# Patient Record
Sex: FEMALE | Race: WHITE | NOT HISPANIC OR LATINO
[De-identification: names, ages, dates, MRNs, and addresses within clinical notes are randomized per-mention and may not be internally consistent; named-entity substitution may affect disease eponyms.]

---

## 2017-02-14 ENCOUNTER — OTHER (OUTPATIENT)
Age: 57
End: 2017-02-14

## 2017-02-27 ENCOUNTER — APPOINTMENT (OUTPATIENT)
Dept: SPINE | Facility: CLINIC | Age: 57
End: 2017-02-27

## 2017-02-27 VITALS
WEIGHT: 195 LBS | DIASTOLIC BLOOD PRESSURE: 90 MMHG | HEIGHT: 66 IN | BODY MASS INDEX: 31.34 KG/M2 | HEART RATE: 71 BPM | SYSTOLIC BLOOD PRESSURE: 147 MMHG

## 2017-02-27 DIAGNOSIS — M48.02 SPINAL STENOSIS, CERVICAL REGION: ICD-10-CM

## 2017-06-05 ENCOUNTER — APPOINTMENT (OUTPATIENT)
Dept: SPINE | Facility: CLINIC | Age: 57
End: 2017-06-05

## 2017-06-05 VITALS
HEART RATE: 72 BPM | DIASTOLIC BLOOD PRESSURE: 79 MMHG | HEIGHT: 66 IN | SYSTOLIC BLOOD PRESSURE: 118 MMHG | WEIGHT: 195 LBS | BODY MASS INDEX: 31.34 KG/M2

## 2017-06-05 RX ORDER — FENOFIBRATE 160 MG/1
160 TABLET ORAL
Qty: 90 | Refills: 0 | Status: ACTIVE | COMMUNITY
Start: 2017-05-15

## 2017-06-05 RX ORDER — NEBIVOLOL HYDROCHLORIDE 10 MG/1
10 TABLET ORAL
Refills: 0 | Status: ACTIVE | COMMUNITY

## 2017-06-05 RX ORDER — AMLODIPINE BESYLATE 5 MG/1
5 TABLET ORAL
Qty: 90 | Refills: 0 | Status: ACTIVE | COMMUNITY
Start: 2017-04-18

## 2017-06-05 RX ORDER — CYCLOBENZAPRINE HYDROCHLORIDE 10 MG/1
10 TABLET, FILM COATED ORAL
Qty: 90 | Refills: 0 | Status: ACTIVE | COMMUNITY
Start: 2016-05-09

## 2017-06-05 RX ORDER — PANTOPRAZOLE 40 MG/1
40 TABLET, DELAYED RELEASE ORAL
Qty: 90 | Refills: 0 | Status: ACTIVE | COMMUNITY
Start: 2017-03-03

## 2017-06-05 RX ORDER — AZILSARTAN KAMEDOXOMIL 80 MG/1
80 TABLET ORAL
Refills: 0 | Status: ACTIVE | COMMUNITY

## 2017-06-05 RX ORDER — EZETIMIBE 10 MG/1
10 TABLET ORAL
Refills: 0 | Status: ACTIVE | COMMUNITY

## 2017-12-04 ENCOUNTER — APPOINTMENT (OUTPATIENT)
Dept: SPINE | Facility: CLINIC | Age: 57
End: 2017-12-04

## 2020-07-14 ENCOUNTER — APPOINTMENT (OUTPATIENT)
Dept: SPINE | Facility: CLINIC | Age: 60
End: 2020-07-14
Payer: COMMERCIAL

## 2020-07-14 VITALS
BODY MASS INDEX: 31.34 KG/M2 | HEIGHT: 66 IN | DIASTOLIC BLOOD PRESSURE: 76 MMHG | TEMPERATURE: 36.4 F | WEIGHT: 195 LBS | OXYGEN SATURATION: 98 % | HEART RATE: 94 BPM | RESPIRATION RATE: 16 BRPM | SYSTOLIC BLOOD PRESSURE: 126 MMHG

## 2020-07-14 PROCEDURE — 99203 OFFICE O/P NEW LOW 30 MIN: CPT

## 2020-07-14 RX ORDER — MULTIVIT-MIN/IRON/FOLIC ACID/K 18-600-40
400 CAPSULE ORAL
Refills: 0 | Status: ACTIVE | COMMUNITY

## 2020-07-14 RX ORDER — ATORVASTATIN CALCIUM 80 MG/1
TABLET, FILM COATED ORAL
Refills: 0 | Status: ACTIVE | COMMUNITY

## 2020-07-14 RX ORDER — ELECTROLYTES/DEXTROSE
SOLUTION, ORAL ORAL
Refills: 0 | Status: ACTIVE | COMMUNITY

## 2020-07-14 RX ORDER — ALPRAZOLAM 0.25 MG/1
0.25 TABLET ORAL
Refills: 0 | Status: ACTIVE | COMMUNITY

## 2020-07-14 RX ORDER — CALCIUM CARBONATE/VITAMIN D3 600 MG-10
TABLET ORAL
Refills: 0 | Status: ACTIVE | COMMUNITY

## 2020-07-14 RX ORDER — DULOXETINE HYDROCHLORIDE 60 MG/1
60 CAPSULE, DELAYED RELEASE PELLETS ORAL
Qty: 180 | Refills: 0 | Status: DISCONTINUED | COMMUNITY
Start: 2017-04-05 | End: 2020-07-14

## 2020-07-14 RX ORDER — DIPHENHYDRAMINE HCL 25 MG
25 CAPSULE ORAL
Refills: 0 | Status: ACTIVE | COMMUNITY

## 2020-07-14 RX ORDER — ASPIRIN 81 MG
81 TABLET, DELAYED RELEASE (ENTERIC COATED) ORAL
Refills: 0 | Status: ACTIVE | COMMUNITY

## 2020-07-14 RX ORDER — DULOXETINE HYDROCHLORIDE 60 MG/1
60 CAPSULE, DELAYED RELEASE ORAL
Refills: 0 | Status: ACTIVE | COMMUNITY

## 2020-07-14 RX ORDER — UBIDECARENONE 200 MG
CAPSULE ORAL
Refills: 0 | Status: ACTIVE | COMMUNITY

## 2020-07-30 ENCOUNTER — OUTPATIENT (OUTPATIENT)
Dept: OUTPATIENT SERVICES | Facility: HOSPITAL | Age: 60
LOS: 1 days | End: 2020-07-30
Payer: COMMERCIAL

## 2020-07-30 ENCOUNTER — APPOINTMENT (OUTPATIENT)
Dept: RADIOLOGY | Facility: CLINIC | Age: 60
End: 2020-07-30
Payer: COMMERCIAL

## 2020-07-30 ENCOUNTER — APPOINTMENT (OUTPATIENT)
Dept: MRI IMAGING | Facility: CLINIC | Age: 60
End: 2020-07-30
Payer: COMMERCIAL

## 2020-07-30 ENCOUNTER — APPOINTMENT (OUTPATIENT)
Dept: CT IMAGING | Facility: CLINIC | Age: 60
End: 2020-07-30
Payer: COMMERCIAL

## 2020-07-30 DIAGNOSIS — Z98.89 OTHER SPECIFIED POSTPROCEDURAL STATES: Chronic | ICD-10-CM

## 2020-07-30 DIAGNOSIS — M48.00 SPINAL STENOSIS, SITE UNSPECIFIED: ICD-10-CM

## 2020-07-30 DIAGNOSIS — M54.5 LOW BACK PAIN: ICD-10-CM

## 2020-07-30 DIAGNOSIS — M41.9 SCOLIOSIS, UNSPECIFIED: ICD-10-CM

## 2020-07-30 PROCEDURE — 72110 X-RAY EXAM L-2 SPINE 4/>VWS: CPT

## 2020-07-30 PROCEDURE — 72148 MRI LUMBAR SPINE W/O DYE: CPT | Mod: 26

## 2020-07-30 PROCEDURE — 72084 X-RAY EXAM ENTIRE SPI 6/> VW: CPT | Mod: 26

## 2020-07-30 PROCEDURE — 72084 X-RAY EXAM ENTIRE SPI 6/> VW: CPT

## 2020-07-30 PROCEDURE — 72131 CT LUMBAR SPINE W/O DYE: CPT | Mod: 26

## 2020-07-30 PROCEDURE — 72148 MRI LUMBAR SPINE W/O DYE: CPT

## 2020-07-30 PROCEDURE — 72131 CT LUMBAR SPINE W/O DYE: CPT

## 2020-07-30 PROCEDURE — 72082 X-RAY EXAM ENTIRE SPI 2/3 VW: CPT

## 2020-07-31 NOTE — REVIEW OF SYSTEMS
[Tingling] : tingling [Numbness] : numbness [Difficulty Walking] : difficulty walking [Negative] : Endocrine [de-identified] : lower back pain and bilateral leg pain

## 2020-07-31 NOTE — PHYSICAL EXAM
[General Appearance - Alert] : alert [General Appearance - In No Acute Distress] : in no acute distress [General Appearance - Well Nourished] : well nourished [Impaired Insight] : insight and judgment were intact [Oriented To Time, Place, And Person] : oriented to person, place, and time [General Appearance - Well Developed] : well developed [Affect] : the affect was normal [Mood] : the mood was normal [Person] : oriented to person [Place] : oriented to place [Time] : oriented to time [Remote Intact] : remote memory intact [Short Term Intact] : short term memory intact [Registration Intact] : recent registration memory intact [Span Intact] : the attention span was normal [Concentration Intact] : normal concentrating ability [Visual Intact] : visual attention was ~T not ~L decreased [Fluency] : fluency intact [Comprehension] : comprehension intact [Reading] : reading intact [Current Events] : adequate knowledge of current events [Past History] : adequate knowledge of personal past history [Cranial Nerves Accessory (XI - Cranial And Spinal)] : head turning and shoulder shrug symmetric [Cranial Nerves Oculomotor (III)] : extraocular motion intact [Vocabulary] : adequate range of vocabulary [Cranial Nerves Hypoglossal (XII)] : there was no tongue deviation with protrusion [Motor Tone] : muscle tone was normal in all four extremities [Involuntary Movements] : no involuntary movements were seen [Motor Strength] : muscle strength was normal in all four extremities [No Muscle Atrophy] : normal bulk in all four extremities [Sensation Tactile Decrease] : light touch was intact [Dysesthesia] : dysesthesia was present [Sensation Pain / Temperature Decrease] : pain and temperature was intact [Balance] : balance was intact [2+] : Ankle jerk left 2+ [No Visual Abnormalities] : no visible abnormailities [Able to toe walk] : the patient was able to toe walk [Normal] : normal [Able to heel walk] : the patient was able to heel walk [Outer Ear] : the ears and nose were normal in appearance [Sclera] : the sclera and conjunctiva were normal [Neck Appearance] : the appearance of the neck was normal [] : no respiratory distress [Skin Color & Pigmentation] : normal skin color and pigmentation [Abnormal Walk] : normal gait [Plantar Reflex Right Only] : normal on the right [Coordination - Dysmetria Impaired Finger-to-Nose Bilateral] : not present [Limited Balance] : balance was intact [Plantar Reflex Left Only] : normal on the left [___] : absent on the right [___] : absent on the left [Straight-Leg Raise Test - Left] : straight leg raise of the left leg was negative [Straight-Leg Raise Test - Right] : straight leg raise  of the right leg was negative

## 2020-07-31 NOTE — ASSESSMENT
[FreeTextEntry1] : 59 year old with lumbar stenosis and progressive lower back pain with leg radicular pain.  Neurological exam is normal.  Lumbar MRI from 2015 shows multiple levels of degenerative disease and severe scoliosis and impingement of cauda equina at L4 L5.  Severe canal stenosis at L2 L3 and L3 L4.  An updated MRI  of te lumbar spine was ordered with additional testing including a scoliosis image, flexion/extension lumbar xrays, CT scan of the lower spine.   She will return in two weeks for review.  She will avoid heavy lifting and continue light stretching.

## 2020-07-31 NOTE — HISTORY OF PRESENT ILLNESS
[> 3 months] : more  than 3 months [FreeTextEntry1] : Lower back pain and  bilateral leg pain  [de-identified] : Today I had the pleasure of  seeing Ms Pang in consultation who was seen five years ago following a cervical fusion of C 4 to T1.  She has no neck or arm radicular pain, but does  carry a chronic complaint of lower back pain with leg radiculopathy bilaterally.  Her back pain began eight years ago  which was isolated to the right buttock area and she was diagnosed with lumbar stenosis and scoliosis. The pain in the last month has progressed and increased in severity. The pain ranges from a 2/10 to a 7/10.  She was given an epidural which was not effective and after a few weeks after the injection, she had a shingles event of her right hip.  This has resolved and she has not had any post herpetic pain.\par \par Today she reports lower back pain across the back and bilaterally leg pain for close to 20 years.  The pain is radiating from the back and into the legs, there is tingling and numbness of the left leg and an achy pain described on the right leg.  The right leg also has a sharp shooting pain that starts in the buttock area and radiates into the entire the leg.   The pain does radiate into each foot with burning dysesthesia of the foot.   She has trouble walking and can only navigate a block at at time.  Sitting and standing is difficult.  If she squats and curls in a fetal position the pain subsides.  No leg weakness is reported and no stool or urine incontinence is noted.  Two months ago she went to a chiropractor which helped alleviate the pain, but was self limiting.  Cyclobenzaprine and cymbalta does improve her pain as well.   She has not gone to PT.  \par \par

## 2020-08-03 ENCOUNTER — APPOINTMENT (OUTPATIENT)
Dept: SPINE | Facility: CLINIC | Age: 60
End: 2020-08-03
Payer: COMMERCIAL

## 2020-08-03 VITALS
OXYGEN SATURATION: 97 % | BODY MASS INDEX: 31.34 KG/M2 | TEMPERATURE: 98.5 F | DIASTOLIC BLOOD PRESSURE: 81 MMHG | HEART RATE: 78 BPM | HEIGHT: 66 IN | WEIGHT: 195 LBS | SYSTOLIC BLOOD PRESSURE: 130 MMHG | RESPIRATION RATE: 16 BRPM

## 2020-08-03 PROCEDURE — 99213 OFFICE O/P EST LOW 20 MIN: CPT

## 2020-08-03 NOTE — REASON FOR VISIT
[Follow-Up: _____] : a [unfilled] follow-up visit [Other: _____] : [unfilled] [FreeTextEntry1] : 60 year old female with a history of chronic lumbar back pain that has progressed in nature and severity over a 5 year period. She has difficulty walking and  performing activities.   Her pain is rated today at  1 4/10.    On MRI of the lumbar region severe Spondylolisthesis L4 L5 and nerve compression is present.   There is a lack of curvature and vacuum disc disease at multiple levels.  In the past she had undergone a C 4 to T 1 fusion which she has recovered well from and has no upper extremity or neck complaints.  T1 pelvic angle is 30°, SVA =8 CMS and she needs about 20° of lumbar lordosis.  Also in addition to multilevel lumbar stenosis at L2-L5 levels will be significant stenosis at T10-11.

## 2020-08-03 NOTE — REVIEW OF SYSTEMS
[Numbness] : numbness [Difficulty Walking] : difficulty walking [Tingling] : tingling [Negative] : Endocrine [de-identified] : lower back pain and leg pain

## 2020-08-03 NOTE — ASSESSMENT
[FreeTextEntry1] : An extensive discussion for a two staged thoracic - lumbar fusion surgical procedure was discussed.  First be a lateral interbody fusion from L1 to L4-5 though there is a teardrop psoas at the L4-5 level so that may not be able to be done. Stage IIB decompressive laminectomy the thoracic and lumbar area along with T9 the pelvis posterior fusion .A Thoracic MRI and CT was ordered to evaluate additional stenosis and possible compression.  Ms. Pang has significant limitations with her daily activity and is willing to  undergo surgery .  She will begin the process of scheduling.

## 2020-08-03 NOTE — DATA REVIEWED
[de-identified] : 7/30/20202 CT scan of lumbar region from [facility]  [de-identified] : 7/30/2020 MRI of lumbar region from [facility]  [de-identified] : 7/30/2020  Scoliosis image from [facility]

## 2020-08-20 ENCOUNTER — OUTPATIENT (OUTPATIENT)
Dept: OUTPATIENT SERVICES | Facility: HOSPITAL | Age: 60
LOS: 1 days | End: 2020-08-20
Payer: COMMERCIAL

## 2020-08-20 ENCOUNTER — APPOINTMENT (OUTPATIENT)
Dept: MRI IMAGING | Facility: CLINIC | Age: 60
End: 2020-08-20
Payer: COMMERCIAL

## 2020-08-20 ENCOUNTER — APPOINTMENT (OUTPATIENT)
Dept: CT IMAGING | Facility: CLINIC | Age: 60
End: 2020-08-20
Payer: COMMERCIAL

## 2020-08-20 DIAGNOSIS — Z98.89 OTHER SPECIFIED POSTPROCEDURAL STATES: Chronic | ICD-10-CM

## 2020-08-20 DIAGNOSIS — M48.00 SPINAL STENOSIS, SITE UNSPECIFIED: ICD-10-CM

## 2020-08-20 PROCEDURE — 72146 MRI CHEST SPINE W/O DYE: CPT | Mod: 26

## 2020-08-20 PROCEDURE — 72128 CT CHEST SPINE W/O DYE: CPT

## 2020-08-20 PROCEDURE — 72128 CT CHEST SPINE W/O DYE: CPT | Mod: 26

## 2020-08-20 PROCEDURE — 72146 MRI CHEST SPINE W/O DYE: CPT

## 2020-08-31 ENCOUNTER — APPOINTMENT (OUTPATIENT)
Dept: SPINE | Facility: CLINIC | Age: 60
End: 2020-08-31
Payer: MEDICARE

## 2020-08-31 DIAGNOSIS — Z98.890 OTHER SPECIFIED POSTPROCEDURAL STATES: ICD-10-CM

## 2020-08-31 PROCEDURE — 99212 OFFICE O/P EST SF 10 MIN: CPT | Mod: 95

## 2020-08-31 NOTE — ASSESSMENT
[FreeTextEntry1] : Long standing lower back pain and leg radicular pain.   Multiple levels of stenosis and a  T 8 to pelvic fusion was discussed and all alternatives and risks with benefits was reviewed and understood.  She is scheduled in October for surgical repair.

## 2020-08-31 NOTE — HISTORY OF PRESENT ILLNESS
[Home] : at home, [unfilled] , at the time of the visit. [Medical Office: ([facility])___] : at the medical office located in  [Other:____] : [unfilled] [Verbal consent obtained from patient] : the patient, [unfilled] [FreeTextEntry4] : Kim Lombardo [FreeTextEntry1] : 60 year old female with a history of lower back pain with bilateral leg pain.  The pain is progressing in nature and sitting and sanding is difficult.  She was evaluated and a two staged procedure was recommended for an extensive spinal  fusion of the thoraco-lumbar region.  Images of the thoracic spine was discussed.

## 2020-08-31 NOTE — REVIEW OF SYSTEMS
[Numbness] : numbness [Tingling] : tingling [Difficulty Walking] : difficulty walking [Negative] : Endocrine [de-identified] : lower back pain

## 2020-08-31 NOTE — REASON FOR VISIT
[Follow-Up: _____] : a [unfilled] follow-up visit [FreeTextEntry1] : A recent MRI of the thoracic spine shows multilevel thoracic stenosis which will need to be addressed during the second stage of her surgery.

## 2020-09-22 ENCOUNTER — OUTPATIENT (OUTPATIENT)
Dept: OUTPATIENT SERVICES | Facility: HOSPITAL | Age: 60
LOS: 1 days | End: 2020-09-22
Payer: COMMERCIAL

## 2020-09-22 VITALS
HEART RATE: 76 BPM | WEIGHT: 199.96 LBS | TEMPERATURE: 98 F | SYSTOLIC BLOOD PRESSURE: 158 MMHG | OXYGEN SATURATION: 97 % | DIASTOLIC BLOOD PRESSURE: 88 MMHG | HEIGHT: 65 IN | RESPIRATION RATE: 20 BRPM

## 2020-09-22 DIAGNOSIS — Z29.9 ENCOUNTER FOR PROPHYLACTIC MEASURES, UNSPECIFIED: ICD-10-CM

## 2020-09-22 DIAGNOSIS — M48.061 SPINAL STENOSIS, LUMBAR REGION WITHOUT NEUROGENIC CLAUDICATION: ICD-10-CM

## 2020-09-22 DIAGNOSIS — I10 ESSENTIAL (PRIMARY) HYPERTENSION: ICD-10-CM

## 2020-09-22 DIAGNOSIS — M41.9 SCOLIOSIS, UNSPECIFIED: ICD-10-CM

## 2020-09-22 DIAGNOSIS — Z01.818 ENCOUNTER FOR OTHER PREPROCEDURAL EXAMINATION: ICD-10-CM

## 2020-09-22 DIAGNOSIS — Z98.890 OTHER SPECIFIED POSTPROCEDURAL STATES: Chronic | ICD-10-CM

## 2020-09-22 DIAGNOSIS — Z98.89 OTHER SPECIFIED POSTPROCEDURAL STATES: Chronic | ICD-10-CM

## 2020-09-22 LAB
A1C WITH ESTIMATED AVERAGE GLUCOSE RESULT: 6.2 % — HIGH (ref 4–5.6)
ANION GAP SERPL CALC-SCNC: 12 MMOL/L — SIGNIFICANT CHANGE UP (ref 5–17)
BLD GP AB SCN SERPL QL: NEGATIVE — SIGNIFICANT CHANGE UP
BUN SERPL-MCNC: 26 MG/DL — HIGH (ref 7–23)
CALCIUM SERPL-MCNC: 10.4 MG/DL — SIGNIFICANT CHANGE UP (ref 8.4–10.5)
CHLORIDE SERPL-SCNC: 106 MMOL/L — SIGNIFICANT CHANGE UP (ref 96–108)
CO2 SERPL-SCNC: 24 MMOL/L — SIGNIFICANT CHANGE UP (ref 22–31)
CREAT SERPL-MCNC: 0.86 MG/DL — SIGNIFICANT CHANGE UP (ref 0.5–1.3)
ESTIMATED AVERAGE GLUCOSE: 131 MG/DL — HIGH (ref 68–114)
GLUCOSE SERPL-MCNC: 139 MG/DL — HIGH (ref 70–99)
HCT VFR BLD CALC: 48.8 % — HIGH (ref 34.5–45)
HGB BLD-MCNC: 15.7 G/DL — HIGH (ref 11.5–15.5)
MCHC RBC-ENTMCNC: 29.6 PG — SIGNIFICANT CHANGE UP (ref 27–34)
MCHC RBC-ENTMCNC: 32.2 GM/DL — SIGNIFICANT CHANGE UP (ref 32–36)
MCV RBC AUTO: 92.1 FL — SIGNIFICANT CHANGE UP (ref 80–100)
MRSA PCR RESULT.: SIGNIFICANT CHANGE UP
NRBC # BLD: 0 /100 WBCS — SIGNIFICANT CHANGE UP (ref 0–0)
PLATELET # BLD AUTO: 292 K/UL — SIGNIFICANT CHANGE UP (ref 150–400)
POTASSIUM SERPL-MCNC: 4.1 MMOL/L — SIGNIFICANT CHANGE UP (ref 3.5–5.3)
POTASSIUM SERPL-SCNC: 4.1 MMOL/L — SIGNIFICANT CHANGE UP (ref 3.5–5.3)
RBC # BLD: 5.3 M/UL — HIGH (ref 3.8–5.2)
RBC # FLD: 13.2 % — SIGNIFICANT CHANGE UP (ref 10.3–14.5)
RH IG SCN BLD-IMP: POSITIVE — SIGNIFICANT CHANGE UP
S AUREUS DNA NOSE QL NAA+PROBE: SIGNIFICANT CHANGE UP
SODIUM SERPL-SCNC: 142 MMOL/L — SIGNIFICANT CHANGE UP (ref 135–145)
WBC # BLD: 5.12 K/UL — SIGNIFICANT CHANGE UP (ref 3.8–10.5)
WBC # FLD AUTO: 5.12 K/UL — SIGNIFICANT CHANGE UP (ref 3.8–10.5)

## 2020-09-22 PROCEDURE — 87640 STAPH A DNA AMP PROBE: CPT

## 2020-09-22 PROCEDURE — 86901 BLOOD TYPING SEROLOGIC RH(D): CPT

## 2020-09-22 PROCEDURE — 80048 BASIC METABOLIC PNL TOTAL CA: CPT

## 2020-09-22 PROCEDURE — 85027 COMPLETE CBC AUTOMATED: CPT

## 2020-09-22 PROCEDURE — 86850 RBC ANTIBODY SCREEN: CPT

## 2020-09-22 PROCEDURE — G0463: CPT

## 2020-09-22 PROCEDURE — 87641 MR-STAPH DNA AMP PROBE: CPT

## 2020-09-22 PROCEDURE — 83036 HEMOGLOBIN GLYCOSYLATED A1C: CPT

## 2020-09-22 PROCEDURE — 86900 BLOOD TYPING SEROLOGIC ABO: CPT

## 2020-09-22 RX ORDER — VANCOMYCIN HCL 1 G
1500 VIAL (EA) INTRAVENOUS ONCE
Refills: 0 | Status: DISCONTINUED | OUTPATIENT
Start: 2020-09-29 | End: 2020-10-06

## 2020-09-22 NOTE — H&P PST ADULT - NSICDXPASTMEDICALHX_GEN_ALL_CORE_FT
PAST MEDICAL HISTORY:  Anxiety and depression     Eczema extremities    GERD (gastroesophageal reflux disease)     H/O sinusitis     History of sciatica mostly right side    HTN (hypertension)     Hyperlipidemia     Meniscus tear left knee    Migraine     Scoliosis     Seasonal allergies     Spinal stenosis in cervical region      PAST MEDICAL HISTORY:  Anxiety and depression     Eczema extremities    GERD (gastroesophageal reflux disease)     H/O sinusitis     History of sciatica mostly right side    HTN (hypertension)     Hyperlipidemia     Lumbar spinal stenosis     Meniscus tear left knee    Migraine     Scoliosis     Seasonal allergies     Spinal stenosis in cervical region

## 2020-09-22 NOTE — H&P PST ADULT - ASSESSMENT
CAPRINI SCORE [CLOT updated 18]    AGE RELATED RISK FACTORS                                                       MOBILITY RELATED FACTORS  [1 ] Age 41-60 years                                            (1 Point)                    [ ] Bed rest                                                        (1 Point)  [ ] Age: 61-74 years                                           (2 Points)                  [ ] Plaster cast                                                   (2 Points)  [ ] Age= 75 years                                              (3 Points)                    [ ] Bed bound for more than 72 hours                 (2 Points)    DISEASE RELATED RISK FACTORS                                               GENDER SPECIFIC FACTORS  [ ] Edema in the lower extremities                       (1 Point)              [ ] Pregnancy                                                     (1 Point)  [ ] Varicose veins                                               (1 Point)                     [ ] Post-partum < 6 weeks                                   (1 Point)             [1] BMI > 25 Kg/m2                                            (1 Point)                     [ ] Hormonal therapy  or oral contraception          (1 Point)                 [ ] Sepsis (in the previous month)                        (1 Point)               [ ] History of pregnancy complications                 (1 point)  [ ] Pneumonia or serious lung disease                                               [ ] Unexplained or recurrent                     (1 Point)           (in the previous month)                               (1 Point)  [ ] Abnormal pulmonary function test                     (1 Point)                 SURGERY RELATED RISK FACTORS  [ ] Acute myocardial infarction                              (1 Point)               [ ]  Section                                             (1 Point)  [ ] Congestive heart failure (in the previous month)  (1 Point)      [ ] Minor surgery                                                  (1 Point)   [ ] Inflammatory bowel disease                             (1 Point)               [ ] Arthroscopic surgery                                        (2 Points)  [ ] Central venous access                                      (2 Points)                [ 2] General surgery lasting more than 45 minutes (2 points)  [ ] Present or previous malignancy                     (2 Points)                [ ] Elective arthroplasty                                         (5 points)    [ ] Stroke (in the previous month)                          (5 Points)                                                                                                                                                           HEMATOLOGY RELATED FACTORS                                                 TRAUMA RELATED RISK FACTORS  [ ] Prior episodes of VTE                                     (3 Points)                [ ] Fracture of the hip, pelvis, or leg                       (5 Points)  [ ] Positive family history for VTE                         (3 Points)             [ ] Acute spinal cord injury (in the previous month)  (5 Points)  [ ] Prothrombin 40121 A                                     (3 Points)               [ ] Paralysis  (less than 1 month)                             (5 Points)  [ ] Factor V Leiden                                             (3 Points)                  [ ] Multiple Trauma within 1 month                        (5 Points)  [ ] Lupus anticoagulants                                     (3 Points)                                                           [ ] Anticardiolipin antibodies                               (3 Points)                                                       [ ] High homocysteine in the blood                      (3 Points)                                             [ ] Other congenital or acquired thrombophilia      (3 Points)                                                [ ] Heparin induced thrombocytopenia                  (3 Points)                                     Total Score [ 4]

## 2020-09-22 NOTE — H&P PST ADULT - NS PRO FEM  PAP SMEARS 3YRS
"                                                    Physical Therapy Daily Note/ Plan of Care     Name: Kamla Espinoza  Clinic Number: [id number]  Diagnosis:   Encounter Diagnoses   Name Primary?    Chronic pain of both knees     Difficulty walking     Decreased range of motion (ROM) of both knees      Physician: Raffaele Saucedo MD  Precautions: diabetes and standard  Visit #: 9 of 20    Time In: 2:55 PM  Time Out: 3:55 PM  1:1 treatment time: 60 minutes    Visit amount: 121.28  Total amount: ~749.4     Initial Evaluation Date: 8/28/19  POC Expiration: 10/17/19, 11/19/19    Subjective     Pt reports that her knees are feeling good right now.  She is not in any pain.    Objective       Range of Motion: Knee    RIGHT LEFT   Flexion: 90 95   Extension -10 -10      Strength: Knee and Ankle    RIGHT  LIEFT   Quadriceps  Knee Extension 4/5 4/5   Hamstrings  Knee Flexion 4/5 4/5   Gastroc/Soleus  Plantarflexion 4/5 4/5   Anterior Tibialis  Dorsiflexion    4+/5 4+/5         Strength: Hip     RIGHT  LEFT   Hip Flexion 4-/5 4-/5   Abduction 4-/5 4-/5   IR 4-/5 4-/5   ER 4/5 4/5   Ext 3+/5 3+/5         Kamla received individual therapeutic exercises and assessment to develop strength, endurance, ROM and flexibility for 60 minutes including:    Quad sets 2 minutes c/ 5" holds  SAQ 2 x 10 c/ 2# ankle weights  SLR 2 x 10 c/ 2# ankle weights  Glute sets 2 x 10 c/ 5" holds  Seated heel slides 20 x 5" hold  Seated marches x 20 B c/ 2# ankle weights  Seated heel/toe raises 3 x 10  Seated add squeezes 5" hold x 2 minutes  Seated clamshells c/ OTB x 20  LAQ 3 x 10 c/ 2# ankle weights  Seated HSS 3 x 30"  Seated knee extension stretch c/ foot on stool and 4# ankle weights above and below joint line x 2 min B  Gastroc wedge stretch 2 x 30"  Standing heel raises 2 x 10 c/ 2# ankle weights  Standing HS curls 2 x 10 c/ 2# ankle weights  Step ups on airex x 20  Steamboats 1 x 10 B c/ 2# ankle weights      Written Home Exercises " Provided: at al  Pt demo good understanding of the education provided. Kamla demonstrated good return demonstration of activities.     Education provided re: importance of improving strength and mobility  Kamla verbalized good understanding of education provided.   No spiritual or educational barriers to learning provided    Assessment     Pt able to tolerate all interventions well without any complaints of pain or discomfort. Pt c/ improved BLE strength and knee ROM, however remains significantly limited c/ both.  Pt will continue to benefit from outpatient PT until surgery date. Continue to progress based on pt's tolerance.    This is a 69 y.o. female referred to outpatient physical therapy and presents with a medical diagnosis of B knee OA and demonstrates limitations as described in the problem list. Pt prognosis is Good. Pt will continue to benefit from skilled outpatient physical therapy to address the deficits listed in the problem list, provide pt/family education and to maximize pt's level of independence in the home and community environment.     Goals:  Short Term Goals:   1. Increase B knee AROM -10-89 degrees , pain-free, within 3 weeks to restore normal functional mobility (Met 10/24/19)  2. Patient able to tolerate at least 5 minutes sustained standing/walking with B knee pain less than 6/10 within 3 weeks  (Met 10/24/19)     Long Term Goals  1. Patient to report B knee pain less than 7/10 at all times within 6 weeks for improved celina to sustained activity (Met 10/24/19)  2. Patient able to tolerate at least 10 minutes sustained standing/walking with R knee pain less than 6/10 within 6 weeks to improve functional mobility (Not Met)  3. Increase B knee AROM to -5 to 100 degrees within 6 weeks to improve mobility prior to upcoming TKA's( Not Met)  4. Increase B LE strength to at least 4-/5 throughout within 8 weeks for improved endurance with sustained activity (Not Met)     Plan     Extend plan of  care for an additional 2x/wk until time of surgery on 11/19/19    Therapist: Drew Dubose PT,DPT  10/24/2019   yes

## 2020-09-22 NOTE — H&P PST ADULT - NSICDXPASTSURGICALHX_GEN_ALL_CORE_FT
PAST SURGICAL HISTORY:  H/O cervical spine surgery C3-6   11/2015    History of appendectomy     History of tonsillectomy

## 2020-09-22 NOTE — H&P PST ADULT - NSICDXFAMILYHX_GEN_ALL_CORE_FT
FAMILY HISTORY:  Family history of atrial fibrillation, mother  age 94  Family hx of hypertension, brother age 65  FHx: type 2 diabetes mellitus, brother age 65 alive

## 2020-09-22 NOTE — H&P PST ADULT - NSICDXPROBLEM_GEN_ALL_CORE_FT
PROBLEM DIAGNOSES  Problem: Lumbar spinal stenosis  Assessment and Plan: scheduled for stage 1 / L1-5 lumbar lateral interbody fusion   both verbal and written preop instruction given, patient verbalized understanding.  chlorhexidine wash instruction given    Problem: Hypertension  Assessment and Plan: instructed to continue antihypertensive medication eduardo-op  scheduled for evaluation with her PCP on 9/23/2020    Problem: Need for prophylactic measure  Assessment and Plan: The Caprini score indicates this patient is at risk for a VTE event (score 3-5).  Most surgical patients in this group would benefit from pharmacologic prophylaxis.  The surgical team will determine the balance between VTE risk and bleeding risk

## 2020-09-22 NOTE — H&P PST ADULT - HISTORY OF PRESENT ILLNESS
60 year old female with h/o scoliosis, presents to preadmission testing for scheduled stage 1, L1-5 lumbar lateral inter 60 year old female with h/o scoliosis/ spinal stenosis- lumbar region, presents to preadmission testing for scheduled stage 1, L1-5 lumbar lateral interbody fusion, scheduled for 9/29/2020. Her medical history includes HTN, hyperlipidemia, osteoarthritis, anxiety/depression disorders, GERD, eczema, sinusitis.    Scheduled for COVID-19 swab on 9/26/2020.

## 2020-09-25 DIAGNOSIS — Z01.818 ENCOUNTER FOR OTHER PREPROCEDURAL EXAMINATION: ICD-10-CM

## 2020-09-25 PROBLEM — Z87.09 PERSONAL HISTORY OF OTHER DISEASES OF THE RESPIRATORY SYSTEM: Chronic | Status: ACTIVE | Noted: 2020-09-22

## 2020-09-25 PROBLEM — M48.061 SPINAL STENOSIS, LUMBAR REGION WITHOUT NEUROGENIC CLAUDICATION: Chronic | Status: ACTIVE | Noted: 2020-09-22

## 2020-09-25 PROBLEM — L30.9 DERMATITIS, UNSPECIFIED: Chronic | Status: ACTIVE | Noted: 2020-09-22

## 2020-09-25 PROBLEM — Z86.69 PERSONAL HISTORY OF OTHER DISEASES OF THE NERVOUS SYSTEM AND SENSE ORGANS: Chronic | Status: ACTIVE | Noted: 2020-09-22

## 2020-09-26 ENCOUNTER — TRANSCRIPTION ENCOUNTER (OUTPATIENT)
Age: 60
End: 2020-09-26

## 2020-09-26 ENCOUNTER — APPOINTMENT (OUTPATIENT)
Dept: DISASTER EMERGENCY | Facility: CLINIC | Age: 60
End: 2020-09-26

## 2020-09-26 LAB — SARS-COV-2 N GENE NPH QL NAA+PROBE: NOT DETECTED

## 2020-09-28 ENCOUNTER — TRANSCRIPTION ENCOUNTER (OUTPATIENT)
Age: 60
End: 2020-09-28

## 2020-09-29 ENCOUNTER — APPOINTMENT (OUTPATIENT)
Dept: SPINE | Facility: HOSPITAL | Age: 60
End: 2020-09-29

## 2020-09-29 ENCOUNTER — INPATIENT (INPATIENT)
Facility: HOSPITAL | Age: 60
LOS: 10 days | Discharge: OTHER INSTITUTION | DRG: 453 | End: 2020-10-10
Attending: NEUROLOGICAL SURGERY | Admitting: NEUROLOGICAL SURGERY
Payer: COMMERCIAL

## 2020-09-29 ENCOUNTER — TRANSCRIPTION ENCOUNTER (OUTPATIENT)
Age: 60
End: 2020-09-29

## 2020-09-29 VITALS
DIASTOLIC BLOOD PRESSURE: 90 MMHG | SYSTOLIC BLOOD PRESSURE: 133 MMHG | OXYGEN SATURATION: 96 % | HEIGHT: 65 IN | RESPIRATION RATE: 18 BRPM | TEMPERATURE: 98 F | HEART RATE: 75 BPM | WEIGHT: 199.96 LBS

## 2020-09-29 DIAGNOSIS — Z98.89 OTHER SPECIFIED POSTPROCEDURAL STATES: Chronic | ICD-10-CM

## 2020-09-29 DIAGNOSIS — Z98.890 OTHER SPECIFIED POSTPROCEDURAL STATES: Chronic | ICD-10-CM

## 2020-09-29 DIAGNOSIS — M41.9 SCOLIOSIS, UNSPECIFIED: ICD-10-CM

## 2020-09-29 LAB
A1C WITH ESTIMATED AVERAGE GLUCOSE RESULT: 6.4 % — HIGH (ref 4–5.6)
ANION GAP SERPL CALC-SCNC: 11 MMOL/L — SIGNIFICANT CHANGE UP (ref 5–17)
APTT BLD: 39.6 SEC — HIGH (ref 27.5–35.5)
BASOPHILS # BLD AUTO: 0.03 K/UL — SIGNIFICANT CHANGE UP (ref 0–0.2)
BASOPHILS NFR BLD AUTO: 0.2 % — SIGNIFICANT CHANGE UP (ref 0–2)
BUN SERPL-MCNC: 29 MG/DL — HIGH (ref 7–23)
CALCIUM SERPL-MCNC: 9.4 MG/DL — SIGNIFICANT CHANGE UP (ref 8.4–10.5)
CHLORIDE SERPL-SCNC: 104 MMOL/L — SIGNIFICANT CHANGE UP (ref 96–108)
CO2 SERPL-SCNC: 24 MMOL/L — SIGNIFICANT CHANGE UP (ref 22–31)
CREAT SERPL-MCNC: 0.87 MG/DL — SIGNIFICANT CHANGE UP (ref 0.5–1.3)
EOSINOPHIL # BLD AUTO: 0.02 K/UL — SIGNIFICANT CHANGE UP (ref 0–0.5)
EOSINOPHIL NFR BLD AUTO: 0.1 % — SIGNIFICANT CHANGE UP (ref 0–6)
ESTIMATED AVERAGE GLUCOSE: 137 MG/DL — HIGH (ref 68–114)
GAS PNL BLDA: SIGNIFICANT CHANGE UP
GLUCOSE BLDC GLUCOMTR-MCNC: 123 MG/DL — HIGH (ref 70–99)
GLUCOSE SERPL-MCNC: 178 MG/DL — HIGH (ref 70–99)
HCT VFR BLD CALC: 44.3 % — SIGNIFICANT CHANGE UP (ref 34.5–45)
HGB BLD-MCNC: 14.3 G/DL — SIGNIFICANT CHANGE UP (ref 11.5–15.5)
IMM GRANULOCYTES NFR BLD AUTO: 1 % — SIGNIFICANT CHANGE UP (ref 0–1.5)
INR BLD: 1.11 RATIO — SIGNIFICANT CHANGE UP (ref 0.88–1.16)
LYMPHOCYTES # BLD AUTO: 1.22 K/UL — SIGNIFICANT CHANGE UP (ref 1–3.3)
LYMPHOCYTES # BLD AUTO: 8.8 % — LOW (ref 13–44)
MCHC RBC-ENTMCNC: 29.6 PG — SIGNIFICANT CHANGE UP (ref 27–34)
MCHC RBC-ENTMCNC: 32.3 GM/DL — SIGNIFICANT CHANGE UP (ref 32–36)
MCV RBC AUTO: 91.7 FL — SIGNIFICANT CHANGE UP (ref 80–100)
MONOCYTES # BLD AUTO: 0.88 K/UL — SIGNIFICANT CHANGE UP (ref 0–0.9)
MONOCYTES NFR BLD AUTO: 6.4 % — SIGNIFICANT CHANGE UP (ref 2–14)
NEUTROPHILS # BLD AUTO: 11.54 K/UL — HIGH (ref 1.8–7.4)
NEUTROPHILS NFR BLD AUTO: 83.5 % — HIGH (ref 43–77)
NRBC # BLD: 0 /100 WBCS — SIGNIFICANT CHANGE UP (ref 0–0)
PLATELET # BLD AUTO: 282 K/UL — SIGNIFICANT CHANGE UP (ref 150–400)
POTASSIUM SERPL-MCNC: 4.5 MMOL/L — SIGNIFICANT CHANGE UP (ref 3.5–5.3)
POTASSIUM SERPL-SCNC: 4.5 MMOL/L — SIGNIFICANT CHANGE UP (ref 3.5–5.3)
PROTHROM AB SERPL-ACNC: 13.2 SEC — SIGNIFICANT CHANGE UP (ref 10.6–13.6)
RBC # BLD: 4.83 M/UL — SIGNIFICANT CHANGE UP (ref 3.8–5.2)
RBC # FLD: 13.4 % — SIGNIFICANT CHANGE UP (ref 10.3–14.5)
RH IG SCN BLD-IMP: POSITIVE — SIGNIFICANT CHANGE UP
SODIUM SERPL-SCNC: 139 MMOL/L — SIGNIFICANT CHANGE UP (ref 135–145)
WBC # BLD: 13.83 K/UL — HIGH (ref 3.8–10.5)
WBC # FLD AUTO: 13.83 K/UL — HIGH (ref 3.8–10.5)

## 2020-09-29 PROCEDURE — 22853 INSJ BIOMECHANICAL DEVICE: CPT

## 2020-09-29 PROCEDURE — 72128 CT CHEST SPINE W/O DYE: CPT | Mod: 26

## 2020-09-29 PROCEDURE — 22585 ARTHRD ANT NTRBD MIN DSC EA: CPT

## 2020-09-29 PROCEDURE — 72131 CT LUMBAR SPINE W/O DYE: CPT | Mod: 26

## 2020-09-29 PROCEDURE — 22558 ARTHRD ANT NTRBD MIN DSC LUM: CPT

## 2020-09-29 PROCEDURE — 99233 SBSQ HOSP IP/OBS HIGH 50: CPT

## 2020-09-29 RX ORDER — ONDANSETRON 8 MG/1
4 TABLET, FILM COATED ORAL
Refills: 0 | Status: DISCONTINUED | OUTPATIENT
Start: 2020-09-29 | End: 2020-09-29

## 2020-09-29 RX ORDER — DULOXETINE HYDROCHLORIDE 30 MG/1
60 CAPSULE, DELAYED RELEASE ORAL DAILY
Refills: 0 | Status: DISCONTINUED | OUTPATIENT
Start: 2020-09-29 | End: 2020-09-29

## 2020-09-29 RX ORDER — ACETAMINOPHEN 500 MG
1000 TABLET ORAL ONCE
Refills: 0 | Status: DISCONTINUED | OUTPATIENT
Start: 2020-09-29 | End: 2020-09-29

## 2020-09-29 RX ORDER — HYDROMORPHONE HYDROCHLORIDE 2 MG/ML
0.25 INJECTION INTRAMUSCULAR; INTRAVENOUS; SUBCUTANEOUS ONCE
Refills: 0 | Status: DISCONTINUED | OUTPATIENT
Start: 2020-09-29 | End: 2020-09-29

## 2020-09-29 RX ORDER — NALOXONE HYDROCHLORIDE 4 MG/.1ML
0.1 SPRAY NASAL
Refills: 0 | Status: DISCONTINUED | OUTPATIENT
Start: 2020-09-29 | End: 2020-09-29

## 2020-09-29 RX ORDER — HYDROMORPHONE HYDROCHLORIDE 2 MG/ML
30 INJECTION INTRAMUSCULAR; INTRAVENOUS; SUBCUTANEOUS
Refills: 0 | Status: DISCONTINUED | OUTPATIENT
Start: 2020-09-29 | End: 2020-09-30

## 2020-09-29 RX ORDER — SODIUM CHLORIDE 9 MG/ML
3 INJECTION INTRAMUSCULAR; INTRAVENOUS; SUBCUTANEOUS EVERY 8 HOURS
Refills: 0 | Status: DISCONTINUED | OUTPATIENT
Start: 2020-09-29 | End: 2020-09-29

## 2020-09-29 RX ORDER — DEXAMETHASONE 0.5 MG/5ML
4 ELIXIR ORAL EVERY 6 HOURS
Refills: 0 | Status: COMPLETED | OUTPATIENT
Start: 2020-09-29 | End: 2020-09-30

## 2020-09-29 RX ORDER — MAGNESIUM OXIDE 400 MG ORAL TABLET 241.3 MG
400 TABLET ORAL DAILY
Refills: 0 | Status: DISCONTINUED | OUTPATIENT
Start: 2020-09-29 | End: 2020-10-10

## 2020-09-29 RX ORDER — ONDANSETRON 8 MG/1
4 TABLET, FILM COATED ORAL EVERY 6 HOURS
Refills: 0 | Status: DISCONTINUED | OUTPATIENT
Start: 2020-09-29 | End: 2020-10-06

## 2020-09-29 RX ORDER — NALOXONE HYDROCHLORIDE 4 MG/.1ML
0.1 SPRAY NASAL
Refills: 0 | Status: DISCONTINUED | OUTPATIENT
Start: 2020-09-29 | End: 2020-10-02

## 2020-09-29 RX ORDER — ALPRAZOLAM 0.25 MG
0.25 TABLET ORAL THREE TIMES A DAY
Refills: 0 | Status: DISCONTINUED | OUTPATIENT
Start: 2020-09-29 | End: 2020-09-29

## 2020-09-29 RX ORDER — CHLORHEXIDINE GLUCONATE 213 G/1000ML
1 SOLUTION TOPICAL ONCE
Refills: 0 | Status: DISCONTINUED | OUTPATIENT
Start: 2020-09-29 | End: 2020-09-29

## 2020-09-29 RX ORDER — NEBIVOLOL HYDROCHLORIDE 5 MG/1
10 TABLET ORAL DAILY
Refills: 0 | Status: DISCONTINUED | OUTPATIENT
Start: 2020-09-29 | End: 2020-09-30

## 2020-09-29 RX ORDER — INFLUENZA VIRUS VACCINE 15; 15; 15; 15 UG/.5ML; UG/.5ML; UG/.5ML; UG/.5ML
0.5 SUSPENSION INTRAMUSCULAR ONCE
Refills: 0 | Status: DISCONTINUED | OUTPATIENT
Start: 2020-09-29 | End: 2020-10-10

## 2020-09-29 RX ORDER — HYDROMORPHONE HYDROCHLORIDE 2 MG/ML
30 INJECTION INTRAMUSCULAR; INTRAVENOUS; SUBCUTANEOUS
Refills: 0 | Status: DISCONTINUED | OUTPATIENT
Start: 2020-09-29 | End: 2020-09-29

## 2020-09-29 RX ORDER — MAGNESIUM HYDROXIDE 400 MG/1
30 TABLET, CHEWABLE ORAL EVERY 12 HOURS
Refills: 0 | Status: DISCONTINUED | OUTPATIENT
Start: 2020-09-29 | End: 2020-10-10

## 2020-09-29 RX ORDER — DIPHENHYDRAMINE HCL 50 MG
25 CAPSULE ORAL EVERY 4 HOURS
Refills: 0 | Status: DISCONTINUED | OUTPATIENT
Start: 2020-09-29 | End: 2020-09-29

## 2020-09-29 RX ORDER — ONDANSETRON 8 MG/1
4 TABLET, FILM COATED ORAL EVERY 6 HOURS
Refills: 0 | Status: DISCONTINUED | OUTPATIENT
Start: 2020-09-29 | End: 2020-09-29

## 2020-09-29 RX ORDER — POLYETHYLENE GLYCOL 3350 17 G/17G
17 POWDER, FOR SOLUTION ORAL
Refills: 0 | Status: DISCONTINUED | OUTPATIENT
Start: 2020-09-29 | End: 2020-10-10

## 2020-09-29 RX ORDER — HYDROMORPHONE HYDROCHLORIDE 2 MG/ML
0.5 INJECTION INTRAMUSCULAR; INTRAVENOUS; SUBCUTANEOUS
Refills: 0 | Status: DISCONTINUED | OUTPATIENT
Start: 2020-09-29 | End: 2020-09-30

## 2020-09-29 RX ORDER — ATORVASTATIN CALCIUM 80 MG/1
10 TABLET, FILM COATED ORAL AT BEDTIME
Refills: 0 | Status: DISCONTINUED | OUTPATIENT
Start: 2020-09-29 | End: 2020-10-10

## 2020-09-29 RX ORDER — HYDROMORPHONE HYDROCHLORIDE 2 MG/ML
0.5 INJECTION INTRAMUSCULAR; INTRAVENOUS; SUBCUTANEOUS
Refills: 0 | Status: DISCONTINUED | OUTPATIENT
Start: 2020-09-29 | End: 2020-09-29

## 2020-09-29 RX ORDER — FENOFIBRATE,MICRONIZED 130 MG
145 CAPSULE ORAL DAILY
Refills: 0 | Status: DISCONTINUED | OUTPATIENT
Start: 2020-09-29 | End: 2020-10-10

## 2020-09-29 RX ORDER — PANTOPRAZOLE SODIUM 20 MG/1
40 TABLET, DELAYED RELEASE ORAL
Refills: 0 | Status: DISCONTINUED | OUTPATIENT
Start: 2020-09-29 | End: 2020-10-10

## 2020-09-29 RX ORDER — SUMATRIPTAN SUCCINATE 4 MG/.5ML
25 INJECTION, SOLUTION SUBCUTANEOUS
Refills: 0 | Status: DISCONTINUED | OUTPATIENT
Start: 2020-09-29 | End: 2020-10-10

## 2020-09-29 RX ORDER — METOPROLOL TARTRATE 50 MG
25 TABLET ORAL
Refills: 0 | Status: DISCONTINUED | OUTPATIENT
Start: 2020-09-29 | End: 2020-09-29

## 2020-09-29 RX ORDER — SENNA PLUS 8.6 MG/1
2 TABLET ORAL AT BEDTIME
Refills: 0 | Status: DISCONTINUED | OUTPATIENT
Start: 2020-09-29 | End: 2020-10-10

## 2020-09-29 RX ORDER — METOPROLOL TARTRATE 50 MG
50 TABLET ORAL DAILY
Refills: 0 | Status: DISCONTINUED | OUTPATIENT
Start: 2020-09-29 | End: 2020-09-29

## 2020-09-29 RX ORDER — CHOLECALCIFEROL (VITAMIN D3) 125 MCG
1000 CAPSULE ORAL DAILY
Refills: 0 | Status: DISCONTINUED | OUTPATIENT
Start: 2020-09-29 | End: 2020-10-10

## 2020-09-29 RX ORDER — LIDOCAINE HCL 20 MG/ML
0.2 VIAL (ML) INJECTION ONCE
Refills: 0 | Status: DISCONTINUED | OUTPATIENT
Start: 2020-09-29 | End: 2020-09-29

## 2020-09-29 RX ORDER — LOSARTAN POTASSIUM 100 MG/1
50 TABLET, FILM COATED ORAL DAILY
Refills: 0 | Status: DISCONTINUED | OUTPATIENT
Start: 2020-09-29 | End: 2020-09-30

## 2020-09-29 RX ORDER — DIPHENHYDRAMINE HCL 50 MG
25 CAPSULE ORAL EVERY 4 HOURS
Refills: 0 | Status: DISCONTINUED | OUTPATIENT
Start: 2020-09-29 | End: 2020-10-10

## 2020-09-29 RX ORDER — CHLORHEXIDINE GLUCONATE 213 G/1000ML
1 SOLUTION TOPICAL
Refills: 0 | Status: DISCONTINUED | OUTPATIENT
Start: 2020-09-29 | End: 2020-10-06

## 2020-09-29 RX ORDER — CYCLOBENZAPRINE HYDROCHLORIDE 10 MG/1
10 TABLET, FILM COATED ORAL THREE TIMES A DAY
Refills: 0 | Status: DISCONTINUED | OUTPATIENT
Start: 2020-09-29 | End: 2020-10-10

## 2020-09-29 RX ORDER — AMLODIPINE BESYLATE 2.5 MG/1
5 TABLET ORAL DAILY
Refills: 0 | Status: DISCONTINUED | OUTPATIENT
Start: 2020-09-29 | End: 2020-09-30

## 2020-09-29 RX ORDER — DULOXETINE HYDROCHLORIDE 30 MG/1
60 CAPSULE, DELAYED RELEASE ORAL
Refills: 0 | Status: DISCONTINUED | OUTPATIENT
Start: 2020-09-29 | End: 2020-10-10

## 2020-09-29 RX ORDER — SODIUM CHLORIDE 9 MG/ML
1000 INJECTION, SOLUTION INTRAVENOUS
Refills: 0 | Status: DISCONTINUED | OUTPATIENT
Start: 2020-09-29 | End: 2020-10-02

## 2020-09-29 RX ADMIN — HYDROMORPHONE HYDROCHLORIDE 30 MILLILITER(S): 2 INJECTION INTRAMUSCULAR; INTRAVENOUS; SUBCUTANEOUS at 14:41

## 2020-09-29 RX ADMIN — CHLORHEXIDINE GLUCONATE 1 APPLICATION(S): 213 SOLUTION TOPICAL at 21:49

## 2020-09-29 RX ADMIN — ATORVASTATIN CALCIUM 10 MILLIGRAM(S): 80 TABLET, FILM COATED ORAL at 21:49

## 2020-09-29 RX ADMIN — SODIUM CHLORIDE 110 MILLILITER(S): 9 INJECTION, SOLUTION INTRAVENOUS at 13:55

## 2020-09-29 RX ADMIN — Medication 4 MILLIGRAM(S): at 18:18

## 2020-09-29 RX ADMIN — Medication 4 MILLIGRAM(S): at 23:56

## 2020-09-29 RX ADMIN — SENNA PLUS 2 TABLET(S): 8.6 TABLET ORAL at 21:49

## 2020-09-29 RX ADMIN — PANTOPRAZOLE SODIUM 40 MILLIGRAM(S): 20 TABLET, DELAYED RELEASE ORAL at 18:17

## 2020-09-29 RX ADMIN — Medication 1 TABLET(S): at 18:17

## 2020-09-29 RX ADMIN — SODIUM CHLORIDE 75 MILLILITER(S): 9 INJECTION, SOLUTION INTRAVENOUS at 21:49

## 2020-09-29 RX ADMIN — HYDROMORPHONE HYDROCHLORIDE 0.25 MILLIGRAM(S): 2 INJECTION INTRAMUSCULAR; INTRAVENOUS; SUBCUTANEOUS at 14:15

## 2020-09-29 RX ADMIN — DULOXETINE HYDROCHLORIDE 60 MILLIGRAM(S): 30 CAPSULE, DELAYED RELEASE ORAL at 18:18

## 2020-09-29 RX ADMIN — AMLODIPINE BESYLATE 5 MILLIGRAM(S): 2.5 TABLET ORAL at 18:17

## 2020-09-29 RX ADMIN — HYDROMORPHONE HYDROCHLORIDE 30 MILLILITER(S): 2 INJECTION INTRAMUSCULAR; INTRAVENOUS; SUBCUTANEOUS at 19:00

## 2020-09-29 RX ADMIN — HYDROMORPHONE HYDROCHLORIDE 0.25 MILLIGRAM(S): 2 INJECTION INTRAMUSCULAR; INTRAVENOUS; SUBCUTANEOUS at 13:47

## 2020-09-29 NOTE — PHYSICAL THERAPY INITIAL EVALUATION ADULT - DISCHARGE DISPOSITION, PT EVAL
Acute rehab, Patient is able to tolerate 3 hours of therapy/day. Patient's plan is to go home after acute rehab. Patient will require at least two disciplines of therapy while participating in acute rehab. The patient also has a medical complexity that requires close physician supervision and 24hr rehabilitation nursing care to address the etiologic cause of the patient's current impairment./rehabilitation facility

## 2020-09-29 NOTE — PHYSICAL THERAPY INITIAL EVALUATION ADULT - PERTINENT HX OF CURRENT PROBLEM, REHAB EVAL
Pt is a  59 y/o female admitted to [facility] on 9/29/20 9/29/2020. pmh includes HTN, hyperlipidemia, osteoarthritis, anxiety/depression disorders, GERD, eczema, sinusitis. Pt with h/o scoliosis/ spinal stenosis- lumbar region. Pt is now s/p stage 1 L1-L5 lateral lumbar interbody fusion.

## 2020-09-29 NOTE — PROGRESS NOTE ADULT - SUBJECTIVE AND OBJECTIVE BOX
Chief complaint:   Patient is a 60y old  Female who presents with a chief complaint of "Back pain" (22 Sep 2020 07:59)    HPI:  60 year old female with h/o scoliosis/ spinal stenosis- lumbar region, presents to preadmission testing for scheduled stage 1, L1-5 lumbar lateral interbody fusion, scheduled for 9/29/2020. Her medical history includes HTN, hyperlipidemia, osteoarthritis, anxiety/depression disorders, GERD, eczema, sinusitis.    Scheduled for COVID-19 swab on 9/26/2020.  (22 Sep 2020 07:59)        24hr EVENTS:      ROS: [ ]  Unable to assess due to mental status   All other systems negative    -----------------------------------------------------------------------------------------------------------------------------------------------------------------------------------  ICU Vital Signs Last 24 Hrs  T(C): 36.8 (29 Sep 2020 15:55), Max: 36.9 (29 Sep 2020 07:22)  T(F): 98.2 (29 Sep 2020 15:55), Max: 98.4 (29 Sep 2020 07:22)  HR: 79 (29 Sep 2020 18:00) (75 - 85)  BP: 142/84 (29 Sep 2020 14:45) (133/90 - 160/80)  BP(mean): 109 (29 Sep 2020 14:45) (101 - 109)  ABP: 152/77 (29 Sep 2020 18:00) (133/76 - 174/88)  ABP(mean): 106 (29 Sep 2020 18:00) (88 - 121)  RR: 12 (29 Sep 2020 18:00) (11 - 18)  SpO2: 96% (29 Sep 2020 18:00) (91% - 100%)      I&O's Summary    29 Sep 2020 07:01  -  29 Sep 2020 19:13  --------------------------------------------------------  IN: 330 mL / OUT: 225 mL / NET: 105 mL        MEDICATIONS  (STANDING):  acetaminophen  IVPB .. 1000 milliGRAM(s) IV Intermittent once  amLODIPine   Tablet 5 milliGRAM(s) Oral daily  atorvastatin 10 milliGRAM(s) Oral at bedtime  chlorhexidine 4% Liquid 1 Application(s) Topical <User Schedule>  cholecalciferol 1000 Unit(s) Oral daily  dexAMETHasone  Injectable 4 milliGRAM(s) IV Push every 6 hours  DULoxetine 60 milliGRAM(s) Oral daily  fenofibrate Tablet 145 milliGRAM(s) Oral daily  HYDROmorphone PCA (1 mG/mL) 30 milliLiter(s) PCA Continuous PCA Continuous  influenza   Vaccine 0.5 milliLiter(s) IntraMuscular once  lactated ringers. 1000 milliLiter(s) (110 mL/Hr) IV Continuous <Continuous>  losartan 50 milliGRAM(s) Oral daily  magnesium oxide 400 milliGRAM(s) Oral daily  multivitamin 1 Tablet(s) Oral daily  nebivolol 10 milliGRAM(s) Oral daily  pantoprazole    Tablet 40 milliGRAM(s) Oral before breakfast  polyethylene glycol 3350 17 Gram(s) Oral two times a day  senna 2 Tablet(s) Oral at bedtime  vancomycin  IVPB 1500 milliGRAM(s) IV Intermittent once      RESPIRATORY:        IMAGING:   Recent imaging studies were reviewed.    LAB RESULTS:                          14.3   13.83 )-----------( 282      ( 29 Sep 2020 14:00 )             44.3           09-29    139  |  104  |  29<H>  ----------------------------<  178<H>  4.5   |  24  |  0.87    Ca    9.4      29 Sep 2020 14:00            ABG - ( 29 Sep 2020 10:14 )  pH, Arterial: 7.43  pH, Blood: x     /  pCO2: 39    /  pO2: 290   / HCO3: 26    / Base Excess: 1.9   /  SaO2: 99        -----------------------------------------------------------------------------------------------------------------------------------------------------------------------------------    PHYSICAL EXAM:  General: Calm, laying in bed  HEENT: MMM  Neuro:  -Mental status- No acute distress, AOx3, conversational, following commands  -CN- PERRL 3mm, EOMI, tongue midline, face symmetric  -Motor- full strength in all ext  -Sensation- intact to LT   -Coordination- no dysmetria noted    CV: RRR  Pulm: Clear to auscultation  Abd: Soft, nontender, nondistended  Ext: No edema  Skin: warm, dry Chief complaint:   Patient is a 60y old  Female who presents with a chief complaint of "Back pain" (22 Sep 2020 07:59)    HPI:  60 year old female with h/o scoliosis/ spinal stenosis- lumbar region, presents to preadmission testing for scheduled stage 1, L1-5 lumbar lateral interbody fusion, scheduled for 9/29/2020. Her medical history includes HTN, hyperlipidemia, osteoarthritis, anxiety/depression disorders, GERD, eczema, sinusitis.    Scheduled for COVID-19 swab on 9/26/2020.  (22 Sep 2020 07:59)    24hr EVENTS:  POD 0  L1-5 lumbar lateral interbody fusion    ROS: 5/10 pain  All other systems negative    -----------------------------------------------------------------------------------------------------------------------------------------------------------------------------------  ICU Vital Signs Last 24 Hrs  T(C): 36.8 (29 Sep 2020 15:55), Max: 36.9 (29 Sep 2020 07:22)  T(F): 98.2 (29 Sep 2020 15:55), Max: 98.4 (29 Sep 2020 07:22)  HR: 79 (29 Sep 2020 18:00) (75 - 85)  BP: 142/84 (29 Sep 2020 14:45) (133/90 - 160/80)  BP(mean): 109 (29 Sep 2020 14:45) (101 - 109)  ABP: 152/77 (29 Sep 2020 18:00) (133/76 - 174/88)  ABP(mean): 106 (29 Sep 2020 18:00) (88 - 121)  RR: 12 (29 Sep 2020 18:00) (11 - 18)  SpO2: 96% (29 Sep 2020 18:00) (91% - 100%)      I&O's Summary    29 Sep 2020 07:01  -  29 Sep 2020 19:13  --------------------------------------------------------  IN: 330 mL / OUT: 225 mL / NET: 105 mL        MEDICATIONS  (STANDING):  acetaminophen  IVPB .. 1000 milliGRAM(s) IV Intermittent once  amLODIPine   Tablet 5 milliGRAM(s) Oral daily  atorvastatin 10 milliGRAM(s) Oral at bedtime  chlorhexidine 4% Liquid 1 Application(s) Topical <User Schedule>  cholecalciferol 1000 Unit(s) Oral daily  dexAMETHasone  Injectable 4 milliGRAM(s) IV Push every 6 hours  DULoxetine 60 milliGRAM(s) Oral daily  fenofibrate Tablet 145 milliGRAM(s) Oral daily  HYDROmorphone PCA (1 mG/mL) 30 milliLiter(s) PCA Continuous PCA Continuous  influenza   Vaccine 0.5 milliLiter(s) IntraMuscular once  lactated ringers. 1000 milliLiter(s) (110 mL/Hr) IV Continuous <Continuous>  losartan 50 milliGRAM(s) Oral daily  magnesium oxide 400 milliGRAM(s) Oral daily  multivitamin 1 Tablet(s) Oral daily  nebivolol 10 milliGRAM(s) Oral daily  pantoprazole    Tablet 40 milliGRAM(s) Oral before breakfast  polyethylene glycol 3350 17 Gram(s) Oral two times a day  senna 2 Tablet(s) Oral at bedtime  vancomycin  IVPB 1500 milliGRAM(s) IV Intermittent once      IMAGING:   Recent imaging studies were reviewed.    LAB RESULTS:                          14.3   13.83 )-----------( 282      ( 29 Sep 2020 14:00 )             44.3           09-29    139  |  104  |  29<H>  ----------------------------<  178<H>  4.5   |  24  |  0.87    Ca    9.4      29 Sep 2020 14:00            ABG - ( 29 Sep 2020 10:14 )  pH, Arterial: 7.43  pH, Blood: x     /  pCO2: 39    /  pO2: 290   / HCO3: 26    / Base Excess: 1.9   /  SaO2: 99        -----------------------------------------------------------------------------------------------------------------------------------------------------------------------------------    PHYSICAL EXAM:  General: Calm, laying in bed  HEENT: MMM  Neuro:  -Mental status- No acute distress, AOx3, conversational, following commands  -CN- PERRL 3mm, EOMI, tongue midline, face symmetric  -Motor- full strength in all ext except 4/5 hip flexion  -Sensation- intact to LT   -Coordination- no dysmetria noted    CV: RRR  Pulm: Clear to auscultation  Abd: Soft, nontender, nondistended  Ext: No edema  Skin: warm, dry

## 2020-09-29 NOTE — PROGRESS NOTE ADULT - ASSESSMENT
Assesment:60 year old female with h/o HTN, hyperlipidemia, ostearthritis, scoliosis/ spinal stenosis, anxiety/depression disorders, GERD, eczema, sinusitis admitted for scheduled stage 1, L1-5 lumbar lateral interbody fusion     NEURO:  L1-L5 lateral lumbar interbody fusion s/p stage 1 on 9/29/20, stage 2 scheduled for 9/30/20  EBL 200cc  -neuro check q1  -dexamethasone 4mg q6   -pain management w/ Dilaudid ggt, cyclobenzaprine 10mg TID PRN for spasm  -Post-Op CT lumbar and thoracic spine  -bed rest for now  -PT/OT evaluation after stage 2 tomorrow   Migraine-  c/w sumatriptan 25mg PRN     Psych:  depression/anxiety  -c/w duloxetine 60mg qd, alprazolam 0.25mg PRN for anxiety    PULMONARY:  saturating well on minimal NC, can wean to RA  -continue to monitor on pulse o2   -incentive spirometry 10q/hr when awake     CARDIOVASCULAR:  Hypertension  -c/w home amlodipine 5mg, Losartan 50mg qd, nebivolol 10mg qd    GASTROENTEROLOGY:  bedside speech & swallow if pass can start advancing diet as tolerated.   ensure BMs w/ Miralax & senna  NPO after midnight for procedure on 9/30    INFECTIOUS:  received pre-op abx vancomycin 1500mg x1   monitor for fevers    RENAL:  -c/w IVF LR until tolerating PO  -check BMP daily  -strict i/o's    HEME/ONC:  DVT ppx: will hold chemical dvt ppx in setting of recent operation. b/l SCDs for now Assesment:60 year old female with h/o HTN, hyperlipidemia, ostearthritis, scoliosis/ spinal stenosis, anxiety/depression disorders, GERD, eczema, sinusitis admitted for scheduled stage 1, L1-5 lumbar lateral interbody fusion     NEURO:  L1-L5 lateral lumbar interbody fusion s/p stage 1 on 9/29/20, stage 2 T9- Pelvis scheduled for 9/30/20  EBL 200cc  -neuro check q1  -dexamethasone 4mg q6   -pain management w/ Dilaudid ggt, cyclobenzaprine 10mg TID PRN for spasm  -Post-Op CT lumbar and thoracic spine  -bed rest for now  -PT/OT evaluation after stage 2 tomorrow   Migraine-  c/w sumatriptan 25mg PRN     Psych:  depression/anxiety  -c/w duloxetine 60mg qd, alprazolam 0.25mg PRN for anxiety    PULMONARY:  saturating well on minimal NC, can wean to RA  -continue to monitor on pulse o2   -incentive spirometry 10q/hr when awake     CARDIOVASCULAR:  Hypertension  -c/w home amlodipine 5mg, Losartan 50mg qd, nebivolol 10mg qd    GASTROENTEROLOGY:  bedside speech & swallow if pass can start advancing diet as tolerated.   ensure BMs w/ Miralax & senna  NPO after midnight for procedure on 9/30    INFECTIOUS:  received pre-op abx vancomycin 1500mg x1   monitor for fevers    RENAL:  -c/w IVF LR until tolerating PO  -check BMP daily  -strict i/o's    HEME/ONC:  DVT ppx: will hold chemical dvt ppx in setting of recent operation. b/l SCDs for now Assesment:60 year old female with h/o HTN, hyperlipidemia, ostearthritis, scoliosis/ spinal stenosis, anxiety/depression disorders, GERD, eczema, sinusitis admitted for scheduled stage 1, L1-5 lumbar lateral interbody fusion     NEURO:  L1-L5 lateral lumbar interbody fusion s/p stage 1 on 9/29/20, stage 2 T9- Pelvis scheduled for 9/30/20  EBL 200cc  -neuro check q1  -dexamethasone 4mg q6   -pain management w/ Dilaudid ggt, cyclobenzaprine 10mg TID PRN for spasm  -Post-Op CT lumbar and thoracic spine  -bed rest for now  -PT/OT evaluation after stage 2 tomorrow   Migraine-  c/w sumatriptan 25mg PRN     Psych:  depression/anxiety  -c/w duloxetine 60mg qd, alprazolam 0.25mg PRN for anxiety    PULMONARY:  saturating well on minimal NC, can wean to RA  -continue to monitor on pulse o2   -incentive spirometry 10q/hr when awake     CARDIOVASCULAR:  Hypertension  -c/w home amlodipine 5mg, Losartan 50mg qd, nebivolol 10mg qd    GASTROENTEROLOGY:  bedside speech & swallow if pass can start advancing diet as tolerated.   ensure BMs w/ Miralax & senna  NPO after midnight for procedure on 9/30    INFECTIOUS:  received pre-op abx vancomycin 1500mg x1   monitor for fevers    RENAL:  -c/w IVF LR until tolerating PO  -check BMP daily  -strict i/o's    HEME/ONC:  DVT ppx: will hold chemical dvt ppx in setting of recent operation. b/l SCDs for now    not critically ill but medically complex

## 2020-09-29 NOTE — PHYSICAL THERAPY INITIAL EVALUATION ADULT - ADDITIONAL COMMENTS
PTA pt was independent with functional mobility and ADLs. Pt lives with her spouse in a PH with 3 steps to enter, [place] HR (unable to reach both at same time), 1 flight in side +UHR.

## 2020-09-29 NOTE — CHART NOTE - NSCHARTNOTEFT_GEN_A_CORE
CAPRINI SCORE [CLOT] Score on Admission for     AGE RELATED RISK FACTORS                                                       MOBILITY RELATED FACTORS  [ x] Age 41-60 years                                            (1 Point)                  [ x] Bed rest                                                        (1 Point)  [ ] Age 61-74 years                                           (2 Points)                 [ ] Plaster cast                                                   (2 Points)  [ ] Age > 75 years                                              (3 Points)                 [ ] Bed bound for more than 72 hours         (2 Points)    DISEASE RELATED RISK FACTORS                                               GENDER SPECIFIC FACTORS  [ ] Edema in the lower extremities                       (1 Point)           [ ] Pregnancy                                                          (1 Point)  [ ] Varicose veins                                               (1 Point)                  [ ] Post-partum < 6 weeks                                   (1 Point)             [ x] BMI > 25 Kg/m2                                            (1 Point)                  [ ] Hormonal therapy  or oral contraception   (1 Point)                 [ ] Sepsis (in the previous month)                        (1 Point)            [ ] History of pregnancy complications             (1 point)  [ ] Pneumonia or serious lung disease                                            [ ] Unexplained or recurrent               (1 Point)           (in the previous month)                               (1 Point)  [ ] Abnormal pulmonary function test                     (1 Point)                 SURGERY RELATED RISK FACTORS (include planned surgeries)  [ ] Acute myocardial infarction                              (1 Point)                 [ ]  Section                                             (1 Point)  [ ] Congestive heart failure (in the previous month)  (1 Point)        [ ] Minor surgery                                                  (1 Point)   [ ] Inflammatory bowel disease                             (1 Point)                 [ ] Arthroscopic surgery                                        (2 Points)  [ ] Central venous access                                      (2 Points)  [ ] History / presence of malignancy                   (2 points)   [x ] General surgery lasting more than 45 minutes   (2 Points)       [ ] Stroke (in the previous month)                          (5 Points)               [ ] Elective arthroplasty                                         (5 Points)                                                                                                                                               HEMATOLOGY RELATED FACTORS                                                 TRAUMA RELATED RISK FACTORS  [ ] Prior episodes of VTE                                     (3 Points)                [ ] Fracture of the hip, pelvis, or leg                       (5 Points)  [ ] Positive family history for VTE                         (3 Points)             [ ] Acute spinal cord injury (in the previous month)  (5 Points)  [ ] Prothrombin 32169 A                                     (3 Points)                [ ] Paralysis  (less than 1 month)                             (5 Points)  [ ] Factor V Leiden                                             (3 Points)                   [ ] Multiple Trauma within 1 month                        (5 Points)  [ ] Lupus anticoagulants                                     (3 Points)                                                           [ ] Anticardiolipin antibodies                               (3 Points)                                                       [ ] High homocysteine in the blood                      (3 Points)                                             [ ] Other congenital or acquired thrombophilia      (3 Points)                                                [ ] Heparin induced thrombocytopenia                  (3 Points)                                          Total Score [    5      ]    Risk:  Very low 0   Low 1 to 2   Moderate 3 to 4   High =5       VTE Prophylaxis Recommendations:  [ ] mechanical pneumatic compression devices                                      [ ] contraindicated: _____________________  [ ] chemoprophylaxis                                                                                    [ ] contraindicated: _____________________    **** HIGH LIKELIHOOD DVT PRESENT ON ADMISSION  [ ] (please order LE dopplers within 24 hours of admission)

## 2020-09-29 NOTE — PROGRESS NOTE ADULT - SUBJECTIVE AND OBJECTIVE BOX
INTERVAL HISTORY: HPI:  60 year old female with h/o scoliosis/ spinal stenosis- lumbar region, presents to preadmission testing for scheduled stage 1, L1-5 lumbar lateral interbody fusion, scheduled for 9/29/2020. Her medical history includes HTN, hyperlipidemia, osteoarthritis, anxiety/depression disorders, GERD, eczema, sinusitis.    Scheduled for COVID-19 swab on 9/26/2020.  (22 Sep 2020 07:59)      PRESSORS: [ ] YES [ ] NO  WHICH:  DOSE:    ANTIBIOTICS:                  DATE STARTED:  ANTIBIOTICS:                  DATE STARTED:  ANTIBIOTICS:                  DATE STARTED:    MEDICATIONS  (STANDING):  acetaminophen  IVPB .. 1000 milliGRAM(s) IV Intermittent once  amLODIPine   Tablet 5 milliGRAM(s) Oral daily  atorvastatin 10 milliGRAM(s) Oral at bedtime  chlorhexidine 4% Liquid 1 Application(s) Topical <User Schedule>  cholecalciferol 1000 Unit(s) Oral daily  dexAMETHasone  Injectable 4 milliGRAM(s) IV Push every 6 hours  DULoxetine 60 milliGRAM(s) Oral daily  fenofibrate Tablet 145 milliGRAM(s) Oral daily  HYDROmorphone PCA (1 mG/mL) 30 milliLiter(s) PCA Continuous PCA Continuous  influenza   Vaccine 0.5 milliLiter(s) IntraMuscular once  lactated ringers. 1000 milliLiter(s) (110 mL/Hr) IV Continuous <Continuous>  losartan 50 milliGRAM(s) Oral daily  magnesium oxide 400 milliGRAM(s) Oral daily  multivitamin 1 Tablet(s) Oral daily  nebivolol 10 milliGRAM(s) Oral daily  pantoprazole    Tablet 40 milliGRAM(s) Oral before breakfast  polyethylene glycol 3350 17 Gram(s) Oral two times a day  senna 2 Tablet(s) Oral at bedtime  vancomycin  IVPB 1500 milliGRAM(s) IV Intermittent once    MEDICATIONS  (PRN):  ALPRAZolam 0.25 milliGRAM(s) Oral three times a day PRN anxiety  cyclobenzaprine 10 milliGRAM(s) Oral three times a day PRN Muscle Spasm  diphenhydrAMINE 25 milliGRAM(s) Oral every 4 hours PRN Rash and/or Itching  HYDROmorphone  Injectable 0.5 milliGRAM(s) IV Push every 10 minutes PRN Moderate Pain (4 - 6)  HYDROmorphone PCA (1 mG/mL) Rescue Clinician Bolus 0.5 milliGRAM(s) IV Push every 15 minutes PRN for Pain Scale GREATER THAN 6  magnesium hydroxide Suspension 30 milliLiter(s) Oral every 12 hours PRN Constipation  naloxone Injectable 0.1 milliGRAM(s) IV Push every 3 minutes PRN For ANY of the following changes in patient status:  A. RR LESS THAN 10 breaths per minute, B. Oxygen saturation LESS THAN 90%, C. Sedation score of 6  ondansetron Injectable 4 milliGRAM(s) IV Push every 6 hours PRN Nausea  ondansetron Injectable 4 milliGRAM(s) IV Push every 30 minutes PRN Nausea and/or Vomiting  SUMAtriptan 25 milliGRAM(s) Oral four times a day PRN Migraine      Drug Dosing Weight  Height (cm): 165.1 (29 Sep 2020 07:38)  Weight (kg): 90.7 (29 Sep 2020 07:38)  BMI (kg/m2): 33.3 (29 Sep 2020 07:38)  BSA (m2): 1.98 (29 Sep 2020 07:38)    CENTRAL LINE: [ ] YES [ ] NO  LOCATION:   DATE INSERTED:  REMOVE: [ ] YES [ ] NO  EXPLAIN:    FREEMAN: [ ] YES [ ] NO    DATE INSERTED:  REMOVE: [ ] YES [ ] NO  EXPLAIN:    A-LINE: [ ] YES [ ] NO  LOCATION:   DATE INSERTED:  REMOVE: [ ] YES [ ] NO  EXPLAIN:    PAST MEDICAL & SURGICAL HISTORY:  Lumbar spinal stenosis    History of sciatica  mostly right side    H/O sinusitis    Eczema  extremities    Scoliosis    Meniscus tear  left knee    Spinal stenosis in cervical region    Anxiety and depression    Migraine    GERD (gastroesophageal reflux disease)    Seasonal allergies    Hyperlipidemia    HTN (hypertension)    H/O cervical spine surgery  C3-6   11/2015    History of tonsillectomy    History of appendectomy        REVIEW OF SYSTEMS: [ ] Unable to Assess due to neurologic exam   [ ] All ROS addressed below are non-contributory, except:  Neuro: [ ] Headache [ ] Back pain [ ] Numbness [ ] Weakness [ ] Ataxia [ ] Dizziness [ ] Aphasia [ ] Dysarthria [ ] Visual disturbance  Resp: [ ] Shortness of breath/dyspnea, [ ] Orthopnea [ ] Cough  CV: [ ] Chest pain [ ] Palpitation [ ] Lightheadedness [ ] Syncope  Renal: [ ] Thirst [ ] Edema  GI: [ ] Nausea [ ] Emesis [ ] Abdominal pain [ ] Constipation [ ] Diarrhea  Hem: [ ] Hematemesis [ ] bright red blood per rectum  ID: [ ] Fever [ ] Chills [ ] Dysuria  ENT: [ ] Rhinorrhea      ICU Vital Signs Last 24 Hrs  T(C): 36.4 (29 Sep 2020 14:15), Max: 36.9 (29 Sep 2020 07:22)  T(F): 97.5 (29 Sep 2020 14:15), Max: 98.4 (29 Sep 2020 07:22)  HR: 80 (29 Sep 2020 15:00) (75 - 83)  BP: 142/84 (29 Sep 2020 14:45) (133/90 - 160/80)  BP(mean): 109 (29 Sep 2020 14:45) (101 - 109)  ABP: 160/86 (29 Sep 2020 15:00) (141/61 - 174/88)  ABP(mean): 114 (29 Sep 2020 15:00) (88 - 121)  RR: 14 (29 Sep 2020 14:45) (14 - 18)  SpO2: 100% (29 Sep 2020 15:00) (95% - 100%)      ABG - ( 29 Sep 2020 10:14 )  pH, Arterial: 7.43  pH, Blood: x     /  pCO2: 39    /  pO2: 290   / HCO3: 26    / Base Excess: 1.9   /  SaO2: 99                  I&O's Detail    29 Sep 2020 07:01  -  29 Sep 2020 15:08  --------------------------------------------------------  IN:    Lactated Ringers: 110 mL  Total IN: 110 mL    OUT:    Indwelling Catheter - Urethral (mL): 75 mL  Total OUT: 75 mL    Total NET: 35 mL              PHYSICAL EXAM:    General: No Acute Distress     Neurological: Awake, alert oriented to person, place and time, Following Commands, PERRL, EOMI, Face Symmetrical, Speech Fluent, Moving all extremities, Muscle Strength normal in all four extremities, No Drift, Sensation to Light Touch Intact    Pulmonary: Clear to Auscultation, No Rales, No Rhonchi, No Wheezes     Cardiovascular: S1, S2, Regular Rate and Rhythm     Gastrointestinal: Soft, Nontender, Nondistended     Extremities: No calf tenderness     Incision:         LABS:  CBC Full  -  ( 29 Sep 2020 14:00 )  WBC Count : 13.83 K/uL  RBC Count : 4.83 M/uL  Hemoglobin : 14.3 g/dL  Hematocrit : 44.3 %  Platelet Count - Automated : 282 K/uL  Mean Cell Volume : 91.7 fl  Mean Cell Hemoglobin : 29.6 pg  Mean Cell Hemoglobin Concentration : 32.3 gm/dL  Auto Neutrophil # : 11.54 K/uL  Auto Lymphocyte # : 1.22 K/uL  Auto Monocyte # : 0.88 K/uL  Auto Eosinophil # : 0.02 K/uL  Auto Basophil # : 0.03 K/uL  Auto Neutrophil % : 83.5 %  Auto Lymphocyte % : 8.8 %  Auto Monocyte % : 6.4 %  Auto Eosinophil % : 0.1 %  Auto Basophil % : 0.2 %    09-29    139  |  104  |  29<H>  ----------------------------<  178<H>  4.5   |  24  |  0.87    Ca    9.4      29 Sep 2020 14:00            RADIOLOGY & ADDITIONAL STUDIES:   INTERVAL HISTORY: HPI:  60 year old female with h/o HTN, hyperlipidemia, ostearthritis, scoliosis/ spinal stenosis, anxiety/depression disorders, GERD, eczema, sinusitis admitted for scheduled stage 1, L1-5 lumbar lateral interbody fusion     PRESSORS: [ ] YES [x ] NO      MEDICATIONS  (STANDING):  acetaminophen  IVPB .. 1000 milliGRAM(s) IV Intermittent once  amLODIPine   Tablet 5 milliGRAM(s) Oral daily  atorvastatin 10 milliGRAM(s) Oral at bedtime  chlorhexidine 4% Liquid 1 Application(s) Topical <User Schedule>  cholecalciferol 1000 Unit(s) Oral daily  dexAMETHasone  Injectable 4 milliGRAM(s) IV Push every 6 hours  DULoxetine 60 milliGRAM(s) Oral daily  fenofibrate Tablet 145 milliGRAM(s) Oral daily  HYDROmorphone PCA (1 mG/mL) 30 milliLiter(s) PCA Continuous PCA Continuous  influenza   Vaccine 0.5 milliLiter(s) IntraMuscular once  lactated ringers. 1000 milliLiter(s) (110 mL/Hr) IV Continuous <Continuous>  losartan 50 milliGRAM(s) Oral daily  magnesium oxide 400 milliGRAM(s) Oral daily  multivitamin 1 Tablet(s) Oral daily  nebivolol 10 milliGRAM(s) Oral daily  pantoprazole    Tablet 40 milliGRAM(s) Oral before breakfast  polyethylene glycol 3350 17 Gram(s) Oral two times a day  senna 2 Tablet(s) Oral at bedtime  vancomycin  IVPB 1500 milliGRAM(s) IV Intermittent once    MEDICATIONS  (PRN):  ALPRAZolam 0.25 milliGRAM(s) Oral three times a day PRN anxiety  cyclobenzaprine 10 milliGRAM(s) Oral three times a day PRN Muscle Spasm  diphenhydrAMINE 25 milliGRAM(s) Oral every 4 hours PRN Rash and/or Itching  HYDROmorphone  Injectable 0.5 milliGRAM(s) IV Push every 10 minutes PRN Moderate Pain (4 - 6)  HYDROmorphone PCA (1 mG/mL) Rescue Clinician Bolus 0.5 milliGRAM(s) IV Push every 15 minutes PRN for Pain Scale GREATER THAN 6  magnesium hydroxide Suspension 30 milliLiter(s) Oral every 12 hours PRN Constipation  naloxone Injectable 0.1 milliGRAM(s) IV Push every 3 minutes PRN For ANY of the following changes in patient status:  A. RR LESS THAN 10 breaths per minute, B. Oxygen saturation LESS THAN 90%, C. Sedation score of 6  ondansetron Injectable 4 milliGRAM(s) IV Push every 6 hours PRN Nausea  ondansetron Injectable 4 milliGRAM(s) IV Push every 30 minutes PRN Nausea and/or Vomiting  SUMAtriptan 25 milliGRAM(s) Oral four times a day PRN Migraine      Drug Dosing Weight  Height (cm): 165.1 (29 Sep 2020 07:38)  Weight (kg): 90.7 (29 Sep 2020 07:38)  BMI (kg/m2): 33.3 (29 Sep 2020 07:38)  BSA (m2): 1.98 (29 Sep 2020 07:38)    CENTRAL LINE: [ ] YES [ x] NO  LOCATION:   DATE INSERTED:  REMOVE: [ ] YES [ ] NO  EXPLAIN:    FREEMAN: [ ] YES [xx ] NO    DATE INSERTED:  REMOVE: [ ] YES [ ] NO  EXPLAIN:    A-LINE: [x ] YES [ ] NO  LOCATION:      PAST MEDICAL & SURGICAL HISTORY:  Lumbar spinal stenosis    History of sciatica  mostly right side    H/O sinusitis    Eczema  extremities    Scoliosis    Meniscus tear  left knee    Spinal stenosis in cervical region    Anxiety and depression    Migraine    GERD (gastroesophageal reflux disease)    Seasonal allergies    Hyperlipidemia    HTN (hypertension)    H/O cervical spine surgery  C3-6   11/2015    History of tonsillectomy    History of appendectomy        REVIEW OF SYSTEMS: [ ] Unable to Assess due to neurologic exam   [x ] All ROS addressed below are non-contributory, except:  Neuro: [ ] Headache [x] Back pain [ ] Numbness [ ] Weakness [ ] Ataxia [ ] Dizziness [ ] Aphasia [ ] Dysarthria [ ] Visual disturbance  Resp: [ ] Shortness of breath/dyspnea, [ ] Orthopnea [ ] Cough  CV: [ ] Chest pain [ ] Palpitation [ ] Lightheadedness [ ] Syncope  Renal: [ ] Thirst [ ] Edema  GI: [ ] Nausea [ ] Emesis [ ] Abdominal pain [ ] Constipation [ ] Diarrhea  Hem: [ ] Hematemesis [ ] bright red blood per rectum  ID: [ ] Fever [ ] Chills [ ] Dysuria  ENT: [ ] Rhinorrhea      ICU Vital Signs Last 24 Hrs  T(C): 36.4 (29 Sep 2020 14:15), Max: 36.9 (29 Sep 2020 07:22)  T(F): 97.5 (29 Sep 2020 14:15), Max: 98.4 (29 Sep 2020 07:22)  HR: 80 (29 Sep 2020 15:00) (75 - 83)  BP: 142/84 (29 Sep 2020 14:45) (133/90 - 160/80)  BP(mean): 109 (29 Sep 2020 14:45) (101 - 109)  ABP: 160/86 (29 Sep 2020 15:00) (141/61 - 174/88)  ABP(mean): 114 (29 Sep 2020 15:00) (88 - 121)  RR: 14 (29 Sep 2020 14:45) (14 - 18)  SpO2: 100% (29 Sep 2020 15:00) (95% - 100%)      ABG - ( 29 Sep 2020 10:14 )  pH, Arterial: 7.43  pH, Blood: x     /  pCO2: 39    /  pO2: 290   / HCO3: 26    / Base Excess: 1.9   /  SaO2: 99                  I&O's Detail    29 Sep 2020 07:01  -  29 Sep 2020 15:08  --------------------------------------------------------  IN:    Lactated Ringers: 110 mL  Total IN: 110 mL    OUT:    Indwelling Catheter - Urethral (mL): 75 mL  Total OUT: 75 mL    Total NET: 35 mL              PHYSICAL EXAM:    General: No Acute Distress   Neurological: Awake, alert oriented to person, place and time, Following Commands, PERRL, EOMI, Face Symmetrical, Speech Fluent, Moving all extremities, Muscle Strength normal in all four extremities, No Drift, Sensation to Light Touch Intact  Pulmonary: Clear to Auscultation, No Rales, No Rhonchi, No Wheezes   Cardiovascular: S1, S2, Regular Rate and Rhythm   Gastrointestinal: Soft, Nontender, Nondistended   Extremities: No calf tenderness       LABS:  CBC Full  -  ( 29 Sep 2020 14:00 )  WBC Count : 13.83 K/uL  RBC Count : 4.83 M/uL  Hemoglobin : 14.3 g/dL  Hematocrit : 44.3 %  Platelet Count - Automated : 282 K/uL  Mean Cell Volume : 91.7 fl  Mean Cell Hemoglobin : 29.6 pg  Mean Cell Hemoglobin Concentration : 32.3 gm/dL  Auto Neutrophil # : 11.54 K/uL  Auto Lymphocyte # : 1.22 K/uL  Auto Monocyte # : 0.88 K/uL  Auto Eosinophil # : 0.02 K/uL  Auto Basophil # : 0.03 K/uL  Auto Neutrophil % : 83.5 %  Auto Lymphocyte % : 8.8 %  Auto Monocyte % : 6.4 %  Auto Eosinophil % : 0.1 %  Auto Basophil % : 0.2 %    09-29    139  |  104  |  29<H>  ----------------------------<  178<H>  4.5   |  24  |  0.87    Ca    9.4      29 Sep 2020 14:00      RADIOLOGY & ADDITIONAL STUDIES:  < from: CT Cervical Spine w/o Cont (11.04.15 @ 20:18) >  FINDINGS:  The patient is status post anterior cervical discectomy and fusionwith   partial C5 corpectomy. Fusion hardware extends from C4 to T1. There is   mild anterolisthesis of C7 over T1. The craniocervical junction is   unremarkable, with intact atlantoaxial alignment.    There are multilevel degenerative changes. At C2-C3, there is right facet   joint hypertrophy without central canal narrowing or significant   neuroforaminal narrowing. At C4-C5, there is bilateral uncinate   hypertrophy, resulting in minimal bilateral neuroforaminal narrowing. At   C5-C6, the level of the partial corpectomy, there is a small disc   osteophyte complex with moderate central canal narrowing and bilateral   facet joint and uncinate hypertrophy, resulting in severe right and   moderate left neuroforaminal narrowing. At C6-C7, thereis bilateral   facet and uncinate joint hypertrophy resulting in mild bilateral   neuroforaminal narrowing. At C7-T1 there is mild uncinate and marked   bilateral facet joint hypertrophy with mild bilateral neuroforaminal   narrowing.    There is a left anterolateral neck drainage catheter with a small amount   of subcutaneous air along its course. Air extends along the anterior   border of the longus coli muscles, consistent with postoperative state.   The mastoid air cells are well aerated. Thethyroid gland is within   normal limits. There is biapical pleural scarring and minimal biapical   groundglass opacities, which may related to mild edema.    IMPRESSION: Minimal postoperative air status post anterior cervical   discectomy and fusion with partial C5 corpectomy.  Multilevel degenerative changes, most notably a small disc osteophyte   complex and bilateral facet joint and uncinate hypertrophy at C5-C6   resulting in moderate central canal narrowing and severe right and   moderate left neuroforaminal narrowing.    < end of copied text >

## 2020-09-29 NOTE — CHART NOTE - NSCHARTNOTEFT_GEN_A_CORE
`CAPRINI SCORE [CLOT] Score on Admission for     AGE RELATED RISK FACTORS                                                       MOBILITY RELATED FACTORS  [ x] Age 41-60 years                                            (1 Point)                  [ ] Bed rest                                                        (1 Point)  [ ] Age: 61-74 years                                           (2 Points)                 [ ] Plaster cast                                                   (2 Points)  [ ] Age= 75 years                                              (3 Points)                 [ ] Bed bound for more than 72 hours                 (2 Points)    DISEASE RELATED RISK FACTORS                                               GENDER SPECIFIC FACTORS  [ ] Edema in the lower extremities                       (1 Point)                  [ ] Pregnancy                                                     (1 Point)  [ ] Varicose veins                                               (1 Point)                  [ ] Post-partum < 6 weeks                                   (1 Point)             [ ] BMI > 25 Kg/m2                                            (1 Point)                  [ ] Hormonal therapy  or oral contraception          (1 Point)                 [ ] Sepsis (in the previous month)                        (1 Point)                  [ ] History of pregnancy complications                 (1 point)  [ ] Pneumonia or serious lung disease                                               [ ] Unexplained or recurrent                     (1 Point)           (in the previous month)                               (1 Point)  [ ] Abnormal pulmonary function test                     (1 Point)                 SURGERY RELATED RISK FACTORS (include planned surgeries)  [ ] Acute myocardial infarction                              (1 Point)                 [ ]  Section                                             (1 Point)  [ ] Congestive heart failure (in the previous month)  (1 Point)         [ ] Minor surgery                                                  (1 Point)   [ ] Inflammatory bowel disease                             (1 Point)                 [ ] Arthroscopic surgery                                        (2 Points)  [ ] Central venous access                                      (2 Points)                [x ] General surgery lasting more than 45 minutes   (2 Points)       [ ] Stroke (in the previous month)                          (5 Points)               [ ] Elective arthroplasty                                         (5 Points)            [ ] current or past malignancy                              (2 Points)                                                                                                       HEMATOLOGY RELATED FACTORS                                                 TRAUMA RELATED RISK FACTORS  [ ] Prior episodes of VTE                                     (3 Points)                [ ] Fracture of the hip, pelvis, or leg                       (5 Points)  [ ] Positive family history for VTE                         (3 Points)                 [ ] Acute spinal cord injury (in the previous month)  (5 Points)  [ ] Prothrombin 13193 A                                     (3 Points)                 [ ] Paralysis  (less than 1 month)                             (5 Points)  [ ] Factor V Leiden                                             (3 Points)                  [ ] Multiple Trauma within 1 month                        (5 Points)  [ ] Lupus anticoagulants                                     (3 Points)                                                           [ ] Anticardiolipin antibodies                               (3 Points)                                                       [ ] High homocysteine in the blood                      (3 Points)                                             [ ] Other congenital or acquired thrombophilia      (3 Points)                                                [ ] Heparin induced thrombocytopenia                  (3 Points)                                          Total Score [          ]    Risk:  Very low 0   Low 1 to 2   Moderate 3 to 4   High =5       VTE Prophylasix Recommednations:  [x ] mechanical pneumatic compression devices                                      [ ] contraindicated: _____________________  [ ] chemo prophylasix                                                                                   [ x] contraindicated _____________________    **** HIGH LIKELIHOOD DVT PRESENT ON ADMISSION  [ x] (please order LE dopplers within 24 hours of admission)

## 2020-09-29 NOTE — PROGRESS NOTE ADULT - ASSESSMENT
60 year old female with h/o HTN, hyperlipidemia, ostearthritis, scoliosis/ spinal stenosis, anxiety/depression disorders, GERD, eczema, sinusitis admitted for scheduled stage 1, L1-5 lumbar lateral interbody fusion     L1-L5 lateral lumbar interbody fusion s/p stage 1 on 9/29/20, stage 2 T9- Pelvis scheduled for 9/30/20  -neuro check q1  -dexamethasone 4mg q6   -pain management w/ Dilaudid ggt, cyclobenzaprine 10mg TID PRN for spasm  -Post-Op CT lumbar and thoracic spine  -bed rest for now  -PT/OT evaluation after stage 2 tomorrow   Migraine- c/w sumatriptan 25mg PRN 60 year old female with h/o HTN, hyperlipidemia, ostearthritis, scoliosis/ spinal stenosis, anxiety/depression disorders, GERD, eczema, sinusitis admitted for scheduled stage 1, L1-5 lumbar lateral interbody fusion     L1-L5 lateral lumbar interbody fusion s/p stage 1 on 9/29/20, stage 2 T9- Pelvis scheduled for 9/30/20  -neuro check q1  -dexamethasone 4mg q6   -pain management w/ Dilaudid PCA, cyclobenzaprine 10mg TID PRN for spasm  -Post-Op CT lumbar and thoracic spine  -bed rest for now  -PT/OT evaluation after stage 2 tomorrow   Migraine- c/w sumatriptan 25mg PRN 60 year old female with h/o HTN, hyperlipidemia, ostearthritis, scoliosis/ spinal stenosis, anxiety/depression disorders, GERD, eczema, sinusitis admitted for scheduled stage 1, L1-5 lumbar lateral interbody fusion     L1-L5 lateral lumbar interbody fusion s/p stage 1 on 9/29/20, stage 2 T9- Pelvis scheduled for 9/30/20  -neuro check q1  -dexamethasone 4mg q6   -pain management w/ Dilaudid PCA, cyclobenzaprine 10mg TID PRN for spasm  -Post-Op CT lumbar and thoracic spine  -bed rest for now  -cont home meds  -PT/OT evaluation after stage 2 tomorrow   Migraine- c/w sumatriptan 25mg PRN

## 2020-09-30 ENCOUNTER — APPOINTMENT (OUTPATIENT)
Dept: SPINE | Facility: HOSPITAL | Age: 60
End: 2020-09-30
Payer: COMMERCIAL

## 2020-09-30 LAB
ANION GAP SERPL CALC-SCNC: 15 MMOL/L — SIGNIFICANT CHANGE UP (ref 5–17)
APTT BLD: 21.3 SEC — LOW (ref 27.5–35.5)
BASE EXCESS BLDV CALC-SCNC: -9 MMOL/L — LOW (ref -2–2)
BUN SERPL-MCNC: 30 MG/DL — HIGH (ref 7–23)
CA-I SERPL-SCNC: 1.28 MMOL/L — SIGNIFICANT CHANGE UP (ref 1.12–1.3)
CALCIUM SERPL-MCNC: 9 MG/DL — SIGNIFICANT CHANGE UP (ref 8.4–10.5)
CHLORIDE BLDV-SCNC: 109 MMOL/L — HIGH (ref 96–108)
CHLORIDE SERPL-SCNC: 110 MMOL/L — HIGH (ref 96–108)
CO2 BLDV-SCNC: 20 MMOL/L — LOW (ref 22–30)
CO2 SERPL-SCNC: 16 MMOL/L — LOW (ref 22–31)
CREAT SERPL-MCNC: 1.09 MG/DL — SIGNIFICANT CHANGE UP (ref 0.5–1.3)
GAS PNL BLDA: SIGNIFICANT CHANGE UP
GAS PNL BLDV: 137 MMOL/L — SIGNIFICANT CHANGE UP (ref 135–145)
GAS PNL BLDV: SIGNIFICANT CHANGE UP
GLUCOSE BLDV-MCNC: 173 MG/DL — HIGH (ref 70–99)
GLUCOSE SERPL-MCNC: 186 MG/DL — HIGH (ref 70–99)
HCO3 BLDV-SCNC: 18 MMOL/L — LOW (ref 21–29)
HCT VFR BLD CALC: 27.1 % — LOW (ref 34.5–45)
HCT VFR BLDA CALC: 27 % — LOW (ref 39–50)
HGB BLD CALC-MCNC: 8.8 G/DL — LOW (ref 11.5–15.5)
HGB BLD-MCNC: 8.8 G/DL — LOW (ref 11.5–15.5)
INR BLD: 1.19 RATIO — HIGH (ref 0.88–1.16)
LACTATE BLDV-MCNC: 7.9 MMOL/L — CRITICAL HIGH (ref 0.7–2)
LACTATE SERPL-SCNC: 2.9 MMOL/L — HIGH (ref 0.7–2)
MAGNESIUM SERPL-MCNC: 2.2 MG/DL — SIGNIFICANT CHANGE UP (ref 1.6–2.6)
MCHC RBC-ENTMCNC: 30.6 PG — SIGNIFICANT CHANGE UP (ref 27–34)
MCHC RBC-ENTMCNC: 32.5 GM/DL — SIGNIFICANT CHANGE UP (ref 32–36)
MCV RBC AUTO: 94.1 FL — SIGNIFICANT CHANGE UP (ref 80–100)
NRBC # BLD: 0 /100 WBCS — SIGNIFICANT CHANGE UP (ref 0–0)
OTHER CELLS CSF MANUAL: 5 ML/DL — LOW (ref 18–22)
PCO2 BLDV: 47 MMHG — SIGNIFICANT CHANGE UP (ref 35–50)
PH BLDV: 7.21 — LOW (ref 7.35–7.45)
PHOSPHATE SERPL-MCNC: 6 MG/DL — HIGH (ref 2.5–4.5)
PLATELET # BLD AUTO: 216 K/UL — SIGNIFICANT CHANGE UP (ref 150–400)
PO2 BLDV: 33 MMHG — SIGNIFICANT CHANGE UP (ref 25–45)
POTASSIUM BLDV-SCNC: 4.2 MMOL/L — SIGNIFICANT CHANGE UP (ref 3.5–5.3)
POTASSIUM SERPL-MCNC: 4.4 MMOL/L — SIGNIFICANT CHANGE UP (ref 3.5–5.3)
POTASSIUM SERPL-SCNC: 4.4 MMOL/L — SIGNIFICANT CHANGE UP (ref 3.5–5.3)
PROTHROM AB SERPL-ACNC: 14.2 SEC — HIGH (ref 10.6–13.6)
RBC # BLD: 2.88 M/UL — LOW (ref 3.8–5.2)
RBC # FLD: 13.2 % — SIGNIFICANT CHANGE UP (ref 10.3–14.5)
SAO2 % BLDV: 41 % — LOW (ref 67–88)
SARS-COV-2 RNA SPEC QL NAA+PROBE: SIGNIFICANT CHANGE UP
SODIUM SERPL-SCNC: 141 MMOL/L — SIGNIFICANT CHANGE UP (ref 135–145)
WBC # BLD: 16.62 K/UL — HIGH (ref 3.8–10.5)
WBC # FLD AUTO: 16.62 K/UL — HIGH (ref 3.8–10.5)

## 2020-09-30 PROCEDURE — 99291 CRITICAL CARE FIRST HOUR: CPT | Mod: 25

## 2020-09-30 PROCEDURE — 63048 LAM FACETEC &FORAMOT EA ADDL: CPT | Mod: 58,59,GC

## 2020-09-30 PROCEDURE — 36556 INSERT NON-TUNNEL CV CATH: CPT

## 2020-09-30 PROCEDURE — 22848 INSERT PELV FIXATION DEVICE: CPT | Mod: 82,58

## 2020-09-30 PROCEDURE — 22843 INSERT SPINE FIXATION DEVICE: CPT | Mod: 82,58

## 2020-09-30 PROCEDURE — 63046 LAM FACETEC & FORAMOT THRC: CPT | Mod: 82,58,59

## 2020-09-30 PROCEDURE — 22633 ARTHRD CMBN 1NTRSPC LUMBAR: CPT | Mod: 82,58

## 2020-09-30 PROCEDURE — 22843 INSERT SPINE FIXATION DEVICE: CPT | Mod: 58,GC

## 2020-09-30 PROCEDURE — 22853 INSJ BIOMECHANICAL DEVICE: CPT | Mod: 58,GC

## 2020-09-30 PROCEDURE — 22853 INSJ BIOMECHANICAL DEVICE: CPT | Mod: 82,58

## 2020-09-30 PROCEDURE — 22614 ARTHRD PST TQ 1NTRSPC EA ADD: CPT | Mod: 58,GC

## 2020-09-30 PROCEDURE — 63046 LAM FACETEC & FORAMOT THRC: CPT | Mod: 58,59,GC

## 2020-09-30 PROCEDURE — 63048 LAM FACETEC &FORAMOT EA ADDL: CPT | Mod: 82,58,59

## 2020-09-30 PROCEDURE — 22848 INSERT PELV FIXATION DEVICE: CPT | Mod: 58,GC

## 2020-09-30 PROCEDURE — 93970 EXTREMITY STUDY: CPT | Mod: 26

## 2020-09-30 PROCEDURE — 22614 ARTHRD PST TQ 1NTRSPC EA ADD: CPT | Mod: 82,58

## 2020-09-30 PROCEDURE — 22633 ARTHRD CMBN 1NTRSPC LUMBAR: CPT | Mod: 58,GC

## 2020-09-30 RX ORDER — INSULIN LISPRO 100/ML
VIAL (ML) SUBCUTANEOUS EVERY 6 HOURS
Refills: 0 | Status: DISCONTINUED | OUTPATIENT
Start: 2020-09-30 | End: 2020-10-01

## 2020-09-30 RX ORDER — DULOXETINE HYDROCHLORIDE 30 MG/1
1 CAPSULE, DELAYED RELEASE ORAL
Qty: 0 | Refills: 0 | DISCHARGE

## 2020-09-30 RX ORDER — HYDROMORPHONE HYDROCHLORIDE 2 MG/ML
30 INJECTION INTRAMUSCULAR; INTRAVENOUS; SUBCUTANEOUS
Refills: 0 | Status: DISCONTINUED | OUTPATIENT
Start: 2020-09-30 | End: 2020-10-02

## 2020-09-30 RX ORDER — HYDROMORPHONE HYDROCHLORIDE 2 MG/ML
1 INJECTION INTRAMUSCULAR; INTRAVENOUS; SUBCUTANEOUS ONCE
Refills: 0 | Status: DISCONTINUED | OUTPATIENT
Start: 2020-09-30 | End: 2020-09-30

## 2020-09-30 RX ORDER — ASPIRIN/CALCIUM CARB/MAGNESIUM 324 MG
0 TABLET ORAL
Qty: 0 | Refills: 0 | DISCHARGE

## 2020-09-30 RX ORDER — ENOXAPARIN SODIUM 100 MG/ML
40 INJECTION SUBCUTANEOUS DAILY
Refills: 0 | Status: DISCONTINUED | OUTPATIENT
Start: 2020-10-01 | End: 2020-10-10

## 2020-09-30 RX ORDER — OMEGA-3 ACID ETHYL ESTERS 1 G
0 CAPSULE ORAL
Qty: 0 | Refills: 0 | DISCHARGE

## 2020-09-30 RX ORDER — PHENYLEPHRINE HYDROCHLORIDE 10 MG/ML
2.3 INJECTION INTRAVENOUS
Qty: 40 | Refills: 0 | Status: DISCONTINUED | OUTPATIENT
Start: 2020-09-30 | End: 2020-10-01

## 2020-09-30 RX ORDER — FENTANYL CITRATE 50 UG/ML
50 INJECTION INTRAVENOUS ONCE
Refills: 0 | Status: DISCONTINUED | OUTPATIENT
Start: 2020-09-30 | End: 2020-09-30

## 2020-09-30 RX ORDER — ATORVASTATIN CALCIUM 80 MG/1
1 TABLET, FILM COATED ORAL
Qty: 0 | Refills: 0 | DISCHARGE

## 2020-09-30 RX ORDER — HYDROMORPHONE HYDROCHLORIDE 2 MG/ML
0.5 INJECTION INTRAMUSCULAR; INTRAVENOUS; SUBCUTANEOUS
Refills: 0 | Status: DISCONTINUED | OUTPATIENT
Start: 2020-09-30 | End: 2020-10-02

## 2020-09-30 RX ORDER — VANCOMYCIN HCL 1 G
1500 VIAL (EA) INTRAVENOUS EVERY 12 HOURS
Refills: 0 | Status: COMPLETED | OUTPATIENT
Start: 2020-09-30 | End: 2020-10-02

## 2020-09-30 RX ADMIN — LOSARTAN POTASSIUM 50 MILLIGRAM(S): 100 TABLET, FILM COATED ORAL at 05:09

## 2020-09-30 RX ADMIN — MAGNESIUM OXIDE 400 MG ORAL TABLET 400 MILLIGRAM(S): 241.3 TABLET ORAL at 11:12

## 2020-09-30 RX ADMIN — SODIUM CHLORIDE 75 MILLILITER(S): 9 INJECTION, SOLUTION INTRAVENOUS at 23:57

## 2020-09-30 RX ADMIN — HYDROMORPHONE HYDROCHLORIDE 1 MILLIGRAM(S): 2 INJECTION INTRAMUSCULAR; INTRAVENOUS; SUBCUTANEOUS at 22:54

## 2020-09-30 RX ADMIN — SODIUM CHLORIDE 75 MILLILITER(S): 9 INJECTION, SOLUTION INTRAVENOUS at 18:00

## 2020-09-30 RX ADMIN — Medication 145 MILLIGRAM(S): at 11:12

## 2020-09-30 RX ADMIN — Medication 4 MILLIGRAM(S): at 11:13

## 2020-09-30 RX ADMIN — HYDROMORPHONE HYDROCHLORIDE 1 MILLIGRAM(S): 2 INJECTION INTRAMUSCULAR; INTRAVENOUS; SUBCUTANEOUS at 23:24

## 2020-09-30 RX ADMIN — DULOXETINE HYDROCHLORIDE 60 MILLIGRAM(S): 30 CAPSULE, DELAYED RELEASE ORAL at 05:09

## 2020-09-30 RX ADMIN — FENTANYL CITRATE 50 MICROGRAM(S): 50 INJECTION INTRAVENOUS at 20:40

## 2020-09-30 RX ADMIN — HYDROMORPHONE HYDROCHLORIDE 30 MILLILITER(S): 2 INJECTION INTRAMUSCULAR; INTRAVENOUS; SUBCUTANEOUS at 07:00

## 2020-09-30 RX ADMIN — Medication 1000 UNIT(S): at 11:12

## 2020-09-30 RX ADMIN — AMLODIPINE BESYLATE 5 MILLIGRAM(S): 2.5 TABLET ORAL at 05:09

## 2020-09-30 RX ADMIN — CHLORHEXIDINE GLUCONATE 1 APPLICATION(S): 213 SOLUTION TOPICAL at 23:57

## 2020-09-30 RX ADMIN — FENTANYL CITRATE 50 MICROGRAM(S): 50 INJECTION INTRAVENOUS at 21:10

## 2020-09-30 RX ADMIN — Medication 4 MILLIGRAM(S): at 05:09

## 2020-09-30 RX ADMIN — NEBIVOLOL HYDROCHLORIDE 10 MILLIGRAM(S): 5 TABLET ORAL at 05:09

## 2020-09-30 RX ADMIN — POLYETHYLENE GLYCOL 3350 17 GRAM(S): 17 POWDER, FOR SOLUTION ORAL at 05:09

## 2020-09-30 RX ADMIN — Medication 1 TABLET(S): at 11:13

## 2020-09-30 NOTE — PROGRESS NOTE ADULT - SUBJECTIVE AND OBJECTIVE BOX
INTERVAL HISTORY: HPI:  60 year old female with h/o scoliosis/ spinal stenosis- lumbar region, presents to preadmission testing for scheduled stage 1, L1-5 lumbar lateral interbody fusion, scheduled for 9/29/2020. Her medical history includes HTN, hyperlipidemia, osteoarthritis, anxiety/depression disorders, GERD, eczema, sinusitis.      MEDICATIONS  (STANDING):  amLODIPine   Tablet 5 milliGRAM(s) Oral daily  atorvastatin 10 milliGRAM(s) Oral at bedtime  chlorhexidine 4% Liquid 1 Application(s) Topical <User Schedule>  cholecalciferol 1000 Unit(s) Oral daily  dexAMETHasone  Injectable 4 milliGRAM(s) IV Push every 6 hours  DULoxetine 60 milliGRAM(s) Oral two times a day  fenofibrate Tablet 145 milliGRAM(s) Oral daily  HYDROmorphone PCA (1 mG/mL) 30 milliLiter(s) PCA Continuous PCA Continuous  influenza   Vaccine 0.5 milliLiter(s) IntraMuscular once  lactated ringers. 1000 milliLiter(s) (75 mL/Hr) IV Continuous <Continuous>  losartan 50 milliGRAM(s) Oral daily  magnesium oxide 400 milliGRAM(s) Oral daily  multivitamin 1 Tablet(s) Oral daily  nebivolol 10 milliGRAM(s) Oral daily  pantoprazole    Tablet 40 milliGRAM(s) Oral before breakfast  polyethylene glycol 3350 17 Gram(s) Oral two times a day  senna 2 Tablet(s) Oral at bedtime  vancomycin  IVPB 1500 milliGRAM(s) IV Intermittent once    MEDICATIONS  (PRN):  ALPRAZolam 0.25 milliGRAM(s) Oral three times a day PRN anxiety  cyclobenzaprine 10 milliGRAM(s) Oral three times a day PRN Muscle Spasm  diphenhydrAMINE 25 milliGRAM(s) Oral every 4 hours PRN Rash and/or Itching  HYDROmorphone PCA (1 mG/mL) Rescue Clinician Bolus 0.5 milliGRAM(s) IV Push every 15 minutes PRN for Pain Scale GREATER THAN 6  magnesium hydroxide Suspension 30 milliLiter(s) Oral every 12 hours PRN Constipation  naloxone Injectable 0.1 milliGRAM(s) IV Push every 3 minutes PRN For ANY of the following changes in patient status:  A. RR LESS THAN 10 breaths per minute, B. Oxygen saturation LESS THAN 90%, C. Sedation score of 6  ondansetron Injectable 4 milliGRAM(s) IV Push every 6 hours PRN Nausea  SUMAtriptan 25 milliGRAM(s) Oral four times a day PRN Migraine      Drug Dosing Weight  Height (cm): 165.1 (29 Sep 2020 07:38)  Weight (kg): 90.7 (29 Sep 2020 07:38)  BMI (kg/m2): 33.3 (29 Sep 2020 07:38)  BSA (m2): 1.98 (29 Sep 2020 07:38)        A-LINE: [ ] YES [ ] NO  LOCATION:   DATE INSERTED:  REMOVE: [ ] YES [ ] NO  EXPLAIN:    PAST MEDICAL & SURGICAL HISTORY:  Lumbar spinal stenosis    History of sciatica  mostly right side    H/O sinusitis    Eczema  extremities    Scoliosis    Meniscus tear  left knee    Spinal stenosis in cervical region    Anxiety and depression    Migraine    GERD (gastroesophageal reflux disease)    Seasonal allergies    Hyperlipidemia    HTN (hypertension)    H/O cervical spine surgery  C3-6   11/2015    History of tonsillectomy    History of appendectomy        REVIEW OF SYSTEMS: [ ] Unable to Assess due to neurologic exam   [ ] All ROS addressed below are non-contributory, except:  Neuro: [ ] Headache [ ] Back pain [ ] Numbness [ ] Weakness [ ] Ataxia [ ] Dizziness [ ] Aphasia [ ] Dysarthria [ ] Visual disturbance  Resp: [ ] Shortness of breath/dyspnea, [ ] Orthopnea [ ] Cough  CV: [ ] Chest pain [ ] Palpitation [ ] Lightheadedness [ ] Syncope  Renal: [ ] Thirst [ ] Edema  GI: [ ] Nausea [ ] Emesis [ ] Abdominal pain [ ] Constipation [ ] Diarrhea  Hem: [ ] Hematemesis [ ] bright red blood per rectum  ID: [ ] Fever [ ] Chills [ ] Dysuria  ENT: [ ] Rhinorrhea      ICU Vital Signs Last 24 Hrs  T(C): 36.6 (30 Sep 2020 03:00), Max: 37 (29 Sep 2020 19:00)  T(F): 97.8 (30 Sep 2020 03:00), Max: 98.6 (29 Sep 2020 19:00)  HR: 81 (30 Sep 2020 06:00) (75 - 85)  BP: 140/77 (30 Sep 2020 06:00) (121/78 - 160/80)  BP(mean): 96 (30 Sep 2020 06:00) (93 - 111)  ABP: 143/83 (29 Sep 2020 20:00) (133/76 - 174/88)  ABP(mean): 108 (29 Sep 2020 20:00) (85 - 121)  RR: 11 (30 Sep 2020 06:00) (9 - 23)  SpO2: 94% (30 Sep 2020 06:00) (91% - 100%)      ABG - ( 29 Sep 2020 10:14 )  pH, Arterial: 7.43  pH, Blood: x     /  pCO2: 39    /  pO2: 290   / HCO3: 26    / Base Excess: 1.9   /  SaO2: 99                  I&O's Detail    29 Sep 2020 07:01  -  30 Sep 2020 07:00  --------------------------------------------------------  IN:    Lactated Ringers: 1555 mL    Oral Fluid: 100 mL  Total IN: 1655 mL    OUT:    Indwelling Catheter - Urethral (mL): 985 mL  Total OUT: 985 mL    Total NET: 670 mL              PHYSICAL EXAM:    General: No Acute Distress     Neurological: Awake, alert oriented to person, place and time, Following Commands, PERRL, EOMI, Face Symmetrical, Speech Fluent, Moving all extremities, Muscle Strength normal in all four extremities, No Drift, Sensation to Light Touch Intact    Pulmonary: Clear to Auscultation, No Rales, No Rhonchi, No Wheezes     Cardiovascular: S1, S2, Regular Rate and Rhythm     Gastrointestinal: Soft, Nontender, Nondistended     Extremities: No calf tenderness     Incision:         LABS:  CBC Full  -  ( 29 Sep 2020 14:00 )  WBC Count : 13.83 K/uL  RBC Count : 4.83 M/uL  Hemoglobin : 14.3 g/dL  Hematocrit : 44.3 %  Platelet Count - Automated : 282 K/uL  Mean Cell Volume : 91.7 fl  Mean Cell Hemoglobin : 29.6 pg  Mean Cell Hemoglobin Concentration : 32.3 gm/dL  Auto Neutrophil # : 11.54 K/uL  Auto Lymphocyte # : 1.22 K/uL  Auto Monocyte # : 0.88 K/uL  Auto Eosinophil # : 0.02 K/uL  Auto Basophil # : 0.03 K/uL  Auto Neutrophil % : 83.5 %  Auto Lymphocyte % : 8.8 %  Auto Monocyte % : 6.4 %  Auto Eosinophil % : 0.1 %  Auto Basophil % : 0.2 %    09-29    139  |  104  |  29<H>  ----------------------------<  178<H>  4.5   |  24  |  0.87    Ca    9.4      29 Sep 2020 14:00      PT/INR - ( 29 Sep 2020 22:31 )   PT: 13.2 sec;   INR: 1.11 ratio         PTT - ( 29 Sep 2020 22:31 )  PTT:39.6 sec      RADIOLOGY & ADDITIONAL STUDIES:

## 2020-09-30 NOTE — PROGRESS NOTE ADULT - SUBJECTIVE AND OBJECTIVE BOX
Patient seen and examined    Overnight events: post op from stage one, doing well, mild back pain    Exam:   AOx3, FC, PERRL, EOMI, no facial, 4/5 mild pain limited HF weakness otherwise 5/5 throughout, no drift

## 2020-09-30 NOTE — PRE-ANESTHESIA EVALUATION ADULT - NSANTHOSAYNRD_GEN_A_CORE
No. HERMILA screening performed.  STOP BANG Legend: 0-2 = LOW Risk; 3-4 = INTERMEDIATE Risk; 5-8 = HIGH Risk
No. HERMILA screening performed.  STOP BANG Legend: 0-2 = LOW Risk; 3-4 = INTERMEDIATE Risk; 5-8 = HIGH Risk

## 2020-09-30 NOTE — BRIEF OPERATIVE NOTE - NSICDXBRIEFPOSTOP_GEN_ALL_CORE_FT
POST-OP DIAGNOSIS:  Lumbar stenosis 30-Sep-2020 17:06:30  Emi Echevarria  
POST-OP DIAGNOSIS:  Lumbar stenosis 30-Sep-2020 17:06:30  Emi Echevarria

## 2020-09-30 NOTE — BRIEF OPERATIVE NOTE - NSICDXBRIEFPROCEDURE_GEN_ALL_CORE_FT
PROCEDURES:  Complex repair of back, 5.1cm to 7.5cm 30-Sep-2020 17:06:00  Emi Echevarria  Muscle flap, trunk 30-Sep-2020 17:05:48  Emi Echevarria  
PROCEDURES:  Arthrodesis of thoracolumbar region, posterolateral technique 30-Sep-2020 19:22:48  David Casanova

## 2020-09-30 NOTE — PROGRESS NOTE ADULT - SUBJECTIVE AND OBJECTIVE BOX
Day 1 of Anesthesia Pain Management Service    SUBJECTIVE: I'm doing ok    Pain Scale Score:	[X] Refer to charted pain scores    THERAPY:    [ ] IV PCA Morphine		[ ] 5 mg/mL	[ ] 1 mg/mL  [X] IV PCA Hydromorphone	[ ] 5 mg/mL	[X] 1 mg/mL  [ ] IV PCA Fentanyl		[ ] 50 micrograms/mL    Demand dose: 0.2 mg     Lockout: 6 minutes   Continuous Rate: 0 mg/hr  4 Hour Limit: 4 mg    MEDICATIONS  (STANDING):  amLODIPine   Tablet 5 milliGRAM(s) Oral daily  atorvastatin 10 milliGRAM(s) Oral at bedtime  chlorhexidine 4% Liquid 1 Application(s) Topical <User Schedule>  cholecalciferol 1000 Unit(s) Oral daily  dexAMETHasone  Injectable 4 milliGRAM(s) IV Push every 6 hours  DULoxetine 60 milliGRAM(s) Oral two times a day  fenofibrate Tablet 145 milliGRAM(s) Oral daily  HYDROmorphone PCA (1 mG/mL) 30 milliLiter(s) PCA Continuous PCA Continuous  influenza   Vaccine 0.5 milliLiter(s) IntraMuscular once  lactated ringers. 1000 milliLiter(s) (75 mL/Hr) IV Continuous <Continuous>  losartan 50 milliGRAM(s) Oral daily  magnesium oxide 400 milliGRAM(s) Oral daily  multivitamin 1 Tablet(s) Oral daily  nebivolol 10 milliGRAM(s) Oral daily  pantoprazole    Tablet 40 milliGRAM(s) Oral before breakfast  polyethylene glycol 3350 17 Gram(s) Oral two times a day  senna 2 Tablet(s) Oral at bedtime  vancomycin  IVPB 1500 milliGRAM(s) IV Intermittent once    MEDICATIONS  (PRN):  ALPRAZolam 0.25 milliGRAM(s) Oral three times a day PRN anxiety  cyclobenzaprine 10 milliGRAM(s) Oral three times a day PRN Muscle Spasm  diphenhydrAMINE 25 milliGRAM(s) Oral every 4 hours PRN Rash and/or Itching  HYDROmorphone PCA (1 mG/mL) Rescue Clinician Bolus 0.5 milliGRAM(s) IV Push every 15 minutes PRN for Pain Scale GREATER THAN 6  magnesium hydroxide Suspension 30 milliLiter(s) Oral every 12 hours PRN Constipation  naloxone Injectable 0.1 milliGRAM(s) IV Push every 3 minutes PRN For ANY of the following changes in patient status:  A. RR LESS THAN 10 breaths per minute, B. Oxygen saturation LESS THAN 90%, C. Sedation score of 6  ondansetron Injectable 4 milliGRAM(s) IV Push every 6 hours PRN Nausea  SUMAtriptan 25 milliGRAM(s) Oral four times a day PRN Migraine      OBJECTIVE:    Sedation Score:	[ X] Alert 	[ ] Drowsy 	[ ] Arousable	[ ] Asleep	[ ] Unresponsive    Side Effects:	[X ] None	[ ] Nausea	[ ] Vomiting	[ ] Pruritus  		[ ] Other:    Vital Signs Last 24 Hrs  T(C): 36.9 (30 Sep 2020 08:00), Max: 37 (29 Sep 2020 19:00)  T(F): 98.5 (30 Sep 2020 08:00), Max: 98.6 (29 Sep 2020 19:00)  HR: 74 (30 Sep 2020 09:00) (74 - 85)  BP: 126/74 (30 Sep 2020 07:00) (121/78 - 160/80)  BP(mean): 91 (30 Sep 2020 07:00) (91 - 111)  RR: 12 (30 Sep 2020 09:00) (9 - 23)  SpO2: 97% (30 Sep 2020 09:00) (91% - 100%)    ASSESSMENT/ PLAN    Therapy to  be:               [X] Continued   [ ] Discontinued   [ ] Changed to PRN Analgesics    Documentation and Verification of current medications:   [X] Done	[ ] Not done, not eligible    Comments: For OR today, Stage 2. D\C PCA on call to OR

## 2020-09-30 NOTE — BRIEF OPERATIVE NOTE - NSICDXBRIEFPREOP_GEN_ALL_CORE_FT
PRE-OP DIAGNOSIS:  Lumbar stenosis 30-Sep-2020 17:06:13  Emi Echevarria  
PRE-OP DIAGNOSIS:  Lumbar stenosis 30-Sep-2020 17:06:13  Emi Echevarria

## 2020-09-30 NOTE — PRE-ANESTHESIA EVALUATION ADULT - LAST STRESS TEST
within 10 years, had an irregularity in the EKG [place], now on
within 10 years, had an irregularity in the EKG [place], now on

## 2020-09-30 NOTE — PROGRESS NOTE ADULT - ASSESSMENT
Assesment:60 year old female with h/o HTN, hyperlipidemia, ostearthritis, scoliosis/ spinal stenosis, anxiety/depression disorders, GERD, eczema, sinusitis admitted for scheduled stage 1, L1-5 lumbar lateral interbody fusion     NEURO:  L1-L5 lateral lumbar interbody fusion s/p stage 1 on 9/29/20, stage 2 T9- Pelvis scheduled for 9/30/20  stage 1 EBL 200cc  -neuro check q1  -dexamethasone 4mg q6   -pain management w/ Dilaudid ggt, cyclobenzaprine 10mg TID PRN for spasm  -Post-Op CT lumbar and thoracic spine  -bed rest for now  -PT/OT evaluation after stage 2 tomorrow   Migraine-  c/w sumatriptan 25mg PRN     Psych:  depression/anxiety  -c/w duloxetine 60mg qd, alprazolam 0.25mg PRN for anxiety    PULMONARY:  saturating well on minimal NC, can wean to RA  -continue to monitor on pulse o2   -incentive spirometry 10q/hr when awake     CARDIOVASCULAR:  Hypertension  -c/w home amlodipine 5mg, Losartan 50mg qd, nebivolol 10mg qd    GASTROENTEROLOGY:  bedside speech & swallow if pass can start advancing diet as tolerated.   ensure BMs w/ Miralax & senna  NPO after midnight for procedure on 9/30    INFECTIOUS:  received pre-op abx vancomycin 1500mg x1   monitor for fevers    RENAL:  -c/w IVF LR until tolerating PO  -check BMP daily  -strict i/o's    HEME/ONC:  DVT ppx: will hold chemical dvt ppx in setting of recent operation. b/l SCDs for now    not critically ill but medically complex

## 2020-09-30 NOTE — PROGRESS NOTE ADULT - SUBJECTIVE AND OBJECTIVE BOX
Pain Management Attending Addendum    SUBJECTIVE: Patient doing well with IV PCA    Therapy:    [X] IV PCA         [ ] PRN Analgesics    OBJECTIVE:   [X] Pain appropriately controlled    [ ] Other:    Side Effects:  [X] None	             [ ] Nausea              [ ] Pruritis                	[ ] Other:    ASSESSMENT/PLAN: Continue current therapy

## 2020-09-30 NOTE — BRIEF OPERATIVE NOTE - OPERATION/FINDINGS
B/l paraspinal muscle flaps w/ complex closure
L1-L5 lateral lumbar interbody fusion
T9 - PELVIS ARTHRODESIS  L5-S1 TLIF

## 2020-09-30 NOTE — PROGRESS NOTE ADULT - SUBJECTIVE AND OBJECTIVE BOX
O: lumbar stenosis s/p Arthrodesis of thoracolumbar region, posterolateral technique with EBL 1.5 L  arrived to the NSCU hypotensive on phenylephrine received 5 L LR in the OR, no blood transfusions     T(C): 36.4 (09-30-20 @ 22:00), Max: 43 (09-30-20 @ 20:20)  HR: 71 (09-30-20 @ 22:00) (68 - 89)  BP: 146/73 (09-30-20 @ 15:13) (121/78 - 146/73)  RR: 14 (09-30-20 @ 22:00) (9 - 19)  SpO2: 98% (09-30-20 @ 22:00) (93% - 100%)  09-29-20 @ 07:01  -  09-30-20 @ 07:00  --------------------------------------------------------  IN: 1655 mL / OUT: 1035 mL / NET: 620 mL    09-30-20 @ 07:01  -  09-30-20 @ 22:46  --------------------------------------------------------  IN: 1237 mL / OUT: 525 mL / NET: 712 mL    ALPRAZolam 0.25 milliGRAM(s) Oral three times a day PRN  amLODIPine   Tablet 5 milliGRAM(s) Oral daily  atorvastatin 10 milliGRAM(s) Oral at bedtime  chlorhexidine 4% Liquid 1 Application(s) Topical <User Schedule>  cholecalciferol 1000 Unit(s) Oral daily  cyclobenzaprine 10 milliGRAM(s) Oral three times a day PRN  diphenhydrAMINE 25 milliGRAM(s) Oral every 4 hours PRN  DULoxetine 60 milliGRAM(s) Oral two times a day  fenofibrate Tablet 145 milliGRAM(s) Oral daily  influenza   Vaccine 0.5 milliLiter(s) IntraMuscular once  insulin lispro (HumaLOG) corrective regimen sliding scale   SubCutaneous every 6 hours  lactated ringers. 1000 milliLiter(s) IV Continuous <Continuous>  losartan 50 milliGRAM(s) Oral daily  magnesium hydroxide Suspension 30 milliLiter(s) Oral every 12 hours PRN  magnesium oxide 400 milliGRAM(s) Oral daily  multivitamin 1 Tablet(s) Oral daily  naloxone Injectable 0.1 milliGRAM(s) IV Push every 3 minutes PRN  nebivolol 10 milliGRAM(s) Oral daily  ondansetron Injectable 4 milliGRAM(s) IV Push every 6 hours PRN  pantoprazole    Tablet 40 milliGRAM(s) Oral before breakfast  phenylephrine    Infusion 2.3 MICROgram(s)/kG/Min IV Continuous <Continuous>  polyethylene glycol 3350 17 Gram(s) Oral two times a day  senna 2 Tablet(s) Oral at bedtime  SUMAtriptan 25 milliGRAM(s) Oral four times a day PRN  vancomycin  IVPB 1500 milliGRAM(s) IV Intermittent once  vancomycin  IVPB 1500 milliGRAM(s) IV Intermittent every 12 hours      PHYSICAL EXAM:    General: No Acute Distress     Neurological: sleepy Awake, alert oriented to person, place and time, Following Commands, PERRL, EOMI, Face Symmetrical, Speech Fluent, Moving all extremities at least antigravity     Pulmonary: Clear to Auscultation, No Rales, No Rhonchi, No Wheezes     Cardiovascular: S1, S2, Regular Rate and Rhythm     Gastrointestinal: Soft, Nontender, Nondistended     Extremities: No calf tenderness     Incision:         LABS:  Na: 141 (09-30 @ 21:23), 139 (09-29 @ 14:00)  K: 4.4 (09-30 @ 21:23), 4.5 (09-29 @ 14:00)  Cl: 110 (09-30 @ 21:23), 104 (09-29 @ 14:00)  CO2: 16 (09-30 @ 21:23), 24 (09-29 @ 14:00)  BUN: 30 (09-30 @ 21:23), 29 (09-29 @ 14:00)  Cr: 1.09 (09-30 @ 21:23), 0.87 (09-29 @ 14:00)  Glu: 186(09-30 @ 21:23), 178(09-29 @ 14:00)    Hgb: 8.8 (09-30 @ 21:24), 14.3 (09-29 @ 14:00)  Hct: 27.1 (09-30 @ 21:24), 44.3 (09-29 @ 14:00)  WBC: 16.62 (09-30 @ 21:24), 13.83 (09-29 @ 14:00)  Plt: 216 (09-30 @ 21:24), 282 (09-29 @ 14:00)    INR: 1.11 09-29-20 @ 22:31  PTT: 39.6 09-29-20 @ 22:31        Assesment:60 year old female with h/o HTN, hyperlipidemia, ostearthritis, scoliosis/ spinal stenosis, anxiety/depression disorders, GERD, eczema, sinusitis admitted for scheduled stage 1, L1-5 lumbar lateral interbody fusion     L1-L5 lateral lumbar interbody fusion s/p stage 1 on 9/29/20, stage 2 T9- Pelvis scheduled for 9/30/20  s/p  lumbar stenosis s/p Arthrodesis of thoracolumbar region, posterolateral technique with EBL 1.5 L  arrived to the NSCU hypotensive on phenylephrine received 5 L LR in the OR, no blood transfusions   hypothermic  no IV access, central line was placed, transfused with 1 packed RBC, will give another unit, and get cbc  LR 75 ml/hr   on warming blanket   monitor I/O  Kepp MAP> 65 mmhg   -neuro check q1  PCA pump   hold antiHTN  trend lactic acid   wean phenylephrine   on NC  eduardo-op ABX   monitor for fevers  hold chemoprophylaxis     patient is in hemorrhagic shock post-op   critical 40 critical care time    O: lumbar stenosis s/p Arthrodesis of thoracolumbar region, posterolateral technique with EBL 1.5 L  arrived to the NSCU hypotensive on phenylephrine received 5 L LR in the OR, no blood transfusions     T(C): 36.4 (09-30-20 @ 22:00), Max: 43 (09-30-20 @ 20:20)  HR: 71 (09-30-20 @ 22:00) (68 - 89)  BP: 146/73 (09-30-20 @ 15:13) (121/78 - 146/73)  RR: 14 (09-30-20 @ 22:00) (9 - 19)  SpO2: 98% (09-30-20 @ 22:00) (93% - 100%)  09-29-20 @ 07:01  -  09-30-20 @ 07:00  --------------------------------------------------------  IN: 1655 mL / OUT: 1035 mL / NET: 620 mL    09-30-20 @ 07:01  -  09-30-20 @ 22:46  --------------------------------------------------------  IN: 1237 mL / OUT: 525 mL / NET: 712 mL    ALPRAZolam 0.25 milliGRAM(s) Oral three times a day PRN  amLODIPine   Tablet 5 milliGRAM(s) Oral daily  atorvastatin 10 milliGRAM(s) Oral at bedtime  chlorhexidine 4% Liquid 1 Application(s) Topical <User Schedule>  cholecalciferol 1000 Unit(s) Oral daily  cyclobenzaprine 10 milliGRAM(s) Oral three times a day PRN  diphenhydrAMINE 25 milliGRAM(s) Oral every 4 hours PRN  DULoxetine 60 milliGRAM(s) Oral two times a day  fenofibrate Tablet 145 milliGRAM(s) Oral daily  influenza   Vaccine 0.5 milliLiter(s) IntraMuscular once  insulin lispro (HumaLOG) corrective regimen sliding scale   SubCutaneous every 6 hours  lactated ringers. 1000 milliLiter(s) IV Continuous <Continuous>  losartan 50 milliGRAM(s) Oral daily  magnesium hydroxide Suspension 30 milliLiter(s) Oral every 12 hours PRN  magnesium oxide 400 milliGRAM(s) Oral daily  multivitamin 1 Tablet(s) Oral daily  naloxone Injectable 0.1 milliGRAM(s) IV Push every 3 minutes PRN  nebivolol 10 milliGRAM(s) Oral daily  ondansetron Injectable 4 milliGRAM(s) IV Push every 6 hours PRN  pantoprazole    Tablet 40 milliGRAM(s) Oral before breakfast  phenylephrine    Infusion 2.3 MICROgram(s)/kG/Min IV Continuous <Continuous>  polyethylene glycol 3350 17 Gram(s) Oral two times a day  senna 2 Tablet(s) Oral at bedtime  SUMAtriptan 25 milliGRAM(s) Oral four times a day PRN  vancomycin  IVPB 1500 milliGRAM(s) IV Intermittent once  vancomycin  IVPB 1500 milliGRAM(s) IV Intermittent every 12 hours      PHYSICAL EXAM:    General: No Acute Distress     Neurological: sleepy Awake, alert oriented to person, place and time, Following Commands, PERRL, EOMI, Face Symmetrical, Speech Fluent, Moving all extremities at least antigravity     Pulmonary: Clear to Auscultation, No Rales, No Rhonchi, No Wheezes     Cardiovascular: S1, S2, Regular Rate and Rhythm     Gastrointestinal: Soft, Nontender, Nondistended     Extremities: No calf tenderness     Incision:         LABS:  Na: 141 (09-30 @ 21:23), 139 (09-29 @ 14:00)  K: 4.4 (09-30 @ 21:23), 4.5 (09-29 @ 14:00)  Cl: 110 (09-30 @ 21:23), 104 (09-29 @ 14:00)  CO2: 16 (09-30 @ 21:23), 24 (09-29 @ 14:00)  BUN: 30 (09-30 @ 21:23), 29 (09-29 @ 14:00)  Cr: 1.09 (09-30 @ 21:23), 0.87 (09-29 @ 14:00)  Glu: 186(09-30 @ 21:23), 178(09-29 @ 14:00)    Hgb: 8.8 (09-30 @ 21:24), 14.3 (09-29 @ 14:00)  Hct: 27.1 (09-30 @ 21:24), 44.3 (09-29 @ 14:00)  WBC: 16.62 (09-30 @ 21:24), 13.83 (09-29 @ 14:00)  Plt: 216 (09-30 @ 21:24), 282 (09-29 @ 14:00)    INR: 1.11 09-29-20 @ 22:31  PTT: 39.6 09-29-20 @ 22:31        Assesment:60 year old female with h/o HTN, hyperlipidemia, ostearthritis, scoliosis/ spinal stenosis, anxiety/depression disorders, GERD, eczema, sinusitis admitted for scheduled stage 1, L1-5 lumbar lateral interbody fusion     L1-L5 lateral lumbar interbody fusion s/p stage 1 on 9/29/20, stage 2 T9- Pelvis scheduled for 9/30/20  s/p  lumbar stenosis s/p Arthrodesis of thoracolumbar region, posterolateral technique with EBL 1.5 L  arrived to the NSCU hypotensive on phenylephrine received 5 L LR in the OR, no blood transfusions   hypothermic  no IV access, central line was placed, transfused with 1 packed RBC, will give another unit, and get cbc  CT spine tomorrow   LR 75 ml/hr   on warming blanket   monitor I/O  Kepp MAP> 65 mmhg   -neuro check q1  PCA pump   hold antiHTN  trend lactic acid   wean phenylephrine   on NC  eduardo-op ABX   monitor for fevers  hold chemoprophylaxis     patient is in hemorrhagic shock post-op   critical 40 critical care time

## 2020-10-01 LAB
GLUCOSE BLDC GLUCOMTR-MCNC: 120 MG/DL — HIGH (ref 70–99)
GLUCOSE BLDC GLUCOMTR-MCNC: 133 MG/DL — HIGH (ref 70–99)
HCT VFR BLD CALC: 34.1 % — LOW (ref 34.5–45)
HGB BLD-MCNC: 11.6 G/DL — SIGNIFICANT CHANGE UP (ref 11.5–15.5)
LACTATE SERPL-SCNC: 1.4 MMOL/L — SIGNIFICANT CHANGE UP (ref 0.7–2)
MCHC RBC-ENTMCNC: 30.9 PG — SIGNIFICANT CHANGE UP (ref 27–34)
MCHC RBC-ENTMCNC: 34 GM/DL — SIGNIFICANT CHANGE UP (ref 32–36)
MCV RBC AUTO: 90.7 FL — SIGNIFICANT CHANGE UP (ref 80–100)
NRBC # BLD: 0 /100 WBCS — SIGNIFICANT CHANGE UP (ref 0–0)
PLATELET # BLD AUTO: 184 K/UL — SIGNIFICANT CHANGE UP (ref 150–400)
RBC # BLD: 3.76 M/UL — LOW (ref 3.8–5.2)
RBC # FLD: 13.3 % — SIGNIFICANT CHANGE UP (ref 10.3–14.5)
WBC # BLD: 13.7 K/UL — HIGH (ref 3.8–10.5)
WBC # FLD AUTO: 13.7 K/UL — HIGH (ref 3.8–10.5)

## 2020-10-01 PROCEDURE — 36620 INSERTION CATHETER ARTERY: CPT

## 2020-10-01 PROCEDURE — 72128 CT CHEST SPINE W/O DYE: CPT | Mod: 26

## 2020-10-01 PROCEDURE — 72131 CT LUMBAR SPINE W/O DYE: CPT | Mod: 26

## 2020-10-01 RX ORDER — SODIUM CHLORIDE 9 MG/ML
1000 INJECTION INTRAMUSCULAR; INTRAVENOUS; SUBCUTANEOUS ONCE
Refills: 0 | Status: COMPLETED | OUTPATIENT
Start: 2020-10-01 | End: 2020-10-01

## 2020-10-01 RX ORDER — MIDODRINE HYDROCHLORIDE 2.5 MG/1
5 TABLET ORAL EVERY 8 HOURS
Refills: 0 | Status: DISCONTINUED | OUTPATIENT
Start: 2020-10-01 | End: 2020-10-10

## 2020-10-01 RX ADMIN — Medication 145 MILLIGRAM(S): at 11:07

## 2020-10-01 RX ADMIN — SODIUM CHLORIDE 1000 MILLILITER(S): 9 INJECTION INTRAMUSCULAR; INTRAVENOUS; SUBCUTANEOUS at 11:00

## 2020-10-01 RX ADMIN — Medication 1000 UNIT(S): at 11:07

## 2020-10-01 RX ADMIN — HYDROMORPHONE HYDROCHLORIDE 30 MILLILITER(S): 2 INJECTION INTRAMUSCULAR; INTRAVENOUS; SUBCUTANEOUS at 18:06

## 2020-10-01 RX ADMIN — Medication 1 TABLET(S): at 11:08

## 2020-10-01 RX ADMIN — PHENYLEPHRINE HYDROCHLORIDE 78.2 MICROGRAM(S)/KG/MIN: 10 INJECTION INTRAVENOUS at 02:00

## 2020-10-01 RX ADMIN — HYDROMORPHONE HYDROCHLORIDE 30 MILLILITER(S): 2 INJECTION INTRAMUSCULAR; INTRAVENOUS; SUBCUTANEOUS at 19:21

## 2020-10-01 RX ADMIN — PHENYLEPHRINE HYDROCHLORIDE 78.2 MICROGRAM(S)/KG/MIN: 10 INJECTION INTRAVENOUS at 18:00

## 2020-10-01 RX ADMIN — DULOXETINE HYDROCHLORIDE 60 MILLIGRAM(S): 30 CAPSULE, DELAYED RELEASE ORAL at 17:01

## 2020-10-01 RX ADMIN — Medication 300 MILLIGRAM(S): at 00:26

## 2020-10-01 RX ADMIN — PANTOPRAZOLE SODIUM 40 MILLIGRAM(S): 20 TABLET, DELAYED RELEASE ORAL at 08:24

## 2020-10-01 RX ADMIN — MIDODRINE HYDROCHLORIDE 5 MILLIGRAM(S): 2.5 TABLET ORAL at 22:33

## 2020-10-01 RX ADMIN — MAGNESIUM OXIDE 400 MG ORAL TABLET 400 MILLIGRAM(S): 241.3 TABLET ORAL at 11:08

## 2020-10-01 RX ADMIN — HYDROMORPHONE HYDROCHLORIDE 30 MILLILITER(S): 2 INJECTION INTRAMUSCULAR; INTRAVENOUS; SUBCUTANEOUS at 07:12

## 2020-10-01 RX ADMIN — SODIUM CHLORIDE 1000 MILLILITER(S): 9 INJECTION INTRAMUSCULAR; INTRAVENOUS; SUBCUTANEOUS at 11:51

## 2020-10-01 RX ADMIN — MIDODRINE HYDROCHLORIDE 5 MILLIGRAM(S): 2.5 TABLET ORAL at 11:53

## 2020-10-01 RX ADMIN — ENOXAPARIN SODIUM 40 MILLIGRAM(S): 100 INJECTION SUBCUTANEOUS at 11:07

## 2020-10-01 RX ADMIN — HYDROMORPHONE HYDROCHLORIDE 30 MILLILITER(S): 2 INJECTION INTRAMUSCULAR; INTRAVENOUS; SUBCUTANEOUS at 02:09

## 2020-10-01 RX ADMIN — DULOXETINE HYDROCHLORIDE 60 MILLIGRAM(S): 30 CAPSULE, DELAYED RELEASE ORAL at 05:41

## 2020-10-01 RX ADMIN — SODIUM CHLORIDE 75 MILLILITER(S): 9 INJECTION, SOLUTION INTRAVENOUS at 18:00

## 2020-10-01 RX ADMIN — POLYETHYLENE GLYCOL 3350 17 GRAM(S): 17 POWDER, FOR SOLUTION ORAL at 17:01

## 2020-10-01 RX ADMIN — Medication 300 MILLIGRAM(S): at 11:09

## 2020-10-01 RX ADMIN — SENNA PLUS 2 TABLET(S): 8.6 TABLET ORAL at 22:33

## 2020-10-01 RX ADMIN — ATORVASTATIN CALCIUM 10 MILLIGRAM(S): 80 TABLET, FILM COATED ORAL at 22:33

## 2020-10-01 NOTE — PROGRESS NOTE ADULT - SUBJECTIVE AND OBJECTIVE BOX
Patient seen and examined    Overnight events: post op from stage two, anemic requiring a transfusion and a central line. EBL 1500, s/p 2 pRBC    Exam:   AOx3, FC, PERRL, EOMI, no facial, 4/5 mild pain limited HF weakness otherwise 5/5 throughout, no drift

## 2020-10-01 NOTE — OCCUPATIONAL THERAPY INITIAL EVALUATION ADULT - BED MOBILITY TRAINING, PT EVAL
Pt will transfer sit <->sup via log-rolling within 4 weeks Pt will transfer sit <->sup via log-rolling (I) within 4 weeks

## 2020-10-01 NOTE — PROGRESS NOTE ADULT - SUBJECTIVE AND OBJECTIVE BOX
PLASTIC SURGERY DAILY PROGRESS NOTE:       SUBJECTIVE/ROS: Patient seen and examined at bedside. Pain controlled. AVSS overnight.      MEDICATIONS  (STANDING):  atorvastatin 10 milliGRAM(s) Oral at bedtime  chlorhexidine 4% Liquid 1 Application(s) Topical <User Schedule>  cholecalciferol 1000 Unit(s) Oral daily  DULoxetine 60 milliGRAM(s) Oral two times a day  enoxaparin Injectable 40 milliGRAM(s) SubCutaneous daily  fenofibrate Tablet 145 milliGRAM(s) Oral daily  HYDROmorphone PCA (1 mG/mL) 30 milliLiter(s) PCA Continuous PCA Continuous  influenza   Vaccine 0.5 milliLiter(s) IntraMuscular once  insulin lispro (HumaLOG) corrective regimen sliding scale   SubCutaneous every 6 hours  lactated ringers. 1000 milliLiter(s) (75 mL/Hr) IV Continuous <Continuous>  magnesium oxide 400 milliGRAM(s) Oral daily  multivitamin 1 Tablet(s) Oral daily  pantoprazole    Tablet 40 milliGRAM(s) Oral before breakfast  phenylephrine    Infusion 2.3 MICROgram(s)/kG/Min (78.2 mL/Hr) IV Continuous <Continuous>  polyethylene glycol 3350 17 Gram(s) Oral two times a day  senna 2 Tablet(s) Oral at bedtime  vancomycin  IVPB 1500 milliGRAM(s) IV Intermittent once  vancomycin  IVPB 1500 milliGRAM(s) IV Intermittent every 12 hours    MEDICATIONS  (PRN):  ALPRAZolam 0.25 milliGRAM(s) Oral three times a day PRN anxiety  cyclobenzaprine 10 milliGRAM(s) Oral three times a day PRN Muscle Spasm  diphenhydrAMINE 25 milliGRAM(s) Oral every 4 hours PRN Rash and/or Itching  HYDROmorphone PCA (1 mG/mL) Rescue Clinician Bolus 0.5 milliGRAM(s) IV Push every 15 minutes PRN for Pain Scale GREATER THAN 6  magnesium hydroxide Suspension 30 milliLiter(s) Oral every 12 hours PRN Constipation  naloxone Injectable 0.1 milliGRAM(s) IV Push every 3 minutes PRN For ANY of the following changes in patient status:  A. RR LESS THAN 10 breaths per minute, B. Oxygen saturation LESS THAN 90%, C. Sedation score of 6  ondansetron Injectable 4 milliGRAM(s) IV Push every 6 hours PRN Nausea  SUMAtriptan 25 milliGRAM(s) Oral four times a day PRN Migraine      OBJECTIVE:    Vital Signs Last 24 Hrs  T(C): 36.9 (01 Oct 2020 03:00), Max: 43 (30 Sep 2020 20:20)  T(F): 98.4 (01 Oct 2020 03:00), Max: 109.4 (30 Sep 2020 20:20)  HR: 86 (01 Oct 2020 06:45) (66 - 89)  BP: 95/69 (01 Oct 2020 02:45) (82/49 - 146/73)  BP(mean): 78 (01 Oct 2020 02:45) (61 - 81)  RR: 13 (01 Oct 2020 06:45) (10 - 19)  SpO2: 98% (01 Oct 2020 06:45) (88% - 100%)    I&O's Detail    30 Sep 2020 07:01  -  01 Oct 2020 07:00  --------------------------------------------------------  IN:    IV PiggyBack: 500 mL    Lactated Ringers: 1350 mL    Oral Fluid: 100 mL    Phenylephrine: 428.4 mL    PRBCs (Packed Red Blood Cells): 600 mL  Total IN: 2978.4 mL    OUT:    Bulb (mL): 40 mL    Bulb (mL): 35 mL    Drain (mL): 210 mL    Drain (mL): 160 mL    Indwelling Catheter - Urethral (mL): 1030 mL  Total OUT: 1475 mL    Total NET: 1503.4 mL          Daily Height in cm: 165.1 (30 Sep 2020 15:13)    Daily     LABS:                        11.6   13.70 )-----------( 184      ( 01 Oct 2020 03:29 )             34.1     09-30    141  |  110<H>  |  30<H>  ----------------------------<  186<H>  4.4   |  16<L>  |  1.09    Ca    9.0      30 Sep 2020 21:23  Phos  6.0     09-30  Mg     2.2     09-30      PT/INR - ( 30 Sep 2020 22:53 )   PT: 14.2 sec;   INR: 1.19 ratio         PTT - ( 30 Sep 2020 22:53 )  PTT:21.3 sec        PHYSICAL EXAM:  General: AAOx3, NAD, lying comfortably in bed  Respiratory: nonlabored breathing  Abdomen: non-distended, soft, non-tender  Back: acquacel clean and dry, no fluid collections noted, HV x2 and MISTY x2 SS  Extremities: no edema      ASSESSMENT AND PLAN:   60 year old female with h/o HTN, hyperlipidemia, ostearthritis, scoliosis/ spinal stenosis, anxiety/depression disorders, GERD, eczema, sinusitis admitted for scheduled stage 1, L1-5 lumbar lateral interbody fusion s/p L1-L5 lateral lumbar interbody fusion and T9-pelvis fusion with PRS closure.     -acquacel change on POD 3 (10/3)  -care per primary team  -PRS will continue to follow    Plastic Surgery Resident  [facility]: [phone]

## 2020-10-01 NOTE — DIETITIAN INITIAL EVALUATION ADULT. - OTHER INFO
Pt is a 61 yo F with h/o scoliosis/spinal stenosis (lumbar region). Presented for scheduled stage 1, L1-5 lumbar lateral interbody fusion 9/29/20, complex repair of back/muscle flap of trunk, lumbar stenosis on 9/30. PMH: HTN, hyperlipidemia, osteoarthrosis, anxiety/depression, GERD, eczema, sinusitis.    Pt observed resting in bed at time of RD visit. Per discussion with pt, reports ____.     A1c: 6.4%, indicating moderate glycemic control PTA. No diagnosis of DM noted as per chart review.     Dosing wt (9/30): 199.2 lbs. Per Keagan RODRIGUEZ, wt history: (9/22): 199.2 lbs; (8/3): 195 lbs; (7/15): 195 lbs. Wt appears stable. Per pt, UBW:     Pt currently prescribed a regular diet. Per pt, reports _____. Pt is a 59 yo F with h/o scoliosis/spinal stenosis (lumbar region). Presented for scheduled stage 1, L1-5 lumbar lateral interbody fusion 9/29/20, complex repair of back/muscle flap of trunk, lumbar stenosis on 9/30. PMH: HTN, hyperlipidemia, osteoarthrosis, anxiety/depression, GERD, eczema, sinusitis.    Pt observed resting in bed at time of RD visit. Per discussion with pt, reports good appetite at baseline, consumes on average 2 meals daily. Denies food allergies, denies adherence to therapeutic/mechanically altered diet, denies intolerance to chewing/swallowing. Pt with no specific dietary preferences at this time. Reports taking vitamin E and vitamin D supplement at home PTA. Endorsed hx of constipation, typically relieved by stool softeners.     A1c: 6.4%, indicating moderate glycemic control PTA. No diagnosis of DM noted as per chart review.     Dosing wt (9/30): 199.2 lbs. Per Keagan RODRIGUEZ, wt history: (9/22): 199.2 lbs; (8/3): 195 lbs; (7/15): 195 lbs. Wt appears stable. Per pt, UBW: 200 lbs. Denies wt changes.     Pt currently prescribed a regular diet. Per pt, reports poor appetite at this time; states "I am not hungry". Breakfast tray noted at bedside. Amenable to trial oral supplement (strawberry Ensure) at lunch meal. Encouraged pt to focus on protein foods first and to order nutrient dense foods with meals to save for in-between meals to mimic smaller, meal frequent meals.

## 2020-10-01 NOTE — PROCEDURE NOTE - NSPOSTCAREGUIDE_GEN_A_CORE
Instructed patient/caregiver to follow-up with primary care physician/Instructed patient/caregiver regarding signs and symptoms of infection/Care for catheter as per unit/ICU protocols/Keep the cast/splint/dressing clean and dry
Care for catheter as per unit/ICU protocols

## 2020-10-01 NOTE — OCCUPATIONAL THERAPY INITIAL EVALUATION ADULT - LEVEL OF INDEPENDENCE: STAND/SIT, REHAB EVAL
Subjective   Melida Gabriel is a 59 year old right handed female.with (problems 1-6)  that has been lost to follow-up.  Her last office visit was in July and she was asked to return in October.  She still has not completed the bone density and flu vaccine is due.  She has developed progressive worsening left facial/jaw line swelling x 3 days. She denies fever or chills. She is aware of abscessed teeth in lower jaw--mandible.    HPI     1)HTN: has a home BP monitor. Readings average 125-130/80-70. Denies chest pain, SOB, MUNOZ.  2)H/O iron deficiency anemia: MV has iron in it  . Most recent H/H was 12.3/37.8 (Jan 2018)  3)Vit D deficiency: takes 5000 IU of vit D daily.  Most recent level was  80 (JAN 2018)  4)venous insufficiency: does not have knee high compression.  5)environmental allergy: currently asymptomatic  6)overweight BMI 27.07% had been 26.28% --26.04%: no routine exercise.     Annual exam: 1/5/18    Prevention  Pap: (Dr Montgomery) 6/22/18  Mammogram:  9/18/14; 7/14/18   Bone density: pending 2017  Colonoscopy:  7/14/10/Dilma (Hussein)    Immunizations  Influenza --- Series1: 05-Sep-2011; Series2: 17-Nov-2014; Series3: 15-Sep-2017 ; 12/15/18  PPSV --- Series1: 15-Sep-2017   Tdap --- Series1: 07-Sep-2013   PPD: BCG vaccine (unable to take PPD screen)    Power of    completed 12/14/18 (daughter Dulce Lara)    Consultants     Review of Systems   Constitutional: Negative for activity change, appetite change, fatigue and unexpected weight change.   HENT: Positive for facial swelling. Negative for congestion, ear pain, hearing loss, nosebleeds, sinus pressure, sore throat, trouble swallowing and voice change.    Eyes: Negative for discharge, redness and visual disturbance.   Respiratory: Negative for cough, shortness of breath and wheezing.    Cardiovascular: Negative for chest pain, palpitations and leg swelling.   Gastrointestinal: Negative for abdominal pain, blood in stool and rectal  pain.   Endocrine: Negative for cold intolerance, heat intolerance, polydipsia, polyphagia and polyuria.   Genitourinary: Negative for difficulty urinating, frequency, hematuria, pelvic pain, urgency, vaginal bleeding and vaginal discharge.   Musculoskeletal: Negative for arthralgias, gait problem and myalgias.   Skin: Negative for color change and rash.   Allergic/Immunologic: Negative for food allergies.   Neurological: Negative for tremors, syncope, speech difficulty and headaches.   Hematological: Does not bruise/bleed easily.   Psychiatric/Behavioral: Negative for confusion, hallucinations and suicidal ideas. The patient is not nervous/anxious.        ALLERGIES:   Allergen Reactions   • Oxycodone-Acetaminophen Other (See Comments)       Current Outpatient Medications   Medication Sig Dispense Refill   • lisinopril-hydroCHLOROthiazide (PRINZIDE,ZESTORETIC) 10-12.5 MG per tablet      • Multiple Vitamins-Minerals (MULTIVITAMIN ADULT PO)      • Cholecalciferol (D3 MAXIMUM STRENGTH) 5000 units Cap        No current facility-administered medications for this visit.        Patient Active Problem List   Diagnosis   • Osteoporosis screening   • Breast cancer screening by mammogram   • Over weight   • Varicose veins of both lower extremities   • Vitamin D deficiency   • Iron deficiency anemia   • Environmental allergies   • Bunion, right   • Non-compliant behavior   • Benign essential hypertension   • Left facial swelling   • Parotiditis      Surgical History  History of  Section   ·  and   History of Colonoscopy   · 7/14/10  History of Dilation And Curettage   · 2015 2/2 post menopausal bleeding (Dr Francis)  History of Ovarian Wedge Resection   · ---2/2 cyst  History of Surgical Lysis Of Intestinal Adhesions   ·  Dr Moran  History of Tonsillectomy   ·   History of Tubal Ligation   ·      Family History  Father   Family history of Dyslipidemia  Family history of glaucoma  (Z83.511)  Paternal Grandfather   Family history of malignant neoplasm of prostate (Z80.42)  Family History   Family history of Breast Cancer   · mother  Family history of hypertension (Z82.49)  Family history of type 2 diabetes mellitus (Z83.3)     Social History  Alcohol use (Z78.9)   · 8 drinks/month  Christianity  Daily caffeine consumption, 2-3 servings a day    Does not use illicit drugs (Z78.9)  Former smoker (Z87.891)   · smoked for 2 years during adolescence  Personal history of nicotine dependence (Z87.891)  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      No visits with results within 1 Month(s) from this visit.   Latest known visit with results is:   Lab Services on 06/22/2018   Component Date Value Ref Range Status   • SwabOne Source 06/22/2018 CERVICOVAGINAL    Final   • Bacterial Vaginosis Result 06/22/2018 POSITIVE* NOT DETECTED   Final   • Candida albicans 06/22/2018 NOT DETECTED  NOT DETECTED   Final   • Candida glabrata 06/22/2018 NOT DETECTED  NOT DETECTED   Final   • Delmy krusei 06/22/2018 NOT DETECTED  NOT DETECTED   Final   • T. vaginalis 06/22/2018 NOT DETECTED  NOT DETECTED   Final   • HIV Antigen/Antibody Screen 06/22/2018 NONREACTIVE  NONREACTIVE   Final   • HEP B Surface AG 06/22/2018 NEGATIVE  NEGATIVE   Final   • RPR Screen 06/22/2018 NONREACTIVE  NONREACTIVE   Final   • Thin Prep Source 06/22/2018 ENDCX    Final   • Chlamydia Trachomatis by Nucleic A* 06/22/2018 NEGATIVE  NEGATIVE   Final   • Neisseria Gonorrhoeae by Nucleic A* 06/22/2018 NEGATIVE  NEGATIVE   Final   • HEPATITIS C ANTIBODY 06/22/2018 NEGATIVE  NEGATIVE   Final   • PAP WITH HIGH RISK HPV 06/22/2018     Final                     Value:Name: CYRUS ELIZALDE        MRN:     [id number]    :  [id number]                     Visit#:  [id number]                            Gynecologic Cytology Consultation Report        Client:  [facility]        Date Specimen Collected: 18            Accession #:  [id number]    Date Specimen Received:  18            Requisition    #:[id number]    Date Reported:           2018 14:42    Location:     [facility]                            * Addendum Present *    ______________________________________________________________________________    Cytologic Interpretation :        Negative for intraepithelial lesion or malignancy.          Satisfactory for evaluation.  Presence of endocervical/transformation zone    component.            John Paladino, CT(ASCP)    ** Electronic Signature (JRP) 2018   14:42 **        Educational note:  The Pap test is a screening test with a well-recognized    false negative rate.  The best                           means available to lower the false negative    rate and to detect early cervical lesions is a Pap test at regular intervals.     All ThinPrep Paps will be reviewed with the aid of the ThinPrep Imaging    System, unless otherwise specified.        ______________________________________________________________________________    Clinical Information:    Menstrual Hx:  Post Menopausal    Other Clinical Conditions:Pap source: Endocervical    REASON FOR COLLECTION::LOW RISK - ENCOUNTER FOR SCREENING FOR MALIGNANT    NEOPLASM OF CERVIX Z12.4    SOURCE::ENDOCERVICAL        Specimen(s) Submitted:     PAP with High Risk HPV        Procedures/Addenda:    HPV, High Risk_IL:    Date Ordered:     2018     Date Reported:2018        Interpretation    Aptima HPV HIGH RISK (TYPES 16,18,31,33,35,39,45,51,52,56,58,59,66,68):    NEGATIVE        The APTIMA HPV Assay is an FDA approved in vitro nucleic acid  amplification    test for the qualitative detection of E6/E7 viral messenger RNA (mRNA) fro                          m 14    high-risk types of human papillomavirus (HPV) in cervical specimens. The    high-risk HPV types detected by the assay include: 16, 18, 31, 33, 35, 39, 45,    51, 52, 56, 58, 59, 66, and 68. The Aptima HPV Assay does not discriminate    between the 14 high-risk types. Cervical specimens in the Thin Prep Pap Test    vials containing PreservCyt Solution and collected with broom-type or    cytobrush/spatula collection devices may be tested with this assay using the    [employer] System.         The APTIMA HPV Assay is intended to screen women 21 years and older with    atypical squamous cells of undetermined significance (ASC-US) cervical    cytology results to determine the need for referral to colposcopy. Test    results are not intended to prevent women from proceeding to colposcopy.         In women 30 years and older, the above assay can be used with cervical    cytology to adjunctively screen to assess the presence or absence of high-risk    HPV types. This information, with the                           physician's assessment of cytology    history, other risk factors, and guidelines, may be used to guide patient    management.                                         ** Electronic Signature ** (NXG) 6/26/2018 12:53 **                ICD Codes:     Z01.419        Fee Codes:     A: [id number]-HH     HPV_IL: [id number]-AT        Performing Lab Location (Unless otherwise specified):    [facility]    [address] [place] [address]. [id number]            Objective  Visit Vitals  /82   Pulse 84   Temp 98.6 °F (37 °C)   Resp 18   Ht 5' 6\" (1.676 m)   Wt 74.3 kg (163 lb 12.8 oz)   BMI 26.44 kg/m²     Physical Exam   Constitutional: She is oriented to person, place, and time. She appears well-developed and well-nourished.   HENT:   Head: Normocephalic and atraumatic.   Left  facial/mandibular swelling with localized soft tissue mass--left sided upper and lower lip swelling--no tongue swellinf   Eyes: Conjunctivae are normal.   Neck: Normal range of motion. Neck supple.   Cardiovascular: Normal rate, regular rhythm and normal heart sounds.   Pulmonary/Chest: Breath sounds normal. Right breast exhibits no inverted nipple, no nipple discharge and no tenderness. Left breast exhibits no inverted nipple, no nipple discharge and no tenderness.   Abdominal: Soft. Bowel sounds are normal.   Musculoskeletal: Normal range of motion.   Neurological: She is alert and oriented to person, place, and time. She has normal reflexes.   Skin: Skin is warm and dry.   Psychiatric: She has a normal mood and affect. Her behavior is normal. Judgment normal.   Nursing note and vitals reviewed.      Assessment and Plan  Problem List Items Addressed This Visit     Over weight    Varicose veins of both lower extremities    Relevant Medications    lisinopril-hydroCHLOROthiazide (PRINZIDE,ZESTORETIC) 10-12.5 MG per tablet    Vitamin D deficiency    Relevant Orders    VITAMIN D -25 HYDROXY    Environmental allergies    Non-compliant behavior - Primary    Benign essential hypertension    Relevant Medications    lisinopril-hydroCHLOROthiazide (PRINZIDE,ZESTORETIC) 10-12.5 MG per tablet    Other Relevant Orders    CBC & AUTO DIFFERENTIAL    COMPREHENSIVE METABOLIC PANEL    LIPID PANEL WITH REFLEX    URINALYSIS & REFLEX MICRO WITH CULTURE IF INDICATED    Left facial swelling    Parotiditis      Other Visit Diagnoses     Colon cancer screening        Relevant Orders    OCCULT BLOOD TEST TUBE        No orders of the defined types were placed in this encounter.  * stressed the need to comply with follow up visits.  *Left facial swelling is from presumed parotiditis with recommendation to apply warm compresses, eat bitter foods such as lemon drops.  Cleocin 300 mg 3 times daily times 5 days and Cipro 500 mg twice daily times  7 days has been prescribed.  Hand written scripts were given to the patient secondary to computer malfunction.  She will be reevaluated 12/18/18    1)blood pressure is stable-continue medication and ;continue to monitor at home and notify if >134/84  2)H/O iron deficiency anemia-continue multivitamin  3)Vit D deficiency-recommend decreasing from 5000 IU daily to 2000 IU daily  4)environmental allergy-continue to monitor  5)overweight-recommend 6 hours of exercise per week and daily calorie reduction    * recommend dental evaluation  *DEXA order given  *she will also need to return for an annual exam with a target date of 1/5/19 (lab slip given)      Follow up  Return in about 1 year (around 12/18/2019).    Jeremy Culver MD   minimum assist (75% patients effort)

## 2020-10-01 NOTE — PROCEDURE NOTE - NSINDICATIONS_GEN_A_CORE
emergency venous access
arterial puncture to obtain ABG's/blood sampling/cannulation purposes/critical patient/transfusion/monitoring purposes

## 2020-10-01 NOTE — PROGRESS NOTE ADULT - ASSESSMENT
Assesment:60 year old female with h/o HTN, hyperlipidemia, ostearthritis, scoliosis/ spinal stenosis, anxiety/depression disorders, GERD, eczema, sinusitis admitted for scheduled stage 1, L1-5 lumbar lateral interbody fusion     NEURO:  L1-L5 lateral lumbar interbody fusion s/p stage 1 on 9/29/20, stage 2 T9- Pelvis scheduled for 9/30/20  stage 1 EBL 200cc  -neuro check q1  -dexamethasone 4mg q6   -pain management w/ Dilaudid ggt, cyclobenzaprine 10mg TID PRN for spasm  -Post-Op CT lumbar and thoracic spine  -bed rest for now  -PT/OT evaluation after stage 2 tomorrow   Migraine-  c/w sumatriptan 25mg PRN     Psych:  depression/anxiety  -c/w duloxetine 60mg qd, alprazolam 0.25mg PRN for anxiety    PULMONARY:  saturating well on minimal NC, can wean to RA  -continue to monitor on pulse o2   -incentive spirometry 10q/hr when awake     CARDIOVASCULAR:  Hypertension  -c/w home amlodipine 5mg, Losartan 50mg qd, nebivolol 10mg qd    GASTROENTEROLOGY:  bedside speech & swallow if pass can start advancing diet as tolerated.   ensure BMs w/ Miralax & senna  NPO after midnight for procedure on 9/30    INFECTIOUS:  received pre-op abx vancomycin 1500mg x1   monitor for fevers    RENAL:  -c/w IVF LR until tolerating PO  -check BMP daily  -strict i/o's    HEME/ONC:  DVT ppx: will hold chemical dvt ppx in setting of recent operation. b/l SCDs for now    not critically ill but medically complex Assessment : 60 year old female with h/o HTN, hyperlipidemia, ostearthritis, scoliosis/ spinal stenosis, S/P scheduled stage 1, L1-5 lumbar lateral interbody fusion. Stage II completed.      NEURO:  s/p L1-L5 lateral lumbar interbody fusion s/p stage 1 on 9/29/20,   s/p Stage II : T9- Pelvis LAT L5- S2 POD1- EBL 1.5L  -neurocheck Q4  -dexamethasone 4mg q6   -pain management w/ Dilaudid ggt, cyclobenzaprine 10mg TID PRN for spasm  -Post-Op CT lumbar and thoracic spine 10/1  -bed rest for now  -PT/OT  Migraine-  c/w sumatriptan 25mg PRN   Psych:  depression/anxiety  -c/w duloxetine 60mg qd, alprazolam 0.25mg PRN for anxiety    PULMONARY:  saturating well on minimal NC, can wean to RA  -continue to monitor on pulse o2   -incentive spirometry 10q/hr when awake     CARDIOVASCULAR:  Hypertension  -c/w home amlodipine 5mg, Losartan 50mg qd, nebivolol 10mg qd  -Fluid resuscitate with IV hydration    GASTROENTEROLOGY:  Bedside speech & swallow if pass can start advancing diet as tolerated.   Ensure BMs w/ Miralax & senna    INFECTIOUS:  Periop Abx  Monitor for fevers    RENAL:  -check BMP daily  -strict i/o's  -Fluid resuscitate    HEME/ONC:    DVT ppx: will hold chemical dvt ppx in setting of recent operation. b/l SCDs for now    not critically ill but medically complex Assessment : 60 year old female with h/o HTN, hyperlipidemia, ostearthritis, scoliosis/ spinal stenosis, S/P scheduled stage 1, L1-5 lumbar lateral interbody fusion. Stage II completed.      NEURO:  s/p L1-L5 lateral lumbar interbody fusion s/p stage 1 on 9/29/20,   s/p Stage II : T9- Pelvis LAT L5- S2 POD1- EBL 1.5L  -neurochecksQ4  -Continue dexamethasone  -pain management w/ Dilaudid ggt, cyclobenzaprine 10mg TID PRN for spasm  -Post-Op CT lumbar and thoracic spine 10/1  -PT/OT  Migraine-  c/w sumatriptan 25mg PRN   Psych:  depression/anxiety  -c/w duloxetine 60mg qd, alprazolam 0.25mg PRN for anxiety    PULMONARY:  -room air  -incentive spirometry 10q/hr when awake     CARDIOVASCULAR:Hypotension   -Hold antihypertensives  -Fluid resuscitate with IV hydration  -Wean pressors    GASTROENTEROLOGY:  Diet as tolerated  Ensure BMs w/ Miralax & senna    INFECTIOUS:  Periop Abx  Monitor for fevers    RENAL:  -Check BMP daily  -Monitor Is/Os  -Fluid resuscitate    HEME/ONC:  DVT ppx: Lovenox    Dispo: Transfer to floor once off pressors    Not critically ill but medically complex

## 2020-10-01 NOTE — PROCEDURE NOTE - NSINFORMCONSENT_GEN_A_CORE
Benefits, risks, and possible complications of procedure explained to patient/caregiver who verbalized understanding and gave verbal consent./ and daughter
Benefits, risks, and possible complications of procedure explained to patient/caregiver who verbalized understanding and gave verbal consent.

## 2020-10-01 NOTE — OCCUPATIONAL THERAPY INITIAL EVALUATION ADULT - ADDITIONAL COMMENTS
CT spine 9/1-Redemonstration of anterior cervical discectomy and fusion of C4-T1 and C5 corpectomy. Redemonstration of interbody spacer devices from L1-L2 through L4-L5. Interval T12-S1 total laminectomies and partial facetectomies. Interval posterior fusion of T9-S1. Interval sacroiliac fusion with sacroiliac screws. New hardware is well-placed. No evidence for new hardware fracture or hardware loosening. Vertebral body height and facet alignment maintained.

## 2020-10-01 NOTE — OCCUPATIONAL THERAPY INITIAL EVALUATION ADULT - RANGE OF MOTION EXAMINATION, UPPER EXTREMITY
bilateral UE Active ROM was WFL  (within functional limits)/shoulder flexion 0-90 due to spine precautions. Diminished R hand grasp due to infiltrated IV

## 2020-10-01 NOTE — PROCEDURE NOTE - NSPROCDETAILS_GEN_ALL_CORE
lumen(s) aspirated and flushed/sterile dressing applied/ultrasound guidance/guidewire recovered/sterile technique, catheter placed
hemostasis with direct pressure, dressing applied/Seldinger technique/positive blood return obtained via catheter/sutured in place/ultrasound guidance/location identified, draped/prepped, sterile technique used, needle inserted/introduced/connected to a pressurized flush line/all materials/supplies accounted for at end of procedure

## 2020-10-01 NOTE — DIETITIAN INITIAL EVALUATION ADULT. - ADD RECOMMEND
1) Consider consistent carbohydrate diet in context of A1c 6.4%. However, if suboptimal PO intake persists, consider liberalize to no therapeutic restrictions. Defer consistency to medical team, SLP. 2) Trial Glucerna Shake (Strawberry) in context of poor-fair intake at this time. Consider addition of meal trays if pt accepts. 3) Monitor and encourage PO intake; encourage use of daily menus. 4) Monitor wt trends, nutrition labs, skin integrity, hydration status, bowel regularity,

## 2020-10-01 NOTE — PROGRESS NOTE ADULT - SUBJECTIVE AND OBJECTIVE BOX
INTERVAL HISTORY: HPI:  60 year old female with h/o scoliosis/ spinal stenosis- lumbar region, presents to preadmission testing for scheduled stage 1, L1-5 lumbar lateral interbody fusion, scheduled for 9/29/2020. Her medical history includes HTN, hyperlipidemia, osteoarthritis, anxiety/depression disorders, GERD, eczema, sinusitis.      MEDICATIONS  (STANDING):  atorvastatin 10 milliGRAM(s) Oral at bedtime  chlorhexidine 4% Liquid 1 Application(s) Topical <User Schedule>  cholecalciferol 1000 Unit(s) Oral daily  DULoxetine 60 milliGRAM(s) Oral two times a day  enoxaparin Injectable 40 milliGRAM(s) SubCutaneous daily  fenofibrate Tablet 145 milliGRAM(s) Oral daily  HYDROmorphone PCA (1 mG/mL) 30 milliLiter(s) PCA Continuous PCA Continuous  influenza   Vaccine 0.5 milliLiter(s) IntraMuscular once  insulin lispro (HumaLOG) corrective regimen sliding scale   SubCutaneous every 6 hours  lactated ringers. 1000 milliLiter(s) (75 mL/Hr) IV Continuous <Continuous>  magnesium oxide 400 milliGRAM(s) Oral daily  multivitamin 1 Tablet(s) Oral daily  pantoprazole    Tablet 40 milliGRAM(s) Oral before breakfast  phenylephrine    Infusion 2.3 MICROgram(s)/kG/Min (78.2 mL/Hr) IV Continuous <Continuous>  polyethylene glycol 3350 17 Gram(s) Oral two times a day  senna 2 Tablet(s) Oral at bedtime  vancomycin  IVPB 1500 milliGRAM(s) IV Intermittent once  vancomycin  IVPB 1500 milliGRAM(s) IV Intermittent every 12 hours    MEDICATIONS  (PRN):  ALPRAZolam 0.25 milliGRAM(s) Oral three times a day PRN anxiety  cyclobenzaprine 10 milliGRAM(s) Oral three times a day PRN Muscle Spasm  diphenhydrAMINE 25 milliGRAM(s) Oral every 4 hours PRN Rash and/or Itching  HYDROmorphone PCA (1 mG/mL) Rescue Clinician Bolus 0.5 milliGRAM(s) IV Push every 15 minutes PRN for Pain Scale GREATER THAN 6  magnesium hydroxide Suspension 30 milliLiter(s) Oral every 12 hours PRN Constipation  naloxone Injectable 0.1 milliGRAM(s) IV Push every 3 minutes PRN For ANY of the following changes in patient status:  A. RR LESS THAN 10 breaths per minute, B. Oxygen saturation LESS THAN 90%, C. Sedation score of 6  ondansetron Injectable 4 milliGRAM(s) IV Push every 6 hours PRN Nausea  SUMAtriptan 25 milliGRAM(s) Oral four times a day PRN Migraine      Drug Dosing Weight  Height (cm): 165.1 (30 Sep 2020 15:13)  Weight (kg): 90.7 (30 Sep 2020 15:13)  BMI (kg/m2): 33.3 (30 Sep 2020 15:13)  BSA (m2): 1.98 (30 Sep 2020 15:13)    CENTRAL LINE: [x ] YES [ ] NO  LOCATION:   DATE INSERTED:  REMOVE: [ ] YES [ ] NO  EXPLAIN:    A-LINE: [x ] YES [ ] NO  LOCATION:   DATE INSERTED:  REMOVE: [ ] YES [ ] NO  EXPLAIN:    PAST MEDICAL & SURGICAL HISTORY:  Lumbar spinal stenosis    History of sciatica  mostly right side    H/O sinusitis    Eczema  extremities    Scoliosis    Meniscus tear  left knee    Spinal stenosis in cervical region    Anxiety and depression    Migraine    GERD (gastroesophageal reflux disease)    Seasonal allergies    Hyperlipidemia    HTN (hypertension)    H/O cervical spine surgery  C3-6   11/2015    History of tonsillectomy    History of appendectomy        REVIEW OF SYSTEMS: [ ] Unable to Assess due to neurologic exam   [x ] All ROS addressed below are non-contributory, except:  Neuro: [ ] Headache [ ] Back pain [ ] Numbness [ ] Weakness [ ] Ataxia [ ] Dizziness [ ] Aphasia [ ] Dysarthria [ ] Visual disturbance  Resp: [ ] Shortness of breath/dyspnea, [ ] Orthopnea [ ] Cough  CV: [ ] Chest pain [ ] Palpitation [ ] Lightheadedness [ ] Syncope  Renal: [ ] Thirst [ ] Edema  GI: [ ] Nausea [ ] Emesis [ ] Abdominal pain [ ] Constipation [ ] Diarrhea  Hem: [ ] Hematemesis [ ] bright red blood per rectum  ID: [ ] Fever [ ] Chills [ ] Dysuria  ENT: [ ] Rhinorrhea      ICU Vital Signs Last 24 Hrs  T(C): 36.9 (01 Oct 2020 03:00), Max: 43 (30 Sep 2020 20:20)  T(F): 98.4 (01 Oct 2020 03:00), Max: 109.4 (30 Sep 2020 20:20)  HR: 81 (01 Oct 2020 04:00) (66 - 89)  BP: 95/69 (01 Oct 2020 02:45) (82/49 - 146/73)  BP(mean): 78 (01 Oct 2020 02:45) (61 - 91)  ABP: 114/59 (01 Oct 2020 04:00) (67/61 - 149/62)  ABP(mean): 81 (01 Oct 2020 04:00) (60 - 275)  RR: 15 (01 Oct 2020 04:00) (10 - 19)  SpO2: 98% (01 Oct 2020 04:00) (88% - 100%)      ABG - ( 30 Sep 2020 20:43 )  pH, Arterial: 7.23  pH, Blood: x     /  pCO2: 35    /  pO2: 168   / HCO3: 14    / Base Excess: -12.2 /  SaO2: 98          I&O's Detail    29 Sep 2020 07:01  -  30 Sep 2020 07:00  --------------------------------------------------------  IN:    Lactated Ringers: 1555 mL    Oral Fluid: 100 mL  Total IN: 1655 mL    OUT:    Indwelling Catheter - Urethral (mL): 1035 mL  Total OUT: 1035 mL    Total NET: 620 mL      30 Sep 2020 07:01  -  01 Oct 2020 06:14  --------------------------------------------------------  IN:    IV PiggyBack: 500 mL    Lactated Ringers: 1200 mL    Phenylephrine: 353.6 mL    PRBCs (Packed Red Blood Cells): 600 mL  Total IN: 2653.6 mL    OUT:    Indwelling Catheter - Urethral (mL): 1030 mL  Total OUT: 1030 mL    Total NET: 1623.6 mL    PHYSICAL EXAM:    General: No Acute Distress     Neurological: sleepy Awake, alert oriented to person, place and time, Following Commands, PERRL, EOMI, Face Symmetrical, Speech Fluent, Moving all extremities at least antigravity     Pulmonary: Clear to Auscultation, No Rales, No Rhonchi, No Wheezes     Cardiovascular: S1, S2, Regular Rate and Rhythm     Gastrointestinal: Soft, Nontender, Nondistended     Extremities: No calf tenderness     Incision:       LABS:  CBC Full  -  ( 01 Oct 2020 03:29 )  WBC Count : 13.70 K/uL  RBC Count : 3.76 M/uL  Hemoglobin : 11.6 g/dL  Hematocrit : 34.1 %  Platelet Count - Automated : 184 K/uL  Mean Cell Volume : 90.7 fl  Mean Cell Hemoglobin : 30.9 pg  Mean Cell Hemoglobin Concentration : 34.0 gm/dL  Auto Neutrophil # : x  Auto Lymphocyte # : x  Auto Monocyte # : x  Auto Eosinophil # : x  Auto Basophil # : x  Auto Neutrophil % : x  Auto Lymphocyte % : x  Auto Monocyte % : x  Auto Eosinophil % : x  Auto Basophil % : x    09-30    141  |  110<H>  |  30<H>  ----------------------------<  186<H>  4.4   |  16<L>  |  1.09    Ca    9.0      30 Sep 2020 21:23  Phos  6.0     09-30  Mg     2.2     09-30      PT/INR - ( 30 Sep 2020 22:53 )   PT: 14.2 sec;   INR: 1.19 ratio         PTT - ( 30 Sep 2020 22:53 )  PTT:21.3 sec      RADIOLOGY & ADDITIONAL STUDIES:   INTERVAL HISTORY: HPI:  60 year old female with h/o scoliosis/ spinal stenosis- lumbar region, presents to preadmission testing for scheduled stage 1, L1-5 lumbar lateral interbody fusion, scheduled for 9/29/2020. Her medical history includes HTN, hyperlipidemia, osteoarthritis, anxiety/depression disorders, GERD, eczema, sinusitis.    OR 9/30:   EBL 1500cc, 2u PRBCs    Overnight events:   On yolanda for MAP >65.      ICU Vital Signs Last 24 Hrs  T(C): 37 (01 Oct 2020 07:00), Max: 43 (30 Sep 2020 20:20)  T(F): 98.6 (01 Oct 2020 07:00), Max: 109.4 (30 Sep 2020 20:20)  HR: 87 (01 Oct 2020 09:45) (66 - 89)  BP: 95/69 (01 Oct 2020 02:45) (82/49 - 146/73)  BP(mean): 78 (01 Oct 2020 02:45) (61 - 81)  ABP: 109/52 (01 Oct 2020 09:45) (67/61 - 149/62)  ABP(mean): 74 (01 Oct 2020 09:45) (60 - 275)  RR: 13 (01 Oct 2020 09:45) (10 - 19)  SpO2: 99% (01 Oct 2020 09:45) (88% - 100%)      09-30-20 @ 07:01  -  10-01-20 @ 07:00  --------------------------------------------------------  IN: 2978.4 mL / OUT: 1475 mL / NET: 1503.4 mL    10-01-20 @ 07:01  -  10-01-20 @ 10:07  --------------------------------------------------------  IN: 306.6 mL / OUT: 175 mL / NET: 131.6 mL        ALPRAZolam 0.25 milliGRAM(s) Oral three times a day PRN  atorvastatin 10 milliGRAM(s) Oral at bedtime  chlorhexidine 4% Liquid 1 Application(s) Topical <User Schedule>  cholecalciferol 1000 Unit(s) Oral daily  cyclobenzaprine 10 milliGRAM(s) Oral three times a day PRN  diphenhydrAMINE 25 milliGRAM(s) Oral every 4 hours PRN  DULoxetine 60 milliGRAM(s) Oral two times a day  enoxaparin Injectable 40 milliGRAM(s) SubCutaneous daily  fenofibrate Tablet 145 milliGRAM(s) Oral daily  HYDROmorphone PCA (1 mG/mL) 30 milliLiter(s) PCA Continuous PCA Continuous  HYDROmorphone PCA (1 mG/mL) Rescue Clinician Bolus 0.5 milliGRAM(s) IV Push every 15 minutes PRN  influenza   Vaccine 0.5 milliLiter(s) IntraMuscular once  insulin lispro (HumaLOG) corrective regimen sliding scale   SubCutaneous every 6 hours  lactated ringers. 1000 milliLiter(s) (75 mL/Hr) IV Continuous <Continuous>  magnesium hydroxide Suspension 30 milliLiter(s) Oral every 12 hours PRN  magnesium oxide 400 milliGRAM(s) Oral daily  multivitamin 1 Tablet(s) Oral daily  naloxone Injectable 0.1 milliGRAM(s) IV Push every 3 minutes PRN  ondansetron Injectable 4 milliGRAM(s) IV Push every 6 hours PRN  pantoprazole    Tablet 40 milliGRAM(s) Oral before breakfast  phenylephrine    Infusion 2.3 MICROgram(s)/kG/Min (78.2 mL/Hr) IV Continuous <Continuous>  polyethylene glycol 3350 17 Gram(s) Oral two times a day  senna 2 Tablet(s) Oral at bedtime  SUMAtriptan 25 milliGRAM(s) Oral four times a day PRN  vancomycin  IVPB 1500 milliGRAM(s) IV Intermittent once  vancomycin  IVPB 1500 milliGRAM(s) IV Intermittent every 12 hours                          11.6   13.70 )-----------( 184      ( 01 Oct 2020 03:29 )             34.1     09-30    141  |  110<H>  |  30<H>  ----------------------------<  186<H>  4.4   |  16<L>  |  1.09    Ca    9.0      30 Sep 2020 21:23  Phos  6.0     09-30  Mg     2.2     09-30        ABG - ( 30 Sep 2020 20:43 )  pH, Arterial: 7.23  pH, Blood: x     /  pCO2: 35    /  pO2: 168   / HCO3: 14    / Base Excess: -12.2 /  SaO2: 98          REVIEW OF SYSTEMS: [ ] Unable to Assess due to neurologic exam   [x ] All ROS addressed below are non-contributory, except:  Neuro: [ ] Headache [ ] Back pain [ ] Numbness [ ] Weakness [ ] Ataxia [ ] Dizziness [ ] Aphasia [ ] Dysarthria [ ] Visual disturbance  Resp: [ ] Shortness of breath/dyspnea, [ ] Orthopnea [ ] Cough  CV: [ ] Chest pain [ ] Palpitation [ ] Lightheadedness [ ] Syncope  Renal: [ ] Thirst [ ] Edema  GI: [ ] Nausea [ ] Emesis [ ] Abdominal pain [ ] Constipation [ ] Diarrhea  Hem: [ ] Hematemesis [ ] bright red blood per rectum  ID: [ ] Fever [ ] Chills [ ] Dysuria  ENT: [ ] Rhinorrhea      PHYSICAL EXAM:    General: No Acute Distress  Neurological:      Pulmonary: Clear to Auscultation, No Rales, No Rhonchi, No Wheezes   Cardiovascular: S1, S2, Regular Rate and Rhythm   Gastrointestinal: Soft, Nontender, Nondistended  Extremities: No calf tenderness   Incision:        INTERVAL HISTORY: HPI:  60 year old female with h/o scoliosis/ spinal stenosis- lumbar region, presents to preadmission testing for scheduled stage 1, L1-5 lumbar lateral interbody fusion, scheduled for 9/29/2020. Her medical history includes HTN, hyperlipidemia, osteoarthritis, anxiety/depression disorders, GERD, eczema, sinusitis.    OR 9/30:   EBL 1500cc, 2u PRBCs    Overnight events:   On yolanda for MAP >65.      ICU Vital Signs Last 24 Hrs  T(C): 37 (01 Oct 2020 07:00), Max: 43 (30 Sep 2020 20:20)  T(F): 98.6 (01 Oct 2020 07:00), Max: 109.4 (30 Sep 2020 20:20)  HR: 87 (01 Oct 2020 09:45) (66 - 89)  BP: 95/69 (01 Oct 2020 02:45) (82/49 - 146/73)  BP(mean): 78 (01 Oct 2020 02:45) (61 - 81)  ABP: 109/52 (01 Oct 2020 09:45) (67/61 - 149/62)  ABP(mean): 74 (01 Oct 2020 09:45) (60 - 275)  RR: 13 (01 Oct 2020 09:45) (10 - 19)  SpO2: 99% (01 Oct 2020 09:45) (88% - 100%)      09-30-20 @ 07:01  -  10-01-20 @ 07:00  --------------------------------------------------------  IN: 2978.4 mL / OUT: 1475 mL / NET: 1503.4 mL    10-01-20 @ 07:01  -  10-01-20 @ 10:07  --------------------------------------------------------  IN: 306.6 mL / OUT: 175 mL / NET: 131.6 mL        ALPRAZolam 0.25 milliGRAM(s) Oral three times a day PRN  atorvastatin 10 milliGRAM(s) Oral at bedtime  chlorhexidine 4% Liquid 1 Application(s) Topical <User Schedule>  cholecalciferol 1000 Unit(s) Oral daily  cyclobenzaprine 10 milliGRAM(s) Oral three times a day PRN  diphenhydrAMINE 25 milliGRAM(s) Oral every 4 hours PRN  DULoxetine 60 milliGRAM(s) Oral two times a day  enoxaparin Injectable 40 milliGRAM(s) SubCutaneous daily  fenofibrate Tablet 145 milliGRAM(s) Oral daily  HYDROmorphone PCA (1 mG/mL) 30 milliLiter(s) PCA Continuous PCA Continuous  HYDROmorphone PCA (1 mG/mL) Rescue Clinician Bolus 0.5 milliGRAM(s) IV Push every 15 minutes PRN  influenza   Vaccine 0.5 milliLiter(s) IntraMuscular once  insulin lispro (HumaLOG) corrective regimen sliding scale   SubCutaneous every 6 hours  lactated ringers. 1000 milliLiter(s) (75 mL/Hr) IV Continuous <Continuous>  magnesium hydroxide Suspension 30 milliLiter(s) Oral every 12 hours PRN  magnesium oxide 400 milliGRAM(s) Oral daily  multivitamin 1 Tablet(s) Oral daily  naloxone Injectable 0.1 milliGRAM(s) IV Push every 3 minutes PRN  ondansetron Injectable 4 milliGRAM(s) IV Push every 6 hours PRN  pantoprazole    Tablet 40 milliGRAM(s) Oral before breakfast  phenylephrine    Infusion 2.3 MICROgram(s)/kG/Min (78.2 mL/Hr) IV Continuous <Continuous>  polyethylene glycol 3350 17 Gram(s) Oral two times a day  senna 2 Tablet(s) Oral at bedtime  SUMAtriptan 25 milliGRAM(s) Oral four times a day PRN  vancomycin  IVPB 1500 milliGRAM(s) IV Intermittent once  vancomycin  IVPB 1500 milliGRAM(s) IV Intermittent every 12 hours                          11.6   13.70 )-----------( 184      ( 01 Oct 2020 03:29 )             34.1     09-30    141  |  110<H>  |  30<H>  ----------------------------<  186<H>  4.4   |  16<L>  |  1.09    Ca    9.0      30 Sep 2020 21:23  Phos  6.0     09-30  Mg     2.2     09-30        ABG - ( 30 Sep 2020 20:43 )  pH, Arterial: 7.23  pH, Blood: x     /  pCO2: 35    /  pO2: 168   / HCO3: 14    / Base Excess: -12.2 /  SaO2: 98          REVIEW OF SYSTEMS: [ ] Unable to Assess due to neurologic exam   [x ] All ROS addressed below are non-contributory, except:  Neuro: [ ] Headache [ ] Back pain [ ] Numbness [ ] Weakness [ ] Ataxia [ ] Dizziness [ ] Aphasia [ ] Dysarthria [ ] Visual disturbance  Resp: [ ] Shortness of breath/dyspnea, [ ] Orthopnea [ ] Cough  CV: [ ] Chest pain [ ] Palpitation [ ] Lightheadedness [ ] Syncope  Renal: [ ] Thirst [ ] Edema  GI: [ ] Nausea [ ] Emesis [ ] Abdominal pain [ ] Constipation [ ] Diarrhea  Hem: [ ] Hematemesis [ ] bright red blood per rectum  ID: [ ] Fever [ ] Chills [ ] Dysuria  ENT: [ ] Rhinorrhea      PHYSICAL EXAM:    General: No Acute Distress  Neurological: Ox3, FC, HF 5/5 B/L. RLE KE 4+, KF 4/5, LLE 4+/5 otherwise 5/5, sensation intact  Pulmonary: Clear to Auscultation, No Rales, No Rhonchi, No Wheezes   Cardiovascular: S1, S2, Regular Rate and Rhythm   Gastrointestinal: Soft, Nontender, Nondistended  Extremities: No calf tenderness   Incision:

## 2020-10-01 NOTE — OCCUPATIONAL THERAPY INITIAL EVALUATION ADULT - TRANSFER TRAINING, PT EVAL
Patient will transfer to toilet with DME (I) within 4 weeks. Pt will perform sit <-> stand transfers (I) with DME/AD as needed within 4 weeks

## 2020-10-01 NOTE — OCCUPATIONAL THERAPY INITIAL EVALUATION ADULT - DIAGNOSIS, OT EVAL
Patient with deficits in ADL status and functional mobility due to pain, impaired balance, strength, ROM, coordination

## 2020-10-01 NOTE — PROGRESS NOTE ADULT - SUBJECTIVE AND OBJECTIVE BOX
Day 2\0 of Anesthesia Pain Management Service    SUBJECTIVE: I'm doing ok    Pain Scale Score:	[X] Refer to charted pain scores    THERAPY:    [ ] IV PCA Morphine		[ ] 5 mg/mL	[ ] 1 mg/mL  [X] IV PCA Hydromorphone	[ ] 5 mg/mL	[X] 1 mg/mL  [ ] IV PCA Fentanyl		[ ] 50 micrograms/mL    Demand dose: 0.2 mg     Lockout: 6 minutes   Continuous Rate: 0 mg/hr  4 Hour Limit: 4 mg    MEDICATIONS  (STANDING):  atorvastatin 10 milliGRAM(s) Oral at bedtime  chlorhexidine 4% Liquid 1 Application(s) Topical <User Schedule>  cholecalciferol 1000 Unit(s) Oral daily  DULoxetine 60 milliGRAM(s) Oral two times a day  enoxaparin Injectable 40 milliGRAM(s) SubCutaneous daily  fenofibrate Tablet 145 milliGRAM(s) Oral daily  HYDROmorphone PCA (1 mG/mL) 30 milliLiter(s) PCA Continuous PCA Continuous  influenza   Vaccine 0.5 milliLiter(s) IntraMuscular once  insulin lispro (HumaLOG) corrective regimen sliding scale   SubCutaneous every 6 hours  lactated ringers. 1000 milliLiter(s) (75 mL/Hr) IV Continuous <Continuous>  magnesium oxide 400 milliGRAM(s) Oral daily  multivitamin 1 Tablet(s) Oral daily  pantoprazole    Tablet 40 milliGRAM(s) Oral before breakfast  phenylephrine    Infusion 2.3 MICROgram(s)/kG/Min (78.2 mL/Hr) IV Continuous <Continuous>  polyethylene glycol 3350 17 Gram(s) Oral two times a day  senna 2 Tablet(s) Oral at bedtime  vancomycin  IVPB 1500 milliGRAM(s) IV Intermittent once  vancomycin  IVPB 1500 milliGRAM(s) IV Intermittent every 12 hours    MEDICATIONS  (PRN):  ALPRAZolam 0.25 milliGRAM(s) Oral three times a day PRN anxiety  cyclobenzaprine 10 milliGRAM(s) Oral three times a day PRN Muscle Spasm  diphenhydrAMINE 25 milliGRAM(s) Oral every 4 hours PRN Rash and/or Itching  HYDROmorphone PCA (1 mG/mL) Rescue Clinician Bolus 0.5 milliGRAM(s) IV Push every 15 minutes PRN for Pain Scale GREATER THAN 6  magnesium hydroxide Suspension 30 milliLiter(s) Oral every 12 hours PRN Constipation  naloxone Injectable 0.1 milliGRAM(s) IV Push every 3 minutes PRN For ANY of the following changes in patient status:  A. RR LESS THAN 10 breaths per minute, B. Oxygen saturation LESS THAN 90%, C. Sedation score of 6  ondansetron Injectable 4 milliGRAM(s) IV Push every 6 hours PRN Nausea  SUMAtriptan 25 milliGRAM(s) Oral four times a day PRN Migraine      OBJECTIVE:    Sedation Score:	[ X] Alert 	[ ] Drowsy 	[ ] Arousable	[ ] Asleep	[ ] Unresponsive    Side Effects:	[X ] None	[ ] Nausea	[ ] Vomiting	[ ] Pruritus  		[ ] Other:    Vital Signs Last 24 Hrs  T(C): 37 (01 Oct 2020 07:00), Max: 43 (30 Sep 2020 20:20)  T(F): 98.6 (01 Oct 2020 07:00), Max: 109.4 (30 Sep 2020 20:20)  HR: 87 (01 Oct 2020 09:45) (66 - 89)  BP: 95/69 (01 Oct 2020 02:45) (82/49 - 146/73)  BP(mean): 78 (01 Oct 2020 02:45) (61 - 81)  RR: 13 (01 Oct 2020 09:45) (10 - 19)  SpO2: 99% (01 Oct 2020 09:45) (88% - 100%)    ASSESSMENT/ PLAN    Therapy to  be:               [X] Continued   [ ] Discontinued   [ ] Changed to PRN Analgesics    Documentation and Verification of current medications:   [X] Done	[ ] Not done, not eligible    Comments: Appears uncomfortable, Using 1-4x/hr. Encouraged increased PCA use.

## 2020-10-01 NOTE — OCCUPATIONAL THERAPY INITIAL EVALUATION ADULT - LIVES WITH, PROFILE
spouse/Pvt home, 3 steps to enter with handrail, 1 flight inside to bed and bath. (I) ADLs and functional transfers without AD/DME. +Stall shower

## 2020-10-01 NOTE — OCCUPATIONAL THERAPY INITIAL EVALUATION ADULT - GENERAL OBSERVATIONS, REHAB EVAL
Pt received semi-supine in bed, +IVL, MISTY drain x 2, hemovac x 2, 2L O2 via NC, nunn, sequentials donned

## 2020-10-01 NOTE — OCCUPATIONAL THERAPY INITIAL EVALUATION ADULT - ADAPTIVE EQUIPMENT NEEDED
Patient:   MEGAN LAWRENCE            MRN: [id number]            FIN: [id number]              Age:   64 years     Sex:  FEMALE     :  54   Associated Diagnoses:   None   Author:   NABIL JO     Chief Complaint   CC: NIK  had colectomy  Reason for Consultation: Post op  Bipap in room  History of Present Illness   The patient presents with altered mental status  63 y/o female with past medical history HTN, DM on metformin and glipizide, CHF, liver failure, A-fib on eliquis, breast CA, CVA in May 2018, functional quadriplegia, NIK on CPAP presents to the ED with AMS onset just PTA. Assoc sx include weakness, fatigue. Per spouse, patient received medications as normal this morning and had a minimal breakfast of orange juice and coffee. After briefly leaving the patient's side, her  states he heard  a call for help and found patient sprawled on the floor. Patient appeared disoriented, asking  who he was; EMS was called. Initial blood sugar was 25 per EMS   DATE OF PROCEDURE: 2019. PREOP & POSTOP DIAGNOSIS: Colonic mass, transverse colon.  PROCEDURE PERFORMED: Partial colectomy with anastomosis, splenectomy.       History of Present Illness             The patient presents with.       Histories   Past Med History: Acute congestive heart failure  Cough  Accelerated hypertension  Acute CHF (congestive heart failure)  Acute CVA (cerebrovascular accident)  Acute bronchitis with chronic obstructive pulmonary disease (COPD)  Acute liver failure  Acute on chronic combined systolic and diastolic CHF (congestive heart failure)  Afib  Aneurysm of carotid artery  Benign essential hypertension  Breast cancer  Congestive heart failure  DM (diabetes mellitus) type 1, uncontrolled, with ketoacidosis  Dyspnea  Functional quadriplegia  Metabolic encephalopathy  Metabolic encephalopathy  NIK on CPAP  Pneumonia  Risk factors for obstructive sleep apnea  Sinus tachycardia  Tachycardia  Type II  diabetes mellitus - poor control      Family History: No family history recorded.      Procedure History: No qualifying data available.      Social History       Alcohol  Details: Use: None.  Exercise  Details: Exercise: Never.  Sexual  Details: Sexual orientation: Straight or heterosexual.  Gender Identity: Identifies as female.  Gender on Ins: Female.  Preferred Pronouns: Female.  Substance Abuse  Details: Use: None.  Tob[facility]  [facility]  Smoked/Smokeless Tobacco Last 30 Days: No.  Smoking Tobacco Use: Former smoker.  Smokeless Tobacco Use Never.  Details: Used in Last 12 Months: Yes.  Use: Current every day smoker.  Cigarette Packs/Day: 1 Pack Per Day.  Cultural/Evangelical Practices  Details: Evangelical or Cultural Practices While in Hospital: No.  .       Health Status   Allergies: Allergies (ST)   Allergies (3) Active Reaction  PCN (penicillins) None Documented  sulfa drugs None Documented  Tetanus Toxoid None Documented    Current medications: Medications (33) Active  Scheduled: (17)  *Magnesium Protocol Communication  1 each, N/A, Daily  *Potassium Protocol Communication  1 each, N/A, Daily  AMIODArone 200 mg tab  200 mg 1 tab, Oral, Q12H  ApixaBAN 5 mg tab  5 mg 1 tab, Oral, Q12H  Atorvastatin 40 mg tab  40 mg 1 tab, Oral, Daily  Bisacodyl 10 mg suppos  10 mg 1 suppository, Rectal, Daily  DilTIAZem 240 mg CD cap  240 mg 1 cap, Oral, Daily  Ferrous sulfate 325 mg tab [Fe 65 mg]  325 mg 1 tab, Oral, TID [with meals]  Furosemide 40 mg/4 mL inj  40 mg 4 mL, Slow IV Push, BID  Insulin human lispro 100 unit/mL inj 3 mL BULK  2-10 unit, Subcutaneous, QID [with meals & HS]  Magnesium oxide 400 mg tab  400 mg 1 tab, Oral, BID  meropenem  500 mg, IVPB, Q8H  Methylnaltrexone 12 mg/0.6 mL inj SDV  12 mg 0.6 mL, Subcutaneous, Q72H  Metoprolol tartrate 50 mg tab  50 mg 1 tab, Oral, Q12H  Pantoprazole 40 mg DR tab  40 mg 1 tab, Oral, BID  Potassium CHLORIDE 10 mEq ER tab  10 mEq 1 tab, Oral, BID  Sodium  chloride PF 0.9% flush inj 10 mL  20 mL, Flush, Q12H  Continuous: (0)  PRN: (16)  Acetaminophen 500 mg tab  500 mg 1 tab, Oral, Q6H  Albuterol 0.042% 1.25 mg/3 mL nebulizer soln  1.25 mg 3 mL, Nebulizer, Q6H  Aluminum-magnesium hydrox-simethicone DS 30 mL oral susp UD  30 mL, Oral, QID  Dextrose (glucose) 40% 15 gm/37.5 gm oral gel UD  15 gm, Oral, As Directed PRN  Dextrose (glucose) 50% 25 gm/50 mL syringe  12.5 gm 25 mL, IV Push, As Directed PRN  DiphenhydrAMINE 25 mg cap  25 mg 1 cap, Oral, Q Bedtime  Glucagon 1 mg/1 mL emergency kit SDV  1 mg 1 mL, IM, As Directed PRN  HydrALAZINE 20 mg/1 mL inj SDV  10 mg 0.5 mL, Slow IV Push, Q6H  Hydrocodone-acetaminophen 5-325 mg tab  1 tab, Oral, Q6H  Labetalol 20 mg/4 mL inj  20 mg 4 mL, Slow IV Push, Q4H  MetoCLOPramide 10 mg/2 mL inj SDV  10 mg 2 mL, Slow IV Push, Q6H  Metoprolol tartrate 5 mg/5 mL inj  2.5 mg 2.5 mL, Slow IV Push, Q6H  Senna-docusate sodium 8.6-50 mg tab  1 tab, Oral, Daily  Sodium chloride PF 0.9% flush inj 10 mL  10 mL, Flush, As Directed PRN  Sodium chloride PF 0.9% flush inj 10 mL  20 mL, Flush, As Directed PRN  TraMADol 50 mg tab  50 mg 1 tab, Oral, Q6H        Physical Examination   VS/Measurements     Vitals between:   21-JUN-2019 19:25:49   TO   22-JUN-2019 19:25:49                   LAST RESULT MINIMUM MAXIMUM  Temperature 36.9 36.8 37.8  Heart Rate 90 90 102  Respiratory Rate 22 18 22  NISBP           96 96 138  NIDBP           54 54 77  SpO2                    99 95 100    General:  Alert and oriented, No acute distress.    Eye   HENT:  Normocephalic.    Neck:  Supple.    Respiratory:  Respirations are non-labored, Symmetrical chest wall expansion.   Cardiovascular:  Normal rate.    Gastrointestinal:  Soft, Obese, Post op abd.    Musculoskeletal:  Normal range of motion.    Integumentary:  Warm, Dry, Intact.    Neurologic:  Alert, Oriented.    Cognition and Speech:  Oriented.    Psychiatric:  Cooperative, Appropriate mood & affect, Normal  judgment, Non-suicidal.      Review / Management   Laboratory results:    Labs between:  21-JUN-2019 19:25 to 22-JUN-2019 19:25  CBC:                 WBC  HgB  Hct  Plt  MCV  RDW   22-JUN-2019 (H) 21.7  (L) 8.1  (L) 26.3  (H) 614  78.0  (H) 25.4   DIFF:                 Seg  Neutroph//ABS  Lymph//ABS  Mono//ABS  EOS/ABS  22-JUN-2019 NOT APPLICABLE  84 // (H) 18.2  6 // 1.2 8 // (H) 1.7  1 // 0.3  BMP:                 Na  Cl  BUN  Glu   22-JUN-2019 (H) 147  (H) 114  (H) 36  (H) 132                              K  CO2  Cr  Ca                              4.0  (L) 20  (H) 2.08  8.8   CMP:                 AST  ALT  AlkPhos  Bili  Albumin   22-JUN-2019 16  12  (H) 124  1.0  (L) 1.7   Other Chem:             Mg  Phos  Triglycerides  GGTP  DirectBili                           1.7  3.2         POC GLU:                 Latest Result  Latest Date  Minimum  Min Date  Maximum  Max Date                             (H) 238  22-JUN-2019 (H) 238  22-JUN-2019 (H) 374  21-JUN-2019  Blood Gas:            Ph  PCO2  PO2  BiCarb  BaseExcess   Arterial:  22-JUN-2019 7.38  34  90  (L) 20  NOT APPLICABLE                   .    Radiology results   Result title:  XR CHEST 2V  Result status:  Final  Verified by:  BIN, FARHAT on 06/22/2019 1:25  IMPRESSION: Sternotomy.  EKG leads overlie.  Obesity reduces detail. Mild to moderate cardiomegaly.  Prominent poorly defined pulmonary vessels suggesting pulmonary edema and CHF.  No definite pleural effusion or consolidation.  Atherosclerotic aorta.  Left hilar calcifications of prior granulomatous disease.       Impression and Plan   Dx and Plan:     Diagnosis      .     Impression and Plan   Afib (KBC87-YT I48.91, Diagnosis, Medical).     Anemia (FYO02-NC D64.9, Discharge, Medical).     Colonic mass (CEH20-GL K63.9, Diagnosis, Medical).     Hypoglycemia (ZLU27-TZ E16.2, Discharge, Medical).     Hypomagnesemia (KTD74-AH E83.42, Discharge, Medical).     Metabolic encephalopathy (XFP76-BK G93.41,  Diagnosis, Medical).    .      CHRONIC A FINB ON SYS AC  on Cardizem  and Amiodarone  H/O CAD/P CABG  COMBINED SYS AND DIASTOLIC HEART FAILURE  COLON MASS ? CANCER  ECHO TO EVAL LV SYS FUNCTION, WALL MOTION ABNORMALITY WILL NEED LEXISCAN  on Eliquis  Hx of NIK  - BIPAP Rx  6/13/19: S/P COLON SURGERY   POST OP RESP FAILURE - ALREADY EXTUBATED  CONCERN FOR CA  Dvt prophylaxis on Eliquis   .     lower Morphine.     .    Orders     Orders   Pharmacy:  Relistor subcutaneous injection 12 mg/0.6 mL (Order Processing): 12 mg, Subcutaneous, Q72H, Routine, Order Start: 06/19/19 12:00 CDT, x 4 doses, Order Stop: 07/01/19 11:59 CDT  Relistor subcutaneous injection 12 mg/0.6 mL (Order Processing): 12 mg, Subcutaneous, Q48H, Routine, Order Start: 06/19/19 12:00 CDT, x 4 doses, Order Stop: 06/27/19 11:59 CDT  Dulcolax Laxative (bisacodyl) rectal 10 mg suppository (Order Processing): 10 mg, Rectal, Daily, Routine, Order Start: 06/19/19 11:56 CDT, Suppository.    Orders   Laboratory:  PO4 LEVEL BLOOD (Order Processing): Priority- Tomorrow AM DRAW (4 To 6 AM), Blood, 06/20/19 06:00 CDT  MAGNESIUM LEVEL [MG] (Order Processing): Priority- Tomorrow AM DRAW (4 To 6 AM), Blood, 06/20/19 06:00 CDT.    Orders   Pharmacy:  magnesium oxide oral 400 mg tablet (Mag-Ox 400) (Order Processing): 400 mg, Oral, Daily, Routine, Order Start: 06/23/19 09:00 CDT, Tab, [20 mEq/242 mg elemental].    .   no

## 2020-10-01 NOTE — OCCUPATIONAL THERAPY INITIAL EVALUATION ADULT - PERTINENT HX OF CURRENT PROBLEM, REHAB EVAL
60F with h/o scoliosis/ spinal stenosis- lumbar region, presents to preadmission testing for scheduled stage 1, L1-5 lumbar lateral interbody fusion, scheduled for 9/29/2020. Her medical history includes HTN, hyperlipidemia, osteoarthritis, anxiety/depression disorders, GERD, eczema, sinusitis. s/p L1-L5 lateral lumbar interbody fusion s/p stage 1 on 9/29/20, stage 2 T9- Pelvis scheduled with b/l paraspinal muscle flaps w/ complex closure.

## 2020-10-01 NOTE — OCCUPATIONAL THERAPY INITIAL EVALUATION ADULT - LEVEL OF INDEPENDENCE: SIT/STAND, REHAB EVAL
deferred due to poor sitting balance/pt declined from elevated surface/minimum assist (75% patients effort)

## 2020-10-01 NOTE — DIETITIAN INITIAL EVALUATION ADULT. - PERTINENT MEDS FT
Lovenox, Vancomycin, Hibiclens, Lactated Ringers, Humalog, Dilaudid, Hydromorphone, Phenylephrine, Xanax, Lipitor, Vit D3, Flexeril, Cymbalta, Tricor, Mg Hydroxide, Mag-Ox, Multivitamin, Zofran, Protonix, Miralax, Senna, Imitrex PRN

## 2020-10-01 NOTE — OCCUPATIONAL THERAPY INITIAL EVALUATION ADULT - PLANNED THERAPY INTERVENTIONS, OT EVAL
neuromuscular re-education/ROM/transfer training/strengthening/ADL retraining/balance training/bed mobility training

## 2020-10-01 NOTE — DIETITIAN INITIAL EVALUATION ADULT. - ENERGY NEEDS
Ht: 65"  Wt: 199.2 lbs  BMI: 33.3 kg/m2   IBW: 125 lbs (+/-10%)     159 % IBW  Edema:  none noted         Skin: Ht: 65"  Wt: 199.2 lbs  BMI: 33.3 kg/m2   IBW: 125 lbs (+/-10%)     159 % IBW  Edema:  none noted         Skin: surgical incision: left lateral aspect s/p lami 9/29, lumbar spine 9/30

## 2020-10-01 NOTE — DIETITIAN INITIAL EVALUATION ADULT. - PERTINENT LABORATORY DATA
(10/1): Hct 34.1, POCT Blood Glucose 120, 133  (9/30): Cl 110, , GFR 55, Blood Gas Arterial Lactate 9.2 (?), Phos 6.0  (9/29): A1c 6.4%

## 2020-10-02 ENCOUNTER — APPOINTMENT (OUTPATIENT)
Dept: SPINE | Facility: HOSPITAL | Age: 60
End: 2020-10-02

## 2020-10-02 LAB
ANION GAP SERPL CALC-SCNC: 11 MMOL/L — SIGNIFICANT CHANGE UP (ref 5–17)
BUN SERPL-MCNC: 25 MG/DL — HIGH (ref 7–23)
CALCIUM SERPL-MCNC: 8.4 MG/DL — SIGNIFICANT CHANGE UP (ref 8.4–10.5)
CHLORIDE SERPL-SCNC: 105 MMOL/L — SIGNIFICANT CHANGE UP (ref 96–108)
CO2 SERPL-SCNC: 23 MMOL/L — SIGNIFICANT CHANGE UP (ref 22–31)
CREAT SERPL-MCNC: 0.88 MG/DL — SIGNIFICANT CHANGE UP (ref 0.5–1.3)
GLUCOSE SERPL-MCNC: 133 MG/DL — HIGH (ref 70–99)
HCT VFR BLD CALC: 29.1 % — LOW (ref 34.5–45)
HGB BLD-MCNC: 9.6 G/DL — LOW (ref 11.5–15.5)
MCHC RBC-ENTMCNC: 30.4 PG — SIGNIFICANT CHANGE UP (ref 27–34)
MCHC RBC-ENTMCNC: 33 GM/DL — SIGNIFICANT CHANGE UP (ref 32–36)
MCV RBC AUTO: 92.1 FL — SIGNIFICANT CHANGE UP (ref 80–100)
NRBC # BLD: 0 /100 WBCS — SIGNIFICANT CHANGE UP (ref 0–0)
PLATELET # BLD AUTO: 173 K/UL — SIGNIFICANT CHANGE UP (ref 150–400)
POTASSIUM SERPL-MCNC: 4.2 MMOL/L — SIGNIFICANT CHANGE UP (ref 3.5–5.3)
POTASSIUM SERPL-SCNC: 4.2 MMOL/L — SIGNIFICANT CHANGE UP (ref 3.5–5.3)
RBC # BLD: 3.16 M/UL — LOW (ref 3.8–5.2)
RBC # FLD: 13.6 % — SIGNIFICANT CHANGE UP (ref 10.3–14.5)
SODIUM SERPL-SCNC: 139 MMOL/L — SIGNIFICANT CHANGE UP (ref 135–145)
WBC # BLD: 10.96 K/UL — HIGH (ref 3.8–10.5)
WBC # FLD AUTO: 10.96 K/UL — HIGH (ref 3.8–10.5)

## 2020-10-02 RX ORDER — HYDROMORPHONE HYDROCHLORIDE 2 MG/ML
0.5 INJECTION INTRAMUSCULAR; INTRAVENOUS; SUBCUTANEOUS
Refills: 0 | Status: DISCONTINUED | OUTPATIENT
Start: 2020-10-02 | End: 2020-10-06

## 2020-10-02 RX ORDER — OXYCODONE HYDROCHLORIDE 5 MG/1
5 TABLET ORAL EVERY 4 HOURS
Refills: 0 | Status: DISCONTINUED | OUTPATIENT
Start: 2020-10-02 | End: 2020-10-02

## 2020-10-02 RX ORDER — OXYCODONE HYDROCHLORIDE 5 MG/1
10 TABLET ORAL EVERY 4 HOURS
Refills: 0 | Status: DISCONTINUED | OUTPATIENT
Start: 2020-10-02 | End: 2020-10-09

## 2020-10-02 RX ADMIN — MAGNESIUM OXIDE 400 MG ORAL TABLET 400 MILLIGRAM(S): 241.3 TABLET ORAL at 13:11

## 2020-10-02 RX ADMIN — OXYCODONE HYDROCHLORIDE 10 MILLIGRAM(S): 5 TABLET ORAL at 13:19

## 2020-10-02 RX ADMIN — HYDROMORPHONE HYDROCHLORIDE 0.5 MILLIGRAM(S): 2 INJECTION INTRAMUSCULAR; INTRAVENOUS; SUBCUTANEOUS at 21:33

## 2020-10-02 RX ADMIN — OXYCODONE HYDROCHLORIDE 10 MILLIGRAM(S): 5 TABLET ORAL at 20:15

## 2020-10-02 RX ADMIN — Medication 145 MILLIGRAM(S): at 13:11

## 2020-10-02 RX ADMIN — HYDROMORPHONE HYDROCHLORIDE 0.5 MILLIGRAM(S): 2 INJECTION INTRAMUSCULAR; INTRAVENOUS; SUBCUTANEOUS at 15:03

## 2020-10-02 RX ADMIN — Medication 300 MILLIGRAM(S): at 00:33

## 2020-10-02 RX ADMIN — MIDODRINE HYDROCHLORIDE 5 MILLIGRAM(S): 2.5 TABLET ORAL at 21:06

## 2020-10-02 RX ADMIN — OXYCODONE HYDROCHLORIDE 10 MILLIGRAM(S): 5 TABLET ORAL at 12:43

## 2020-10-02 RX ADMIN — HYDROMORPHONE HYDROCHLORIDE 30 MILLILITER(S): 2 INJECTION INTRAMUSCULAR; INTRAVENOUS; SUBCUTANEOUS at 07:10

## 2020-10-02 RX ADMIN — POLYETHYLENE GLYCOL 3350 17 GRAM(S): 17 POWDER, FOR SOLUTION ORAL at 17:52

## 2020-10-02 RX ADMIN — SENNA PLUS 2 TABLET(S): 8.6 TABLET ORAL at 21:06

## 2020-10-02 RX ADMIN — POLYETHYLENE GLYCOL 3350 17 GRAM(S): 17 POWDER, FOR SOLUTION ORAL at 05:48

## 2020-10-02 RX ADMIN — DULOXETINE HYDROCHLORIDE 60 MILLIGRAM(S): 30 CAPSULE, DELAYED RELEASE ORAL at 05:48

## 2020-10-02 RX ADMIN — MIDODRINE HYDROCHLORIDE 5 MILLIGRAM(S): 2.5 TABLET ORAL at 05:48

## 2020-10-02 RX ADMIN — CYCLOBENZAPRINE HYDROCHLORIDE 10 MILLIGRAM(S): 10 TABLET, FILM COATED ORAL at 21:06

## 2020-10-02 RX ADMIN — Medication 1000 UNIT(S): at 13:12

## 2020-10-02 RX ADMIN — Medication 1 TABLET(S): at 13:11

## 2020-10-02 RX ADMIN — OXYCODONE HYDROCHLORIDE 10 MILLIGRAM(S): 5 TABLET ORAL at 19:44

## 2020-10-02 RX ADMIN — PANTOPRAZOLE SODIUM 40 MILLIGRAM(S): 20 TABLET, DELAYED RELEASE ORAL at 05:48

## 2020-10-02 RX ADMIN — HYDROMORPHONE HYDROCHLORIDE 0.5 MILLIGRAM(S): 2 INJECTION INTRAMUSCULAR; INTRAVENOUS; SUBCUTANEOUS at 11:47

## 2020-10-02 RX ADMIN — MIDODRINE HYDROCHLORIDE 5 MILLIGRAM(S): 2.5 TABLET ORAL at 13:12

## 2020-10-02 RX ADMIN — DULOXETINE HYDROCHLORIDE 60 MILLIGRAM(S): 30 CAPSULE, DELAYED RELEASE ORAL at 17:52

## 2020-10-02 RX ADMIN — HYDROMORPHONE HYDROCHLORIDE 0.5 MILLIGRAM(S): 2 INJECTION INTRAMUSCULAR; INTRAVENOUS; SUBCUTANEOUS at 21:03

## 2020-10-02 RX ADMIN — ATORVASTATIN CALCIUM 10 MILLIGRAM(S): 80 TABLET, FILM COATED ORAL at 21:06

## 2020-10-02 RX ADMIN — HYDROMORPHONE HYDROCHLORIDE 0.5 MILLIGRAM(S): 2 INJECTION INTRAMUSCULAR; INTRAVENOUS; SUBCUTANEOUS at 11:18

## 2020-10-02 RX ADMIN — ENOXAPARIN SODIUM 40 MILLIGRAM(S): 100 INJECTION SUBCUTANEOUS at 13:11

## 2020-10-02 RX ADMIN — HYDROMORPHONE HYDROCHLORIDE 0.5 MILLIGRAM(S): 2 INJECTION INTRAMUSCULAR; INTRAVENOUS; SUBCUTANEOUS at 15:31

## 2020-10-02 NOTE — PROGRESS NOTE ADULT - SUBJECTIVE AND OBJECTIVE BOX
Day 3\1 of Anesthesia Pain Management Service    SUBJECTIVE: Patient sleeping    Pain Scale Score:	[X] Refer to charted pain scores    THERAPY:    [ ] IV PCA Morphine		        [ ] 5 mg/mL	[ ] 1 mg/mL  [X] IV PCA Hydromorphone	[ ] 5 mg/mL	[X] 1 mg/mL  [ ] IV PCA Fentanyl		        [ ] 50 micrograms/mL    Demand dose: 0.2 mg     Lockout: 6 minutes   Continuous Rate: 0 mg/hr  4 Hour Limit: 4 mg    MEDICATIONS  (STANDING):  atorvastatin 10 milliGRAM(s) Oral at bedtime  chlorhexidine 4% Liquid 1 Application(s) Topical <User Schedule>  cholecalciferol 1000 Unit(s) Oral daily  DULoxetine 60 milliGRAM(s) Oral two times a day  enoxaparin Injectable 40 milliGRAM(s) SubCutaneous daily  fenofibrate Tablet 145 milliGRAM(s) Oral daily  influenza   Vaccine 0.5 milliLiter(s) IntraMuscular once  magnesium oxide 400 milliGRAM(s) Oral daily  midodrine 5 milliGRAM(s) Oral every 8 hours  multivitamin 1 Tablet(s) Oral daily  pantoprazole    Tablet 40 milliGRAM(s) Oral before breakfast  polyethylene glycol 3350 17 Gram(s) Oral two times a day  senna 2 Tablet(s) Oral at bedtime  vancomycin  IVPB 1500 milliGRAM(s) IV Intermittent once    MEDICATIONS  (PRN):  ALPRAZolam 0.25 milliGRAM(s) Oral three times a day PRN anxiety  cyclobenzaprine 10 milliGRAM(s) Oral three times a day PRN Muscle Spasm  diphenhydrAMINE 25 milliGRAM(s) Oral every 4 hours PRN Rash and/or Itching  magnesium hydroxide Suspension 30 milliLiter(s) Oral every 12 hours PRN Constipation  ondansetron Injectable 4 milliGRAM(s) IV Push every 6 hours PRN Nausea  oxyCODONE    IR 5 milliGRAM(s) Oral every 4 hours PRN Moderate Pain (4 - 6)  oxyCODONE    IR 10 milliGRAM(s) Oral every 4 hours PRN Severe Pain (7 - 10)  SUMAtriptan 25 milliGRAM(s) Oral four times a day PRN Migraine      OBJECTIVE:    Sedation Score:	[  ] Alert	 [ ] Drowsy 	[ ] Arousable	[X ] Asleep	[ ] Unresponsive    Side Effects:	[X ] None	[ ] Nausea	[ ] Vomiting	[ ] Pruritus  		[ ] Other:    Vital Signs Last 24 Hrs  T(C): 36.8 (02 Oct 2020 06:18), Max: 37.3 (01 Oct 2020 11:00)  T(F): 98.3 (02 Oct 2020 06:18), Max: 99.2 (01 Oct 2020 11:00)  HR: 97 (02 Oct 2020 06:18) (82 - 105)  BP: 100/64 (02 Oct 2020 06:18) (100/64 - 120/66)  BP(mean): 82 (01 Oct 2020 17:00) (75 - 82)  RR: 18 (02 Oct 2020 06:18) (11 - 18)  SpO2: 93% (02 Oct 2020 06:18) (93% - 99%)    ASSESSMENT/ PLAN    Therapy to  be:               [  ] Continued   [X ] Discontinued   [ X] Changed to PRN Analgesics    Documentation and Verification of current medications:   [X] Done	[ ] Not done, not eligible    Comments: PCA D\C'd by primary service. Transitioned to prn analgesics

## 2020-10-02 NOTE — PROGRESS NOTE ADULT - SUBJECTIVE AND OBJECTIVE BOX
Post-op day # 2 s/p stage 2 T9-Pelvis Fusion , lateral L5-S1 with plastic closure  Post-op day 3 s/p L1-L5 LLIF    OVERNIGHT EVENTS: no acute event    Vital Signs Last 24 Hrs  T(C): 37.3 (02 Oct 2020 09:36), Max: 37.3 (02 Oct 2020 09:36)  T(F): 99.1 (02 Oct 2020 09:36), Max: 99.1 (02 Oct 2020 09:36)  HR: 107 (02 Oct 2020 11:12) (84 - 107)  BP: 117/74 (02 Oct 2020 11:12) (100/64 - 120/66)  BP(mean): 82 (01 Oct 2020 17:00) (75 - 82)  RR: 18 (02 Oct 2020 09:36) (11 - 18)  SpO2: 95% (02 Oct 2020 11:12) (93% - 99%)    I&O's Detail    01 Oct 2020 07:01  -  02 Oct 2020 07:00  --------------------------------------------------------  IN:    IV PiggyBack: 1000 mL    Lactated Ringers: 1445 mL    Oral Fluid: 860 mL    Phenylephrine: 136 mL    Sodium Chloride 0.9% Bolus: 2000 mL  Total IN: 5441 mL    OUT:    Accordian (mL): 150 mL    Bulb (mL): 180 mL    Bulb (mL): 100 mL    Drain (mL): 180 mL    Drain (mL): 280 mL    Indwelling Catheter - Urethral (mL): 2425 mL  Total OUT: 3315 mL    Total NET: 2126 mL        I&O's Summary    01 Oct 2020 07:01  -  02 Oct 2020 07:00  --------------------------------------------------------  IN: 5441 mL / OUT: 3315 mL / NET: 2126 mL        PHYSICAL EXAM:  Neurological: Alert awake oriented x3    Motor exam:         [x] Upper extremity                 D             B          T          WE       WF      HI                                               R         5/5        5/5        5/5       5/5     5/5       5/5                                               L          5/5        5/5        5/5       5/5     5/5       5/5         [x] Lower extremity                Ps          Ha        Quad    EHL        FHL                                               R        4/5      4/5       4/5       5/5         5/5                                               L       4/5       4/5   4/5       5/5          5/5          Sensation: [x] intact to light touch  [] decreased:        Gait: not tested    Cardiovascular: Regular  Respiratory: Clear bilaterally  Gastrointestinal: Abd soft + BS non tender  Genitourinary:  Extremities: -C/C/E  Incision/Wound: Intact all 4 drains in place    TUBES/LINES:  [] CVC  [] A-line  [] Lumbar Drain  [] Ventriculostomy  [] Other    DIET: Regular  [] NPO  [] Mechanical  [] Tube feeds    LABS:                        9.6    10.96 )-----------( 173      ( 02 Oct 2020 06:59 )             29.1     10-02    139  |  105  |  25<H>  ----------------------------<  133<H>  4.2   |  23  |  0.88    Ca    8.4      02 Oct 2020 07:00  Phos  6.0     09-30  Mg     2.2     09-30      PT/INR - ( 30 Sep 2020 22:53 )   PT: 14.2 sec;   INR: 1.19 ratio         PTT - ( 30 Sep 2020 22:53 )  PTT:21.3 sec          Allergies    penicillin (Other)    Intolerances      MEDICATIONS:  Antibiotics:  vancomycin  IVPB 1500 milliGRAM(s) IV Intermittent once    Neuro:  ALPRAZolam 0.25 milliGRAM(s) Oral three times a day PRN  cyclobenzaprine 10 milliGRAM(s) Oral three times a day PRN  diphenhydrAMINE 25 milliGRAM(s) Oral every 4 hours PRN  DULoxetine 60 milliGRAM(s) Oral two times a day  HYDROmorphone  Injectable 0.5 milliGRAM(s) IV Push every 3 hours PRN  ondansetron Injectable 4 milliGRAM(s) IV Push every 6 hours PRN  oxyCODONE    IR 5 milliGRAM(s) Oral every 4 hours PRN  oxyCODONE    IR 10 milliGRAM(s) Oral every 4 hours PRN  SUMAtriptan 25 milliGRAM(s) Oral four times a day PRN    Anticoagulation:  enoxaparin Injectable 40 milliGRAM(s) SubCutaneous daily    OTHER:  atorvastatin 10 milliGRAM(s) Oral at bedtime  chlorhexidine 4% Liquid 1 Application(s) Topical <User Schedule>  fenofibrate Tablet 145 milliGRAM(s) Oral daily  influenza   Vaccine 0.5 milliLiter(s) IntraMuscular once  magnesium hydroxide Suspension 30 milliLiter(s) Oral every 12 hours PRN  midodrine 5 milliGRAM(s) Oral every 8 hours  pantoprazole    Tablet 40 milliGRAM(s) Oral before breakfast  polyethylene glycol 3350 17 Gram(s) Oral two times a day  senna 2 Tablet(s) Oral at bedtime    IVF:  cholecalciferol 1000 Unit(s) Oral daily  magnesium oxide 400 milliGRAM(s) Oral daily  multivitamin 1 Tablet(s) Oral daily    CULTURES:    RADIOLOGY & ADDITIONAL TESTS:      ASSESSMENT:     60 year old female with h/o scoliosis/ spinal stenosis- lumbar region, presents to preadmission testing for scheduled stage 1, L1-5 lumbar lateral interbody fusion, scheduled for 9/29/2020. Her medical history includes HTN, hyperlipidemia, osteoarthritis, anxiety/depression disorders, GERD, eczema, sinusitis.  60y Female Post-op day # 2 s/p stage 2 T9-Pelvis Fusion , lateral L5-S1 with plastic closure  Post-op day 3 s/p L1-L5 LLIF    Please Check When Present   []  GCS  E   V  M     Heart Failure: []Acute, [] acute on chronic , []chronic  Heart Failure:  [] Diastolic (HFpEF), [] Systolic (HFrEF), []Combined (HFpEF and HFrEF), [] RHF, [] Pulm HTN, [] Other    [] ANDRESSA, [] ATN, [] AIN, [] other  [] CKD1, [] CKD2, [] CKD 3, [] CKD 4, [] CKD 5, []ESRD    Encephalopathy: [] Metabolic, [] Hepatic, [] toxic, [] Neurological, [] Other    Abnormal Nurtitional Status: [] malnurtition (see nutrition note), [ ]underweight: BMI < 19, [] morbid obesity: BMI >40, [] Cachexia    [] Sepsis  [] hypovolemic shock,[] cardiogenic shock, [] hemorrhagic shock, [] neuogenic shock  [] Acute Respiratory Failure  []Cerebral edema, [] Brain compression/ herniation,   [] Functional quadriplegia  [] Acute blood loss anemia    PLAN:  NEURO: Stable, off PCA, PO analgesic, PT consult increase activity as tolerated  CARDIOVASCULAR: Hemodynamically stable  PULMONARY: Incentive spirometer  RENAL: IVL, Perkins d/kp will void CK  GI: Tolerating Regular diet  HEME: H/H stable  ID: Afebrile  ENDO: Borderline DM type 2    DVT PROPHYLAXIS:  [x] Venodynes                                [x] Heparin/Lovenox    FALL RISK:  [x] Low Risk                                    [] Impulsive

## 2020-10-02 NOTE — PROGRESS NOTE ADULT - SUBJECTIVE AND OBJECTIVE BOX
Pt seen and examined. Doing well. No acute events o/n.     T(C): 36.8 (10-02-20 @ 06:18), Max: 37.3 (10-01-20 @ 11:00)  T(F): 98.3 (10-02-20 @ 06:18), Max: 99.2 (10-01-20 @ 11:00)  HR: 97 (10-02-20 @ 06:18) (82 - 105)  BP: 100/64 (10-02-20 @ 06:18) (100/64 - 120/66)  RR: 18 (10-02-20 @ 06:18) (11 - 18)  SpO2: 93% (10-02-20 @ 06:18) (93% - 99%)      01 Oct 2020 07:01  -  02 Oct 2020 07:00  --------------------------------------------------------  IN:    IV PiggyBack: 1000 mL    Lactated Ringers: 1445 mL    Oral Fluid: 860 mL    Phenylephrine: 136 mL    Sodium Chloride 0.9% Bolus: 2000 mL  Total IN: 5441 mL    OUT:    Accordian (mL): 150 mL    Bulb (mL): 180 mL    Bulb (mL): 100 mL    Drain (mL): 180 mL    Drain (mL): 280 mL    Indwelling Catheter - Urethral (mL): 2425 mL  Total OUT: 3315 mL    Total NET: 2126 mL          Exam:  Back soft  Dressing c/d/i  No palpable collections  JPs serosanguinous.                           9.6    10.96 )-----------( 173      ( 02 Oct 2020 06:59 )             29.1     09-30    141  |  110<H>  |  30<H>  ----------------------------<  186<H>  4.4   |  16<L>  |  1.09    Ca    9.0      30 Sep 2020 21:23  Phos  6.0     09-30  Mg     2.2     09-30        ASSESSMENT AND PLAN:   60 year old female with h/o HTN, hyperlipidemia, ostearthritis, scoliosis/ spinal stenosis, anxiety/depression disorders, GERD, eczema, sinusitis admitted for scheduled stage 1, L1-5 lumbar lateral interbody fusion s/p L1-L5 lateral lumbar interbody fusion and T9-pelvis fusion with PRS closure.     -acquacel change on POD 3 (10/3)  -care per primary team  -PRS will continue to follow    Plastic Surgery Resident  [facility]: [phone]

## 2020-10-03 LAB
GLUCOSE BLDC GLUCOMTR-MCNC: 115 MG/DL — HIGH (ref 70–99)
GLUCOSE BLDC GLUCOMTR-MCNC: 118 MG/DL — HIGH (ref 70–99)
GLUCOSE BLDC GLUCOMTR-MCNC: 121 MG/DL — HIGH (ref 70–99)
GLUCOSE BLDC GLUCOMTR-MCNC: 132 MG/DL — HIGH (ref 70–99)

## 2020-10-03 RX ORDER — INSULIN LISPRO 100/ML
VIAL (ML) SUBCUTANEOUS
Refills: 0 | Status: DISCONTINUED | OUTPATIENT
Start: 2020-10-03 | End: 2020-10-10

## 2020-10-03 RX ORDER — INSULIN LISPRO 100/ML
VIAL (ML) SUBCUTANEOUS AT BEDTIME
Refills: 0 | Status: DISCONTINUED | OUTPATIENT
Start: 2020-10-03 | End: 2020-10-10

## 2020-10-03 RX ORDER — DEXTROSE 50 % IN WATER 50 %
25 SYRINGE (ML) INTRAVENOUS ONCE
Refills: 0 | Status: DISCONTINUED | OUTPATIENT
Start: 2020-10-03 | End: 2020-10-10

## 2020-10-03 RX ORDER — DEXTROSE 50 % IN WATER 50 %
15 SYRINGE (ML) INTRAVENOUS ONCE
Refills: 0 | Status: DISCONTINUED | OUTPATIENT
Start: 2020-10-03 | End: 2020-10-10

## 2020-10-03 RX ORDER — SODIUM CHLORIDE 9 MG/ML
1000 INJECTION, SOLUTION INTRAVENOUS
Refills: 0 | Status: DISCONTINUED | OUTPATIENT
Start: 2020-10-03 | End: 2020-10-10

## 2020-10-03 RX ORDER — DEXTROSE 50 % IN WATER 50 %
12.5 SYRINGE (ML) INTRAVENOUS ONCE
Refills: 0 | Status: DISCONTINUED | OUTPATIENT
Start: 2020-10-03 | End: 2020-10-10

## 2020-10-03 RX ORDER — GLUCAGON INJECTION, SOLUTION 0.5 MG/.1ML
1 INJECTION, SOLUTION SUBCUTANEOUS ONCE
Refills: 0 | Status: DISCONTINUED | OUTPATIENT
Start: 2020-10-03 | End: 2020-10-10

## 2020-10-03 RX ADMIN — OXYCODONE HYDROCHLORIDE 10 MILLIGRAM(S): 5 TABLET ORAL at 06:28

## 2020-10-03 RX ADMIN — OXYCODONE HYDROCHLORIDE 10 MILLIGRAM(S): 5 TABLET ORAL at 14:26

## 2020-10-03 RX ADMIN — SENNA PLUS 2 TABLET(S): 8.6 TABLET ORAL at 21:26

## 2020-10-03 RX ADMIN — OXYCODONE HYDROCHLORIDE 10 MILLIGRAM(S): 5 TABLET ORAL at 21:27

## 2020-10-03 RX ADMIN — OXYCODONE HYDROCHLORIDE 10 MILLIGRAM(S): 5 TABLET ORAL at 11:34

## 2020-10-03 RX ADMIN — CYCLOBENZAPRINE HYDROCHLORIDE 10 MILLIGRAM(S): 10 TABLET, FILM COATED ORAL at 05:54

## 2020-10-03 RX ADMIN — OXYCODONE HYDROCHLORIDE 10 MILLIGRAM(S): 5 TABLET ORAL at 15:30

## 2020-10-03 RX ADMIN — OXYCODONE HYDROCHLORIDE 10 MILLIGRAM(S): 5 TABLET ORAL at 05:55

## 2020-10-03 RX ADMIN — POLYETHYLENE GLYCOL 3350 17 GRAM(S): 17 POWDER, FOR SOLUTION ORAL at 05:57

## 2020-10-03 RX ADMIN — MAGNESIUM OXIDE 400 MG ORAL TABLET 400 MILLIGRAM(S): 241.3 TABLET ORAL at 12:28

## 2020-10-03 RX ADMIN — Medication 1 TABLET(S): at 12:28

## 2020-10-03 RX ADMIN — OXYCODONE HYDROCHLORIDE 10 MILLIGRAM(S): 5 TABLET ORAL at 01:02

## 2020-10-03 RX ADMIN — MIDODRINE HYDROCHLORIDE 5 MILLIGRAM(S): 2.5 TABLET ORAL at 21:27

## 2020-10-03 RX ADMIN — POLYETHYLENE GLYCOL 3350 17 GRAM(S): 17 POWDER, FOR SOLUTION ORAL at 18:17

## 2020-10-03 RX ADMIN — ENOXAPARIN SODIUM 40 MILLIGRAM(S): 100 INJECTION SUBCUTANEOUS at 12:28

## 2020-10-03 RX ADMIN — Medication 1000 UNIT(S): at 12:29

## 2020-10-03 RX ADMIN — DULOXETINE HYDROCHLORIDE 60 MILLIGRAM(S): 30 CAPSULE, DELAYED RELEASE ORAL at 18:17

## 2020-10-03 RX ADMIN — OXYCODONE HYDROCHLORIDE 10 MILLIGRAM(S): 5 TABLET ORAL at 22:00

## 2020-10-03 RX ADMIN — ATORVASTATIN CALCIUM 10 MILLIGRAM(S): 80 TABLET, FILM COATED ORAL at 21:26

## 2020-10-03 RX ADMIN — DULOXETINE HYDROCHLORIDE 60 MILLIGRAM(S): 30 CAPSULE, DELAYED RELEASE ORAL at 05:54

## 2020-10-03 RX ADMIN — OXYCODONE HYDROCHLORIDE 10 MILLIGRAM(S): 5 TABLET ORAL at 10:02

## 2020-10-03 RX ADMIN — PANTOPRAZOLE SODIUM 40 MILLIGRAM(S): 20 TABLET, DELAYED RELEASE ORAL at 05:55

## 2020-10-03 RX ADMIN — Medication 145 MILLIGRAM(S): at 12:27

## 2020-10-03 RX ADMIN — MIDODRINE HYDROCHLORIDE 5 MILLIGRAM(S): 2.5 TABLET ORAL at 05:54

## 2020-10-03 RX ADMIN — MIDODRINE HYDROCHLORIDE 5 MILLIGRAM(S): 2.5 TABLET ORAL at 14:26

## 2020-10-03 RX ADMIN — CYCLOBENZAPRINE HYDROCHLORIDE 10 MILLIGRAM(S): 10 TABLET, FILM COATED ORAL at 12:28

## 2020-10-03 RX ADMIN — OXYCODONE HYDROCHLORIDE 10 MILLIGRAM(S): 5 TABLET ORAL at 00:31

## 2020-10-03 NOTE — PROGRESS NOTE ADULT - SUBJECTIVE AND OBJECTIVE BOX
From: Kenneth Rios  To: Sherry Harris MD  Sent: 3/25/2019 8:55 AM CDT  Subject: Other    This message is being sent by Sanudra Mcfarland on behalf of Kenneth Rios    This is Kenneth's mom. He needs a refill of his Adderall please. Thank you!  Saundra Mcfarland   Afebrile  VSS  Pain controlled      Drains serosang  Incision w dressing CDI    FU  2 weeks suture removal

## 2020-10-03 NOTE — PROGRESS NOTE ADULT - SUBJECTIVE AND OBJECTIVE BOX
PLASTIC SURGERY DAILY PROGRESS NOTE:     SUBJECTIVE/ROS: Patient feels well. Seen and examined at bedside. Pain well controlled. Dressing changed at bedside today (POD3). AVSS overnight.      MEDICATIONS  (STANDING):  atorvastatin 10 milliGRAM(s) Oral at bedtime  chlorhexidine 4% Liquid 1 Application(s) Topical <User Schedule>  cholecalciferol 1000 Unit(s) Oral daily  dextrose 5%. 1000 milliLiter(s) (50 mL/Hr) IV Continuous <Continuous>  dextrose 50% Injectable 12.5 Gram(s) IV Push once  dextrose 50% Injectable 25 Gram(s) IV Push once  dextrose 50% Injectable 25 Gram(s) IV Push once  DULoxetine 60 milliGRAM(s) Oral two times a day  enoxaparin Injectable 40 milliGRAM(s) SubCutaneous daily  fenofibrate Tablet 145 milliGRAM(s) Oral daily  influenza   Vaccine 0.5 milliLiter(s) IntraMuscular once  insulin lispro (HumaLOG) corrective regimen sliding scale   SubCutaneous three times a day before meals  insulin lispro (HumaLOG) corrective regimen sliding scale   SubCutaneous at bedtime  magnesium oxide 400 milliGRAM(s) Oral daily  midodrine 5 milliGRAM(s) Oral every 8 hours  multivitamin 1 Tablet(s) Oral daily  pantoprazole    Tablet 40 milliGRAM(s) Oral before breakfast  polyethylene glycol 3350 17 Gram(s) Oral two times a day  senna 2 Tablet(s) Oral at bedtime  vancomycin  IVPB 1500 milliGRAM(s) IV Intermittent once    MEDICATIONS  (PRN):  ALPRAZolam 0.25 milliGRAM(s) Oral three times a day PRN anxiety  cyclobenzaprine 10 milliGRAM(s) Oral three times a day PRN Muscle Spasm  dextrose 40% Gel 15 Gram(s) Oral once PRN Blood Glucose LESS THAN 70 milliGRAM(s)/deciliter  diphenhydrAMINE 25 milliGRAM(s) Oral every 4 hours PRN Rash and/or Itching  glucagon  Injectable 1 milliGRAM(s) IntraMuscular once PRN Glucose LESS THAN 70 milligrams/deciliter  HYDROmorphone  Injectable 0.5 milliGRAM(s) IV Push every 3 hours PRN breakthru pain  magnesium hydroxide Suspension 30 milliLiter(s) Oral every 12 hours PRN Constipation  ondansetron Injectable 4 milliGRAM(s) IV Push every 6 hours PRN Nausea  oxyCODONE    IR 5 milliGRAM(s) Oral every 4 hours PRN Moderate Pain (4 - 6)  oxyCODONE    IR 10 milliGRAM(s) Oral every 4 hours PRN Severe Pain (7 - 10)  SUMAtriptan 25 milliGRAM(s) Oral four times a day PRN Migraine      OBJECTIVE:    Vital Signs Last 24 Hrs  T(C): 36.9 (03 Oct 2020 04:57), Max: 37.3 (02 Oct 2020 09:36)  T(F): 98.5 (03 Oct 2020 04:57), Max: 99.1 (02 Oct 2020 09:36)  HR: 71 (03 Oct 2020 04:57) (71 - 107)  BP: 113/63 (03 Oct 2020 04:57) (100/66 - 117/74)  BP(mean): --  RR: 18 (03 Oct 2020 04:57) (18 - 18)  SpO2: 93% (03 Oct 2020 04:57) (93% - 98%)    I&O's Detail    02 Oct 2020 07:01  -  03 Oct 2020 07:00  --------------------------------------------------------  IN:    Oral Fluid: 500 mL  Total IN: 500 mL    OUT:    Bulb (mL): 45 mL    Bulb (mL): 95 mL    Drain (mL): 105 mL    Drain (mL): 255 mL    Indwelling Catheter - Urethral (mL): 775 mL    Intermittent Catheterization - Urethral (mL): 450 mL  Total OUT: 1725 mL    Total NET: -1225 mL          Daily     Daily     LABS:                        9.6    10.96 )-----------( 173      ( 02 Oct 2020 06:59 )             29.1     10-02    139  |  105  |  25<H>  ----------------------------<  133<H>  4.2   |  23  |  0.88    Ca    8.4      02 Oct 2020 07:00        Exam:  Back soft  Dressing c/d/i  No palpable collections  JPs serosanguinous.       ASSESSMENT AND PLAN:   60 year old female with h/o HTN, hyperlipidemia, ostearthritis, scoliosis/ spinal stenosis, anxiety/depression disorders, GERD, eczema, sinusitis admitted for scheduled stage 1, L1-5 lumbar lateral interbody fusion s/p L1-L5 lateral lumbar interbody fusion and T9-pelvis fusion with PRS closure.     -acquacel change in 5 days (10/8)  -care per primary team  -PRS will continue to follow      Plastic Surgery Resident  [facility]: [phone]

## 2020-10-03 NOTE — PROGRESS NOTE ADULT - SUBJECTIVE AND OBJECTIVE BOX
Vital Signs Last 24 Hrs  T(C): 36.4 (03 Oct 2020 09:00), Max: 37.3 (02 Oct 2020 16:47)  T(F): 97.6 (03 Oct 2020 09:00), Max: 99.1 (02 Oct 2020 16:47)  HR: 90 (03 Oct 2020 10:39) (54 - 107)  BP: 116/76 (03 Oct 2020 10:39) (102/64 - 117/74)  BP(mean): --  RR: 18 (03 Oct 2020 09:00) (18 - 18)  SpO2: 93% (03 Oct 2020 10:39) (93% - 98%)    Neurological: Alert awake oriented x3    Motor exam: Stable         [x] Upper extremity                 D             B          T          WE       WF      HI                                               R         5/5        5/5        5/5       5/5     5/5       5/5                                               L          5/5        5/5        5/5       5/5     5/5       5/5         [x] Lower extremity                Ps          Ha        Quad    EHL        FHL                                               R        4/5      4/5       4/5       5/5         5/5                                               L       4/5       4/5   4/5       5/5          5/5          Sensation: [x] intact to light touch  [] decreased:

## 2020-10-04 LAB
GLUCOSE BLDC GLUCOMTR-MCNC: 121 MG/DL — HIGH (ref 70–99)
GLUCOSE BLDC GLUCOMTR-MCNC: 124 MG/DL — HIGH (ref 70–99)
GLUCOSE BLDC GLUCOMTR-MCNC: 125 MG/DL — HIGH (ref 70–99)
GLUCOSE BLDC GLUCOMTR-MCNC: 129 MG/DL — HIGH (ref 70–99)
HCT VFR BLD CALC: 27.3 % — LOW (ref 34.5–45)
HGB BLD-MCNC: 8.8 G/DL — LOW (ref 11.5–15.5)
MCHC RBC-ENTMCNC: 30.3 PG — SIGNIFICANT CHANGE UP (ref 27–34)
MCHC RBC-ENTMCNC: 32.2 GM/DL — SIGNIFICANT CHANGE UP (ref 32–36)
MCV RBC AUTO: 94.1 FL — SIGNIFICANT CHANGE UP (ref 80–100)
NRBC # BLD: 1 /100 WBCS — HIGH (ref 0–0)
PLATELET # BLD AUTO: 234 K/UL — SIGNIFICANT CHANGE UP (ref 150–400)
RBC # BLD: 2.9 M/UL — LOW (ref 3.8–5.2)
RBC # FLD: 13.7 % — SIGNIFICANT CHANGE UP (ref 10.3–14.5)
WBC # BLD: 10 K/UL — SIGNIFICANT CHANGE UP (ref 3.8–10.5)
WBC # FLD AUTO: 10 K/UL — SIGNIFICANT CHANGE UP (ref 3.8–10.5)

## 2020-10-04 RX ADMIN — OXYCODONE HYDROCHLORIDE 10 MILLIGRAM(S): 5 TABLET ORAL at 12:00

## 2020-10-04 RX ADMIN — OXYCODONE HYDROCHLORIDE 10 MILLIGRAM(S): 5 TABLET ORAL at 11:16

## 2020-10-04 RX ADMIN — MAGNESIUM OXIDE 400 MG ORAL TABLET 400 MILLIGRAM(S): 241.3 TABLET ORAL at 11:17

## 2020-10-04 RX ADMIN — POLYETHYLENE GLYCOL 3350 17 GRAM(S): 17 POWDER, FOR SOLUTION ORAL at 17:29

## 2020-10-04 RX ADMIN — OXYCODONE HYDROCHLORIDE 10 MILLIGRAM(S): 5 TABLET ORAL at 20:14

## 2020-10-04 RX ADMIN — ENOXAPARIN SODIUM 40 MILLIGRAM(S): 100 INJECTION SUBCUTANEOUS at 11:17

## 2020-10-04 RX ADMIN — OXYCODONE HYDROCHLORIDE 10 MILLIGRAM(S): 5 TABLET ORAL at 15:39

## 2020-10-04 RX ADMIN — ATORVASTATIN CALCIUM 10 MILLIGRAM(S): 80 TABLET, FILM COATED ORAL at 21:44

## 2020-10-04 RX ADMIN — Medication 145 MILLIGRAM(S): at 11:16

## 2020-10-04 RX ADMIN — POLYETHYLENE GLYCOL 3350 17 GRAM(S): 17 POWDER, FOR SOLUTION ORAL at 05:37

## 2020-10-04 RX ADMIN — Medication 1000 UNIT(S): at 11:16

## 2020-10-04 RX ADMIN — CYCLOBENZAPRINE HYDROCHLORIDE 10 MILLIGRAM(S): 10 TABLET, FILM COATED ORAL at 05:37

## 2020-10-04 RX ADMIN — PANTOPRAZOLE SODIUM 40 MILLIGRAM(S): 20 TABLET, DELAYED RELEASE ORAL at 05:45

## 2020-10-04 RX ADMIN — OXYCODONE HYDROCHLORIDE 10 MILLIGRAM(S): 5 TABLET ORAL at 16:23

## 2020-10-04 RX ADMIN — DULOXETINE HYDROCHLORIDE 60 MILLIGRAM(S): 30 CAPSULE, DELAYED RELEASE ORAL at 17:29

## 2020-10-04 RX ADMIN — MIDODRINE HYDROCHLORIDE 5 MILLIGRAM(S): 2.5 TABLET ORAL at 21:45

## 2020-10-04 RX ADMIN — SENNA PLUS 2 TABLET(S): 8.6 TABLET ORAL at 21:44

## 2020-10-04 RX ADMIN — OXYCODONE HYDROCHLORIDE 10 MILLIGRAM(S): 5 TABLET ORAL at 21:00

## 2020-10-04 RX ADMIN — OXYCODONE HYDROCHLORIDE 10 MILLIGRAM(S): 5 TABLET ORAL at 06:08

## 2020-10-04 RX ADMIN — MIDODRINE HYDROCHLORIDE 5 MILLIGRAM(S): 2.5 TABLET ORAL at 15:06

## 2020-10-04 RX ADMIN — Medication 1 TABLET(S): at 11:16

## 2020-10-04 RX ADMIN — DULOXETINE HYDROCHLORIDE 60 MILLIGRAM(S): 30 CAPSULE, DELAYED RELEASE ORAL at 05:37

## 2020-10-04 RX ADMIN — OXYCODONE HYDROCHLORIDE 10 MILLIGRAM(S): 5 TABLET ORAL at 05:38

## 2020-10-04 RX ADMIN — MIDODRINE HYDROCHLORIDE 5 MILLIGRAM(S): 2.5 TABLET ORAL at 07:08

## 2020-10-04 NOTE — PROGRESS NOTE ADULT - SUBJECTIVE AND OBJECTIVE BOX
PLASTIC SURGERY DAILY PROGRESS NOTE:     SUBJECTIVE/ROS: Patient feels well. Seen and examined at bedside. Pain well controlled. Dressing changed at bedside yesterday. AVSS overnight.     Vital Signs Last 24 Hrs  T(C): 36.6 (04 Oct 2020 09:16), Max: 37 (04 Oct 2020 01:00)  T(F): 97.8 (04 Oct 2020 09:16), Max: 98.6 (04 Oct 2020 01:00)  HR: 90 (04 Oct 2020 09:16) (82 - 90)  BP: 139/81 (04 Oct 2020 09:16) (109/64 - 139/81)  BP(mean): --  RR: 17 (04 Oct 2020 09:16) (17 - 18)  SpO2: 95% (04 Oct 2020 09:16) (93% - 98%)    OBJECTIVE:  Daily       LABS:                        8.8    10.00 )-----------( 234      ( 04 Oct 2020 06:56 )             27.3                  Exam:  Back soft  Dressing c/d/i  No palpable collections  JPs serosanguinous.       ASSESSMENT AND PLAN:   60 year old female with h/o HTN, hyperlipidemia, ostearthritis, scoliosis/ spinal stenosis, anxiety/depression disorders, GERD, eczema, sinusitis admitted for scheduled stage 1, L1-5 lumbar lateral interbody fusion s/p L1-L5 lateral lumbar interbody fusion and T9-pelvis fusion with PRS closure.     -acquacel change in 5 days (10/8)  -care per primary team  -PRS will continue to follow      Plastic Surgery Resident  [facility]: [phone]

## 2020-10-05 LAB
GLUCOSE BLDC GLUCOMTR-MCNC: 121 MG/DL — HIGH (ref 70–99)
GLUCOSE BLDC GLUCOMTR-MCNC: 136 MG/DL — HIGH (ref 70–99)
GLUCOSE BLDC GLUCOMTR-MCNC: 139 MG/DL — HIGH (ref 70–99)
GLUCOSE BLDC GLUCOMTR-MCNC: 148 MG/DL — HIGH (ref 70–99)

## 2020-10-05 PROCEDURE — 99222 1ST HOSP IP/OBS MODERATE 55: CPT

## 2020-10-05 RX ORDER — LACTULOSE 10 G/15ML
20 SOLUTION ORAL EVERY 4 HOURS
Refills: 0 | Status: DISCONTINUED | OUTPATIENT
Start: 2020-10-05 | End: 2020-10-08

## 2020-10-05 RX ADMIN — OXYCODONE HYDROCHLORIDE 10 MILLIGRAM(S): 5 TABLET ORAL at 23:15

## 2020-10-05 RX ADMIN — Medication 1000 UNIT(S): at 11:26

## 2020-10-05 RX ADMIN — OXYCODONE HYDROCHLORIDE 10 MILLIGRAM(S): 5 TABLET ORAL at 09:57

## 2020-10-05 RX ADMIN — Medication 145 MILLIGRAM(S): at 11:27

## 2020-10-05 RX ADMIN — OXYCODONE HYDROCHLORIDE 10 MILLIGRAM(S): 5 TABLET ORAL at 04:25

## 2020-10-05 RX ADMIN — LACTULOSE 20 GRAM(S): 10 SOLUTION ORAL at 11:28

## 2020-10-05 RX ADMIN — LACTULOSE 20 GRAM(S): 10 SOLUTION ORAL at 13:40

## 2020-10-05 RX ADMIN — PANTOPRAZOLE SODIUM 40 MILLIGRAM(S): 20 TABLET, DELAYED RELEASE ORAL at 05:40

## 2020-10-05 RX ADMIN — MAGNESIUM HYDROXIDE 30 MILLILITER(S): 400 TABLET, CHEWABLE ORAL at 05:40

## 2020-10-05 RX ADMIN — DULOXETINE HYDROCHLORIDE 60 MILLIGRAM(S): 30 CAPSULE, DELAYED RELEASE ORAL at 05:40

## 2020-10-05 RX ADMIN — HYDROMORPHONE HYDROCHLORIDE 0.5 MILLIGRAM(S): 2 INJECTION INTRAMUSCULAR; INTRAVENOUS; SUBCUTANEOUS at 06:56

## 2020-10-05 RX ADMIN — OXYCODONE HYDROCHLORIDE 10 MILLIGRAM(S): 5 TABLET ORAL at 10:30

## 2020-10-05 RX ADMIN — Medication 1 TABLET(S): at 11:26

## 2020-10-05 RX ADMIN — POLYETHYLENE GLYCOL 3350 17 GRAM(S): 17 POWDER, FOR SOLUTION ORAL at 05:41

## 2020-10-05 RX ADMIN — DULOXETINE HYDROCHLORIDE 60 MILLIGRAM(S): 30 CAPSULE, DELAYED RELEASE ORAL at 16:48

## 2020-10-05 RX ADMIN — OXYCODONE HYDROCHLORIDE 10 MILLIGRAM(S): 5 TABLET ORAL at 17:36

## 2020-10-05 RX ADMIN — LACTULOSE 20 GRAM(S): 10 SOLUTION ORAL at 08:56

## 2020-10-05 RX ADMIN — MAGNESIUM OXIDE 400 MG ORAL TABLET 400 MILLIGRAM(S): 241.3 TABLET ORAL at 11:27

## 2020-10-05 RX ADMIN — MIDODRINE HYDROCHLORIDE 5 MILLIGRAM(S): 2.5 TABLET ORAL at 22:44

## 2020-10-05 RX ADMIN — OXYCODONE HYDROCHLORIDE 10 MILLIGRAM(S): 5 TABLET ORAL at 03:42

## 2020-10-05 RX ADMIN — ATORVASTATIN CALCIUM 10 MILLIGRAM(S): 80 TABLET, FILM COATED ORAL at 22:44

## 2020-10-05 RX ADMIN — CYCLOBENZAPRINE HYDROCHLORIDE 10 MILLIGRAM(S): 10 TABLET, FILM COATED ORAL at 05:40

## 2020-10-05 RX ADMIN — OXYCODONE HYDROCHLORIDE 10 MILLIGRAM(S): 5 TABLET ORAL at 22:45

## 2020-10-05 RX ADMIN — MIDODRINE HYDROCHLORIDE 5 MILLIGRAM(S): 2.5 TABLET ORAL at 13:40

## 2020-10-05 RX ADMIN — OXYCODONE HYDROCHLORIDE 10 MILLIGRAM(S): 5 TABLET ORAL at 16:48

## 2020-10-05 RX ADMIN — HYDROMORPHONE HYDROCHLORIDE 0.5 MILLIGRAM(S): 2 INJECTION INTRAMUSCULAR; INTRAVENOUS; SUBCUTANEOUS at 07:18

## 2020-10-05 RX ADMIN — MIDODRINE HYDROCHLORIDE 5 MILLIGRAM(S): 2.5 TABLET ORAL at 05:40

## 2020-10-05 RX ADMIN — ENOXAPARIN SODIUM 40 MILLIGRAM(S): 100 INJECTION SUBCUTANEOUS at 11:27

## 2020-10-05 NOTE — PROGRESS NOTE ADULT - SUBJECTIVE AND OBJECTIVE BOX
Post-op day # 5 s/p stage 2 T9-Pelvis Fusion , lateral L5-S1 with plastic closure  Post-op day # 6 s/p L1-L5 LLIF    OVERNIGHT EVENTS: no acute event, has not had a BM for days now  Vital Signs Last 24 Hrs  T(C): 36.3 (05 Oct 2020 05:38), Max: 37.1 (04 Oct 2020 21:32)  T(F): 97.4 (05 Oct 2020 05:38), Max: 98.8 (04 Oct 2020 21:32)  HR: 92 (05 Oct 2020 05:38) (90 - 95)  BP: 144/76 (05 Oct 2020 05:38) (120/70 - 144/76)  BP(mean): --  RR: 17 (05 Oct 2020 05:38) (16 - 18)  SpO2: 94% (05 Oct 2020 05:38) (92% - 98%)    I&O's Detail    04 Oct 2020 07:01  -  05 Oct 2020 07:00  --------------------------------------------------------  IN:    Oral Fluid: 1160 mL  Total IN: 1160 mL    OUT:    Bulb (mL): 35 mL    Drain (mL): 105 mL    Drain (mL): 24 mL    Voided (mL): 1200 mL  Total OUT: 1364 mL    Total NET: -204 mL        I&O's Summary    04 Oct 2020 07:01  -  05 Oct 2020 07:00  --------------------------------------------------------  IN: 1160 mL / OUT: 1364 mL / NET: -204 mL        PHYSICAL EXAM:  Neurological:    Motor exam:         [x] Upper extremity                 D             B          T          WE       WF      HI                                               R         5/5        5/5        5/5       5/5     5/5       5/5                                               L          5/5        5/5        5/5       5/5     5/5       5/5         [x] Lower extremity                Ps          Ha        Quad    EHL        FHL                                               R        4/5      4/5        4/5       5/5         5/5                                               L        4/5       4/5       4/5       5/5          5/5                      Sensation: [x] intact to light touch  [] decreased:        Gait    Cardiovascular: Regular  Respiratory: Clear bilaterally  Gastrointestinal: Abd soft + BS non tender  Genitourinary:  Extremities: -C/C/E  Incision/Wound: Intact    TUBES/LINES:  [] CVC  [] A-line  [] Lumbar Drain  [] Ventriculostomy  [] Other    DIET:  [] NPO  [] Mechanical  [] Tube feeds    LABS:                        8.8    10.00 )-----------( 234      ( 04 Oct 2020 06:56 )             27.3                   CAPILLARY BLOOD GLUCOSE      POCT Blood Glucose.: 121 mg/dL (05 Oct 2020 08:01)  POCT Blood Glucose.: 129 mg/dL (04 Oct 2020 21:32)  POCT Blood Glucose.: 121 mg/dL (04 Oct 2020 17:26)  POCT Blood Glucose.: 125 mg/dL (04 Oct 2020 12:00)  POCT Blood Glucose.: 124 mg/dL (04 Oct 2020 08:37)          Drug Levels: [] N/A    CSF Analysis: [] N/A      Allergies    penicillin (Other)    Intolerances      MEDICATIONS:  Antibiotics:  vancomycin  IVPB 1500 milliGRAM(s) IV Intermittent once    Neuro:  ALPRAZolam 0.25 milliGRAM(s) Oral three times a day PRN  cyclobenzaprine 10 milliGRAM(s) Oral three times a day PRN  diphenhydrAMINE 25 milliGRAM(s) Oral every 4 hours PRN  DULoxetine 60 milliGRAM(s) Oral two times a day  HYDROmorphone  Injectable 0.5 milliGRAM(s) IV Push every 3 hours PRN  ondansetron Injectable 4 milliGRAM(s) IV Push every 6 hours PRN  oxyCODONE    IR 5 milliGRAM(s) Oral every 4 hours PRN  oxyCODONE    IR 10 milliGRAM(s) Oral every 4 hours PRN  SUMAtriptan 25 milliGRAM(s) Oral four times a day PRN    Anticoagulation:  enoxaparin Injectable 40 milliGRAM(s) SubCutaneous daily    OTHER:  atorvastatin 10 milliGRAM(s) Oral at bedtime  chlorhexidine 4% Liquid 1 Application(s) Topical <User Schedule>  dextrose 40% Gel 15 Gram(s) Oral once PRN  dextrose 50% Injectable 12.5 Gram(s) IV Push once  dextrose 50% Injectable 25 Gram(s) IV Push once  dextrose 50% Injectable 25 Gram(s) IV Push once  fenofibrate Tablet 145 milliGRAM(s) Oral daily  glucagon  Injectable 1 milliGRAM(s) IntraMuscular once PRN  influenza   Vaccine 0.5 milliLiter(s) IntraMuscular once  insulin lispro (HumaLOG) corrective regimen sliding scale   SubCutaneous at bedtime  insulin lispro (HumaLOG) corrective regimen sliding scale   SubCutaneous three times a day before meals  lactulose Syrup 20 Gram(s) Oral every 4 hours  magnesium hydroxide Suspension 30 milliLiter(s) Oral every 12 hours PRN  midodrine 5 milliGRAM(s) Oral every 8 hours  pantoprazole    Tablet 40 milliGRAM(s) Oral before breakfast  polyethylene glycol 3350 17 Gram(s) Oral two times a day  senna 2 Tablet(s) Oral at bedtime    IVF:  cholecalciferol 1000 Unit(s) Oral daily  dextrose 5%. 1000 milliLiter(s) IV Continuous <Continuous>  magnesium oxide 400 milliGRAM(s) Oral daily  multivitamin 1 Tablet(s) Oral daily    CULTURES:    RADIOLOGY & ADDITIONAL TESTS:      ASSESSMENT:     60 year old female with h/o scoliosis/ spinal stenosis- lumbar region, presents to preadmission testing for scheduled stage 1, L1-5 lumbar lateral interbody fusion, scheduled for 9/29/2020. Her medical history includes HTN, hyperlipidemia, osteoarthritis, anxiety/depression disorders, GERD, eczema, sinusitis.    60y Female s/p Post-op day # 5 s/p stage 2 T9-Pelvis Fusion , lateral L5-S1 with plastic closure                          Post-op day # 6 s/p L1-L5 LLIF    Please Check When Present   []  GCS  E   V  M     Heart Failure: []Acute, [] acute on chronic , []chronic  Heart Failure:  [] Diastolic (HFpEF), [] Systolic (HFrEF), []Combined (HFpEF and HFrEF), [] RHF, [] Pulm HTN, [] Other    [] ANDRESSA, [] ATN, [] AIN, [] other  [] CKD1, [] CKD2, [] CKD 3, [] CKD 4, [] CKD 5, []ESRD    Encephalopathy: [] Metabolic, [] Hepatic, [] toxic, [] Neurological, [] Other    Abnormal Nurtitional Status: [] malnurtition (see nutrition note), [ ]underweight: BMI < 19, [] morbid obesity: BMI >40, [] Cachexia    [] Sepsis  [] hypovolemic shock,[] cardiogenic shock, [] hemorrhagic shock, [] neuogenic shock  [] Acute Respiratory Failure  []Cerebral edema, [] Brain compression/ herniation,   [] Functional quadriplegia  [] Acute blood loss anemia      PLAN:  NEURO: Stable, await rehab placement  CARDIOVASCULAR: Hemodynamically stable  PULMONARY: Incentive spirometer  RENAL: IVL, Perkins d/kp will void CK  GI: Tolerating Regular diet, bowel regimen  HEME: H/H stable  ID: Afebrile  ENDO: Borderline DM type 2    DVT PROPHYLAXIS:  [x] Venodynes                                [x] Heparin/Lovenox    FALL RISK:  [x] Low Risk                                    [] Impulsive Post-op day # 5 s/p stage 2 T9-Pelvis Fusion , lateral L5-S1 with plastic closure  Post-op day # 6 s/p L1-L5 LLIF    OVERNIGHT EVENTS: no acute event, has not had a BM for days now  Vital Signs Last 24 Hrs  T(C): 36.3 (05 Oct 2020 05:38), Max: 37.1 (04 Oct 2020 21:32)  T(F): 97.4 (05 Oct 2020 05:38), Max: 98.8 (04 Oct 2020 21:32)  HR: 92 (05 Oct 2020 05:38) (90 - 95)  BP: 144/76 (05 Oct 2020 05:38) (120/70 - 144/76)  BP(mean): --  RR: 17 (05 Oct 2020 05:38) (16 - 18)  SpO2: 94% (05 Oct 2020 05:38) (92% - 98%)    I&O's Detail    04 Oct 2020 07:01  -  05 Oct 2020 07:00  --------------------------------------------------------  IN:    Oral Fluid: 1160 mL  Total IN: 1160 mL    OUT:    Bulb (mL): 35 mL    Drain (mL): 105 mL    Drain (mL): 24 mL    Voided (mL): 1200 mL  Total OUT: 1364 mL    Total NET: -204 mL        I&O's Summary    04 Oct 2020 07:01  -  05 Oct 2020 07:00  --------------------------------------------------------  IN: 1160 mL / OUT: 1364 mL / NET: -204 mL        PHYSICAL EXAM:  Neurological:    Motor exam:         [x] Upper extremity                 D             B          T          WE       WF      HI                                               R         5/5        5/5        5/5       5/5     5/5       5/5                                               L          5/5        5/5        5/5       5/5     5/5       5/5         [x] Lower extremity                Ps          Ha        Quad    EHL        FHL                                               R        4/5      4/5        4/5       5/5         5/5                                               L        4/5       4/5       4/5       5/5          5/5                      Sensation: [x] intact to light touch  [] decreased:        Gait; not tested    Cardiovascular: Regular  Respiratory: Clear bilaterally  Gastrointestinal: Abd soft + BS non tender  Genitourinary:  Extremities: -C/C/E  Incision/Wound: Intact Lt HMV removed by plastic sx    TUBES/LINES:  [] CVC  [] A-line  [] Lumbar Drain  [] Ventriculostomy  [] Other    DIET: Regular diet  [] NPO  [] Mechanical  [] Tube feeds    LABS:                        8.8    10.00 )-----------( 234      ( 04 Oct 2020 06:56 )             27.3       POCT Blood Glucose.: 121 mg/dL (05 Oct 2020 08:01)  POCT Blood Glucose.: 129 mg/dL (04 Oct 2020 21:32)  POCT Blood Glucose.: 121 mg/dL (04 Oct 2020 17:26)  POCT Blood Glucose.: 125 mg/dL (04 Oct 2020 12:00)  POCT Blood Glucose.: 124 mg/dL (04 Oct 2020 08:37)    Allergies    penicillin (Other)    Intolerances      MEDICATIONS:  Antibiotics:  vancomycin  IVPB 1500 milliGRAM(s) IV Intermittent once    Neuro:  ALPRAZolam 0.25 milliGRAM(s) Oral three times a day PRN  cyclobenzaprine 10 milliGRAM(s) Oral three times a day PRN  diphenhydrAMINE 25 milliGRAM(s) Oral every 4 hours PRN  DULoxetine 60 milliGRAM(s) Oral two times a day  HYDROmorphone  Injectable 0.5 milliGRAM(s) IV Push every 3 hours PRN  ondansetron Injectable 4 milliGRAM(s) IV Push every 6 hours PRN  oxyCODONE    IR 5 milliGRAM(s) Oral every 4 hours PRN  oxyCODONE    IR 10 milliGRAM(s) Oral every 4 hours PRN  SUMAtriptan 25 milliGRAM(s) Oral four times a day PRN    Anticoagulation:  enoxaparin Injectable 40 milliGRAM(s) SubCutaneous daily    OTHER:  atorvastatin 10 milliGRAM(s) Oral at bedtime  chlorhexidine 4% Liquid 1 Application(s) Topical <User Schedule>  dextrose 40% Gel 15 Gram(s) Oral once PRN  dextrose 50% Injectable 12.5 Gram(s) IV Push once  dextrose 50% Injectable 25 Gram(s) IV Push once  dextrose 50% Injectable 25 Gram(s) IV Push once  fenofibrate Tablet 145 milliGRAM(s) Oral daily  glucagon  Injectable 1 milliGRAM(s) IntraMuscular once PRN  influenza   Vaccine 0.5 milliLiter(s) IntraMuscular once  insulin lispro (HumaLOG) corrective regimen sliding scale   SubCutaneous at bedtime  insulin lispro (HumaLOG) corrective regimen sliding scale   SubCutaneous three times a day before meals  lactulose Syrup 20 Gram(s) Oral every 4 hours  magnesium hydroxide Suspension 30 milliLiter(s) Oral every 12 hours PRN  midodrine 5 milliGRAM(s) Oral every 8 hours  pantoprazole    Tablet 40 milliGRAM(s) Oral before breakfast  polyethylene glycol 3350 17 Gram(s) Oral two times a day  senna 2 Tablet(s) Oral at bedtime    IVF:  cholecalciferol 1000 Unit(s) Oral daily  dextrose 5%. 1000 milliLiter(s) IV Continuous <Continuous>  magnesium oxide 400 milliGRAM(s) Oral daily  multivitamin 1 Tablet(s) Oral daily    CULTURES:    RADIOLOGY & ADDITIONAL TESTS:      ASSESSMENT:     60 year old female with h/o scoliosis/ spinal stenosis- lumbar region, presents to preadmission testing for scheduled stage 1, L1-5 lumbar lateral interbody fusion, scheduled for 9/29/2020. Her medical history includes HTN, hyperlipidemia, osteoarthritis, anxiety/depression disorders, GERD, eczema, sinusitis.    60y Female s/p Post-op day # 5 s/p stage 2 T9-Pelvis Fusion , lateral L5-S1 with plastic closure                          Post-op day # 6 s/p L1-L5 LLIF    Please Check When Present   []  GCS  E   V  M     Heart Failure: []Acute, [] acute on chronic , []chronic  Heart Failure:  [] Diastolic (HFpEF), [] Systolic (HFrEF), []Combined (HFpEF and HFrEF), [] RHF, [] Pulm HTN, [] Other    [] ANDRESSA, [] ATN, [] AIN, [] other  [] CKD1, [] CKD2, [] CKD 3, [] CKD 4, [] CKD 5, []ESRD    Encephalopathy: [] Metabolic, [] Hepatic, [] toxic, [] Neurological, [] Other    Abnormal Nurtitional Status: [] malnurtition (see nutrition note), [ ]underweight: BMI < 19, [] morbid obesity: BMI >40, [] Cachexia    [] Sepsis  [] hypovolemic shock,[] cardiogenic shock, [] hemorrhagic shock, [] neuogenic shock  [] Acute Respiratory Failure  []Cerebral edema, [] Brain compression/ herniation,   [] Functional quadriplegia  [] Acute blood loss anemia      PLAN:  NEURO: Stable, await rehab placement  CARDIOVASCULAR: Hemodynamically stable  PULMONARY: Incentive spirometer  RENAL: IVL, voiding  GI: Tolerating Regular diet, bowel regimen  HEME: H/H stable  ID: Afebrile  ENDO: Borderline DM type 2    DVT PROPHYLAXIS:  [x] Venodynes                                [x] Heparin/Lovenox    FALL RISK:  [x] Low Risk                                    [] Impulsive

## 2020-10-05 NOTE — PROGRESS NOTE ADULT - SUBJECTIVE AND OBJECTIVE BOX
Pt seen and examined. Doing well. No acute events o/n. Left HV removed.     T(C): 37.1 (10-05-20 @ 08:40), Max: 37.1 (10-04-20 @ 21:32)  T(F): 98.8 (10-05-20 @ 08:40), Max: 98.8 (10-04-20 @ 21:32)  HR: 96 (10-05-20 @ 08:40) (90 - 96)  BP: 132/73 (10-05-20 @ 08:40) (120/70 - 144/76)  RR: 17 (10-05-20 @ 08:40) (16 - 18)  SpO2: 91% (10-05-20 @ 08:40) (91% - 98%)      04 Oct 2020 07:01  -  05 Oct 2020 07:00  --------------------------------------------------------  IN:    Oral Fluid: 1160 mL  Total IN: 1160 mL    OUT:    Bulb (mL): 35 mL    Drain (mL): 105 mL    Drain (mL): 24 mL    Voided (mL): 1200 mL  Total OUT: 1364 mL    Total NET: -204 mL      05 Oct 2020 07:01  -  05 Oct 2020 09:56  --------------------------------------------------------  IN:    Oral Fluid: 177 mL  Total IN: 177 mL    OUT:    Voided (mL): 300 mL  Total OUT: 300 mL    Total NET: -123 mL          Exam:  Back soft  Dressing c/d/i  No palpable collections  JPs serosanguinous.                           8.8    10.00 )-----------( 234      ( 04 Oct 2020 06:56 )             27.3     ASSESSMENT AND PLAN     60 year old female with h/o HTN, hyperlipidemia, ostearthritis, scoliosis/ spinal stenosis, anxiety/depression disorders, GERD, eczema, sinusitis admitted for scheduled stage 1, L1-5 lumbar lateral interbody fusion s/p L1-L5 lateral lumbar interbody fusion and T9-pelvis fusion with PRS closure.     -acquacel change in 5 days from last change (10/8)  -care per primary team  -PRS will continue to follow      Plastic Surgery Resident  [facility]: [phone]

## 2020-10-05 NOTE — CONSULT NOTE ADULT - SUBJECTIVE AND OBJECTIVE BOX
HPI:  60 year old female with h/o scoliosis/ spinal stenosis- lumbar region, presents to preadmission testing for scheduled stage 1, L1-5 lumbar lateral interbody fusion, scheduled for 9/29/2020. Her medical history includes HTN, hyperlipidemia, osteoarthritis, anxiety/depression disorders, GERD, eczema, sinusitis.    Patient was admitted on 9/29, had L1-L5 LLIF, stage 2 T9-Pelvis Fusion , lateral L5-S1 with plastic closure on 9/30. Today, Patient reports decreased appetite, sleeping well, no BM.     REVIEW OF SYSTEMS  Constitutional - No fever, No weight loss, No fatigue  HEENT - No eye pain, No visual disturbances, No difficulty hearing, No tinnitus, No vertigo, No neck pain  Respiratory - No cough, No wheezing, No shortness of breath  Cardiovascular - No chest pain, No palpitations  Gastrointestinal - No abdominal pain, No nausea, No vomiting, No diarrhea, No constipation  Genitourinary - No dysuria, No frequency, No hematuria, No incontinence  Neurological - No headaches, No memory loss, No loss of strength, No numbness, No tremors  Skin - No itching, No rashes, No lesions   Endocrine - No temperature intolerance  Musculoskeletal - No joint pain, No joint swelling, No muscle pain  Psychiatric - No depression, No anxiety    VITALS  T(C): 37.1 (10-05-20 @ 13:00), Max: 37.1 (10-04-20 @ 21:32)  HR: 104 (10-05-20 @ 13:00) (90 - 104)  BP: 113/70 (10-05-20 @ 13:00) (113/70 - 144/76)  RR: 17 (10-05-20 @ 13:00) (16 - 18)  SpO2: 93% (10-05-20 @ 13:00) (91% - 98%)  Wt(kg): --    PAST MEDICAL & SURGICAL HISTORY  Lumbar spinal stenosis    History of sciatica    H/O sinusitis    Eczema    Scoliosis    Meniscus tear    Spinal stenosis in cervical region    Anxiety and depression    Migraine    GERD (gastroesophageal reflux disease)    Seasonal allergies    Hyperlipidemia    HTN (hypertension)    H/O cervical spine surgery    History of tonsillectomy    History of appendectomy        SOCIAL HISTORY  Smoking - Denied  EtOH - Denied   Drugs - Denied    FUNCTIONAL HISTORY  Lives with , he works out of house x 12 hours daily   Independent AMB/ADLs, limited walking PTA due to pain.     CURRENT FUNCTIONAL STATUS  10/5 PT    Bed Mobility  Bed Mobility Training Supine-to-Sit: minimum assist (75% patient effort);  moderate assist (50% patient effort);  verbal cues;  nonverbal cues (demo/gestures);  1 person assist  Bed Mobility Training Limitations: decreased ability to use arms for pushing/pulling;  decreased ability to use legs for bridging/pushing;  decreased strength;  impaired balance    Sit-Stand Transfer Training  Transfer Training Sit-to-Stand Transfer: minimum assist (75% patient effort);  verbal cues;  nonverbal cues (demo/gestures);  2 person assist;  full weight-bearing  Transfer Training Stand-to-Sit Transfer: minimum assist (75% patient effort);  nonverbal cues (demo/gestures);  verbal cues;  2 person assist;  full weight-bearing  Sit-to-Stand Transfer Training Transfer Safety Analysis: decreased balance;  decreased step length;  decreased weight-shifting ability;  decreased strength;  impaired balance    Gait Training  Gait Training: moderate assist (50% patient effort);  verbal cues;  nonverbal cues (demo/gestures);  2 person assist;  full weight-bearing   therapist on ea ;  3 steps toward HOB.   Gait Analysis: decreased uday;  shuffling;  decreased step length;  decreased weight-shifting ability;  decreased strength;  impaired balance;  3 steps toward HOB     Therapeutic Exercise  Therapeutic Exercise Detail: sit<.stand with sade steady Fallon. x3 trials.     Pt performed supine to sit min/modAx1. Pt shivani sitting EOB Fallon/CGA ~5min. Pt performed sit<>stand MinAx2 with person on ea side. Pt amb 3steps toward HOB +Buckling BLE. Pt performed sit<>stand Fallon with sade steady x3 trials. Pt transfered bed to chair via sade steady. Pt performed standing marches x10ea.    10/3 OT  Pt performed supine to sit via log-rolling modAx2. Cues for LE positioning/initiation of rolling. Sitting balance improved (fair - to fair), able to sit w/out assistance w/ B UE support. BP stable throughout. Initial sit to stand min A x2 from elevated bed surface w/ RW. Attempted side steps to chair w/ max Ax2 & RW.       FAMILY HISTORY   FHx: type 2 diabetes mellitus    Family hx of hypertension    Family history of atrial fibrillation        RECENT LABS/IMAGING  CBC Full  -  ( 04 Oct 2020 06:56 )  WBC Count : 10.00 K/uL  RBC Count : 2.90 M/uL  Hemoglobin : 8.8 g/dL  Hematocrit : 27.3 %  Platelet Count - Automated : 234 K/uL  Mean Cell Volume : 94.1 fl  Mean Cell Hemoglobin : 30.3 pg  Mean Cell Hemoglobin Concentration : 32.2 gm/dL  Auto Neutrophil # : x  Auto Lymphocyte # : x  Auto Monocyte # : x  Auto Eosinophil # : x  Auto Basophil # : x  Auto Neutrophil % : x  Auto Lymphocyte % : x  Auto Monocyte % : x  Auto Eosinophil % : x  Auto Basophil % : x    < from: CT Lumbar Spine No Cont (10.01.20 @ 10:24) >    IMPRESSION:    Redemonstration of anterior cervical discectomy and fusion of C4-T1 and C5 corpectomy.    Redemonstration of interbody spacer devices from L1-L2 through L4-L5.    Interval T12-S1 total laminectomies and partial facetectomies.    Interval posterior fusion of T9-S1.    Interval sacroiliac fusion with sacroiliac screws.    New hardware is well-placed. No evidence for new hardware fracture or hardware loosening.    Vertebral body height and facet alignment maintained.    < end of copied text >            ALLERGIES  penicillin (Other)      MEDICATIONS   ALPRAZolam 0.25 milliGRAM(s) Oral three times a day PRN  atorvastatin 10 milliGRAM(s) Oral at bedtime  chlorhexidine 4% Liquid 1 Application(s) Topical <User Schedule>  cholecalciferol 1000 Unit(s) Oral daily  cyclobenzaprine 10 milliGRAM(s) Oral three times a day PRN  dextrose 40% Gel 15 Gram(s) Oral once PRN  dextrose 5%. 1000 milliLiter(s) IV Continuous <Continuous>  dextrose 50% Injectable 12.5 Gram(s) IV Push once  dextrose 50% Injectable 25 Gram(s) IV Push once  dextrose 50% Injectable 25 Gram(s) IV Push once  diphenhydrAMINE 25 milliGRAM(s) Oral every 4 hours PRN  DULoxetine 60 milliGRAM(s) Oral two times a day  enoxaparin Injectable 40 milliGRAM(s) SubCutaneous daily  fenofibrate Tablet 145 milliGRAM(s) Oral daily  glucagon  Injectable 1 milliGRAM(s) IntraMuscular once PRN  HYDROmorphone  Injectable 0.5 milliGRAM(s) IV Push every 3 hours PRN  influenza   Vaccine 0.5 milliLiter(s) IntraMuscular once  insulin lispro (HumaLOG) corrective regimen sliding scale   SubCutaneous three times a day before meals  insulin lispro (HumaLOG) corrective regimen sliding scale   SubCutaneous at bedtime  lactulose Syrup 20 Gram(s) Oral every 4 hours  magnesium hydroxide Suspension 30 milliLiter(s) Oral every 12 hours PRN  magnesium oxide 400 milliGRAM(s) Oral daily  midodrine 5 milliGRAM(s) Oral every 8 hours  multivitamin 1 Tablet(s) Oral daily  ondansetron Injectable 4 milliGRAM(s) IV Push every 6 hours PRN  oxyCODONE    IR 5 milliGRAM(s) Oral every 4 hours PRN  oxyCODONE    IR 10 milliGRAM(s) Oral every 4 hours PRN  pantoprazole    Tablet 40 milliGRAM(s) Oral before breakfast  polyethylene glycol 3350 17 Gram(s) Oral two times a day  senna 2 Tablet(s) Oral at bedtime  SUMAtriptan 25 milliGRAM(s) Oral four times a day PRN  vancomycin  IVPB 1500 milliGRAM(s) IV Intermittent once      ----------------------------------------------------------------------------------------  PHYSICAL EXAM  Constitutional - NAD, Comfortable, in bed   HEENT - NCAT, EOMI  Chest - Breathing comfortably  Cardiovascular - S1S2   Abdomen - Soft   Extremities - No C/C/E, No calf tenderness   Neurologic Exam -                    Cognitive - Awake, Alert, AAO to self, place, date, year, situation     Communication - Fluent, No dysarthria     Cranial Nerves - CN 2-12 intact     Motor - No focal deficits                    LEFT    UE - ShAB 5/5, EF 5/5, EE 5/5, WE 5/5,  5/5                    RIGHT UE - ShAB 5/5, EF 5/5, EE 5/5, WE 5/5,  5/5                    LEFT    LE - HF 4/5, KE 5/5, DF 5/5, PF 5/5                    RIGHT LE - HF 4/5, KE 4/5, DF 5/5, PF 5/5        Sensory - Intact to LT     Reflexes - DTR Intact, No primitive reflexes     Psychiatric - Mood stable, Affect WNL  ----------------------------------------------------------------------------------------  ASSESSMENT/PLAN  60 year old woman h/o HTN, hyperlipidemia, osteoarthritis, anxiety/depression disorders, GERD, eczema, sinusitis.  s/p L1-L5 LLIF, stage 2 T9-Pelvis Fusion , lateral L5-S1 with plastic closure    Pain - oxycodone, IV Dilaudid prn, cyclobenzaprine 10 mg TID prn (please transition to PO pain meds only)  bowel regimen, senna, add suppository if no BM  DVT PPX - SCDs, lovenox   Rehab - Will continue to follow for ongoing rehab needs and recommendations.   continue bedside PT/OT  out of bed to chair daily  min assist with transfers, mod assist x 2 with ambulation   Recommend ACUTE inpatient rehabilitation for the functional deficits consisting of 3 hours of therapy/day & 24 hour RN/daily PMR physician for comorbid medical management. Patient will be able to tolerate 3 hours a day.

## 2020-10-06 LAB
GLUCOSE BLDC GLUCOMTR-MCNC: 127 MG/DL — HIGH (ref 70–99)
GLUCOSE BLDC GLUCOMTR-MCNC: 130 MG/DL — HIGH (ref 70–99)
GLUCOSE BLDC GLUCOMTR-MCNC: 134 MG/DL — HIGH (ref 70–99)
GLUCOSE BLDC GLUCOMTR-MCNC: 138 MG/DL — HIGH (ref 70–99)
SARS-COV-2 RNA SPEC QL NAA+PROBE: SIGNIFICANT CHANGE UP

## 2020-10-06 PROCEDURE — 99232 SBSQ HOSP IP/OBS MODERATE 35: CPT

## 2020-10-06 RX ADMIN — MIDODRINE HYDROCHLORIDE 5 MILLIGRAM(S): 2.5 TABLET ORAL at 21:04

## 2020-10-06 RX ADMIN — HYDROMORPHONE HYDROCHLORIDE 0.5 MILLIGRAM(S): 2 INJECTION INTRAMUSCULAR; INTRAVENOUS; SUBCUTANEOUS at 00:54

## 2020-10-06 RX ADMIN — Medication 1000 UNIT(S): at 11:58

## 2020-10-06 RX ADMIN — DULOXETINE HYDROCHLORIDE 60 MILLIGRAM(S): 30 CAPSULE, DELAYED RELEASE ORAL at 17:42

## 2020-10-06 RX ADMIN — OXYCODONE HYDROCHLORIDE 10 MILLIGRAM(S): 5 TABLET ORAL at 06:01

## 2020-10-06 RX ADMIN — Medication 145 MILLIGRAM(S): at 11:58

## 2020-10-06 RX ADMIN — DULOXETINE HYDROCHLORIDE 60 MILLIGRAM(S): 30 CAPSULE, DELAYED RELEASE ORAL at 06:01

## 2020-10-06 RX ADMIN — ATORVASTATIN CALCIUM 10 MILLIGRAM(S): 80 TABLET, FILM COATED ORAL at 21:04

## 2020-10-06 RX ADMIN — HYDROMORPHONE HYDROCHLORIDE 0.5 MILLIGRAM(S): 2 INJECTION INTRAMUSCULAR; INTRAVENOUS; SUBCUTANEOUS at 00:39

## 2020-10-06 RX ADMIN — HYDROMORPHONE HYDROCHLORIDE 0.5 MILLIGRAM(S): 2 INJECTION INTRAMUSCULAR; INTRAVENOUS; SUBCUTANEOUS at 09:21

## 2020-10-06 RX ADMIN — MIDODRINE HYDROCHLORIDE 5 MILLIGRAM(S): 2.5 TABLET ORAL at 06:01

## 2020-10-06 RX ADMIN — MAGNESIUM OXIDE 400 MG ORAL TABLET 400 MILLIGRAM(S): 241.3 TABLET ORAL at 11:58

## 2020-10-06 RX ADMIN — HYDROMORPHONE HYDROCHLORIDE 0.5 MILLIGRAM(S): 2 INJECTION INTRAMUSCULAR; INTRAVENOUS; SUBCUTANEOUS at 10:00

## 2020-10-06 RX ADMIN — OXYCODONE HYDROCHLORIDE 10 MILLIGRAM(S): 5 TABLET ORAL at 17:42

## 2020-10-06 RX ADMIN — OXYCODONE HYDROCHLORIDE 10 MILLIGRAM(S): 5 TABLET ORAL at 06:31

## 2020-10-06 RX ADMIN — ENOXAPARIN SODIUM 40 MILLIGRAM(S): 100 INJECTION SUBCUTANEOUS at 11:58

## 2020-10-06 RX ADMIN — Medication 1 TABLET(S): at 11:58

## 2020-10-06 RX ADMIN — PANTOPRAZOLE SODIUM 40 MILLIGRAM(S): 20 TABLET, DELAYED RELEASE ORAL at 06:01

## 2020-10-06 RX ADMIN — OXYCODONE HYDROCHLORIDE 10 MILLIGRAM(S): 5 TABLET ORAL at 18:30

## 2020-10-06 NOTE — PROGRESS NOTE ADULT - SUBJECTIVE AND OBJECTIVE BOX
patient continues to have pain, 8/10, sat in chair x 30 minutes yesterday    REVIEW OF SYSTEMS  Constitutional - No fever,  No fatigue  HEENT - No vertigo, No neck pain  Neurological - No headaches, No memory loss, No loss of strength, No numbness, No tremors  Skin - No rashes, No lesions   Musculoskeletal - No joint pain, No joint swelling, No muscle pain  Psychiatric - No depression, No anxiety    FUNCTIONAL PROGRESS  10/5 PT      Bed Mobility  Bed Mobility Training Supine-to-Sit: minimum assist (75% patient effort);  moderate assist (50% patient effort);  verbal cues;  nonverbal cues (demo/gestures);  1 person assist  Bed Mobility Training Limitations: decreased ability to use arms for pushing/pulling;  decreased ability to use legs for bridging/pushing;  decreased strength;  impaired balance    Sit-Stand Transfer Training  Transfer Training Sit-to-Stand Transfer: minimum assist (75% patient effort);  verbal cues;  nonverbal cues (demo/gestures);  2 person assist;  full weight-bearing  Transfer Training Stand-to-Sit Transfer: minimum assist (75% patient effort);  nonverbal cues (demo/gestures);  verbal cues;  2 person assist;  full weight-bearing  Sit-to-Stand Transfer Training Transfer Safety Analysis: decreased balance;  decreased step length;  decreased weight-shifting ability;  decreased strength;  impaired balance    Gait Training  Gait Training: moderate assist (50% patient effort);  verbal cues;  nonverbal cues (demo/gestures);  2 person assist;  full weight-bearing   therapist on ea ;  3 steps toward HOB.   Gait Analysis: decreased uday;  shuffling;  decreased step length;  decreased weight-shifting ability;  decreased strength;  impaired balance;  3 steps toward HOB     Therapeutic Exercise  Therapeutic Exercise Detail: sit<.stand with sade steady Fallon. x3 trials.         VITALS  T(C): 36.4 (10-06-20 @ 09:34), Max: 37.1 (10-05-20 @ 13:00)  HR: 100 (10-06-20 @ 09:34) (99 - 108)  BP: 154/88 (10-06-20 @ 09:34) (113/70 - 154/88)  RR: 18 (10-06-20 @ 09:34) (17 - 18)  SpO2: 95% (10-06-20 @ 09:34) (93% - 98%)  Wt(kg): --    MEDICATIONS   ALPRAZolam 0.25 milliGRAM(s) three times a day PRN  atorvastatin 10 milliGRAM(s) at bedtime  chlorhexidine 4% Liquid 1 Application(s) <User Schedule>  cholecalciferol 1000 Unit(s) daily  cyclobenzaprine 10 milliGRAM(s) three times a day PRN  dextrose 40% Gel 15 Gram(s) once PRN  dextrose 5%. 1000 milliLiter(s) <Continuous>  dextrose 50% Injectable 12.5 Gram(s) once  dextrose 50% Injectable 25 Gram(s) once  dextrose 50% Injectable 25 Gram(s) once  diphenhydrAMINE 25 milliGRAM(s) every 4 hours PRN  DULoxetine 60 milliGRAM(s) two times a day  enoxaparin Injectable 40 milliGRAM(s) daily  fenofibrate Tablet 145 milliGRAM(s) daily  glucagon  Injectable 1 milliGRAM(s) once PRN  HYDROmorphone  Injectable 0.5 milliGRAM(s) every 3 hours PRN  influenza   Vaccine 0.5 milliLiter(s) once  insulin lispro (HumaLOG) corrective regimen sliding scale   three times a day before meals  insulin lispro (HumaLOG) corrective regimen sliding scale   at bedtime  lactulose Syrup 20 Gram(s) every 4 hours  magnesium hydroxide Suspension 30 milliLiter(s) every 12 hours PRN  magnesium oxide 400 milliGRAM(s) daily  midodrine 5 milliGRAM(s) every 8 hours  multivitamin 1 Tablet(s) daily  ondansetron Injectable 4 milliGRAM(s) every 6 hours PRN  oxyCODONE    IR 10 milliGRAM(s) every 4 hours PRN  oxyCODONE    IR 5 milliGRAM(s) every 4 hours PRN  pantoprazole    Tablet 40 milliGRAM(s) before breakfast  polyethylene glycol 3350 17 Gram(s) two times a day  senna 2 Tablet(s) at bedtime  SUMAtriptan 25 milliGRAM(s) four times a day PRN  vancomycin  IVPB 1500 milliGRAM(s) once      RECENT LABS - Reviewed    -----------------------------------------------------------------------------------  PHYSICAL EXAM  Constitutional - NAD, Comfortable, in bed   HEENT - NCAT, EOMI  Chest - Breathing comfortably  Cardiovascular - S1S2   Abdomen - Soft   Extremities - No C/C/E, No calf tenderness   Neurologic Exam -                    Cognitive - Awake, Alert, AAO to self, place, date, year, situation     Communication - Fluent, No dysarthria     Cranial Nerves - CN 2-12 intact     Motor - No focal deficits                    LEFT    UE - ShAB 5/5, EF 5/5, EE 5/5, WE 5/5,  5/5                    RIGHT UE - ShAB 5/5, EF 5/5, EE 5/5, WE 5/5,  5/5                    LEFT    LE - HF 4/5, KE 5/5, DF 5/5, PF 5/5                    RIGHT LE - HF 4/5, KE 4/5, DF 5/5, PF 5/5        Sensory - Intact to LT     Reflexes - DTR Intact, No primitive reflexes     Psychiatric - Mood stable, Affect WNL  ----------------------------------------------------------------------------------------  ASSESSMENT/PLAN  60 year old woman h/o HTN, hyperlipidemia, osteoarthritis, anxiety/depression disorders, GERD, eczema, sinusitis.  s/p L1-L5 LLIF, stage 2 T9-Pelvis Fusion , lateral L5-S1 with plastic closure    Pain - oxycodone, IV Dilaudid prn, cyclobenzaprine 10 mg TID prn (please transition to PO pain meds only)  bowel regimen, senna, +BM this morning  DVT PPX - SCDs, lovenox   Rehab - Will continue to follow for ongoing rehab needs and recommendations.   continue bedside PT/OT  out of bed to chair daily  min assist with transfers, mod assist x 2 with ambulation   Recommend ACUTE inpatient rehabilitation for the functional deficits consisting of 3 hours of therapy/day & 24 hour RN/daily PMR physician for comorbid medical management. Patient will be able to tolerate 3 hours a day.

## 2020-10-06 NOTE — PROGRESS NOTE ADULT - SUBJECTIVE AND OBJECTIVE BOX
Plastic Surgery Progress Note (pg [facility]: [id number], [facility]: [phone])    SUBJECTIVE:  Patient seen and examined at bedside this AM. No acute events overnight. AF/VSS. Pain controlled. LHV removed yesterday    OBJECTIVE:     ** VITAL SIGNS / I&O's **    Vital Signs Last 24 Hrs  T(C): 36.8 (06 Oct 2020 04:32), Max: 37.1 (05 Oct 2020 08:40)  T(F): 98.3 (06 Oct 2020 04:32), Max: 98.8 (05 Oct 2020 08:40)  HR: 101 (06 Oct 2020 04:32) (96 - 108)  BP: 115/79 (06 Oct 2020 04:32) (113/70 - 135/85)  BP(mean): --  RR: 17 (06 Oct 2020 04:32) (17 - 17)  SpO2: 93% (06 Oct 2020 04:32) (91% - 98%)      05 Oct 2020 07:01  -  06 Oct 2020 07:00  --------------------------------------------------------  IN:    Oral Fluid: 1077 mL  Total IN: 1077 mL    OUT:    Bulb (mL): 50 mL    Drain (mL): 55 mL    Drain (mL): 45 mL    Voided (mL): 1425 mL  Total OUT: 1575 mL    Total NET: -498 mL          Exam:  Back soft  Dressing c/d/i  No palpable collections  JPs serosanguinous.       **MEDS**  ALPRAZolam 0.25 milliGRAM(s) Oral three times a day PRN  atorvastatin 10 milliGRAM(s) Oral at bedtime  chlorhexidine 4% Liquid 1 Application(s) Topical <User Schedule>  cholecalciferol 1000 Unit(s) Oral daily  cyclobenzaprine 10 milliGRAM(s) Oral three times a day PRN  dextrose 40% Gel 15 Gram(s) Oral once PRN  dextrose 5%. 1000 milliLiter(s) IV Continuous <Continuous>  dextrose 50% Injectable 12.5 Gram(s) IV Push once  dextrose 50% Injectable 25 Gram(s) IV Push once  dextrose 50% Injectable 25 Gram(s) IV Push once  diphenhydrAMINE 25 milliGRAM(s) Oral every 4 hours PRN  DULoxetine 60 milliGRAM(s) Oral two times a day  enoxaparin Injectable 40 milliGRAM(s) SubCutaneous daily  fenofibrate Tablet 145 milliGRAM(s) Oral daily  glucagon  Injectable 1 milliGRAM(s) IntraMuscular once PRN  HYDROmorphone  Injectable 0.5 milliGRAM(s) IV Push every 3 hours PRN  influenza   Vaccine 0.5 milliLiter(s) IntraMuscular once  insulin lispro (HumaLOG) corrective regimen sliding scale   SubCutaneous three times a day before meals  insulin lispro (HumaLOG) corrective regimen sliding scale   SubCutaneous at bedtime  lactulose Syrup 20 Gram(s) Oral every 4 hours  magnesium hydroxide Suspension 30 milliLiter(s) Oral every 12 hours PRN  magnesium oxide 400 milliGRAM(s) Oral daily  midodrine 5 milliGRAM(s) Oral every 8 hours  multivitamin 1 Tablet(s) Oral daily  ondansetron Injectable 4 milliGRAM(s) IV Push every 6 hours PRN  oxyCODONE    IR 10 milliGRAM(s) Oral every 4 hours PRN  oxyCODONE    IR 5 milliGRAM(s) Oral every 4 hours PRN  pantoprazole    Tablet 40 milliGRAM(s) Oral before breakfast  polyethylene glycol 3350 17 Gram(s) Oral two times a day  senna 2 Tablet(s) Oral at bedtime  SUMAtriptan 25 milliGRAM(s) Oral four times a day PRN  vancomycin  IVPB 1500 milliGRAM(s) IV Intermittent once      ** LABS **              CAPILLARY BLOOD GLUCOSE      POCT Blood Glucose.: 148 mg/dL (05 Oct 2020 22:20)  POCT Blood Glucose.: 139 mg/dL (05 Oct 2020 17:36)  POCT Blood Glucose.: 136 mg/dL (05 Oct 2020 11:52)  POCT Blood Glucose.: 121 mg/dL (05 Oct 2020 08:01)

## 2020-10-06 NOTE — PROGRESS NOTE ADULT - SUBJECTIVE AND OBJECTIVE BOX
Post-op day # 6 s/p stage 2 T9-Pelvis Fusion , lateral L5-S1 with plastic closure  Post-op day # 7 s/p L1-L5 LLIF    OVERNIGHT EVENTS:  Vital Signs Last 24 Hrs  T(C): 36.8 (06 Oct 2020 04:32), Max: 37.1 (05 Oct 2020 08:40)  T(F): 98.3 (06 Oct 2020 04:32), Max: 98.8 (05 Oct 2020 08:40)  HR: 101 (06 Oct 2020 04:32) (96 - 108)  BP: 115/79 (06 Oct 2020 04:32) (113/70 - 135/85)  BP(mean): --  RR: 17 (06 Oct 2020 04:32) (17 - 17)  SpO2: 93% (06 Oct 2020 04:32) (91% - 98%)    I&O's Detail    05 Oct 2020 07:01  -  06 Oct 2020 07:00  --------------------------------------------------------  IN:    Oral Fluid: 1077 mL  Total IN: 1077 mL    OUT:    Bulb (mL): 50 mL    Drain (mL): 55 mL    Drain (mL): 45 mL    Voided (mL): 1425 mL  Total OUT: 1575 mL    Total NET: -498 mL        I&O's Summary    05 Oct 2020 07:01  -  06 Oct 2020 07:00  --------------------------------------------------------  IN: 1077 mL / OUT: 1575 mL / NET: -498 mL        PHYSICAL EXAM:  Neurological:    Motor exam:         [x] Upper extremity                 D             B          T          WE       WF      HI                                               R         5/5        5/5        5/5       5/5     5/5       5/5                                               L          5/5        5/5        5/5       5/5     5/5       5/5         [x] Lower extremity                Ps          Ha        Quad    EHL        FHL                                               R        4/5      4/5        4/5       5/5         5/5                                               L        4/5       4/5       4/5       5/5          5/5                                                 Sensation: [x] intact to light touch  [] decreased:        Gait: not tested    Cardiovascular: Regular  Respiratory: Clear bilaterally  Gastrointestinal: Abd soft + BS non tender  Genitourinary:  Extremities: -C/C/E  Incision/Wound: Intact, one HMV and one MISTY left in place    TUBES/LINES:  [] CVC  [] A-line  [] Lumbar Drain  [] Ventriculostomy  [] Other    DIET: Regular  [] NPO  [] Mechanical  [] Tube feeds    LABS:                  CAPILLARY BLOOD GLUCOSE      POCT Blood Glucose.: 148 mg/dL (05 Oct 2020 22:20)  POCT Blood Glucose.: 139 mg/dL (05 Oct 2020 17:36)  POCT Blood Glucose.: 136 mg/dL (05 Oct 2020 11:52)          Drug Levels: [] N/A    CSF Analysis: [] N/A      Allergies    penicillin (Other)    Intolerances      MEDICATIONS:  Antibiotics:  vancomycin  IVPB 1500 milliGRAM(s) IV Intermittent once    Neuro:  ALPRAZolam 0.25 milliGRAM(s) Oral three times a day PRN  cyclobenzaprine 10 milliGRAM(s) Oral three times a day PRN  diphenhydrAMINE 25 milliGRAM(s) Oral every 4 hours PRN  DULoxetine 60 milliGRAM(s) Oral two times a day  HYDROmorphone  Injectable 0.5 milliGRAM(s) IV Push every 3 hours PRN  ondansetron Injectable 4 milliGRAM(s) IV Push every 6 hours PRN  oxyCODONE    IR 10 milliGRAM(s) Oral every 4 hours PRN  oxyCODONE    IR 5 milliGRAM(s) Oral every 4 hours PRN  SUMAtriptan 25 milliGRAM(s) Oral four times a day PRN    Anticoagulation:  enoxaparin Injectable 40 milliGRAM(s) SubCutaneous daily    OTHER:  atorvastatin 10 milliGRAM(s) Oral at bedtime  chlorhexidine 4% Liquid 1 Application(s) Topical <User Schedule>  dextrose 40% Gel 15 Gram(s) Oral once PRN  dextrose 50% Injectable 12.5 Gram(s) IV Push once  dextrose 50% Injectable 25 Gram(s) IV Push once  dextrose 50% Injectable 25 Gram(s) IV Push once  fenofibrate Tablet 145 milliGRAM(s) Oral daily  glucagon  Injectable 1 milliGRAM(s) IntraMuscular once PRN  influenza   Vaccine 0.5 milliLiter(s) IntraMuscular once  insulin lispro (HumaLOG) corrective regimen sliding scale   SubCutaneous three times a day before meals  insulin lispro (HumaLOG) corrective regimen sliding scale   SubCutaneous at bedtime  lactulose Syrup 20 Gram(s) Oral every 4 hours  magnesium hydroxide Suspension 30 milliLiter(s) Oral every 12 hours PRN  midodrine 5 milliGRAM(s) Oral every 8 hours  pantoprazole    Tablet 40 milliGRAM(s) Oral before breakfast  polyethylene glycol 3350 17 Gram(s) Oral two times a day  senna 2 Tablet(s) Oral at bedtime    IVF:  cholecalciferol 1000 Unit(s) Oral daily  dextrose 5%. 1000 milliLiter(s) IV Continuous <Continuous>  magnesium oxide 400 milliGRAM(s) Oral daily  multivitamin 1 Tablet(s) Oral daily    CULTURES:    RADIOLOGY & ADDITIONAL TESTS:      ASSESSMENT:     60 year old female with h/o scoliosis/ spinal stenosis- lumbar region, presents to preadmission testing for scheduled stage 1, L1-5 lumbar lateral interbody fusion, scheduled for 9/29/2020. Her medical history includes HTN, hyperlipidemia, osteoarthritis, anxiety/depression disorders, GERD, eczema, sinusitis.    60y Female Post-op day # 6 s/p stage 2 T9-Pelvis Fusion , lateral L5-S1 with plastic closure                    Post-op day # 7 s/p L1-L5 LLIF  Assessment:  Please Check When Present   []  GCS  E   V  M     Heart Failure: []Acute, [] acute on chronic , []chronic  Heart Failure:  [] Diastolic (HFpEF), [] Systolic (HFrEF), []Combined (HFpEF and HFrEF), [] RHF, [] Pulm HTN, [] Other    [] ANDRESSA, [] ATN, [] AIN, [] other  [] CKD1, [] CKD2, [] CKD 3, [] CKD 4, [] CKD 5, []ESRD    Encephalopathy: [] Metabolic, [] Hepatic, [] toxic, [] Neurological, [] Other    Abnormal Nurtitional Status: [] malnurtition (see nutrition note), [ ]underweight: BMI < 19, [] morbid obesity: BMI >40, [] Cachexia    [] Sepsis  [] hypovolemic shock,[] cardiogenic shock, [] hemorrhagic shock, [] neuogenic shock  [] Acute Respiratory Failure  []Cerebral edema, [] Brain compression/ herniation,   [] Functional quadriplegia  [] Acute blood loss anemia    PLAN:  NEURO: Stable, await rehab placement  CARDIOVASCULAR: Hemodynamically stable  PULMONARY: Incentive spirometer  RENAL: IVL, voiding  GI: Tolerating Regular diet, bowel regimen  HEME: H/H stable  ID: Afebrile  ENDO: Borderline DM type 2    DVT PROPHYLAXIS:  [x] Venodynes                                [x] Heparin/Lovenox    FALL RISK:  [x] Low Risk                                    [] Impulsive     Post-op day # 6 s/p stage 2 T9-Pelvis Fusion , lateral L5-S1 with plastic closure  Post-op day # 7 s/p L1-L5 LLIF    OVERNIGHT EVENTS: No acute event    Vital Signs Last 24 Hrs  T(C): 36.8 (06 Oct 2020 04:32), Max: 37.1 (05 Oct 2020 08:40)  T(F): 98.3 (06 Oct 2020 04:32), Max: 98.8 (05 Oct 2020 08:40)  HR: 101 (06 Oct 2020 04:32) (96 - 108)  BP: 115/79 (06 Oct 2020 04:32) (113/70 - 135/85)  BP(mean): --  RR: 17 (06 Oct 2020 04:32) (17 - 17)  SpO2: 93% (06 Oct 2020 04:32) (91% - 98%)    I&O's Detail    05 Oct 2020 07:01  -  06 Oct 2020 07:00  --------------------------------------------------------  IN:    Oral Fluid: 1077 mL  Total IN: 1077 mL    OUT:    Bulb (mL): 50 mL    Drain (mL): 55 mL    Drain (mL): 45 mL    Voided (mL): 1425 mL  Total OUT: 1575 mL    Total NET: -498 mL        I&O's Summary    05 Oct 2020 07:01  -  06 Oct 2020 07:00  --------------------------------------------------------  IN: 1077 mL / OUT: 1575 mL / NET: -498 mL        PHYSICAL EXAM:  Neurological:    Motor exam:         [x] Upper extremity                 D             B          T          WE       WF      HI                                               R         5/5        5/5        5/5       5/5     5/5       5/5                                               L          5/5        5/5        5/5       5/5     5/5       5/5         [x] Lower extremity                Ps          Ha        Quad    EHL        FHL                                               R        4/5      4/5        4/5       5/5         5/5                                               L        4/5       4/5       4/5       5/5          5/5                                                 Sensation: [x] intact to light touch  [] decreased:        Gait: not tested    Cardiovascular: Regular  Respiratory: Clear bilaterally  Gastrointestinal: Abd soft + BS non tender  Genitourinary:  Extremities: -C/C/E  Incision/Wound: Intact, one HMV and one MISTY left in place    TUBES/LINES:  [] CVC  [] A-line  [] Lumbar Drain  [] Ventriculostomy  [] Other    DIET: Regular  [] NPO  [] Mechanical  [] Tube feeds    LABS:      POCT Blood Glucose.: 148 mg/dL (05 Oct 2020 22:20)  POCT Blood Glucose.: 139 mg/dL (05 Oct 2020 17:36)  POCT Blood Glucose.: 136 mg/dL (05 Oct 2020 11:52)          Drug Levels: [] N/A    CSF Analysis: [] N/A      Allergies    penicillin (Other)    Intolerances      MEDICATIONS:  Antibiotics:  vancomycin  IVPB 1500 milliGRAM(s) IV Intermittent once    Neuro:  ALPRAZolam 0.25 milliGRAM(s) Oral three times a day PRN  cyclobenzaprine 10 milliGRAM(s) Oral three times a day PRN  diphenhydrAMINE 25 milliGRAM(s) Oral every 4 hours PRN  DULoxetine 60 milliGRAM(s) Oral two times a day  HYDROmorphone  Injectable 0.5 milliGRAM(s) IV Push every 3 hours PRN  ondansetron Injectable 4 milliGRAM(s) IV Push every 6 hours PRN  oxyCODONE    IR 10 milliGRAM(s) Oral every 4 hours PRN  oxyCODONE    IR 5 milliGRAM(s) Oral every 4 hours PRN  SUMAtriptan 25 milliGRAM(s) Oral four times a day PRN    Anticoagulation:  enoxaparin Injectable 40 milliGRAM(s) SubCutaneous daily    OTHER:  atorvastatin 10 milliGRAM(s) Oral at bedtime  chlorhexidine 4% Liquid 1 Application(s) Topical <User Schedule>  dextrose 40% Gel 15 Gram(s) Oral once PRN  dextrose 50% Injectable 12.5 Gram(s) IV Push once  dextrose 50% Injectable 25 Gram(s) IV Push once  dextrose 50% Injectable 25 Gram(s) IV Push once  fenofibrate Tablet 145 milliGRAM(s) Oral daily  glucagon  Injectable 1 milliGRAM(s) IntraMuscular once PRN  influenza   Vaccine 0.5 milliLiter(s) IntraMuscular once  insulin lispro (HumaLOG) corrective regimen sliding scale   SubCutaneous three times a day before meals  insulin lispro (HumaLOG) corrective regimen sliding scale   SubCutaneous at bedtime  lactulose Syrup 20 Gram(s) Oral every 4 hours  magnesium hydroxide Suspension 30 milliLiter(s) Oral every 12 hours PRN  midodrine 5 milliGRAM(s) Oral every 8 hours  pantoprazole    Tablet 40 milliGRAM(s) Oral before breakfast  polyethylene glycol 3350 17 Gram(s) Oral two times a day  senna 2 Tablet(s) Oral at bedtime    IVF:  cholecalciferol 1000 Unit(s) Oral daily  dextrose 5%. 1000 milliLiter(s) IV Continuous <Continuous>  magnesium oxide 400 milliGRAM(s) Oral daily  multivitamin 1 Tablet(s) Oral daily    CULTURES:    RADIOLOGY & ADDITIONAL TESTS:      ASSESSMENT:     60 year old female with h/o scoliosis/ spinal stenosis- lumbar region, presents to preadmission testing for scheduled stage 1, L1-5 lumbar lateral interbody fusion, scheduled for 9/29/2020. Her medical history includes HTN, hyperlipidemia, osteoarthritis, anxiety/depression disorders, GERD, eczema, sinusitis.    60y Female Post-op day # 6 s/p stage 2 T9-Pelvis Fusion , lateral L5-S1 with plastic closure                    Post-op day # 7 s/p L1-L5 LLIF  Assessment:  Please Check When Present   []  GCS  E   V  M     Heart Failure: []Acute, [] acute on chronic , []chronic  Heart Failure:  [] Diastolic (HFpEF), [] Systolic (HFrEF), []Combined (HFpEF and HFrEF), [] RHF, [] Pulm HTN, [] Other    [] ANDRESSA, [] ATN, [] AIN, [] other  [] CKD1, [] CKD2, [] CKD 3, [] CKD 4, [] CKD 5, []ESRD    Encephalopathy: [] Metabolic, [] Hepatic, [] toxic, [] Neurological, [] Other    Abnormal Nurtitional Status: [] malnurtition (see nutrition note), [ ]underweight: BMI < 19, [] morbid obesity: BMI >40, [] Cachexia    [] Sepsis  [] hypovolemic shock,[] cardiogenic shock, [] hemorrhagic shock, [] neuogenic shock  [] Acute Respiratory Failure  []Cerebral edema, [] Brain compression/ herniation,   [] Functional quadriplegia  [] Acute blood loss anemia    PLAN:  NEURO: Stable, await rehab placement  CARDIOVASCULAR: Hemodynamically stable  PULMONARY: Incentive spirometer  RENAL: IVL, voiding  GI: Tolerating Regular diet, bowel regimen  HEME: H/H stable  ID: Afebrile  ENDO: Borderline DM type 2    DVT PROPHYLAXIS:  [x] Venodynes                                [x] Heparin/Lovenox    FALL RISK:  [x] Low Risk                                    [] Impulsive

## 2020-10-06 NOTE — PROGRESS NOTE ADULT - ASSESSMENT
ASSESSMENT AND PLAN     60 year old female with h/o HTN, hyperlipidemia, ostearthritis, scoliosis/ spinal stenosis, anxiety/depression disorders, GERD, eczema, sinusitis admitted for scheduled stage 1, L1-5 lumbar lateral interbody fusion s/p L1-L5 lateral lumbar interbody fusion and T9-pelvis fusion with PRS closure.     -acquacel change in 5 days from last change (10/8)  -care per primary team  -PRS will continue to follow      Plastic Surgery Resident  [facility]: [phone]

## 2020-10-07 ENCOUNTER — TRANSCRIPTION ENCOUNTER (OUTPATIENT)
Age: 60
End: 2020-10-07

## 2020-10-07 LAB
ANION GAP SERPL CALC-SCNC: 11 MMOL/L — SIGNIFICANT CHANGE UP (ref 5–17)
BUN SERPL-MCNC: 29 MG/DL — HIGH (ref 7–23)
CALCIUM SERPL-MCNC: 9 MG/DL — SIGNIFICANT CHANGE UP (ref 8.4–10.5)
CHLORIDE SERPL-SCNC: 96 MMOL/L — SIGNIFICANT CHANGE UP (ref 96–108)
CO2 SERPL-SCNC: 25 MMOL/L — SIGNIFICANT CHANGE UP (ref 22–31)
CREAT SERPL-MCNC: 0.69 MG/DL — SIGNIFICANT CHANGE UP (ref 0.5–1.3)
GLUCOSE BLDC GLUCOMTR-MCNC: 113 MG/DL — HIGH (ref 70–99)
GLUCOSE BLDC GLUCOMTR-MCNC: 118 MG/DL — HIGH (ref 70–99)
GLUCOSE BLDC GLUCOMTR-MCNC: 120 MG/DL — HIGH (ref 70–99)
GLUCOSE BLDC GLUCOMTR-MCNC: 121 MG/DL — HIGH (ref 70–99)
GLUCOSE SERPL-MCNC: 126 MG/DL — HIGH (ref 70–99)
HCT VFR BLD CALC: 29.8 % — LOW (ref 34.5–45)
HGB BLD-MCNC: 9.9 G/DL — LOW (ref 11.5–15.5)
MCHC RBC-ENTMCNC: 30.5 PG — SIGNIFICANT CHANGE UP (ref 27–34)
MCHC RBC-ENTMCNC: 33.2 GM/DL — SIGNIFICANT CHANGE UP (ref 32–36)
MCV RBC AUTO: 91.7 FL — SIGNIFICANT CHANGE UP (ref 80–100)
NRBC # BLD: 2 /100 WBCS — HIGH (ref 0–0)
PLATELET # BLD AUTO: 473 K/UL — HIGH (ref 150–400)
POTASSIUM SERPL-MCNC: 3.8 MMOL/L — SIGNIFICANT CHANGE UP (ref 3.5–5.3)
POTASSIUM SERPL-SCNC: 3.8 MMOL/L — SIGNIFICANT CHANGE UP (ref 3.5–5.3)
RBC # BLD: 3.25 M/UL — LOW (ref 3.8–5.2)
RBC # FLD: 13.8 % — SIGNIFICANT CHANGE UP (ref 10.3–14.5)
SODIUM SERPL-SCNC: 132 MMOL/L — LOW (ref 135–145)
WBC # BLD: 14.15 K/UL — HIGH (ref 3.8–10.5)
WBC # FLD AUTO: 14.15 K/UL — HIGH (ref 3.8–10.5)

## 2020-10-07 RX ORDER — ACETAMINOPHEN 500 MG
1000 TABLET ORAL ONCE
Refills: 0 | Status: COMPLETED | OUTPATIENT
Start: 2020-10-07 | End: 2020-10-07

## 2020-10-07 RX ADMIN — Medication 145 MILLIGRAM(S): at 11:38

## 2020-10-07 RX ADMIN — ENOXAPARIN SODIUM 40 MILLIGRAM(S): 100 INJECTION SUBCUTANEOUS at 11:39

## 2020-10-07 RX ADMIN — OXYCODONE HYDROCHLORIDE 10 MILLIGRAM(S): 5 TABLET ORAL at 09:09

## 2020-10-07 RX ADMIN — SENNA PLUS 2 TABLET(S): 8.6 TABLET ORAL at 22:10

## 2020-10-07 RX ADMIN — Medication 1000 UNIT(S): at 11:38

## 2020-10-07 RX ADMIN — Medication 30 MILLILITER(S): at 11:38

## 2020-10-07 RX ADMIN — OXYCODONE HYDROCHLORIDE 10 MILLIGRAM(S): 5 TABLET ORAL at 08:54

## 2020-10-07 RX ADMIN — LACTULOSE 20 GRAM(S): 10 SOLUTION ORAL at 22:10

## 2020-10-07 RX ADMIN — MIDODRINE HYDROCHLORIDE 5 MILLIGRAM(S): 2.5 TABLET ORAL at 05:11

## 2020-10-07 RX ADMIN — DULOXETINE HYDROCHLORIDE 60 MILLIGRAM(S): 30 CAPSULE, DELAYED RELEASE ORAL at 05:12

## 2020-10-07 RX ADMIN — POLYETHYLENE GLYCOL 3350 17 GRAM(S): 17 POWDER, FOR SOLUTION ORAL at 18:15

## 2020-10-07 RX ADMIN — Medication 1000 MILLIGRAM(S): at 11:06

## 2020-10-07 RX ADMIN — Medication 1 TABLET(S): at 11:38

## 2020-10-07 RX ADMIN — ATORVASTATIN CALCIUM 10 MILLIGRAM(S): 80 TABLET, FILM COATED ORAL at 22:10

## 2020-10-07 RX ADMIN — PANTOPRAZOLE SODIUM 40 MILLIGRAM(S): 20 TABLET, DELAYED RELEASE ORAL at 05:11

## 2020-10-07 RX ADMIN — MIDODRINE HYDROCHLORIDE 5 MILLIGRAM(S): 2.5 TABLET ORAL at 22:10

## 2020-10-07 RX ADMIN — MAGNESIUM OXIDE 400 MG ORAL TABLET 400 MILLIGRAM(S): 241.3 TABLET ORAL at 11:38

## 2020-10-07 RX ADMIN — DULOXETINE HYDROCHLORIDE 60 MILLIGRAM(S): 30 CAPSULE, DELAYED RELEASE ORAL at 18:15

## 2020-10-07 RX ADMIN — Medication 400 MILLIGRAM(S): at 10:51

## 2020-10-07 NOTE — PROVIDER CONTACT NOTE (OTHER) - BACKGROUND
pt s/p stage 1 9/29 (L1-5 lat lum interbody fusion) & Stage 2 9/30 (T9-pelvis arthrodesis l5-s1 TLIF)

## 2020-10-07 NOTE — PROGRESS NOTE ADULT - SUBJECTIVE AND OBJECTIVE BOX
Plastic Surgery Progress Note (pg [facility]: [id number], [facility]: [phone])    SUBJECTIVE:  Patient seen and examined at bedside this AM. No acute events overnight. AF/VSS. Pain well controlled. Will plan for dressing change tomorrow    OBJECTIVE:     ** VITAL SIGNS / I&O's **    Vital Signs Last 24 Hrs  T(C): 36.4 (07 Oct 2020 05:09), Max: 36.7 (06 Oct 2020 17:02)  T(F): 97.6 (07 Oct 2020 05:09), Max: 98.1 (06 Oct 2020 17:02)  HR: 92 (07 Oct 2020 05:09) (90 - 100)  BP: 139/70 (07 Oct 2020 05:09) (113/78 - 154/88)  BP(mean): --  RR: 18 (07 Oct 2020 05:09) (17 - 18)  SpO2: 94% (07 Oct 2020 05:09) (94% - 98%)      06 Oct 2020 07:01  -  07 Oct 2020 07:00  --------------------------------------------------------  IN:    Oral Fluid: 860 mL  Total IN: 860 mL    OUT:    Bulb (mL): 25 mL    Drain (mL): 60 mL    Voided (mL): 750 mL  Total OUT: 835 mL    Total NET: 25 mL          Exam:  Back soft  Dressing c/d/i  No palpable collections  JPs serosanguinous.     **MEDS**  atorvastatin 10 milliGRAM(s) Oral at bedtime  cholecalciferol 1000 Unit(s) Oral daily  cyclobenzaprine 10 milliGRAM(s) Oral three times a day PRN  dextrose 40% Gel 15 Gram(s) Oral once PRN  dextrose 5%. 1000 milliLiter(s) IV Continuous <Continuous>  dextrose 50% Injectable 12.5 Gram(s) IV Push once  dextrose 50% Injectable 25 Gram(s) IV Push once  dextrose 50% Injectable 25 Gram(s) IV Push once  diphenhydrAMINE 25 milliGRAM(s) Oral every 4 hours PRN  DULoxetine 60 milliGRAM(s) Oral two times a day  enoxaparin Injectable 40 milliGRAM(s) SubCutaneous daily  fenofibrate Tablet 145 milliGRAM(s) Oral daily  glucagon  Injectable 1 milliGRAM(s) IntraMuscular once PRN  influenza   Vaccine 0.5 milliLiter(s) IntraMuscular once  insulin lispro (HumaLOG) corrective regimen sliding scale   SubCutaneous three times a day before meals  insulin lispro (HumaLOG) corrective regimen sliding scale   SubCutaneous at bedtime  lactulose Syrup 20 Gram(s) Oral every 4 hours  magnesium hydroxide Suspension 30 milliLiter(s) Oral every 12 hours PRN  magnesium oxide 400 milliGRAM(s) Oral daily  midodrine 5 milliGRAM(s) Oral every 8 hours  multivitamin 1 Tablet(s) Oral daily  oxyCODONE    IR 10 milliGRAM(s) Oral every 4 hours PRN  oxyCODONE    IR 5 milliGRAM(s) Oral every 4 hours PRN  pantoprazole    Tablet 40 milliGRAM(s) Oral before breakfast  polyethylene glycol 3350 17 Gram(s) Oral two times a day  senna 2 Tablet(s) Oral at bedtime  SUMAtriptan 25 milliGRAM(s) Oral four times a day PRN      ** LABS **              CAPILLARY BLOOD GLUCOSE      POCT Blood Glucose.: 130 mg/dL (06 Oct 2020 21:33)  POCT Blood Glucose.: 134 mg/dL (06 Oct 2020 17:36)  POCT Blood Glucose.: 138 mg/dL (06 Oct 2020 12:26)  POCT Blood Glucose.: 127 mg/dL (06 Oct 2020 08:35)       Plastic Surgery Progress Note (pg [facility]: [id number], [facility]: [phone])    SUBJECTIVE:  Patient seen and examined at bedside this AM. No acute events overnight. AF/VSS. Pain well controlled. Will plan for dressing change tomorrow. HR better controlled     OBJECTIVE:     ** VITAL SIGNS / I&O's **    Vital Signs Last 24 Hrs  T(C): 36.4 (07 Oct 2020 05:09), Max: 36.7 (06 Oct 2020 17:02)  T(F): 97.6 (07 Oct 2020 05:09), Max: 98.1 (06 Oct 2020 17:02)  HR: 92 (07 Oct 2020 05:09) (90 - 100)  BP: 139/70 (07 Oct 2020 05:09) (113/78 - 154/88)  BP(mean): --  RR: 18 (07 Oct 2020 05:09) (17 - 18)  SpO2: 94% (07 Oct 2020 05:09) (94% - 98%)      06 Oct 2020 07:01  -  07 Oct 2020 07:00  --------------------------------------------------------  IN:    Oral Fluid: 860 mL  Total IN: 860 mL    OUT:    Bulb (mL): 25 mL    Drain (mL): 60 mL    Voided (mL): 750 mL  Total OUT: 835 mL    Total NET: 25 mL          Exam:  Back soft  Dressing c/d/i  No palpable collections  JPs serosanguinous.     **MEDS**  atorvastatin 10 milliGRAM(s) Oral at bedtime  cholecalciferol 1000 Unit(s) Oral daily  cyclobenzaprine 10 milliGRAM(s) Oral three times a day PRN  dextrose 40% Gel 15 Gram(s) Oral once PRN  dextrose 5%. 1000 milliLiter(s) IV Continuous <Continuous>  dextrose 50% Injectable 12.5 Gram(s) IV Push once  dextrose 50% Injectable 25 Gram(s) IV Push once  dextrose 50% Injectable 25 Gram(s) IV Push once  diphenhydrAMINE 25 milliGRAM(s) Oral every 4 hours PRN  DULoxetine 60 milliGRAM(s) Oral two times a day  enoxaparin Injectable 40 milliGRAM(s) SubCutaneous daily  fenofibrate Tablet 145 milliGRAM(s) Oral daily  glucagon  Injectable 1 milliGRAM(s) IntraMuscular once PRN  influenza   Vaccine 0.5 milliLiter(s) IntraMuscular once  insulin lispro (HumaLOG) corrective regimen sliding scale   SubCutaneous three times a day before meals  insulin lispro (HumaLOG) corrective regimen sliding scale   SubCutaneous at bedtime  lactulose Syrup 20 Gram(s) Oral every 4 hours  magnesium hydroxide Suspension 30 milliLiter(s) Oral every 12 hours PRN  magnesium oxide 400 milliGRAM(s) Oral daily  midodrine 5 milliGRAM(s) Oral every 8 hours  multivitamin 1 Tablet(s) Oral daily  oxyCODONE    IR 10 milliGRAM(s) Oral every 4 hours PRN  oxyCODONE    IR 5 milliGRAM(s) Oral every 4 hours PRN  pantoprazole    Tablet 40 milliGRAM(s) Oral before breakfast  polyethylene glycol 3350 17 Gram(s) Oral two times a day  senna 2 Tablet(s) Oral at bedtime  SUMAtriptan 25 milliGRAM(s) Oral four times a day PRN      ** LABS **              CAPILLARY BLOOD GLUCOSE      POCT Blood Glucose.: 130 mg/dL (06 Oct 2020 21:33)  POCT Blood Glucose.: 134 mg/dL (06 Oct 2020 17:36)  POCT Blood Glucose.: 138 mg/dL (06 Oct 2020 12:26)  POCT Blood Glucose.: 127 mg/dL (06 Oct 2020 08:35)

## 2020-10-07 NOTE — DISCHARGE NOTE PROVIDER - NSDCACTIVITY_GEN_ALL_CORE
Stairs allowed/Walking - Outdoors allowed/Walking - Indoors allowed/No heavy lifting/straining/Bathing allowed/Do not drive or operate machinery/Showering allowed

## 2020-10-07 NOTE — DISCHARGE NOTE PROVIDER - NSDCCPCAREPLAN_GEN_ALL_CORE_FT
PRINCIPAL DISCHARGE DIAGNOSIS  Diagnosis: Lumbar spinal stenosis  Assessment and Plan of Treatment: 9/29 s/p Stage 1 L1-L5 Lateral lumbar interbody fusion, 9/30/20 s/p Stage 2 T9-pelvis fusion, L5-S1 with plastics closure      SECONDARY DISCHARGE DIAGNOSES  Diagnosis: Osteoarthritis  Assessment and Plan of Treatment: continue celebrex    Diagnosis: Acute hypotension  Assessment and Plan of Treatment: Continue on midodrine- wean to off as indicated. Home meds held ( amlodipine 5 mg, bystolic 10 mg, Edarbi )    Diagnosis: Acute blood loss anemia  Assessment and Plan of Treatment: Stable. s/p 2 units PRBC

## 2020-10-07 NOTE — PROVIDER CONTACT NOTE (OTHER) - ASSESSMENT
pt working w/ PT & OT. c/o pain BP elevated last pain medication given two hours prior. pt is not due for anything at this time. no other adverse s/s. no c/o chest pain.

## 2020-10-07 NOTE — DISCHARGE NOTE PROVIDER - NSDCFUADDAPPT_GEN_ALL_CORE_FT
Please schedule an appointment with 1) Neurosurgeon Dr Valdez 2) Plastic Surgeon Dr Alegre 3) Your Primary Care Physician after rehab Please schedule an appointment after Rehab with 1) Neurosurgeon Dr Valdez 2) Plastic Surgeon Dr Alegre 3) Your Primary Care Physician after rehab    Remove staples lt hip POD 14 if wound looks well  Remove sutures midline spine POD 14 if wound looks well

## 2020-10-07 NOTE — PROGRESS NOTE ADULT - ASSESSMENT
ASSESSMENT AND PLAN     60 year old female with h/o HTN, hyperlipidemia, ostearthritis, scoliosis/ spinal stenosis, anxiety/depression disorders, GERD, eczema, sinusitis admitted for scheduled stage 1, L1-5 lumbar lateral interbody fusion s/p L1-L5 lateral lumbar interbody fusion and T9-pelvis fusion with PRS closure.     -acquacel change in 5 days from last change (10/8)  -care per primary team  -PRS will continue to follow      Plastic Surgery Resident  [facility]: [phone]

## 2020-10-07 NOTE — DISCHARGE NOTE PROVIDER - NSDCMRMEDTOKEN_GEN_ALL_CORE_FT
amLODIPine 5 mg oral tablet: 1 tab(s) orally once a day  aspirin 81 mg oral tablet: 1 tab(s) orally once a day x prevention  atorvastatin 10 mg oral tablet: 1 tab(s) orally once a day, evening   Benadryl 25 mg oral capsule: 1 cap(s) orally in the morning x allergies  Bystolic 10 mg oral tablet: 1 tab(s) orally once a day, in the morning   CeleBREX 200 mg oral capsule: 1 cap(s) orally once a day  x body pain   Centrum oral tablet: 1 tab(s) orally once a day  CoQ10 300 mg oral capsule: 1 cap(s) orally once a day  cyclobenzaprine 10 mg oral tablet: 1 tab(s) orally 3 times a day, PRN works for muscle spasm secondary to and back pain   Cymbalta 60 mg oral delayed release capsule: 2 cap(s) orally once a day x anxiety and body pain  Edarbi 40 mg oral tablet: 1 tab(s) orally once a day, evening x HTN  fenofibrate 160 mg oral tablet: 1 tab(s) orally once a day, evening   Fiorinal with Codeine oral capsule: 1 cap(s) orally every 4 hours, PRN headache/migraines  Frova 2.5 mg oral tablet: 1 tab(s) orally once a day, PRN migrains  magnesium oxide 400 mg (241.3 mg elemental magnesium) oral tablet: 1 tab(s) orally once a day, evening  x heart health  Melatonin 10 mg oral capsule: 1 cap(s) orally once a day (at bedtime)  MiraLax oral powder for reconstitution:   pantoprazole 40 mg oral delayed release tablet: 1 tab(s) orally once a day x acid reflux  Vitamin D3 1000 intl units (25 mcg) oral tablet: 1 tablet orally once a day   vitamin E 400 intl units oral capsule: 1 cap(s) orally once a day  Xanax 0.25 mg oral tablet: 1 tab(s) orally 3 times a day, PRN  Zetia 10 mg oral tablet: 1 tab(s) orally once a day   amLODIPine 5 mg oral tablet: 1 tab(s) orally once a day  atorvastatin 10 mg oral tablet: 1 tab(s) orally once a day, evening   Benadryl 25 mg oral capsule: 1 cap(s) orally in the morning x allergies  Bystolic 10 mg oral tablet: 1 tab(s) orally once a day, in the morning   Centrum oral tablet: 1 tab(s) orally once a day  CoQ10 300 mg oral capsule: 1 cap(s) orally once a day  cyclobenzaprine 10 mg oral tablet: 1 tab(s) orally 3 times a day, PRN works for muscle spasm secondary to and back pain   Cymbalta 60 mg oral delayed release capsule: 2 cap(s) orally once a day x anxiety and body pain  Edarbi 40 mg oral tablet: 1 tab(s) orally once a day, evening x HTN  enoxaparin: 40 milligram(s) subcutaneous once a day (at bedtime)  Frova 2.5 mg oral tablet: 1 tab(s) orally once a day, PRN migrains  magnesium oxide 400 mg (241.3 mg elemental magnesium) oral tablet: 1 tab(s) orally once a day, evening  x heart health  Melatonin 10 mg oral capsule: 1 cap(s) orally once a day (at bedtime)  MiraLax oral powder for reconstitution:   Multiple Vitamins oral tablet: 1 tab(s) orally once a day  pantoprazole 40 mg oral delayed release tablet: 1 tab(s) orally once a day x acid reflux  vitamin E 400 intl units oral capsule: 1 cap(s) orally once a day  Zetia 10 mg oral tablet: 1 tab(s) orally once a day

## 2020-10-07 NOTE — PROGRESS NOTE ADULT - ASSESSMENT
60 year old female with h/o scoliosis/ spinal stenosis- lumbar region, presents to preadmission testing for scheduled stage 1, L1-5 lumbar lateral interbody fusion, scheduled for 9/29/2020. Her medical history includes HTN, hyperlipidemia, osteoarthritis, anxiety/depression disorders, GERD, eczema, sinusitis.     9/29 s/p Stage 1 L1-L5 lateral lumbar interbody fusion with Dr Valdez, Stage 2 T9 - PELVIS ARTHRODESIS L5-S1 TLIF, B/l paraspinal muscle flaps w/ complex plastics closure. Admitted to NSICU post operatively, requiring fluid resuscitation with IV hydration and pressors. Patient was weaned off pressors and started on midodrine. transferred to the floor when stable.       PLAN:  Neuro:   - neuro stable  - MISTY and hmv drains removed  - plastics following- aquacel dressing change due on 10/8 (every 5 days)  - pain control- continue oxycodone prn, flexeril prn,   - on cymbalta  Respiratory:   - on room air  CV:  - on midodrine- wean at rehab  - on statin  Endocrine:   - A1c 6.4- fingersticks <150 during hospital course  DVT ppx:   - venodynes, sq lovenox  GI:    - GERD- continue ppi  - bowel regimen  PT/OT:   - Acute rehab      Spectralink # [id number] 60 year old female with h/o scoliosis/ spinal stenosis- lumbar region, presents to preadmission testing for scheduled stage 1, L1-5 lumbar lateral interbody fusion, scheduled for 9/29/2020. Her medical history includes HTN, hyperlipidemia, osteoarthritis, anxiety/depression disorders, GERD, eczema, sinusitis.    9/29 s/p Stage 1 L1-L5 lateral lumbar interbody fusion with Dr Valdez,   9/30 s/p Stage 2 T9 - PELVIS ARTHRODESIS L5-S1 TLIF, B/l paraspinal muscle flaps w/ complex plastics closure. Admitted to NSICU post operatively, requiring fluid resuscitation with IV hydration and pressors. EBL 1500 cc, s/p 2 u prbc. Patient was weaned off pressors and started on midodrine. Transferred to the floor when stable.       PLAN:  Neuro:   - neuro stable  - MISTY and hmv drains removed  - plastics following- aquacel dressing change due on 10/8 (every 5 days)per plastics note  - pain control- continue oxycodone prn, flexeril prn,   - on cymbalta  Respiratory:   - on room air  CV:  - on midodrine- wean at rehab  - on statin  Endocrine:   - A1c 6.4- fingersticks <150 during hospital course  DVT ppx:   - venodynes, sq lovenox  GI:    - GERD- continue ppi  - bowel regimen  PT/OT:   - Acute rehab      Spectralink # [id number] 60 year old female with h/o scoliosis/ spinal stenosis- lumbar region, presents to preadmission testing for scheduled stage 1, L1-5 lumbar lateral interbody fusion, scheduled for 9/29/2020. Her medical history includes HTN, hyperlipidemia, osteoarthritis, anxiety/depression disorders, GERD, eczema, sinusitis.    9/29 s/p Stage 1 L1-L5 lateral lumbar interbody fusion with Dr Valdez,   9/30 s/p Stage 2 T9 - PELVIS ARTHRODESIS L5-S1 TLIF, B/l paraspinal muscle flaps w/ complex plastics closure. Admitted to NSICU post operatively, requiring fluid resuscitation with IV hydration and pressors. EBL 1500 cc, s/p 2 u prbc. Patient was weaned off pressors and started on midodrine. Transferred to the floor when stable.       PLAN:  Neuro:   - neuro stable  - MISTY and hmv drains removed  - plastics following- aquacel dressing change due on 10/8 (every 5 days)per plastics note  - pain control- continue oxycodone prn, flexeril prn,   - on cymbalta  - acute blood loss anemia- hgb stable  Respiratory:   - on room air  CV:  - on midodrine- wean at rehab  - on statin  Endocrine:   - A1c 6.4- fingersticks <150 during hospital course  DVT ppx:   - venodynes, sq lovenox  GI:    - GERD- continue ppi  - bowel regimen  PT/OT:   - Acute rehab      [facility] # [id number]

## 2020-10-07 NOTE — CHART NOTE - NSCHARTNOTEFT_GEN_A_CORE
Nutrition Follow Up Note    Patient seen for nutrition follow up    Source: pt, EMR    Chart reviewed, events noted.    Diet: Diet, Regular (10-07-20 @ 09:04) [Active]     Patient denies any N/V, last BM 10/6    PO intake: pt reports improved PO intake, consuming 50-75% of meals. Pt with no food preferences to offer at this time.    Source for PO intake: pt, EMR    Enteral/Parenteral Nutrition: n/a    No updated weights available to assess, will continue to monitor    Pertinent Medications: MEDICATIONS  (STANDING):  atorvastatin 10 milliGRAM(s) Oral at bedtime  cholecalciferol 1000 Unit(s) Oral daily  dextrose 5%. 1000 milliLiter(s) (50 mL/Hr) IV Continuous <Continuous>  dextrose 50% Injectable 12.5 Gram(s) IV Push once  dextrose 50% Injectable 25 Gram(s) IV Push once  dextrose 50% Injectable 25 Gram(s) IV Push once  DULoxetine 60 milliGRAM(s) Oral two times a day  enoxaparin Injectable 40 milliGRAM(s) SubCutaneous daily  fenofibrate Tablet 145 milliGRAM(s) Oral daily  influenza   Vaccine 0.5 milliLiter(s) IntraMuscular once  insulin lispro (HumaLOG) corrective regimen sliding scale   SubCutaneous three times a day before meals  insulin lispro (HumaLOG) corrective regimen sliding scale   SubCutaneous at bedtime  lactulose Syrup 20 Gram(s) Oral every 4 hours  magnesium oxide 400 milliGRAM(s) Oral daily  midodrine 5 milliGRAM(s) Oral every 8 hours  multivitamin 1 Tablet(s) Oral daily  pantoprazole    Tablet 40 milliGRAM(s) Oral before breakfast  polyethylene glycol 3350 17 Gram(s) Oral two times a day  senna 2 Tablet(s) Oral at bedtime    MEDICATIONS  (PRN):  aluminum hydroxide/magnesium hydroxide/simethicone Suspension 30 milliLiter(s) Oral every 4 hours PRN Dyspepsia  cyclobenzaprine 10 milliGRAM(s) Oral three times a day PRN Muscle Spasm  dextrose 40% Gel 15 Gram(s) Oral once PRN Blood Glucose LESS THAN 70 milliGRAM(s)/deciliter  diphenhydrAMINE 25 milliGRAM(s) Oral every 4 hours PRN Rash and/or Itching  glucagon  Injectable 1 milliGRAM(s) IntraMuscular once PRN Glucose LESS THAN 70 milligrams/deciliter  magnesium hydroxide Suspension 30 milliLiter(s) Oral every 12 hours PRN Constipation  oxyCODONE    IR 5 milliGRAM(s) Oral every 4 hours PRN Moderate Pain (4 - 6)  oxyCODONE    IR 10 milliGRAM(s) Oral every 4 hours PRN Severe Pain (7 - 10)  SUMAtriptan 25 milliGRAM(s) Oral four times a day PRN Migraine    Pertinent Labs: 10-07 @ 12:09: Na 132<L>, BUN 29<H>, Cr 0.69, <H>, K+ 3.8, Phos --, Mg --, Alk Phos --, ALT/SGPT --, AST/SGOT --, HbA1c --    Finger Sticks:  POCT Blood Glucose.: 120 mg/dL (10-07 @ 13:33)  POCT Blood Glucose.: 121 mg/dL (10-07 @ 08:52)  POCT Blood Glucose.: 130 mg/dL (10-06 @ 21:33)  POCT Blood Glucose.: 134 mg/dL (10-06 @ 17:36)      Skin per nursing documentation: no pressure injuries  Edema per nursing documentation: none noted    Estimated Needs:   [x] no change since previous assessment  [ ] recalculated:     Previous Nutrition Diagnosis: Inadequate Oral Intake  Nutrition Diagnosis is: ongoing, care plan in progress, being addressed with PO encouragement, diet liberalization    New Nutrition Diagnosis: n/a    Recommend  1. Continue regular diet, monitor FS for need for consistent carbohydrate restriction.  2. RD to add diet Mighty Shakes to meal trays to promote protein intake.  3. Continue multivitamin.  4. RD to reinforce diet education PRN.    Monitoring and Evaluation:     Continue to monitor nutritional intake, tolerance to diet prescription, weights, labs, skin integrity    RD remains available upon request and will follow up per protocol    Janene Nicole RD, Pager # [phone]

## 2020-10-07 NOTE — PROVIDER CONTACT NOTE (MEDICATION) - BACKGROUND
Pt admitted w/ scoliosis, previously on pressors in NSCU, weaned off pressors and started on midodrine and transferred to floor unit

## 2020-10-07 NOTE — DISCHARGE NOTE PROVIDER - NSDCCPTREATMENT_GEN_ALL_CORE_FT
PRINCIPAL PROCEDURE  Procedure: Arthrodesis of thoracolumbar region, posterolateral technique  Findings and Treatment:

## 2020-10-07 NOTE — DISCHARGE NOTE PROVIDER - HOSPITAL COURSE
60 year old female with h/o scoliosis/ spinal stenosis- lumbar region, presents  for scheduled stage 1, L1-5 lumbar lateral interbody fusion, scheduled for 9/29/2020. Her medical history includes HTN, hyperlipidemia, osteoarthritis, anxiety/depression disorders, GERD, eczema, sinusitis.  Hospital course:  9/29/20 s/p Stage 1 L1-L5 lateral lumbar interbody fusion with Dr Valdez,   9/30/20 s/p Stage 2 T9 - PELVIS ARTHRODESIS L5-S1 TLIF, B/l paraspinal muscle flaps w/ complex plastics closure. Admitted to NSICU post operatively, requiring fluid resuscitation with IV hydration and pressors. EBL 1500 cc, s/p 2 u prbc. Patient was weaned off pressors and started on midodrine. Transferred to the floor when stable. Surgical drains removed on 10/7/20. Plastic surgery following for incision management. Aquacel dressing changed on 10/3 next dressing change on 10/8. Hgb remains stable. Pt currently off antihypertensives, now on midodrine- wean to off as indicated. On day of discharge patient is medically and neurologically stable..       Hospital course:  9/29/20 s/p Stage 1 L1-L5 lateral lumbar interbody fusion with Dr Valdez,   9/30/20 s/p Stage 2 T9 - PELVIS ARTHRODESIS L5-S1 TLIF, B/l paraspinal muscle flaps w/ complex plastics closure. Admitted to NSICU post operatively, requiring fluid resuscitation with IV hydration and pressors. EBL 1500 cc, s/p 2 u prbc. Patient was weaned off pressors and started on midodrine. Transferred to the floor when stable. Surgical drains removed on 10/7/20. Plastic surgery following for incision management. Aquacel dressing changed on 10/3 next dressing change on 10/8. Hgb remains stable. Pt currently off antihypertensives, now on midodrine- wean to off as indicated. On day of discharge patient is medically and neurologically stable..    60 year old female with h/o scoliosis/ spinal stenosis- lumbar region, presents to preadmission testing for scheduled stage 1, L1-5 lumbar lateral interbody fusion, scheduled for 9/29/2020. Her medical history includes HTN, hyperlipidemia, osteoarthritis, anxiety/depression disorders, GERD, eczema, sinusitis.    Scheduled for COVID-19 swab on 9/26/2020.  (22 Sep 2020 07:59)    PROCEDURE: Adm 9/29 Stage 1 L1-5 LLIF, 9/30 Stage 2 T9-pelvis, L5-S1 w/plastic closure     POD#10/11    PLAN:  Neuro: Anemia stable. Leuckocytosis trending downwards, pt asymptomatic.  Inc activity/OOB. DC to Rehab today.    Plastic note of 10/9--please follow up in 1-2 weeks with Dr. Alegre in office (1-2 weeks based on dc from hosp). Plastic Surgery Resident [facility]: [phone]  Electronic Signatures: Cindy Hewitt (MD)     Respiratory: Patient instructed to use incentive spirometer [ X] YES [ ] NO              DVT ppx: [X ] SQL [ ] SQH and Venodynes [ ] Left [ ] Right [ X] Bilateral    Discharge Planning:  The patient was evaluated by PT/OT/PMR and recommended Acute Rehab.   She was subsequently DC on 10/10/2020 in stable condition.

## 2020-10-07 NOTE — DISCHARGE NOTE PROVIDER - CARE PROVIDER_API CALL
Shine Valdez  NEUROSURGERY  [address] [place] [address], [address]  [place] [address]  Phone: [phone]  Fax: [phone]  Follow Up Time:     Marcel Alegre)  Plastic Surgery; Surgery; Surgical Critical Care  [address] [place] [address]  Phone: [phone]  Fax: [phone]  Follow Up Time:

## 2020-10-07 NOTE — DISCHARGE NOTE PROVIDER - NSDCFUADDINST_GEN_ALL_CORE_FT
Followup with your Private MD in 1-2 weeks.  Return to Emergency Department or contact your Neurosurgeon if any changes in mental status, weakness, numbness or tingling of extremities; difficulty swallowing; drainage or redness of wound, fever; pain in legs; difficulty urinating or constipation.  Donot restart your Aspirin or take any Motrin/NSAIDS until checking with your Neurosurgeon.  No strenous activity. No heavy lifting. Do not return to work until cleared by physician. No driving until cleared by physician.  Remove dressing on 3rd day after your surgery and leave incision open to air. You may shower on the 3rd day after you surgery.  No soaking in tub,  Donot remove steri strips. Donot apply any ointements to incision.

## 2020-10-07 NOTE — PROGRESS NOTE ADULT - SUBJECTIVE AND OBJECTIVE BOX
SUBJECTIVE: Pt seen and examined, she reports incisional pain, also reporting bilat knee pain and right shoulder pain (likely osteoarthritis). awaiting rehab, MISTY and hmv removed    OVERNIGHT EVENTS: none    Vital Signs Last 24 Hrs  T(C): 37 (07 Oct 2020 08:00), Max: 37 (07 Oct 2020 08:00)  T(F): 98.6 (07 Oct 2020 08:00), Max: 98.6 (07 Oct 2020 08:00)  HR: 90 (07 Oct 2020 08:00) (90 - 96)  BP: 150/80 (07 Oct 2020 08:00) (113/78 - 150/80)  BP(mean): --  RR: 18 (07 Oct 2020 08:00) (17 - 18)  SpO2: 95% (07 Oct 2020 08:00) (94% - 98%)    PHYSICAL EXAM:    General: No Acute Distress     Neurological: Awake, alert oriented to person, place and time, Following Commands, Speech Fluent, Moving all extremities, Tomasz UE 5/5, RLE 4+/5, LLE KF 4/5 all other 5/5, No Drift, Sensation to Light Touch Intact    Pulmonary: Clear to Auscultation, No Rales, No Rhonchi, No Wheezes     Cardiovascular: S1, S2, Regular Rate and Rhythm     Gastrointestinal: Soft, Nontender, Nondistended     Incision: back incision + aquacel ag dsg c/d/i, changed on 10/3 by plastics    LABS:             10-06 @ 07:01  -  10-07 @ 07:00  --------------------------------------------------------  IN: 860 mL / OUT: 835 mL / NET: 25 mL      DRAINS: MISTY and HMV removed    MEDICATIONS:  Antibiotics:    Neuro:  cyclobenzaprine 10 milliGRAM(s) Oral three times a day PRN Muscle Spasm  diphenhydrAMINE 25 milliGRAM(s) Oral every 4 hours PRN Rash and/or Itching  DULoxetine 60 milliGRAM(s) Oral two times a day  oxyCODONE    IR 5 milliGRAM(s) Oral every 4 hours PRN Moderate Pain (4 - 6)  oxyCODONE    IR 10 milliGRAM(s) Oral every 4 hours PRN Severe Pain (7 - 10)  SUMAtriptan 25 milliGRAM(s) Oral four times a day PRN Migraine    Cardiac:  midodrine 5 milliGRAM(s) Oral every 8 hours    Pulm:    GI/:  lactulose Syrup 20 Gram(s) Oral every 4 hours  magnesium hydroxide Suspension 30 milliLiter(s) Oral every 12 hours PRN Constipation  pantoprazole    Tablet 40 milliGRAM(s) Oral before breakfast  polyethylene glycol 3350 17 Gram(s) Oral two times a day  senna 2 Tablet(s) Oral at bedtime    Other:   atorvastatin 10 milliGRAM(s) Oral at bedtime  cholecalciferol 1000 Unit(s) Oral daily  dextrose 40% Gel 15 Gram(s) Oral once PRN Blood Glucose LESS THAN 70 milliGRAM(s)/deciliter  dextrose 5%. 1000 milliLiter(s) IV Continuous <Continuous>  dextrose 50% Injectable 12.5 Gram(s) IV Push once  dextrose 50% Injectable 25 Gram(s) IV Push once  dextrose 50% Injectable 25 Gram(s) IV Push once  enoxaparin Injectable 40 milliGRAM(s) SubCutaneous daily  fenofibrate Tablet 145 milliGRAM(s) Oral daily  glucagon  Injectable 1 milliGRAM(s) IntraMuscular once PRN Glucose LESS THAN 70 milligrams/deciliter  influenza   Vaccine 0.5 milliLiter(s) IntraMuscular once  insulin lispro (HumaLOG) corrective regimen sliding scale   SubCutaneous three times a day before meals  insulin lispro (HumaLOG) corrective regimen sliding scale   SubCutaneous at bedtime  magnesium oxide 400 milliGRAM(s) Oral daily  multivitamin 1 Tablet(s) Oral daily    DIET: [] Regular [x] CCD [] Renal [] Puree [] Dysphagia [] Tube Feeds:     IMAGING:   < from: CT Lumbar Spine No Cont (10.01.20 @ 10:24) >  IMPRESSION:    Redemonstration of anterior cervical discectomy and fusion of C4-T1 and C5 corpectomy.    Redemonstration of interbody spacer devices from L1-L2 through L4-L5.    Interval T12-S1 total laminectomies and partial facetectomies.    Interval posterior fusion of T9-S1.    Interval sacroiliac fusion with sacroiliac screws.    New hardware is well-placed. No evidence for new hardware fracture or hardware loosening.    Vertebral body height and facet alignment maintained.    < end of copied text >  < from: [facility] Duplex Lower Ext Vein Scan, Bilat (09.30.20 @ 13:11) >    IMPRESSION:  No evidence of deep venous thrombosis in either lower extremity.    < end of copied text >

## 2020-10-08 LAB
GLUCOSE BLDC GLUCOMTR-MCNC: 111 MG/DL — HIGH (ref 70–99)
GLUCOSE BLDC GLUCOMTR-MCNC: 114 MG/DL — HIGH (ref 70–99)
GLUCOSE BLDC GLUCOMTR-MCNC: 118 MG/DL — HIGH (ref 70–99)
GLUCOSE BLDC GLUCOMTR-MCNC: 135 MG/DL — HIGH (ref 70–99)

## 2020-10-08 PROCEDURE — 99233 SBSQ HOSP IP/OBS HIGH 50: CPT

## 2020-10-08 RX ADMIN — Medication 145 MILLIGRAM(S): at 12:29

## 2020-10-08 RX ADMIN — OXYCODONE HYDROCHLORIDE 10 MILLIGRAM(S): 5 TABLET ORAL at 08:52

## 2020-10-08 RX ADMIN — OXYCODONE HYDROCHLORIDE 10 MILLIGRAM(S): 5 TABLET ORAL at 08:22

## 2020-10-08 RX ADMIN — MIDODRINE HYDROCHLORIDE 5 MILLIGRAM(S): 2.5 TABLET ORAL at 13:05

## 2020-10-08 RX ADMIN — Medication 1000 UNIT(S): at 12:29

## 2020-10-08 RX ADMIN — OXYCODONE HYDROCHLORIDE 10 MILLIGRAM(S): 5 TABLET ORAL at 22:42

## 2020-10-08 RX ADMIN — MAGNESIUM OXIDE 400 MG ORAL TABLET 400 MILLIGRAM(S): 241.3 TABLET ORAL at 12:29

## 2020-10-08 RX ADMIN — OXYCODONE HYDROCHLORIDE 10 MILLIGRAM(S): 5 TABLET ORAL at 12:28

## 2020-10-08 RX ADMIN — MIDODRINE HYDROCHLORIDE 5 MILLIGRAM(S): 2.5 TABLET ORAL at 22:41

## 2020-10-08 RX ADMIN — OXYCODONE HYDROCHLORIDE 10 MILLIGRAM(S): 5 TABLET ORAL at 23:12

## 2020-10-08 RX ADMIN — DULOXETINE HYDROCHLORIDE 60 MILLIGRAM(S): 30 CAPSULE, DELAYED RELEASE ORAL at 05:09

## 2020-10-08 RX ADMIN — MAGNESIUM HYDROXIDE 30 MILLILITER(S): 400 TABLET, CHEWABLE ORAL at 05:10

## 2020-10-08 RX ADMIN — ATORVASTATIN CALCIUM 10 MILLIGRAM(S): 80 TABLET, FILM COATED ORAL at 22:42

## 2020-10-08 RX ADMIN — PANTOPRAZOLE SODIUM 40 MILLIGRAM(S): 20 TABLET, DELAYED RELEASE ORAL at 00:53

## 2020-10-08 RX ADMIN — ENOXAPARIN SODIUM 40 MILLIGRAM(S): 100 INJECTION SUBCUTANEOUS at 12:29

## 2020-10-08 RX ADMIN — Medication 1 TABLET(S): at 12:28

## 2020-10-08 RX ADMIN — MIDODRINE HYDROCHLORIDE 5 MILLIGRAM(S): 2.5 TABLET ORAL at 05:09

## 2020-10-08 RX ADMIN — DULOXETINE HYDROCHLORIDE 60 MILLIGRAM(S): 30 CAPSULE, DELAYED RELEASE ORAL at 17:25

## 2020-10-08 RX ADMIN — OXYCODONE HYDROCHLORIDE 10 MILLIGRAM(S): 5 TABLET ORAL at 12:58

## 2020-10-08 RX ADMIN — CYCLOBENZAPRINE HYDROCHLORIDE 10 MILLIGRAM(S): 10 TABLET, FILM COATED ORAL at 22:41

## 2020-10-08 NOTE — PROGRESS NOTE ADULT - SUBJECTIVE AND OBJECTIVE BOX
Afeb VSS  Incision CDI    Plan  FU 2 weeks for suture removal  DW patient  Call for appt  [phone]

## 2020-10-08 NOTE — PROGRESS NOTE ADULT - SUBJECTIVE AND OBJECTIVE BOX
patient reports she had a bad day yesterday, slept better overnight.   complains of abdominal bloating, +BM, appetite good  also with left foot discomfort, feels tight    REVIEW OF SYSTEMS  Constitutional - No fever,  No fatigue  HEENT - No vertigo, No neck pain  Neurological - No headaches, No memory loss, No loss of strength, No numbness, No tremors  Skin - No rashes, No lesions   Musculoskeletal - No joint pain, No joint swelling, No muscle pain  Psychiatric - No depression, No anxiety    FUNCTIONAL PROGRESS  10/7 PT    Bed Mobility  Bed Mobility Training Rolling/Turning: supervsion;  supervision;  verbal cues  Bed Mobility Training Scooting: moderate assist (50% patient effort);  verbal cues;  nonverbal cues (demo/gestures);  2 person assist  Bed Mobility Training Sit-to-Supine: moderate assist (50% patient effort);  verbal cues;  nonverbal cues (demo/gestures);  2 person assist  Bed Mobility Training Supine-to-Sit: moderate assist (50% patient effort);  verbal cues;  nonverbal cues (demo/gestures);  2 person assist  Bed Mobility Training Limitations: decreased ability to use legs for bridging/pushing;  decreased ability to use arms for pushing/pulling;  impaired ability to control trunk for mobility;  decreased strength;  impaired balance;  pain    Pt shivani sitting EOB ~5min with fair minus balance    10/6 PT      Bed Mobility  Bed Mobility Training Supine-to-Sit: minimum assist (75% patient effort);  contact guard;  verbal cues;  nonverbal cues (demo/gestures);  1 person assist  Bed Mobility Training Limitations: decreased ability to use arms for pushing/pulling;  decreased ability to use legs for bridging/pushing;  impaired ability to control trunk for mobility;  decreased strength;  impaired balance    Sit-Stand Transfer Training  Transfer Training Sit-to-Stand Transfer: minimum assist (75% patient effort);  moderate assist (50% patient effort);  verbal cues;  nonverbal cues (demo/gestures);  2 person assist;  weight-bearing as tolerated   rolling walker  Transfer Training Stand-to-Sit Transfer: minimum assist (75% patient effort);  moderate assist (50% patient effort);  verbal cues;  nonverbal cues (demo/gestures);  2 person assist;  weight-bearing as tolerated   rolling walker  Sit-to-Stand Transfer Training Transfer Safety Analysis: decreased balance;  decreased step length;  decreased weight-shifting ability;  impaired balance;  decreased strength;  rolling walker    Therapeutic Exercise  Therapeutic Exercise Detail: standing marches x10ea minAx2 with RW, addition knee block provided RLE>LLE.       10/5 PT    Bed Mobility  Bed Mobility Training Supine-to-Sit: minimum assist (75% patient effort);  moderate assist (50% patient effort);  verbal cues;  nonverbal cues (demo/gestures);  1 person assist  Bed Mobility Training Limitations: decreased ability to use arms for pushing/pulling;  decreased ability to use legs for bridging/pushing;  decreased strength;  impaired balance    Sit-Stand Transfer Training  Transfer Training Sit-to-Stand Transfer: minimum assist (75% patient effort);  verbal cues;  nonverbal cues (demo/gestures);  2 person assist;  full weight-bearing  Transfer Training Stand-to-Sit Transfer: minimum assist (75% patient effort);  nonverbal cues (demo/gestures);  verbal cues;  2 person assist;  full weight-bearing  Sit-to-Stand Transfer Training Transfer Safety Analysis: decreased balance;  decreased step length;  decreased weight-shifting ability;  decreased strength;  impaired balance    Gait Training  Gait Training: moderate assist (50% patient effort);  verbal cues;  nonverbal cues (demo/gestures);  2 person assist;  full weight-bearing   therapist on ea ;  3 steps toward HOB.   Gait Analysis: decreased uday;  shuffling;  decreased step length;  decreased weight-shifting ability;  decreased strength;  impaired balance;  3 steps toward HOB     10/7 OT    Bed Mobility  Bed Mobility Training Rolling/Turning: supervsion;  1 person assist;  nonverbal cues (demo/gestures);  verbal cues;  bed rails  Bed Mobility Training Scooting: moderate assist (50% patient effort);  2 person assist;  verbal cues;  nonverbal cues (demo/gestures)  Bed Mobility Training Sit-to-Supine: moderate assist (50% patient effort);  2 person assist;  verbal cues;  nonverbal cues (demo/gestures)  Bed Mobility Training Supine-to-Sit: moderate assist (50% patient effort);  2 person assist;  nonverbal cues (demo/gestures);  verbal cues  Bed Mobility Training Limitations: decreased ability to use arms for pushing/pulling;  decreased ability to use legs for bridging/pushing;  decreased ROM;  decreased strength;  impaired balance;  impaired coordination    Sit-Stand Transfer Training  Transfer Training Sit-to-Stand Transfer: Pt declines, requesting return to bed    Therapeutic Exercise  Therapeutic Exercise Detail: Sitting at EOB approx 5 min with Fair (-) balance.             VITALS  T(C): 37.1 (10-08-20 @ 09:43), Max: 37.1 (10-08-20 @ 09:43)  HR: 95 (10-08-20 @ 09:43) (90 - 95)  BP: 132/73 (10-08-20 @ 09:43) (132/73 - 173/85)  RR: 18 (10-08-20 @ 09:43) (18 - 18)  SpO2: 94% (10-08-20 @ 09:43) (93% - 95%)  Wt(kg): --    MEDICATIONS   aluminum hydroxide/magnesium hydroxide/simethicone Suspension 30 milliLiter(s) every 4 hours PRN  atorvastatin 10 milliGRAM(s) at bedtime  cholecalciferol 1000 Unit(s) daily  cyclobenzaprine 10 milliGRAM(s) three times a day PRN  dextrose 40% Gel 15 Gram(s) once PRN  dextrose 5%. 1000 milliLiter(s) <Continuous>  dextrose 50% Injectable 12.5 Gram(s) once  dextrose 50% Injectable 25 Gram(s) once  dextrose 50% Injectable 25 Gram(s) once  diphenhydrAMINE 25 milliGRAM(s) every 4 hours PRN  DULoxetine 60 milliGRAM(s) two times a day  enoxaparin Injectable 40 milliGRAM(s) daily  fenofibrate Tablet 145 milliGRAM(s) daily  glucagon  Injectable 1 milliGRAM(s) once PRN  influenza   Vaccine 0.5 milliLiter(s) once  insulin lispro (HumaLOG) corrective regimen sliding scale   three times a day before meals  insulin lispro (HumaLOG) corrective regimen sliding scale   at bedtime  magnesium hydroxide Suspension 30 milliLiter(s) every 12 hours PRN  magnesium oxide 400 milliGRAM(s) daily  midodrine 5 milliGRAM(s) every 8 hours  multivitamin 1 Tablet(s) daily  oxyCODONE    IR 10 milliGRAM(s) every 4 hours PRN  oxyCODONE    IR 5 milliGRAM(s) every 4 hours PRN  pantoprazole    Tablet 40 milliGRAM(s) before breakfast  polyethylene glycol 3350 17 Gram(s) two times a day  senna 2 Tablet(s) at bedtime  SUMAtriptan 25 milliGRAM(s) four times a day PRN      RECENT LABS - Reviewed                        9.9    14.15 )-----------( 473      ( 07 Oct 2020 12:09 )             29.8     10-07    132<L>  |  96  |  29<H>  ----------------------------<  126<H>  3.8   |  25  |  0.69    Ca    9.0      07 Oct 2020 12:09      -------------------------------------------------------------------------------  PHYSICAL EXAM  Constitutional - NAD, Comfortable, in bed   HEENT - NCAT, EOMI  Chest - Breathing comfortably  Cardiovascular - S1S2   Abdomen - Soft, nontender  Extremities - No C/C/E, No calf tenderness   Neurologic Exam -                    Cognitive - Awake, Alert, AAO to self, place, date, year, situation     Communication - Fluent, No dysarthria     Cranial Nerves - CN 2-12 intact     Motor - No focal deficits                    LEFT    UE - ShAB 5/5, EF 5/5, EE 5/5, WE 5/5,  5/5                    RIGHT UE - ShAB 5/5, EF 5/5, EE 5/5, WE 5/5,  5/5                    LEFT    LE - HF 4/5, KE 5/5, DF 5/5, PF 5/5                    RIGHT LE - HF 4/5, KE 4/5, DF 5/5, PF 5/5        Sensory - Intact to LT     Reflexes - DTR Intact, No primitive reflexes     Psychiatric - Mood stable, Affect WNL  ----------------------------------------------------------------------------------------  ASSESSMENT/PLAN  60 year old woman h/o HTN, hyperlipidemia, osteoarthritis, anxiety/depression disorders, GERD, eczema, sinusitis.  s/p L1-L5 LLIF, stage 2 T9-Pelvis Fusion , lateral L5-S1 with plastic closure    Pain - oxycodone, cyclobenzaprine 10 mg TID prn, cymbalta  bowel regimen, senna  DVT PPX - SCDs, lovenox   Rehab - Will continue to follow for ongoing rehab needs and recommendations.   continue bedside PT/OT, limited by pain/BP, only  bed mobility yesterday  out of bed to chair daily, patient with limited tolerance for sitting, encouraged to sit in chair today  d/w PT, to coordinate today's therapy with pain meds to improve functional mobility   Recommend ACUTE inpatient rehabilitation for the functional deficits consisting of 3 hours of therapy/day & 24 hour RN/daily PMR physician for comorbid medical management. Patient will be able to tolerate 3 hours a day.

## 2020-10-08 NOTE — PROGRESS NOTE ADULT - SUBJECTIVE AND OBJECTIVE BOX
Post-op day # 8 s/p stage 2 T9-Pelvis Fusion , lateral L5-S1 with plastic closure  Post-op day # 9 s/p L1-L5 LLIF    OVERNIGHT EVENTS: no acute event, patient c/o numbness of lower abdomen but has been moving the lower extremities, has had diarrhea, lactulose d/c    Vital Signs Last 24 Hrs  T(C): 37.1 (08 Oct 2020 09:43), Max: 37.1 (08 Oct 2020 09:43)  T(F): 98.8 (08 Oct 2020 09:43), Max: 98.8 (08 Oct 2020 09:43)  HR: 95 (08 Oct 2020 09:43) (90 - 95)  BP: 132/73 (08 Oct 2020 09:43) (132/73 - 159/78)  BP(mean): --  RR: 18 (08 Oct 2020 09:43) (18 - 18)  SpO2: 94% (08 Oct 2020 09:43) (93% - 95%)    I&O's Detail    07 Oct 2020 07:01  -  08 Oct 2020 07:00  --------------------------------------------------------  IN:    Oral Fluid: 720 mL  Total IN: 720 mL    OUT:    Voided (mL): 250 mL  Total OUT: 250 mL    Total NET: 470 mL        I&O's Summary    07 Oct 2020 07:01  -  08 Oct 2020 07:00  --------------------------------------------------------  IN: 720 mL / OUT: 250 mL / NET: 470 mL        PHYSICAL EXAM:  Neurological: Alert awake, oriented x3    Motor exam:         [x] Upper extremity                 D             B          T          WE       WF      HI                                               R         5/5        5/5        5/5       5/5     5/5       5/5                                               L          5/5        5/5        5/5       5/5     5/5       5/5         [x] Lower extremity                Ps          Ha        Quad    EHL        FHL                                               R        4/5      4/5        4/5       5/5         5/5                                               L        4/5       4/5       4/5       5/5          5/5                                                 Sensation: [x] intact to light touch  [] decreased:        Gait: not tested    Cardiovascular: Regular  Respiratory: Clear bilaterally  Gastrointestinal: Abd soft + BS non tender  Genitourinary:  Extremities: -C/C/E, milsd swelling of Lt toes  Incision/Wound: Intact    TUBES/LINES:  [] CVC  [] A-line  [] Lumbar Drain  [] Ventriculostomy  [] Other    DIET: Regular  [] NPO  [] Mechanical  [] Tube feeds    LABS:                        9.9    14.15 )-----------( 473      ( 07 Oct 2020 12:09 )             29.8     10-07    132<L>  |  96  |  29<H>  ----------------------------<  126<H>  3.8   |  25  |  0.69    Ca    9.0      07 Oct 2020 12:09              CAPILLARY BLOOD GLUCOSE      POCT Blood Glucose.: 111 mg/dL (08 Oct 2020 08:31)  POCT Blood Glucose.: 118 mg/dL (07 Oct 2020 22:06)  POCT Blood Glucose.: 113 mg/dL (07 Oct 2020 17:53)  POCT Blood Glucose.: 120 mg/dL (07 Oct 2020 13:33)          Drug Levels: [] N/A    CSF Analysis: [] N/A      Allergies    penicillin (Other)    Intolerances      MEDICATIONS:  Antibiotics:    Neuro:  cyclobenzaprine 10 milliGRAM(s) Oral three times a day PRN  diphenhydrAMINE 25 milliGRAM(s) Oral every 4 hours PRN  DULoxetine 60 milliGRAM(s) Oral two times a day  oxyCODONE    IR 10 milliGRAM(s) Oral every 4 hours PRN  oxyCODONE    IR 5 milliGRAM(s) Oral every 4 hours PRN  SUMAtriptan 25 milliGRAM(s) Oral four times a day PRN    Anticoagulation:  enoxaparin Injectable 40 milliGRAM(s) SubCutaneous daily    OTHER:  aluminum hydroxide/magnesium hydroxide/simethicone Suspension 30 milliLiter(s) Oral every 4 hours PRN  atorvastatin 10 milliGRAM(s) Oral at bedtime  dextrose 40% Gel 15 Gram(s) Oral once PRN  dextrose 50% Injectable 12.5 Gram(s) IV Push once  dextrose 50% Injectable 25 Gram(s) IV Push once  dextrose 50% Injectable 25 Gram(s) IV Push once  fenofibrate Tablet 145 milliGRAM(s) Oral daily  glucagon  Injectable 1 milliGRAM(s) IntraMuscular once PRN  influenza   Vaccine 0.5 milliLiter(s) IntraMuscular once  insulin lispro (HumaLOG) corrective regimen sliding scale   SubCutaneous three times a day before meals  insulin lispro (HumaLOG) corrective regimen sliding scale   SubCutaneous at bedtime  magnesium hydroxide Suspension 30 milliLiter(s) Oral every 12 hours PRN  midodrine 5 milliGRAM(s) Oral every 8 hours  pantoprazole    Tablet 40 milliGRAM(s) Oral before breakfast  polyethylene glycol 3350 17 Gram(s) Oral two times a day  senna 2 Tablet(s) Oral at bedtime    IVF:  cholecalciferol 1000 Unit(s) Oral daily  dextrose 5%. 1000 milliLiter(s) IV Continuous <Continuous>  magnesium oxide 400 milliGRAM(s) Oral daily  multivitamin 1 Tablet(s) Oral daily    CULTURES:    RADIOLOGY & ADDITIONAL TESTS:      ASSESSMENT:     60 year old female with h/o scoliosis/ spinal stenosis- lumbar region, presents to preadmission testing for scheduled stage 1, L1-5 lumbar lateral interbody fusion, scheduled for 9/29/2020. Her medical history includes HTN, hyperlipidemia, osteoarthritis, anxiety/depression disorders, GERD, eczema, sinusitis.  60y Female Post-op day # 8 s/p stage 2 T9-Pelvis Fusion , lateral L5-S1 with plastic closure                   Post-op day # 9 s/p L1-L5 LLIF  PLAN:  NEURO: Stable, increase activity as tolerated, await acute rehab placement  CARDIOVASCULAR: Hemodynamically stable  PULMONARY: Incentive spirometer  RENAL: IVL, voiding  GI: Tolerating Regular diet, bowel regimen  HEME: H/H stable  ID: Afebrile  ENDO: Borderline DM type 2    DVT PROPHYLAXIS:  [x] Venodynes                                [x] Heparin/Lovenox    FALL RISK:  [x] Low Risk                                    [] Impulsive

## 2020-10-08 NOTE — PROGRESS NOTE ADULT - SUBJECTIVE AND OBJECTIVE BOX
PLASTIC SURGERY DAILY PROGRESS NOTE:     SUBJECTIVE/ROS: Patient feels well. Seen and examined at bedside. Dressing changed in AM by PRS team.      MEDICATIONS  (STANDING):  atorvastatin 10 milliGRAM(s) Oral at bedtime  cholecalciferol 1000 Unit(s) Oral daily  dextrose 5%. 1000 milliLiter(s) (50 mL/Hr) IV Continuous <Continuous>  dextrose 50% Injectable 12.5 Gram(s) IV Push once  dextrose 50% Injectable 25 Gram(s) IV Push once  dextrose 50% Injectable 25 Gram(s) IV Push once  DULoxetine 60 milliGRAM(s) Oral two times a day  enoxaparin Injectable 40 milliGRAM(s) SubCutaneous daily  fenofibrate Tablet 145 milliGRAM(s) Oral daily  influenza   Vaccine 0.5 milliLiter(s) IntraMuscular once  insulin lispro (HumaLOG) corrective regimen sliding scale   SubCutaneous three times a day before meals  insulin lispro (HumaLOG) corrective regimen sliding scale   SubCutaneous at bedtime  lactulose Syrup 20 Gram(s) Oral every 4 hours  magnesium oxide 400 milliGRAM(s) Oral daily  midodrine 5 milliGRAM(s) Oral every 8 hours  multivitamin 1 Tablet(s) Oral daily  pantoprazole    Tablet 40 milliGRAM(s) Oral before breakfast  polyethylene glycol 3350 17 Gram(s) Oral two times a day  senna 2 Tablet(s) Oral at bedtime    MEDICATIONS  (PRN):  aluminum hydroxide/magnesium hydroxide/simethicone Suspension 30 milliLiter(s) Oral every 4 hours PRN Dyspepsia  cyclobenzaprine 10 milliGRAM(s) Oral three times a day PRN Muscle Spasm  dextrose 40% Gel 15 Gram(s) Oral once PRN Blood Glucose LESS THAN 70 milliGRAM(s)/deciliter  diphenhydrAMINE 25 milliGRAM(s) Oral every 4 hours PRN Rash and/or Itching  glucagon  Injectable 1 milliGRAM(s) IntraMuscular once PRN Glucose LESS THAN 70 milligrams/deciliter  magnesium hydroxide Suspension 30 milliLiter(s) Oral every 12 hours PRN Constipation  oxyCODONE    IR 10 milliGRAM(s) Oral every 4 hours PRN Severe Pain (7 - 10)  oxyCODONE    IR 5 milliGRAM(s) Oral every 4 hours PRN Moderate Pain (4 - 6)  SUMAtriptan 25 milliGRAM(s) Oral four times a day PRN Migraine      OBJECTIVE:    Vital Signs Last 24 Hrs  T(C): 36.7 (08 Oct 2020 05:08), Max: 37 (07 Oct 2020 08:00)  T(F): 98 (08 Oct 2020 05:08), Max: 98.6 (07 Oct 2020 08:00)  HR: 91 (08 Oct 2020 05:08) (90 - 95)  BP: 159/78 (08 Oct 2020 05:08) (146/82 - 173/85)  BP(mean): --  RR: 18 (08 Oct 2020 05:08) (18 - 18)  SpO2: 95% (08 Oct 2020 05:08) (93% - 95%)    I&O's Detail    07 Oct 2020 07:01  -  08 Oct 2020 07:00  --------------------------------------------------------  IN:    Oral Fluid: 720 mL  Total IN: 720 mL    OUT:    Voided (mL): 250 mL  Total OUT: 250 mL    Total NET: 470 mL          Daily     Daily     LABS:                        9.9    14.15 )-----------( 473      ( 07 Oct 2020 12:09 )             29.8     10-07    132<L>  |  96  |  29<H>  ----------------------------<  126<H>  3.8   |  25  |  0.69    Ca    9.0      07 Oct 2020 12:09      Exam:  Back soft  Dressing c/d/i  No palpable collections  JPs serosanguinous.       ASSESSMENT AND PLAN:     60 year old female with h/o HTN, hyperlipidemia, ostearthritis, scoliosis/ spinal stenosis, anxiety/depression disorders, GERD, eczema, sinusitis admitted for scheduled stage 1, L1-5 lumbar lateral interbody fusion s/p L1-L5 lateral lumbar interbody fusion and T9-pelvis fusion with PRS closure.     -acquacel change in 5 days from last change (10/13)  -care per primary team  -PRS will continue to follow      Plastic Surgery Resident  [facility]: [phone]

## 2020-10-09 LAB
GLUCOSE BLDC GLUCOMTR-MCNC: 110 MG/DL — HIGH (ref 70–99)
GLUCOSE BLDC GLUCOMTR-MCNC: 126 MG/DL — HIGH (ref 70–99)
GLUCOSE BLDC GLUCOMTR-MCNC: 129 MG/DL — HIGH (ref 70–99)
GLUCOSE BLDC GLUCOMTR-MCNC: 90 MG/DL — SIGNIFICANT CHANGE UP (ref 70–99)

## 2020-10-09 PROCEDURE — 99232 SBSQ HOSP IP/OBS MODERATE 35: CPT

## 2020-10-09 RX ADMIN — Medication 1000 UNIT(S): at 11:43

## 2020-10-09 RX ADMIN — OXYCODONE HYDROCHLORIDE 10 MILLIGRAM(S): 5 TABLET ORAL at 06:49

## 2020-10-09 RX ADMIN — MIDODRINE HYDROCHLORIDE 5 MILLIGRAM(S): 2.5 TABLET ORAL at 21:41

## 2020-10-09 RX ADMIN — DULOXETINE HYDROCHLORIDE 60 MILLIGRAM(S): 30 CAPSULE, DELAYED RELEASE ORAL at 06:19

## 2020-10-09 RX ADMIN — ATORVASTATIN CALCIUM 10 MILLIGRAM(S): 80 TABLET, FILM COATED ORAL at 21:41

## 2020-10-09 RX ADMIN — OXYCODONE HYDROCHLORIDE 10 MILLIGRAM(S): 5 TABLET ORAL at 21:41

## 2020-10-09 RX ADMIN — CYCLOBENZAPRINE HYDROCHLORIDE 10 MILLIGRAM(S): 10 TABLET, FILM COATED ORAL at 06:19

## 2020-10-09 RX ADMIN — DULOXETINE HYDROCHLORIDE 60 MILLIGRAM(S): 30 CAPSULE, DELAYED RELEASE ORAL at 17:06

## 2020-10-09 RX ADMIN — MIDODRINE HYDROCHLORIDE 5 MILLIGRAM(S): 2.5 TABLET ORAL at 06:19

## 2020-10-09 RX ADMIN — PANTOPRAZOLE SODIUM 40 MILLIGRAM(S): 20 TABLET, DELAYED RELEASE ORAL at 06:19

## 2020-10-09 RX ADMIN — Medication 1 TABLET(S): at 11:43

## 2020-10-09 RX ADMIN — MAGNESIUM OXIDE 400 MG ORAL TABLET 400 MILLIGRAM(S): 241.3 TABLET ORAL at 11:43

## 2020-10-09 RX ADMIN — ENOXAPARIN SODIUM 40 MILLIGRAM(S): 100 INJECTION SUBCUTANEOUS at 11:43

## 2020-10-09 RX ADMIN — Medication 145 MILLIGRAM(S): at 11:43

## 2020-10-09 RX ADMIN — OXYCODONE HYDROCHLORIDE 10 MILLIGRAM(S): 5 TABLET ORAL at 22:15

## 2020-10-09 RX ADMIN — MIDODRINE HYDROCHLORIDE 5 MILLIGRAM(S): 2.5 TABLET ORAL at 13:34

## 2020-10-09 RX ADMIN — OXYCODONE HYDROCHLORIDE 10 MILLIGRAM(S): 5 TABLET ORAL at 06:19

## 2020-10-09 NOTE — PROGRESS NOTE ADULT - REASON FOR ADMISSION
Lumbar spinal stenosis
Low back pain
Lumbar spinal stenosis

## 2020-10-09 NOTE — PROGRESS NOTE ADULT - SUBJECTIVE AND OBJECTIVE BOX
no new complaints, yesterday was better.     REVIEW OF SYSTEMS  Constitutional - No fever,  No fatigue  HEENT - No vertigo, No neck pain  Neurological - No headaches, No memory loss, + loss of strength, No numbness, No tremors  Skin - No rashes, No lesions   Musculoskeletal - No joint pain, No joint swelling, No muscle pain  Psychiatric - No depression, No anxiety    FUNCTIONAL PROGRESS  10/8 PT    Bed Mobility  Bed Mobility Training Supine-to-Sit: minimum assist (75% patient effort);  verbal cues;  nonverbal cues (demo/gestures);  1 person assist  Bed Mobility Training Limitations: decreased ability to use arms for pushing/pulling;  decreased ability to use legs for bridging/pushing;  impaired ability to control trunk for mobility;  decreased strength;  impaired balance    Sit-Stand Transfer Training  Transfer Training Sit-to-Stand Transfer: minimum assist (75% patient effort);  verbal cues;  nonverbal cues (demo/gestures);  2 person assist;  weight-bearing as tolerated   rolling walker  Transfer Training Stand-to-Sit Transfer: minimum assist (75% patient effort);  supervision;  verbal cues;  nonverbal cues (demo/gestures);  1 person assist;  weight-bearing as tolerated   rolling walker  Sit-to-Stand Transfer Training Transfer Safety Analysis: decreased balance;  decreased step length;  decreased weight-shifting ability;  impaired balance;  decreased strength;  rolling walker    Gait Training  Gait Training: moderate assist (50% patient effort);  minimum assist (75% patient effort);  verbal cues;  nonverbal cues (demo/gestures);  1 person assist;  weight-bearing as tolerated   rolling walker;  8ft  Gait Analysis: decreased uday;  decreased step length;  decreased weight-shifting ability;  decreased strength;  impaired balance;  8ft;  rolling walker        VITALS  T(C): 36.6 (10-09-20 @ 08:39), Max: 37.6 (10-09-20 @ 04:47)  HR: 88 (10-09-20 @ 08:39) (82 - 105)  BP: 137/75 (10-09-20 @ 08:39) (119/71 - 158/78)  RR: 18 (10-09-20 @ 08:39) (18 - 18)  SpO2: 97% (10-09-20 @ 08:39) (93% - 98%)  Wt(kg): --    MEDICATIONS   aluminum hydroxide/magnesium hydroxide/simethicone Suspension 30 milliLiter(s) every 4 hours PRN  atorvastatin 10 milliGRAM(s) at bedtime  cholecalciferol 1000 Unit(s) daily  cyclobenzaprine 10 milliGRAM(s) three times a day PRN  dextrose 40% Gel 15 Gram(s) once PRN  dextrose 5%. 1000 milliLiter(s) <Continuous>  dextrose 50% Injectable 12.5 Gram(s) once  dextrose 50% Injectable 25 Gram(s) once  dextrose 50% Injectable 25 Gram(s) once  diphenhydrAMINE 25 milliGRAM(s) every 4 hours PRN  DULoxetine 60 milliGRAM(s) two times a day  enoxaparin Injectable 40 milliGRAM(s) daily  fenofibrate Tablet 145 milliGRAM(s) daily  glucagon  Injectable 1 milliGRAM(s) once PRN  influenza   Vaccine 0.5 milliLiter(s) once  insulin lispro (HumaLOG) corrective regimen sliding scale   three times a day before meals  insulin lispro (HumaLOG) corrective regimen sliding scale   at bedtime  magnesium hydroxide Suspension 30 milliLiter(s) every 12 hours PRN  magnesium oxide 400 milliGRAM(s) daily  midodrine 5 milliGRAM(s) every 8 hours  multivitamin 1 Tablet(s) daily  oxyCODONE    IR 5 milliGRAM(s) every 4 hours PRN  oxyCODONE    IR 10 milliGRAM(s) every 4 hours PRN  pantoprazole    Tablet 40 milliGRAM(s) before breakfast  polyethylene glycol 3350 17 Gram(s) two times a day  senna 2 Tablet(s) at bedtime  SUMAtriptan 25 milliGRAM(s) four times a day PRN      RECENT LABS - Reviewed                        9.9    14.15 )-----------( 473      ( 07 Oct 2020 12:09 )             29.8     10-07    132<L>  |  96  |  29<H>  ----------------------------<  126<H>  3.8   |  25  |  0.69    Ca    9.0      07 Oct 2020 12:09      --------------------------------------------------------------------  PHYSICAL EXAM  Constitutional - NAD, Comfortable, in bed   HEENT - NCAT, EOMI  Chest - Breathing comfortably  Cardiovascular - S1S2   Abdomen - Soft, nontender  Extremities - No C/C/E, No calf tenderness   Neurologic Exam -                    Cognitive - Awake, Alert, AAO to self, place, date, year, situation     Communication - Fluent, No dysarthria     Cranial Nerves - CN 2-12 intact     Motor - No focal deficits                    LEFT    UE - ShAB 5/5, EF 5/5, EE 5/5, WE 5/5,  5/5                    RIGHT UE - ShAB 5/5, EF 5/5, EE 5/5, WE 5/5,  5/5                    LEFT    LE - HF 4/5, KE 5/5, DF 5/5, PF 5/5                    RIGHT LE - HF 4/5, KE 4/5, DF 5/5, PF 5/5        Sensory - Intact to LT     Reflexes - DTR Intact, No primitive reflexes     Psychiatric - Mood stable, Affect WNL  ----------------------------------------------------------------------------------------  ASSESSMENT/PLAN  60 year old woman h/o HTN, hyperlipidemia, osteoarthritis, anxiety/depression disorders, GERD, eczema, sinusitis.  s/p L1-L5 LLIF, stage 2 T9-Pelvis Fusion , lateral L5-S1 with plastic closure    Pain - oxycodone, cyclobenzaprine 10 mg TID prn, cymbalta  bowel regimen, senna  DVT PPX - SCDs, lovenox   Rehab - Will continue to follow for ongoing rehab needs and recommendations.   continue bedside PT/OT  out of bed to chair daily  min assist with transfers, mod assist with ambulation x 8 feet    Recommend ACUTE inpatient rehabilitation for the functional deficits consisting of 3 hours of therapy/day & 24 hour RN/daily PMR physician for comorbid medical management. Patient will be able to tolerate 3 hours a day.

## 2020-10-09 NOTE — PROGRESS NOTE ADULT - SUBJECTIVE AND OBJECTIVE BOX
Post-op day # 9 s/p stage 2 T9-Pelvis Fusion , lateral L5-S1 with plastic closure  Post-op day # 10 s/p L1-L5 LLIF    OVERNIGHT EVENTS: no acute event abd numbness improved    Vital Signs Last 24 Hrs  T(C): 36.6 (09 Oct 2020 08:39), Max: 37.6 (09 Oct 2020 04:47)  T(F): 97.9 (09 Oct 2020 08:39), Max: 99.6 (09 Oct 2020 04:47)  HR: 88 (09 Oct 2020 08:39) (82 - 105)  BP: 137/75 (09 Oct 2020 08:39) (119/71 - 158/78)  BP(mean): --  RR: 18 (09 Oct 2020 08:39) (18 - 18)  SpO2: 97% (09 Oct 2020 08:39) (93% - 98%)    I&O's Detail    08 Oct 2020 07:01  -  09 Oct 2020 07:00  --------------------------------------------------------  IN:    Oral Fluid: 1100 mL  Total IN: 1100 mL    OUT:    Voided (mL): 1350 mL  Total OUT: 1350 mL    Total NET: -250 mL        I&O's Summary    08 Oct 2020 07:01  -  09 Oct 2020 07:00  --------------------------------------------------------  IN: 1100 mL / OUT: 1350 mL / NET: -250 mL        PHYSICAL EXAM:  Neurological:    Motor exam:         [] Upper extremity                 D             B          T          WE       WF      HI                                               R         5/5        5/5        5/5       5/5     5/5       5/5                                               L          5/5        5/5        5/5       5/5     5/5       5/5         [] Lower extremity                Ps          Ha        Quad    EHL        FHL                                               R        5/5        5/5        5/5       5/5         5/5                                               L         5/5        5/5       5/5       5/5          5/5                                                        [] warm well perfused; capillary refill <3 seconds     Sensation: [] intact to light touch  [] decreased:        Gait    Cardiovascular: Regular  Respiratory: Clear bilaterally  Gastrointestinal: Abd soft + BS non tender  Genitourinary:  Extremities: -C/C/E  Incision/Wound: Intact    TUBES/LINES:  [] CVC  [] A-line  [] Lumbar Drain  [] Ventriculostomy  [] Other    DIET:  [] NPO  [] Mechanical  [] Tube feeds    LABS:                  CAPILLARY BLOOD GLUCOSE      POCT Blood Glucose.: 90 mg/dL (09 Oct 2020 12:55)  POCT Blood Glucose.: 110 mg/dL (09 Oct 2020 08:42)  POCT Blood Glucose.: 135 mg/dL (08 Oct 2020 22:22)  POCT Blood Glucose.: 118 mg/dL (08 Oct 2020 17:43)          Drug Levels: [] N/A    CSF Analysis: [] N/A      Allergies    penicillin (Other)    Intolerances      MEDICATIONS:  Antibiotics:    Neuro:  cyclobenzaprine 10 milliGRAM(s) Oral three times a day PRN  diphenhydrAMINE 25 milliGRAM(s) Oral every 4 hours PRN  DULoxetine 60 milliGRAM(s) Oral two times a day  oxyCODONE    IR 5 milliGRAM(s) Oral every 4 hours PRN  oxyCODONE    IR 10 milliGRAM(s) Oral every 4 hours PRN  SUMAtriptan 25 milliGRAM(s) Oral four times a day PRN    Anticoagulation:  enoxaparin Injectable 40 milliGRAM(s) SubCutaneous daily    OTHER:  aluminum hydroxide/magnesium hydroxide/simethicone Suspension 30 milliLiter(s) Oral every 4 hours PRN  atorvastatin 10 milliGRAM(s) Oral at bedtime  dextrose 40% Gel 15 Gram(s) Oral once PRN  dextrose 50% Injectable 12.5 Gram(s) IV Push once  dextrose 50% Injectable 25 Gram(s) IV Push once  dextrose 50% Injectable 25 Gram(s) IV Push once  fenofibrate Tablet 145 milliGRAM(s) Oral daily  glucagon  Injectable 1 milliGRAM(s) IntraMuscular once PRN  influenza   Vaccine 0.5 milliLiter(s) IntraMuscular once  insulin lispro (HumaLOG) corrective regimen sliding scale   SubCutaneous three times a day before meals  insulin lispro (HumaLOG) corrective regimen sliding scale   SubCutaneous at bedtime  magnesium hydroxide Suspension 30 milliLiter(s) Oral every 12 hours PRN  midodrine 5 milliGRAM(s) Oral every 8 hours  pantoprazole    Tablet 40 milliGRAM(s) Oral before breakfast  polyethylene glycol 3350 17 Gram(s) Oral two times a day  senna 2 Tablet(s) Oral at bedtime    IVF:  cholecalciferol 1000 Unit(s) Oral daily  dextrose 5%. 1000 milliLiter(s) IV Continuous <Continuous>  magnesium oxide 400 milliGRAM(s) Oral daily  multivitamin 1 Tablet(s) Oral daily    CULTURES:    RADIOLOGY & ADDITIONAL TESTS:      ASSESSMENT:     60 year old female with h/o scoliosis/ spinal stenosis- lumbar region, presents to preadmission testing for scheduled stage 1, L1-5 lumbar lateral interbody fusion, scheduled for 9/29/2020. Her medical history includes HTN, hyperlipidemia, osteoarthritis, anxiety/depression disorders, GERD, eczema, sinusitis.    60y Female Post-op day # 8 s/p stage 2 T9-Pelvis Fusion , lateral L5-S1 with plastic closure                    Post-op day # 9 s/p L1-L5 LLIF  Please Check When Present   []  GCS  E   V  M     Heart Failure: []Acute, [] acute on chronic , []chronic  Heart Failure:  [] Diastolic (HFpEF), [] Systolic (HFrEF), []Combined (HFpEF and HFrEF), [] RHF, [] Pulm HTN, [] Other    [] ANDRESSA, [] ATN, [] AIN, [] other  [] CKD1, [] CKD2, [] CKD 3, [] CKD 4, [] CKD 5, []ESRD    Encephalopathy: [] Metabolic, [] Hepatic, [] toxic, [] Neurological, [] Other    Abnormal Nurtitional Status: [] malnurtition (see nutrition note), [ ]underweight: BMI < 19, [] morbid obesity: BMI >40, [] Cachexia    [] Sepsis  [] hypovolemic shock,[] cardiogenic shock, [] hemorrhagic shock, [] neuogenic shock  [] Acute Respiratory Failure  []Cerebral edema, [] Brain compression/ herniation,   [] Functional quadriplegia  [] Acute blood loss anemia    PLAN:  NEURO: Stable, increase activity as tolerated, await acute rehab placement, possible d/c in am  CARDIOVASCULAR: Hemodynamically stable  PULMONARY: Incentive spirometer  RENAL: voiding  GI: Tolerating Regular diet, + BM  HEME: H/H stable  ID: Afebrile  ENDO: Borderline DM type 2    DVT PROPHYLAXIS:  [x] Venodynes                                [x] Heparin/Lovenox    FALL RISK:  [x] Low Risk                                    [] Impulsive

## 2020-10-09 NOTE — PROGRESS NOTE ADULT - SUBJECTIVE AND OBJECTIVE BOX
Pt seen and examined. Doing well. No acute events o/n.     T(C): 37.6 (10-09-20 @ 04:47), Max: 37.6 (10-09-20 @ 04:47)  T(F): 99.6 (10-09-20 @ 04:47), Max: 99.6 (10-09-20 @ 04:47)  HR: 82 (10-09-20 @ 04:47) (82 - 105)  BP: 130/72 (10-09-20 @ 04:47) (119/71 - 158/78)  RR: 18 (10-09-20 @ 04:47) (18 - 18)  SpO2: 95% (10-09-20 @ 04:47) (93% - 98%)      08 Oct 2020 07:01  -  09 Oct 2020 07:00  --------------------------------------------------------  IN:    Oral Fluid: 1100 mL  Total IN: 1100 mL    OUT:    Voided (mL): 1350 mL  Total OUT: 1350 mL    Total NET: -250 mL          Exam:  Back soft  Dressing c/d/i  No palpable collections  JPs serosanguinous.                           9.9    14.15 )-----------( 473      ( 07 Oct 2020 12:09 )             29.8     10-07    132<L>  |  96  |  29<H>  ----------------------------<  126<H>  3.8   |  25  |  0.69    Ca    9.0      07 Oct 2020 12:09          Exam:  Back soft  Dressing c/d/i  No palpable collections  JPs serosanguinous.       ASSESSMENT AND PLAN:     60 year old female with h/o HTN, hyperlipidemia, ostearthritis, scoliosis/ spinal stenosis, anxiety/depression disorders, GERD, eczema, sinusitis admitted for scheduled stage 1, L1-5 lumbar lateral interbody fusion s/p L1-L5 lateral lumbar interbody fusion and T9-pelvis fusion with PRS closure.     -likely dc to rehab today  -care per primary team  -PRS will continue to follow  -please follow up in 1-2 weeks with Dr. Alegre in office (1-2 weeks based on dc from hosp)    Plastic Surgery Resident  [facility]: [phone]

## 2020-10-10 ENCOUNTER — TRANSCRIPTION ENCOUNTER (OUTPATIENT)
Age: 60
End: 2020-10-10

## 2020-10-10 ENCOUNTER — INPATIENT (INPATIENT)
Facility: HOSPITAL | Age: 60
LOS: 23 days | Discharge: ANOTHER HOSPITAL | DRG: 949 | End: 2020-11-03
Attending: PHYSICAL MEDICINE & REHABILITATION | Admitting: PHYSICAL MEDICINE & REHABILITATION
Payer: COMMERCIAL

## 2020-10-10 VITALS
OXYGEN SATURATION: 99 % | TEMPERATURE: 97 F | RESPIRATION RATE: 18 BRPM | DIASTOLIC BLOOD PRESSURE: 83 MMHG | SYSTOLIC BLOOD PRESSURE: 155 MMHG | WEIGHT: 210.1 LBS | HEIGHT: 65 IN | HEART RATE: 92 BPM

## 2020-10-10 VITALS
RESPIRATION RATE: 18 BRPM | TEMPERATURE: 98 F | SYSTOLIC BLOOD PRESSURE: 124 MMHG | OXYGEN SATURATION: 97 % | DIASTOLIC BLOOD PRESSURE: 64 MMHG | HEART RATE: 87 BPM

## 2020-10-10 DIAGNOSIS — Z98.89 OTHER SPECIFIED POSTPROCEDURAL STATES: Chronic | ICD-10-CM

## 2020-10-10 DIAGNOSIS — Z98.890 OTHER SPECIFIED POSTPROCEDURAL STATES: Chronic | ICD-10-CM

## 2020-10-10 DIAGNOSIS — Z98.1 ARTHRODESIS STATUS: ICD-10-CM

## 2020-10-10 LAB
ANION GAP SERPL CALC-SCNC: 8 MMOL/L — SIGNIFICANT CHANGE UP (ref 5–17)
BUN SERPL-MCNC: 18 MG/DL — SIGNIFICANT CHANGE UP (ref 7–23)
CALCIUM SERPL-MCNC: 9 MG/DL — SIGNIFICANT CHANGE UP (ref 8.4–10.5)
CHLORIDE SERPL-SCNC: 101 MMOL/L — SIGNIFICANT CHANGE UP (ref 96–108)
CO2 SERPL-SCNC: 26 MMOL/L — SIGNIFICANT CHANGE UP (ref 22–31)
CREAT SERPL-MCNC: 0.74 MG/DL — SIGNIFICANT CHANGE UP (ref 0.5–1.3)
GLUCOSE BLDC GLUCOMTR-MCNC: 114 MG/DL — HIGH (ref 70–99)
GLUCOSE BLDC GLUCOMTR-MCNC: 115 MG/DL — HIGH (ref 70–99)
GLUCOSE BLDC GLUCOMTR-MCNC: 173 MG/DL — HIGH (ref 70–99)
GLUCOSE SERPL-MCNC: 118 MG/DL — HIGH (ref 70–99)
HCT VFR BLD CALC: 29.5 % — LOW (ref 34.5–45)
HGB BLD-MCNC: 9.4 G/DL — LOW (ref 11.5–15.5)
MCHC RBC-ENTMCNC: 30.2 PG — SIGNIFICANT CHANGE UP (ref 27–34)
MCHC RBC-ENTMCNC: 31.9 GM/DL — LOW (ref 32–36)
MCV RBC AUTO: 94.9 FL — SIGNIFICANT CHANGE UP (ref 80–100)
NRBC # BLD: 0 /100 WBCS — SIGNIFICANT CHANGE UP (ref 0–0)
PLATELET # BLD AUTO: 523 K/UL — HIGH (ref 150–400)
POTASSIUM SERPL-MCNC: 4.1 MMOL/L — SIGNIFICANT CHANGE UP (ref 3.5–5.3)
POTASSIUM SERPL-SCNC: 4.1 MMOL/L — SIGNIFICANT CHANGE UP (ref 3.5–5.3)
RBC # BLD: 3.11 M/UL — LOW (ref 3.8–5.2)
RBC # FLD: 14.6 % — HIGH (ref 10.3–14.5)
SODIUM SERPL-SCNC: 135 MMOL/L — SIGNIFICANT CHANGE UP (ref 135–145)
WBC # BLD: 12.61 K/UL — HIGH (ref 3.8–10.5)
WBC # FLD AUTO: 12.61 K/UL — HIGH (ref 3.8–10.5)

## 2020-10-10 PROCEDURE — 82947 ASSAY GLUCOSE BLOOD QUANT: CPT

## 2020-10-10 PROCEDURE — 86891 AUTOLOGOUS BLOOD OP SALVAGE: CPT

## 2020-10-10 PROCEDURE — C1713: CPT

## 2020-10-10 PROCEDURE — 83036 HEMOGLOBIN GLYCOSYLATED A1C: CPT

## 2020-10-10 PROCEDURE — 84100 ASSAY OF PHOSPHORUS: CPT

## 2020-10-10 PROCEDURE — 97110 THERAPEUTIC EXERCISES: CPT

## 2020-10-10 PROCEDURE — 85610 PROTHROMBIN TIME: CPT

## 2020-10-10 PROCEDURE — 93970 EXTREMITY STUDY: CPT

## 2020-10-10 PROCEDURE — 36430 TRANSFUSION BLD/BLD COMPNT: CPT

## 2020-10-10 PROCEDURE — 83735 ASSAY OF MAGNESIUM: CPT

## 2020-10-10 PROCEDURE — 80048 BASIC METABOLIC PNL TOTAL CA: CPT

## 2020-10-10 PROCEDURE — 82330 ASSAY OF CALCIUM: CPT

## 2020-10-10 PROCEDURE — 85018 HEMOGLOBIN: CPT

## 2020-10-10 PROCEDURE — 83605 ASSAY OF LACTIC ACID: CPT

## 2020-10-10 PROCEDURE — U0003: CPT

## 2020-10-10 PROCEDURE — P9016: CPT

## 2020-10-10 PROCEDURE — 97166 OT EVAL MOD COMPLEX 45 MIN: CPT

## 2020-10-10 PROCEDURE — 97162 PT EVAL MOD COMPLEX 30 MIN: CPT

## 2020-10-10 PROCEDURE — 84295 ASSAY OF SERUM SODIUM: CPT

## 2020-10-10 PROCEDURE — 82962 GLUCOSE BLOOD TEST: CPT

## 2020-10-10 PROCEDURE — C1769: CPT

## 2020-10-10 PROCEDURE — 97530 THERAPEUTIC ACTIVITIES: CPT

## 2020-10-10 PROCEDURE — 97116 GAIT TRAINING THERAPY: CPT

## 2020-10-10 PROCEDURE — 72128 CT CHEST SPINE W/O DYE: CPT

## 2020-10-10 PROCEDURE — 82803 BLOOD GASES ANY COMBINATION: CPT

## 2020-10-10 PROCEDURE — C1889: CPT

## 2020-10-10 PROCEDURE — 85027 COMPLETE CBC AUTOMATED: CPT

## 2020-10-10 PROCEDURE — 82435 ASSAY OF BLOOD CHLORIDE: CPT

## 2020-10-10 PROCEDURE — 85025 COMPLETE CBC W/AUTO DIFF WBC: CPT

## 2020-10-10 PROCEDURE — 85730 THROMBOPLASTIN TIME PARTIAL: CPT

## 2020-10-10 PROCEDURE — 84132 ASSAY OF SERUM POTASSIUM: CPT

## 2020-10-10 PROCEDURE — 85014 HEMATOCRIT: CPT

## 2020-10-10 PROCEDURE — 82565 ASSAY OF CREATININE: CPT

## 2020-10-10 PROCEDURE — 76000 FLUOROSCOPY <1 HR PHYS/QHP: CPT

## 2020-10-10 PROCEDURE — 72131 CT LUMBAR SPINE W/O DYE: CPT

## 2020-10-10 PROCEDURE — 86923 COMPATIBILITY TEST ELECTRIC: CPT

## 2020-10-10 RX ORDER — OXYCODONE HYDROCHLORIDE 5 MG/1
10 TABLET ORAL EVERY 8 HOURS
Refills: 0 | Status: DISCONTINUED | OUTPATIENT
Start: 2020-10-10 | End: 2020-10-16

## 2020-10-10 RX ORDER — SENNA PLUS 8.6 MG/1
2 TABLET ORAL AT BEDTIME
Refills: 0 | Status: DISCONTINUED | OUTPATIENT
Start: 2020-10-10 | End: 2020-11-03

## 2020-10-10 RX ORDER — INSULIN LISPRO 100/ML
VIAL (ML) SUBCUTANEOUS
Refills: 0 | Status: DISCONTINUED | OUTPATIENT
Start: 2020-10-10 | End: 2020-10-11

## 2020-10-10 RX ORDER — CELECOXIB 200 MG/1
1 CAPSULE ORAL
Qty: 0 | Refills: 0 | DISCHARGE

## 2020-10-10 RX ORDER — OXYCODONE HYDROCHLORIDE 5 MG/1
5 TABLET ORAL EVERY 6 HOURS
Refills: 0 | Status: DISCONTINUED | OUTPATIENT
Start: 2020-10-10 | End: 2020-10-16

## 2020-10-10 RX ORDER — FENOFIBRATE,MICRONIZED 130 MG
1 CAPSULE ORAL
Qty: 0 | Refills: 0 | DISCHARGE

## 2020-10-10 RX ORDER — MIDODRINE HYDROCHLORIDE 2.5 MG/1
5 TABLET ORAL EVERY 8 HOURS
Refills: 0 | Status: DISCONTINUED | OUTPATIENT
Start: 2020-10-10 | End: 2020-10-11

## 2020-10-10 RX ORDER — CYCLOBENZAPRINE HYDROCHLORIDE 10 MG/1
10 TABLET, FILM COATED ORAL THREE TIMES A DAY
Refills: 0 | Status: DISCONTINUED | OUTPATIENT
Start: 2020-10-10 | End: 2020-11-03

## 2020-10-10 RX ORDER — DEXTROSE 50 % IN WATER 50 %
12.5 SYRINGE (ML) INTRAVENOUS ONCE
Refills: 0 | Status: DISCONTINUED | OUTPATIENT
Start: 2020-10-10 | End: 2020-11-03

## 2020-10-10 RX ORDER — ATORVASTATIN CALCIUM 80 MG/1
10 TABLET, FILM COATED ORAL AT BEDTIME
Refills: 0 | Status: DISCONTINUED | OUTPATIENT
Start: 2020-10-10 | End: 2020-11-03

## 2020-10-10 RX ORDER — SODIUM CHLORIDE 9 MG/ML
1000 INJECTION, SOLUTION INTRAVENOUS
Refills: 0 | Status: DISCONTINUED | OUTPATIENT
Start: 2020-10-10 | End: 2020-11-03

## 2020-10-10 RX ORDER — CHOLECALCIFEROL (VITAMIN D3) 125 MCG
0 CAPSULE ORAL
Qty: 0 | Refills: 0 | DISCHARGE

## 2020-10-10 RX ORDER — GLUCAGON INJECTION, SOLUTION 0.5 MG/.1ML
1 INJECTION, SOLUTION SUBCUTANEOUS ONCE
Refills: 0 | Status: DISCONTINUED | OUTPATIENT
Start: 2020-10-10 | End: 2020-11-03

## 2020-10-10 RX ORDER — CHOLECALCIFEROL (VITAMIN D3) 125 MCG
1000 CAPSULE ORAL DAILY
Refills: 0 | Status: DISCONTINUED | OUTPATIENT
Start: 2020-10-10 | End: 2020-11-03

## 2020-10-10 RX ORDER — DIPHENHYDRAMINE HCL 50 MG
25 CAPSULE ORAL EVERY 4 HOURS
Refills: 0 | Status: DISCONTINUED | OUTPATIENT
Start: 2020-10-10 | End: 2020-11-03

## 2020-10-10 RX ORDER — ENOXAPARIN SODIUM 100 MG/ML
40 INJECTION SUBCUTANEOUS
Qty: 0 | Refills: 0 | DISCHARGE
Start: 2020-10-10

## 2020-10-10 RX ORDER — DEXTROSE 50 % IN WATER 50 %
25 SYRINGE (ML) INTRAVENOUS ONCE
Refills: 0 | Status: DISCONTINUED | OUTPATIENT
Start: 2020-10-10 | End: 2020-11-03

## 2020-10-10 RX ORDER — ENOXAPARIN SODIUM 100 MG/ML
40 INJECTION SUBCUTANEOUS DAILY
Refills: 0 | Status: DISCONTINUED | OUTPATIENT
Start: 2020-10-10 | End: 2020-10-11

## 2020-10-10 RX ORDER — DEXTROSE 50 % IN WATER 50 %
15 SYRINGE (ML) INTRAVENOUS ONCE
Refills: 0 | Status: DISCONTINUED | OUTPATIENT
Start: 2020-10-10 | End: 2020-11-03

## 2020-10-10 RX ORDER — ALPRAZOLAM 0.25 MG
1 TABLET ORAL
Qty: 0 | Refills: 0 | DISCHARGE

## 2020-10-10 RX ORDER — DULOXETINE HYDROCHLORIDE 30 MG/1
60 CAPSULE, DELAYED RELEASE ORAL
Refills: 0 | Status: DISCONTINUED | OUTPATIENT
Start: 2020-10-10 | End: 2020-10-14

## 2020-10-10 RX ORDER — MAGNESIUM HYDROXIDE 400 MG/1
30 TABLET, CHEWABLE ORAL EVERY 12 HOURS
Refills: 0 | Status: DISCONTINUED | OUTPATIENT
Start: 2020-10-10 | End: 2020-10-10

## 2020-10-10 RX ORDER — POLYETHYLENE GLYCOL 3350 17 G/17G
17 POWDER, FOR SOLUTION ORAL DAILY
Refills: 0 | Status: DISCONTINUED | OUTPATIENT
Start: 2020-10-10 | End: 2020-10-13

## 2020-10-10 RX ORDER — SENNA PLUS 8.6 MG/1
2 TABLET ORAL AT BEDTIME
Refills: 0 | Status: DISCONTINUED | OUTPATIENT
Start: 2020-10-10 | End: 2020-10-10

## 2020-10-10 RX ORDER — PANTOPRAZOLE SODIUM 20 MG/1
40 TABLET, DELAYED RELEASE ORAL
Refills: 0 | Status: DISCONTINUED | OUTPATIENT
Start: 2020-10-10 | End: 2020-11-03

## 2020-10-10 RX ORDER — FENOFIBRATE,MICRONIZED 130 MG
145 CAPSULE ORAL DAILY
Refills: 0 | Status: DISCONTINUED | OUTPATIENT
Start: 2020-10-10 | End: 2020-11-03

## 2020-10-10 RX ORDER — MAGNESIUM OXIDE 400 MG ORAL TABLET 241.3 MG
400 TABLET ORAL DAILY
Refills: 0 | Status: DISCONTINUED | OUTPATIENT
Start: 2020-10-10 | End: 2020-11-03

## 2020-10-10 RX ORDER — ASPIRIN/CALCIUM CARB/MAGNESIUM 324 MG
1 TABLET ORAL
Qty: 0 | Refills: 0 | DISCHARGE

## 2020-10-10 RX ORDER — SUMATRIPTAN SUCCINATE 4 MG/.5ML
25 INJECTION, SOLUTION SUBCUTANEOUS
Refills: 0 | Status: DISCONTINUED | OUTPATIENT
Start: 2020-10-10 | End: 2020-11-03

## 2020-10-10 RX ORDER — BUTALBITAL, ASPIRIN, CAFFEINE AND CODEINE PHOSPHATE 30; 50; 40; 325 MG/1; MG/1; MG/1; MG/1
1 CAPSULE ORAL
Qty: 0 | Refills: 0 | DISCHARGE

## 2020-10-10 RX ORDER — POLYETHYLENE GLYCOL 3350 17 G/17G
17 POWDER, FOR SOLUTION ORAL
Refills: 0 | Status: DISCONTINUED | OUTPATIENT
Start: 2020-10-10 | End: 2020-10-10

## 2020-10-10 RX ADMIN — MIDODRINE HYDROCHLORIDE 5 MILLIGRAM(S): 2.5 TABLET ORAL at 22:38

## 2020-10-10 RX ADMIN — MIDODRINE HYDROCHLORIDE 5 MILLIGRAM(S): 2.5 TABLET ORAL at 05:43

## 2020-10-10 RX ADMIN — Medication 2: at 17:38

## 2020-10-10 RX ADMIN — ATORVASTATIN CALCIUM 10 MILLIGRAM(S): 80 TABLET, FILM COATED ORAL at 22:38

## 2020-10-10 RX ADMIN — OXYCODONE HYDROCHLORIDE 10 MILLIGRAM(S): 5 TABLET ORAL at 23:59

## 2020-10-10 RX ADMIN — OXYCODONE HYDROCHLORIDE 10 MILLIGRAM(S): 5 TABLET ORAL at 16:15

## 2020-10-10 RX ADMIN — DULOXETINE HYDROCHLORIDE 60 MILLIGRAM(S): 30 CAPSULE, DELAYED RELEASE ORAL at 17:38

## 2020-10-10 RX ADMIN — DULOXETINE HYDROCHLORIDE 60 MILLIGRAM(S): 30 CAPSULE, DELAYED RELEASE ORAL at 05:43

## 2020-10-10 RX ADMIN — ENOXAPARIN SODIUM 40 MILLIGRAM(S): 100 INJECTION SUBCUTANEOUS at 22:38

## 2020-10-10 RX ADMIN — OXYCODONE HYDROCHLORIDE 10 MILLIGRAM(S): 5 TABLET ORAL at 15:41

## 2020-10-10 RX ADMIN — CYCLOBENZAPRINE HYDROCHLORIDE 10 MILLIGRAM(S): 10 TABLET, FILM COATED ORAL at 22:33

## 2020-10-10 RX ADMIN — Medication 145 MILLIGRAM(S): at 22:38

## 2020-10-10 RX ADMIN — CYCLOBENZAPRINE HYDROCHLORIDE 10 MILLIGRAM(S): 10 TABLET, FILM COATED ORAL at 11:00

## 2020-10-10 RX ADMIN — PANTOPRAZOLE SODIUM 40 MILLIGRAM(S): 20 TABLET, DELAYED RELEASE ORAL at 05:43

## 2020-10-10 NOTE — H&P ADULT - NSHPSOCIALHISTORY_GEN_ALL_CORE
Social:  Tobacco - former smoker with 0.25ppd, quit in 01/2020  EtOH - 1-2 drinks, 2-4 times per month  Illicit drugs: denies  Lives with spouse in  with 3STE, B/L HR but unable to reach both at the same time, 1 flight of stairs inside +UHR    Prior Level of Function: independent with ADLs    Current Level of Function:  PT 10/8/20: Fallon bed mobility, Fallon transfers with 1-2 person assist, Fallon gait 8' RW and 1 person assist  OT 10/7/20: supervision rolling/turning, modA other bed mobility Social:  Tobacco - former smoker with 0.25ppd, quit in 01/2020  EtOH - 1-2 drinks, 2-4 times per month  Illicit drugs: denies  Lives with spouse in  with 3STE, B/L HR but unable to reach both at the same time, 1 flight of stairs inside +UHR    Prior Level of Function: independent with ADLs  Lives with  in [place].    Current Level of Function:  PT 10/8/20: Fallon bed mobility, Fallon transfers with 1-2 person assist, Fallon gait 8' RW and 1 person assist  OT 10/7/20: supervision rolling/turning, modA other bed mobility

## 2020-10-10 NOTE — H&P ADULT - NSICDXPASTMEDICALHX_GEN_ALL_CORE_FT
PAST MEDICAL HISTORY:  Anxiety and depression     Eczema extremities    GERD (gastroesophageal reflux disease)     H/O sinusitis     History of sciatica mostly right side    HTN (hypertension)     Hyperlipidemia     Lumbar spinal stenosis     Meniscus tear left knee    Migraine     Scoliosis     Seasonal allergies     Spinal stenosis in cervical region

## 2020-10-10 NOTE — H&P ADULT - NSHPLABSRESULTS_GEN_ALL_CORE
RECENT LABS    Vital Signs Last 24 Hrs  T(C): 36.7 (10 Oct 2020 08:56), Max: 36.7 (09 Oct 2020 17:05)  T(F): 98 (10 Oct 2020 08:56), Max: 98 (09 Oct 2020 17:05)  HR: 87 (10 Oct 2020 08:56) (82 - 94)  BP: 124/64 (10 Oct 2020 08:56) (115/80 - 142/78)  BP(mean): --  RR: 18 (10 Oct 2020 08:56) (18 - 18)  SpO2: 97% (10 Oct 2020 08:56) (87% - 97%)                          9.4    12.61 )-----------( 523      ( 10 Oct 2020 07:18 )             29.5     10-10    135  |  101  |  18  ----------------------------<  118<H>  4.1   |  26  |  0.74    Ca    9.0      10 Oct 2020 07:11          CAPILLARY BLOOD GLUCOSE      POCT Blood Glucose.: 115 mg/dL (10 Oct 2020 07:22)  POCT Blood Glucose.: 129 mg/dL (09 Oct 2020 21:36)  POCT Blood Glucose.: 126 mg/dL (09 Oct 2020 17:44)  POCT Blood Glucose.: 90 mg/dL (09 Oct 2020 12:55)          IMAGING:  CT T/L-spine 9/29/20:  Redemonstration of C5 corpectomy with interbody spacer device. Redemonstration of anterior cervical discectomy and fusion of C4-T1.    Interval placement of interbody spacer devices at L1-L2 through L4-L5.    Persistent grade 1 anterolisthesis of L4 and L5.    Multilevel degenerative changes.    Evaluation of spinal canal contents limited by CT modality and streak artifact from spinal hardware.      Duplex B/L LE 9/30/20: No evidence of deep venous thrombosis in either lower extremity.      CT T/L-spine 10/1/20:  Redemonstration of anterior cervical discectomy and fusion of C4-T1 and C5 corpectomy.    Redemonstration of interbody spacer devices from L1-L2 through L4-L5.    Interval T12-S1 total laminectomies and partial facetectomies.    Interval posterior fusion of T9-S1.    Interval sacroiliac fusion with sacroiliac screws.    New hardware is well-placed. No evidence for new hardware fracture or hardware loosening.    Vertebral body height and facet alignment maintained.

## 2020-10-10 NOTE — DISCHARGE NOTE NURSING/CASE MANAGEMENT/SOCIAL WORK - NSDCFUADDAPPT_GEN_ALL_CORE_FT
Please schedule an appointment after Rehab with 1) Neurosurgeon Dr Valdez 2) Plastic Surgeon Dr Alegre 3) Your Primary Care Physician after rehab    Remove staples lt hip POD 14 if wound looks well  Remove sutures midline spine POD 14 if wound looks well

## 2020-10-10 NOTE — H&P ADULT - NSHPREVIEWOFSYSTEMS_GEN_ALL_CORE
REVIEW OF SYSTEMS  Constitutional: No fever, No Chills, No fatigue  HEENT: No eye pain, No visual disturbances, No difficulty hearing, No Dysphagia   Pulm: No cough,  No shortness of breath  Cardio: No chest pain, No palpitations  GI:  No abdominal pain, No nausea, No vomiting, No diarrhea, No constipation, No incontinence, Last Bowel Movement on   : No dysuria, No frequency, No hematuria, No incontinence  Neuro: No headaches, No memory loss, No loss of strength, No numbness, No tremors  Skin: No itching, No rashes, No lesions   Endo: No temperature intolerance  MSK: No joint pain, No joint swelling, No muscle pain, No Neck or back pain  Psych:  No depression, No anxiety REVIEW OF SYSTEMS  Constitutional: No fever, No Chills, No fatigue  HEENT: No eye pain, No visual disturbances, No difficulty hearing, No Dysphagia   Pulm: No cough,  No shortness of breath  Cardio: No chest pain, No palpitations  GI:  +diarrhea , no abdominal pain  : No dysuria, No frequency, No hematuria, No incontinence  Neuro: No headaches, No memory loss, No loss of strength, No numbness, No tremors  Skin: + surgical incisions  Endo: No temperature intolerance  MSK: + bilateral shoulder and knee pain, hamstring tightness, mild back pain  Psych:  No depression, No anxiety

## 2020-10-10 NOTE — PROGRESS NOTE ADULT - SUBJECTIVE AND OBJECTIVE BOX
SUBJECTIVE: Getting washed up now. In good spirits, no complaints except for numbness Lt great toe.    OVERNIGHT EVENTS: None    Vital Signs Last 24 Hrs  T(C): 36.7 (10 Oct 2020 04:39), Max: 36.7 (09 Oct 2020 17:05)  T(F): 98 (10 Oct 2020 04:39), Max: 98 (09 Oct 2020 17:05)  HR: 82 (10 Oct 2020 04:39) (82 - 94)  BP: 142/78 (10 Oct 2020 04:39) (115/80 - 142/78)  BP(mean): --  RR: 18 (10 Oct 2020 04:39) (18 - 18)  SpO2: 94% (10 Oct 2020 04:39) (87% - 97%)  IVF: [X ] IVL [ ] NS+K@   DIET: [X ] Regular [ ] CCD [ ] Renal [ ] Puree [ ] Dysphagia [ ] Tube Feeds:   PCA: [ ] YES [X ] NO   FREEMAN: [ ] YES [X ] NO [X ] VOID   BM: [ X] YES-on 10/10    DRAINS: None    PHYSICAL EXAM:    Constitutional: No Acute Distress     Neurological: AAOx3, Following Commands, Moving all Extremities     Motor exam:          Upper extremity                         Delt     Bicep     Tricep    HG                                                 R         5/5        5/5        5/5       5/5                                               L          5/5        5/5        5/5       5/5          Lower extremity                        HF         KF        KE       DF         PF                                                  R        5/5        5/5        5/5       5/5         5/5                                               L        4+/5        5/5       5/5       5/5          5/5                                                 Sensation: [X] intact to light touch  [] decreased:     Pulmonary: Clear to Auscultation, No rales, No rhonchi, No wheezes     Cardiovascular: S1, S2, Regular rate and rhythm     Gastrointestinal: Soft, Non-tender, Non-distended     Extremities: No calf tenderness     Incision: Aquacel dsg ML back and lt Hip-CDI. Nylon sutures ML spine and staples Lt Hip. +resolving ecchymosis lt hip.    LABS:                        9.4    12.61 )-----------( 523      ( 10 Oct 2020 07:18 )             29.5    10-10    135  |  101  |  18  ----------------------------<  118<H>  4.1   |  26  |  0.74    Ca    9.0      10 Oct 2020 07:11    COVID-19 PCR: Ever (06 Oct 2020 12:45)    IMAGING:  < from: [facility] Duplex Lower Ext Vein Scan, Bilat (09.30.20 @ 13:11) >  No evidence of deep venous thrombosis in either lower extremity.    < from: CT Lumbar Spine No Cont (10.01.20 @ 10:24) >  Redemonstration of anterior cervical discectomy and fusion of C4-T1 and C5 corpectomy.    Redemonstration of interbody spacer devices from L1-L2 through L4-L5.    Interval T12-S1 total laminectomies and partial facetectomies.    Interval posterior fusion of T9-S1.    Interval sacroiliac fusion with sacroiliac screws.    New hardware is well-placed. No evidence for new hardware fracture or hardware loosening.    Vertebral body height and facet alignment maintained    MEDICATIONS  (STANDING):  atorvastatin 10 milliGRAM(s) Oral at bedtime  cholecalciferol 1000 Unit(s) Oral daily  dextrose 5%. 1000 milliLiter(s) (50 mL/Hr) IV Continuous <Continuous>  dextrose 50% Injectable 12.5 Gram(s) IV Push once  dextrose 50% Injectable 25 Gram(s) IV Push once  dextrose 50% Injectable 25 Gram(s) IV Push once  DULoxetine 60 milliGRAM(s) Oral two times a day  enoxaparin Injectable 40 milliGRAM(s) SubCutaneous daily  fenofibrate Tablet 145 milliGRAM(s) Oral daily  influenza   Vaccine 0.5 milliLiter(s) IntraMuscular once  insulin lispro (HumaLOG) corrective regimen sliding scale   SubCutaneous three times a day before meals  insulin lispro (HumaLOG) corrective regimen sliding scale   SubCutaneous at bedtime  magnesium oxide 400 milliGRAM(s) Oral daily  midodrine 5 milliGRAM(s) Oral every 8 hours  multivitamin 1 Tablet(s) Oral daily  pantoprazole    Tablet 40 milliGRAM(s) Oral before breakfast  polyethylene glycol 3350 17 Gram(s) Oral two times a day  senna 2 Tablet(s) Oral at bedtime    MEDICATIONS  (PRN):  aluminum hydroxide/magnesium hydroxide/simethicone Suspension 30 milliLiter(s) Oral every 4 hours PRN Dyspepsia  cyclobenzaprine 10 milliGRAM(s) Oral three times a day PRN Muscle Spasm  dextrose 40% Gel 15 Gram(s) Oral once PRN Blood Glucose LESS THAN 70 milliGRAM(s)/deciliter  diphenhydrAMINE 25 milliGRAM(s) Oral every 4 hours PRN Rash and/or Itching  glucagon  Injectable 1 milliGRAM(s) IntraMuscular once PRN Glucose LESS THAN 70 milligrams/deciliter  magnesium hydroxide Suspension 30 milliLiter(s) Oral every 12 hours PRN Constipation  SUMAtriptan 25 milliGRAM(s) Oral four times a day PRN Migraine

## 2020-10-10 NOTE — H&P ADULT - ASSESSMENT
Assessment/Plan:  CRISTEL WATTS is a HTN, hyperlipidemia, osteoarthritis, anxiety/depression disorders, GERD, eczema, sinusitis, scoliosis/spinal stenosis- lumbar region, presented to [facility] 9/29/20 for scheduled stage 1, L1-5 lumbar lateral interbody fusion with Dr. Valdez. Stage 2 T9-pelvis arthrodesis, L5-S1 TLIF done on 9/30/20 with closure of muscle flap done by plastics, Dr. Alegre. Hospital course complicated by hemorrhagic shock postop requiring pressors and blood transfusions, now stable on Midodrine. Now admitted to [facility] after for initiation of a multidisciplinary rehab program consisting focused on functional mobility, transfers and ADLs (activities of daily living).    #Elective L1-5 lumbar fusion, T9-pelvis arthrodesis, L5-S1 TLIF 9/29-9/30  - Start comprehensive multidisciplinary rehab program, PT/OT 3 hours a day, 5 days a week.  - P&O as needed  - Remove staples lt hip POD 14 if wound looks well. Remove sutures midline spine POD 14 if wound looks well per neurosurgery. Can remove dressing on 3rd day after surgery and leave incision open to air. Patient may shower on the 3rd day after surgery.   - HOLD ASA and no NSAIDs until cleared by neurosurgery.  - Last Aquacel dressing change 10/8/20. Aquacel to be changed Q5 days. Next dressing change 10/13/20.  - F/u with neurosurgery, Dr. Valdez, outpatient  - F/u with plastics in 1-2 weeks with Dr. Alegre in clinic  - Activity Limitations: Decreased social, vocational and leisure activities, decreased self care and ADLs, decreased mobility, decreased ability to manage household and finances.   - No weight bearing restrictions  - Precautions: falls, spine, neuro    #Hypotension postop  - Continue Midodrine 5mg Q8hr, wean as tolerated  - Home meds for HTN on hold: Norvasc 5mg daily, Bystolic 10mg QAM, Edarbi 40mg QHS  - Monitor BP    #Depression/Anxiety:  - Continue Cymbalta 60mg BID  - Consider neuropsychology consult    #Migraines  - Sumatriptan 25mg Q4hr PRN    #Sleep:   - Maintain quiet hours and low stim environment.  - Melatonin PRN to maximize participation in therapy during the day.   - Monitor sleep logs    #Allergies  - Continue Benadryl 25mg Q4hr PRN    Pain Management:  - Flexeril 10mg TID PRN  - Avoid sedating medications that may interfere with cognitive recovery    GI/Bowel:  - At risk for constipation due to neurologic diagnosis, immobility and/or medication use  - Senna QHS, Miralax BID, Milk of Magnesia prn  - GI ppx: Protonix daily, Maalox PRN    /Bladder:   - At risk for incontinence and retention due to neurologic diagnosis and limited mobility  - Currently patient voids:    [ ] independent     [ ] external collection device (condom cath)    [ ] Indwelling nunn catheter     [ ] Intermittent catheterization  - Continue catheter/bladder nursing protocol with bladder scans Q**** hours with straight cath for >***cc.  - Encourage timed voids every 4 hours while awake for independence and to promote continence during therapy.    Skin/Pressure Injury:   - At risk for pressure injury due to neurologic diagnosis and relative immobility.  - Skin assessment on admission: ***  - Monitor Incisions: ***  - Turn every 2 hours while in bed, air mattress  - Soft heel protectors  - Skin barrier cream as needed  - Nursing to monitor skin Qshift    Diet/Dysphagia:  - Diet Consistency/Modifications: regular/DASH  - Nutrition consult    DVT ppx:  - Lovenox  - SCDs  - Last Doppler on 9/30/20 neg B/L.  ---------------  Code Status/Emergency Contact:  Health Care Proxy (HCP)  Jatin Rosalesida  [phone]      Outpatient Follow-up (Specialty/Name of physician):  Shine Valdez  NEUROSURGERY  [address] [place] [address], [address]  [place] [address]  Phone: [phone]  Fax: [phone]  Follow Up Time:     Marcel Alegre)  Plastic Surgery; Surgery; Surgical Critical Care  [address] [place] [address]  Phone: [phone]  Fax: [phone]  --------------  Goals: Safe discharge to home  Estimated Length of Stay: 10-14 days  Rehab Potential: Good  Medical Prognosis: Good  Estimated Disposition: Home with Home Care  ---------------    PRESCREEN COMPARISON:  I have reviewed the prescreen information and I have found no relevant changes between the preadmission screening and my post admission evaluation.    RATIONALE FOR INPATIENT ADMISSION: Patient demonstrates the following:  [X] Medically appropriate for rehabilitation admission  [X] Has attainable rehab goals with an appropriate initial discharge plan  [X]Has rehabilitation potential (expected to make a significant improvement within a reasonable period of time)  [X] Requires close medical management by a rehab physician, rehab nursing care, Hospitalist and comprehensive interdisciplinary team (including PT, OT and/or SLP, Prosthetics and Orthotics)   Assessment/Plan:  CRISTEL WATTS is a HTN, hyperlipidemia, osteoarthritis, anxiety/depression disorders, GERD, eczema, sinusitis, scoliosis/spinal stenosis- lumbar region, presented to [facility] 9/29/20 for scheduled stage 1, L1-5 lumbar lateral interbody fusion with Dr. Valdez. Stage 2 T9-pelvis arthrodesis, L5-S1 TLIF done on 9/30/20 with closure of muscle flap done by plastics, Dr. Alegre. Hospital course complicated by hemorrhagic shock postop requiring pressors and blood transfusions, now stable on Midodrine. Now admitted to [facility] after for initiation of a multidisciplinary rehab program consisting focused on functional mobility, transfers and ADLs (activities of daily living).    #Elective L1-5 lumbar fusion, T9-pelvis arthrodesis, L5-S1 TLIF 9/29-9/30  - Start comprehensive multidisciplinary rehab program, PT/OT 3 hours a day, 5 days a week.  - P&O as needed  - Remove staples lt hip POD 14 if wound looks well. Remove sutures midline spine POD 14 if wound looks well per neurosurgery. Can remove dressing on 3rd day after surgery and leave incision open to air. Patient may shower on the 3rd day after surgery.   - HOLD ASA and no NSAIDs until cleared by neurosurgery.  - Last Aquacel dressing change 10/8/20. Aquacel to be changed Q5 days. Next dressing change 10/13/20.  - F/u with neurosurgery, Dr. Valdez, outpatient  - F/u with plastics in 1-2 weeks with Dr. Alegre in clinic  - Activity Limitations: Decreased social, vocational and leisure activities, decreased self care and ADLs, decreased mobility, decreased ability to manage household and finances.   - No weight bearing restrictions  - Precautions: falls, spine, neuro    #Hypotension postop  - Continue Midodrine 5mg Q8hr, wean as tolerated  - Home meds for HTN on hold: Norvasc 5mg daily, Bystolic 10mg QAM, Edarbi 40mg QHS  - Monitor BP    #Pre-DM  - HbA1c 6.4 9/29/20  - FS  10/9  - FS and ISS BID with meals  - Monitor FS.    #Depression/Anxiety:  - Continue Cymbalta 60mg BID  - Consider neuropsychology consult    #Migraines  - Sumatriptan 25mg Q4hr PRN    #Sleep:   - Maintain quiet hours and low stim environment.  - Melatonin PRN to maximize participation in therapy during the day.   - Monitor sleep logs    #Allergies  - Continue Benadryl 25mg Q4hr PRN    Pain Management:  - Flexeril 10mg TID PRN  - Avoid sedating medications that may interfere with cognitive recovery    GI/Bowel:  - At risk for constipation due to neurologic diagnosis, immobility and/or medication use  - Senna QHS, Miralax BID, Milk of Magnesia prn  - GI ppx: Protonix daily, Maalox PRN    /Bladder:   - At risk for incontinence and retention due to neurologic diagnosis and limited mobility  - Currently patient voids:    [ ] independent     [ ] external collection device (condom cath)    [ ] Indwelling nunn catheter     [ ] Intermittent catheterization  - Continue catheter/bladder nursing protocol with bladder scans Q**** hours with straight cath for >***cc.  - Encourage timed voids every 4 hours while awake for independence and to promote continence during therapy.    Skin/Pressure Injury:   - At risk for pressure injury due to neurologic diagnosis and relative immobility.  - Skin assessment on admission: ***  - Monitor Incisions: ***  - Turn every 2 hours while in bed, air mattress  - Soft heel protectors  - Skin barrier cream as needed  - Nursing to monitor skin Qshift    Diet/Dysphagia:  - Diet Consistency/Modifications: regular/DASH  - Nutrition consult    DVT ppx:  - Lovenox  - SCDs  - Last Doppler on 9/30/20 neg B/L.  ---------------  Code Status/Emergency Contact:  Health Care Proxy (HCP)  Jatin Stevenson  [phone]      Outpatient Follow-up (Specialty/Name of physician):  Shine Valdez  NEUROSURGERY  [address] [place] [address], [address]  [place] [address]  Phone: [phone]  Fax: [phone]  Follow Up Time:     Marcel Alegre)  Plastic Surgery; Surgery; Surgical Critical Care  [address] [place] [address]  Phone: [phone]  Fax: [phone]  --------------  Goals: Safe discharge to home  Estimated Length of Stay: 10-14 days  Rehab Potential: Good  Medical Prognosis: Good  Estimated Disposition: Home with Home Care  ---------------    PRESCREEN COMPARISON:  I have reviewed the prescreen information and I have found no relevant changes between the preadmission screening and my post admission evaluation.    RATIONALE FOR INPATIENT ADMISSION: Patient demonstrates the following:  [X] Medically appropriate for rehabilitation admission  [X] Has attainable rehab goals with an appropriate initial discharge plan  [X]Has rehabilitation potential (expected to make a significant improvement within a reasonable period of time)  [X] Requires close medical management by a rehab physician, rehab nursing care, Hospitalist and comprehensive interdisciplinary team (including PT, OT and/or SLP, Prosthetics and Orthotics)   Assessment/Plan:  CRISTEL WATTS is a HTN, hyperlipidemia, osteoarthritis, anxiety/depression disorders, GERD, eczema, sinusitis, scoliosis/spinal stenosis- lumbar region, presented to [facility] 9/29/20 for scheduled stage 1, L1-5 lumbar lateral interbody fusion with Dr. Valdez. Stage 2 T9-pelvis arthrodesis, L5-S1 TLIF done on 9/30/20 with closure of muscle flap done by plastics, Dr. Alegre. Hospital course complicated by hemorrhagic shock postop requiring pressors and blood transfusions, now stable on Midodrine. Now admitted to [facility] after for initiation of a multidisciplinary rehab program consisting focused on functional mobility, transfers and ADLs (activities of daily living).    #Elective L1-5 lumbar fusion, T9-pelvis arthrodesis, L5-S1 TLIF 9/29-9/30  - Start comprehensive multidisciplinary rehab program, PT/OT 3 hours a day, 5 days a week.  - P&O as needed  - Wound Care:     Remove staples lt hip POD 14 if wound looks well. Remove sutures midline spine POD 14 if wound looks well per neurosurgery.      Can remove dressing on 3rd day after surgery and leave incision open to air.      Patient may shower on the 3rd day after surgery.      Last Aquacel dressing change 10/8/20. Aquacel to be changed Q5 days. Next dressing change 10/13/20.  - HOLD ASA and no NSAIDs until cleared by neurosurgery.  - F/u with neurosurgery, Dr. Valdez, outpatient  - F/u with plastics in 1-2 weeks with Dr. Alegre in clinic  - No weight bearing restrictions  - Precautions: falls, spine, neuro    #Hypotension postop - improved  - Continue Midodrine 5mg Q8hr, wean as tolerated  - Home meds for HTN on hold: Norvasc 5mg daily, Bystolic 10mg QAM, Edarbi 40mg QHS  - Monitor BP    #Pre-DM  - HbA1c 6.4 9/29/20  - FS  10/9  - FS and ISS BID with meals  - Monitor FS.    #Depression/Anxiety:  - Continue Cymbalta 60mg BID  - Consider neuropsychology consult    #Migraines  - Sumatriptan 25mg Q4hr PRN    #Sleep:   - Maintain quiet hours and low stim environment.  - Melatonin PRN to maximize participation in therapy during the day.   - Monitor sleep logs    #Allergies  - Continue Benadryl 25mg Q4hr PRN    Pain Management:  - Flexeril 10mg TID PRN    GI/Bowel:  - At risk for constipation due to neurologic diagnosis, immobility and/or medication use  - Senna QHS, Miralax BID, Milk of Magnesia prn -- discontinued on admission to rehab due to endorsing loose stools  - GI ppx: Protonix daily, Maalox PRN    /Bladder:   - At risk for incontinence and retention due to neurologic diagnosis and limited mobility  - Currently patient voids: independent with bedpan   - Encourage timed voids every 4 hours while awake for independence and to promote continence during therapy.    Skin/Pressure Injury:   - Skin assessment on admission: no pressure injuries noted   - Monitor Incisions: Thoraco-lumbar incision and left flank incision - wound care as above  - Turn every 2 hours while in bed, air mattress  - Soft heel protectors  - Skin barrier cream as needed  - Nursing to monitor skin Qshift    Diet:  - Diet Consistency/Modifications: regular/DASH    DVT ppx:  - Lovenox  - SCDs  - Last Doppler on 9/30/20 neg B/L.  ---------------  Code Status/Emergency Contact:  Health Care Proxy (HCP)  Jatin Stevenson  [phone]      Outpatient Follow-up (Specialty/Name of physician):  Shine Valdez  NEUROSURGERY  [address] [place] [address], [address]  [place] [address]  Phone: [phone]  Fax: [phone]  Follow Up Time:     Marcel Alegre (MD)  Plastic Surgery; Surgery; Surgical Critical Care  [address] [place] [address]  Phone: [phone]  Fax: [phone]  --------------  Goals: Safe discharge to home  Estimated Length of Stay: 10-14 days  Rehab Potential: Good  Medical Prognosis: Good  Estimated Disposition: Home with Home Care  ---------------    PRESCREEN COMPARISON:  I have reviewed the prescreen information and I have found no relevant changes between the preadmission screening and my post admission evaluation.    RATIONALE FOR INPATIENT ADMISSION: Patient demonstrates the following:  [X] Medically appropriate for rehabilitation admission  [X] Has attainable rehab goals with an appropriate initial discharge plan  [X]Has rehabilitation potential (expected to make a significant improvement within a reasonable period of time)  [X] Requires close medical management by a rehab physician, rehab nursing care, Hospitalist and comprehensive interdisciplinary team (including PT, OT and/or SLP, Prosthetics and Orthotics)

## 2020-10-10 NOTE — H&P ADULT - REASON FOR ADMISSION
functional deficits due to L1-L5 lumbar fusion, T9-pelvis arthrodesis, L5-S1 TLIF  03.9 Other Neurologic

## 2020-10-10 NOTE — PROGRESS NOTE ADULT - ASSESSMENT
60 year old female with h/o scoliosis/ spinal stenosis- lumbar region, presents to preadmission testing for scheduled stage 1, L1-5 lumbar lateral interbody fusion, scheduled for 9/29/2020. Her medical history includes HTN, hyperlipidemia, osteoarthritis, anxiety/depression disorders, GERD, eczema, sinusitis.    Scheduled for COVID-19 swab on 9/26/2020.  (22 Sep 2020 07:59)    PROCEDURE: Adm 9/29 Stage 1 L1-5 LLIF, 9/30 Stage 2 T9-pelvis, L5-S1 w/plastic closure     POD#10/11    PLAN:  Neuro: Anemia stable. Leuckocytosis trending downwards, pt asymptomatic.  Inc activity/OOB. DC to Rehab today.    Plastic note of 10/9--please follow up in 1-2 weeks with Dr. Alegre in office (1-2 weeks based on dc from hosp). Plastic Surgery Resident [facility]: [phone]  Electronic Signatures: Cindy Hewitt (MD)     Respiratory: Patient instructed to use incentive spirometer [ X] YES [ ] NO              DVT ppx: [X ] SQL [ ] SQH and Venodynes [ ] Left [ ] Right [ X] Bilateral    Discharge Planning:  The patient was evaluated by PT/OT/PMR and recommended Acute Rehab.   She was subsequently DC on 10/10/2020 in stable condition.    More than 30 minutes spent on total encounter: more than 50% of the visit was spent on educating the patient and family regarding condition, medications, follow up plans, signs and symptoms to be concerned with, preparing paperwork, and questions answered regarding discharge.      Assessment:  Please Check When Present   []  GCS  E   V  M     Heart Failure: []Acute, [] acute on chronic , []chronic  Heart Failure:  [] Diastolic (HFpEF), [] Systolic (HFrEF), []Combined (HFpEF and HFrEF), [] RHF, [] Pulm HTN, [] Other    [] ANDRESSA, [] ATN, [] AIN, [] other  [] CKD1, [] CKD2, [] CKD 3, [] CKD 4, [] CKD 5, []ESRD    Encephalopathy: [] Metabolic, [] Hepatic, [] toxic, [] Neurological, [] Other    Abnormal Nurtitional Status: [] malnurtition (see nutrition note), [ ]underweight: BMI < 19, [] morbid obesity: BMI >40, [] Cachexia    [] Sepsis  [] hypovolemic shock,[] cardiogenic shock, [] hemorrhagic shock, [] neuogenic shock  [] Acute Respiratory Failure  []Cerebral edema, [] Brain compression/ herniation,   [] Functional quadriplegia  [] Acute blood loss anemia

## 2020-10-10 NOTE — H&P ADULT - ATTENDING COMMENTS
Patient seen and examined. I agree with note/plan.  Pt with pain controlled, no nausea, vomiting, diarrhea.    Vital Signs Last 24 Hrs  T(C): 37.1 (11 Oct 2020 08:00), Max: 37.1 (11 Oct 2020 08:00)  T(F): 98.8 (11 Oct 2020 08:00), Max: 98.8 (11 Oct 2020 08:00)  HR: 82 (11 Oct 2020 08:00) (82 - 101)  BP: 144/79 (11 Oct 2020 08:00) (115/66 - 144/79)  BP(mean): --  RR: 16 (11 Oct 2020 08:00) (16 - 16)  SpO2: 95% (11 Oct 2020 08:00) (94% - 95%)                          9.4    13.86 )-----------( 478      ( 11 Oct 2020 10:23 )             29.0     10-11    135  |  102  |  16  ----------------------------<  153<H>  3.7   |  27  |  0.63    Ca    8.6      11 Oct 2020 10:23    TPro  5.3<L>  /  Alb  2.2<L>  /  TBili  0.6  /  DBili  x   /  AST  29  /  ALT  46<H>  /  AlkPhos  69  10-11        PT/OT eval  midodrine tapered down  leukocytosis - likely reactive. no signs of infection

## 2020-10-10 NOTE — PROGRESS NOTE ADULT - PROVIDER SPECIALTY LIST ADULT
Anesthesia
NSICU
Neurosurgery
Plastic Surgery
Rehab Medicine
Plastic Surgery
Anesthesia
NSICU
NSICU

## 2020-10-10 NOTE — H&P ADULT - NSHPPHYSICALEXAM_GEN_ALL_CORE
PHYSICAL EXAM  Constitutional - NAD, Comfortable, in bed   HEENT - NCAT, EOMI  Chest - Breathing comfortably  Cardiovascular - S1S2   Abdomen - Soft   Extremities - No C/C/E, No calf tenderness   Neurologic Exam -                    Cognitive - Awake, Alert, AAO to self, place, date, year, situation     Communication - Fluent, No dysarthria     Cranial Nerves - CN 2-12 intact     Motor - No focal deficits                    LEFT    UE - ShAB 5/5, EF 5/5, EE 5/5, WE 5/5,  5/5                    RIGHT UE - ShAB 5/5, EF 5/5, EE 5/5, WE 5/5,  5/5                    LEFT    LE - HF 4/5, KE 5/5, DF 5/5, PF 5/5                    RIGHT LE - HF 4/5, KE 4/5, DF 5/5, PF 5/5        Sensory - Intact to LT     Reflexes - DTR Intact, No primitive reflexes     Psychiatric - Mood stable, Affect WNL PHYSICAL EXAM  Constitutional - NAD, Comfortable, lying in bed  HEENT - NCAT, EOMI  Chest - Breathing comfortably  Cardiovascular - RRR  Abdomen - Soft, mildly distended, hyperactive bowel sounds  Extremities - No C/C/E, No calf tenderness   Neurologic Exam -                    Cognitive - Awake, Alert, AAO to self, place, date, year, situation     Communication - Fluent, No dysarthria     Cranial Nerves - no facial asymmetry, shoulder shrug intact,      Motor -                     LEFT    UE - ShAB 5/5, EF 5/5, EE 5/5, WE 5/5,  5/5                    RIGHT UE - ShAB 5/5, EF 5/5, EE 5/5, WE 5/5,  5/5                    LEFT    LE - HF 4/5, KE 4/5, DF 3/5, PF 5/5                    RIGHT LE - HF 3/5, KE 3/5, DF 3/5, PF 5/5        Sensory - decreased to light touch medial leg on RIGHT and dorsum of foot on LEFT     Reflexes - DTR Intact, No primitive reflexes     Psychiatric - Mood stable, Affect WNL     Skin:         thoraco-lumbar surgical incision approximated with staples and dressed with Aquacel         Left flank with ecchymosis and surgical incision dressed with Aquacel         Healed drain incisions lower lumbar          No pressure injuries noted on sacrum, coccyx, or heels

## 2020-10-10 NOTE — DISCHARGE NOTE NURSING/CASE MANAGEMENT/SOCIAL WORK - PATIENT PORTAL LINK FT
You can access the FollowMyHealth Patient Portal offered by [facility] by registering at the following website: [email or web address]. By joining [employer]’s FollowMyHealth portal, you will also be able to view your health information using other applications (apps) compatible with our system.

## 2020-10-10 NOTE — H&P ADULT - HISTORY OF PRESENT ILLNESS
60 year old female with PMHx HTN, hyperlipidemia, osteoarthritis, anxiety/depression disorders, GERD, eczema, sinusitis, scoliosis/spinal stenosis- lumbar region, presented to [facility] 9/29/20 for scheduled stage 1, L1-5 lumbar lateral interbody fusion with Dr. Valdez. Stage 2 T9-pelvis arthrodesis, L5-S1 TLIF done on 9/30/20 with closure of muscle flap done by plastics, Dr. Alegre. Admitted to NSICU post operatively due to hemorrhagic shock requiring fluid resuscitation with IV hydration and pressors. EBL 1500 cc, s/p 2 u prbc. Patient was weaned off IV pressors 10/1/20 and started on midodrine. Transferred to the floor when stable. Surgical drains removed on 10/7/20. Plastic surgery following for incision management. Aquacel dressing changed on 10/3 and 10/8. Hgb remains stable. Pt currently off antihypertensives, now on midodrine- wean to off as indicated.     Patient is medically and neurologically stable for discharge to [place] for multidisciplinary acute rehab 10/10/20. 60 year old female with PMHx HTN, hyperlipidemia, osteoarthritis, anxiety/depression disorders, GERD, eczema, sinusitis, scoliosis/spinal stenosis- lumbar region, presented to [facility] 9/29/20 for scheduled stage 1, L1-5 lumbar lateral interbody fusion with Dr. Valdez. Stage 2 T9-pelvis arthrodesis, L5-S1 TLIF done on 9/30/20 with closure of muscle flap done by plastics, Dr. Alegre. Admitted to NSICU post operatively due to hemorrhagic shock requiring fluid resuscitation with IV hydration and pressors. EBL 1500 cc, s/p 2 u prbc. Patient was weaned off IV pressors 10/1/20 and started on midodrine. Transferred to the floor when stable. Surgical drains removed on 10/7/20. Plastic surgery following for incision management. Aquacel dressing changed on 10/3 and 10/8. Hgb remains stable. Pt currently off antihypertensives, now on midodrine- wean to off as indicated.     Patient is medically and neurologically stable for discharge to [place] for multidisciplinary acute rehab 10/10/20.  Patient arrived hemodynamically stable condition. Seen at bedside with her  present. She reports bilateral shoulder pain due to history of OA (had injections prior to being admitted for spine surgery).  Currently reports 7/10 with movement.  She endorses 2-3 episodes of diarrhea the last two days. Denies abdominal pain, fevers, chills.

## 2020-10-11 LAB
ALBUMIN SERPL ELPH-MCNC: 2.2 G/DL — LOW (ref 3.3–5)
ALP SERPL-CCNC: 69 U/L — SIGNIFICANT CHANGE UP (ref 40–120)
ALT FLD-CCNC: 46 U/L — HIGH (ref 10–45)
ANION GAP SERPL CALC-SCNC: 6 MMOL/L — SIGNIFICANT CHANGE UP (ref 5–17)
AST SERPL-CCNC: 29 U/L — SIGNIFICANT CHANGE UP (ref 10–40)
BASOPHILS # BLD AUTO: 0 K/UL — SIGNIFICANT CHANGE UP (ref 0–0.2)
BASOPHILS NFR BLD AUTO: 0 % — SIGNIFICANT CHANGE UP (ref 0–2)
BILIRUB SERPL-MCNC: 0.6 MG/DL — SIGNIFICANT CHANGE UP (ref 0.2–1.2)
BUN SERPL-MCNC: 16 MG/DL — SIGNIFICANT CHANGE UP (ref 7–23)
CALCIUM SERPL-MCNC: 8.6 MG/DL — SIGNIFICANT CHANGE UP (ref 8.4–10.5)
CHLORIDE SERPL-SCNC: 102 MMOL/L — SIGNIFICANT CHANGE UP (ref 96–108)
CO2 SERPL-SCNC: 27 MMOL/L — SIGNIFICANT CHANGE UP (ref 22–31)
CREAT SERPL-MCNC: 0.63 MG/DL — SIGNIFICANT CHANGE UP (ref 0.5–1.3)
EOSINOPHIL # BLD AUTO: 0.55 K/UL — HIGH (ref 0–0.5)
EOSINOPHIL NFR BLD AUTO: 4 % — SIGNIFICANT CHANGE UP (ref 0–6)
GLUCOSE BLDC GLUCOMTR-MCNC: 120 MG/DL — HIGH (ref 70–99)
GLUCOSE SERPL-MCNC: 153 MG/DL — HIGH (ref 70–99)
HCT VFR BLD CALC: 29 % — LOW (ref 34.5–45)
HCV AB S/CO SERPL IA: 0.04 S/CO — SIGNIFICANT CHANGE UP (ref 0–0.99)
HCV AB SERPL-IMP: SIGNIFICANT CHANGE UP
HGB BLD-MCNC: 9.4 G/DL — LOW (ref 11.5–15.5)
LYMPHOCYTES # BLD AUTO: 1.25 K/UL — SIGNIFICANT CHANGE UP (ref 1–3.3)
LYMPHOCYTES # BLD AUTO: 9 % — LOW (ref 13–44)
MCHC RBC-ENTMCNC: 30.3 PG — SIGNIFICANT CHANGE UP (ref 27–34)
MCHC RBC-ENTMCNC: 32.4 GM/DL — SIGNIFICANT CHANGE UP (ref 32–36)
MCV RBC AUTO: 93.5 FL — SIGNIFICANT CHANGE UP (ref 80–100)
MONOCYTES # BLD AUTO: 0.83 K/UL — SIGNIFICANT CHANGE UP (ref 0–0.9)
MONOCYTES NFR BLD AUTO: 6 % — SIGNIFICANT CHANGE UP (ref 2–14)
NEUTROPHILS # BLD AUTO: 10.95 K/UL — HIGH (ref 1.8–7.4)
NEUTROPHILS NFR BLD AUTO: 78 % — HIGH (ref 43–77)
PLATELET # BLD AUTO: 478 K/UL — HIGH (ref 150–400)
POTASSIUM SERPL-MCNC: 3.7 MMOL/L — SIGNIFICANT CHANGE UP (ref 3.5–5.3)
POTASSIUM SERPL-SCNC: 3.7 MMOL/L — SIGNIFICANT CHANGE UP (ref 3.5–5.3)
PROT SERPL-MCNC: 5.3 G/DL — LOW (ref 6–8.3)
RBC # BLD: 3.1 M/UL — LOW (ref 3.8–5.2)
RBC # FLD: 14.6 % — HIGH (ref 10.3–14.5)
SODIUM SERPL-SCNC: 135 MMOL/L — SIGNIFICANT CHANGE UP (ref 135–145)
WBC # BLD: 13.86 K/UL — HIGH (ref 3.8–10.5)
WBC # FLD AUTO: 13.86 K/UL — HIGH (ref 3.8–10.5)

## 2020-10-11 PROCEDURE — 99223 1ST HOSP IP/OBS HIGH 75: CPT | Mod: GC

## 2020-10-11 PROCEDURE — 99222 1ST HOSP IP/OBS MODERATE 55: CPT

## 2020-10-11 RX ORDER — MIDODRINE HYDROCHLORIDE 2.5 MG/1
2.5 TABLET ORAL EVERY 8 HOURS
Refills: 0 | Status: DISCONTINUED | OUTPATIENT
Start: 2020-10-11 | End: 2020-10-13

## 2020-10-11 RX ORDER — ENOXAPARIN SODIUM 100 MG/ML
40 INJECTION SUBCUTANEOUS
Refills: 0 | Status: DISCONTINUED | OUTPATIENT
Start: 2020-10-11 | End: 2020-11-03

## 2020-10-11 RX ADMIN — MIDODRINE HYDROCHLORIDE 2.5 MILLIGRAM(S): 2.5 TABLET ORAL at 21:33

## 2020-10-11 RX ADMIN — Medication 145 MILLIGRAM(S): at 12:20

## 2020-10-11 RX ADMIN — ATORVASTATIN CALCIUM 10 MILLIGRAM(S): 80 TABLET, FILM COATED ORAL at 21:33

## 2020-10-11 RX ADMIN — DULOXETINE HYDROCHLORIDE 60 MILLIGRAM(S): 30 CAPSULE, DELAYED RELEASE ORAL at 05:18

## 2020-10-11 RX ADMIN — OXYCODONE HYDROCHLORIDE 10 MILLIGRAM(S): 5 TABLET ORAL at 15:52

## 2020-10-11 RX ADMIN — CYCLOBENZAPRINE HYDROCHLORIDE 10 MILLIGRAM(S): 10 TABLET, FILM COATED ORAL at 10:54

## 2020-10-11 RX ADMIN — MAGNESIUM OXIDE 400 MG ORAL TABLET 400 MILLIGRAM(S): 241.3 TABLET ORAL at 12:20

## 2020-10-11 RX ADMIN — OXYCODONE HYDROCHLORIDE 10 MILLIGRAM(S): 5 TABLET ORAL at 00:30

## 2020-10-11 RX ADMIN — Medication 1000 UNIT(S): at 12:20

## 2020-10-11 RX ADMIN — PANTOPRAZOLE SODIUM 40 MILLIGRAM(S): 20 TABLET, DELAYED RELEASE ORAL at 05:18

## 2020-10-11 RX ADMIN — OXYCODONE HYDROCHLORIDE 5 MILLIGRAM(S): 5 TABLET ORAL at 11:15

## 2020-10-11 RX ADMIN — DULOXETINE HYDROCHLORIDE 60 MILLIGRAM(S): 30 CAPSULE, DELAYED RELEASE ORAL at 17:11

## 2020-10-11 RX ADMIN — MIDODRINE HYDROCHLORIDE 2.5 MILLIGRAM(S): 2.5 TABLET ORAL at 14:26

## 2020-10-11 RX ADMIN — MIDODRINE HYDROCHLORIDE 5 MILLIGRAM(S): 2.5 TABLET ORAL at 05:18

## 2020-10-11 RX ADMIN — OXYCODONE HYDROCHLORIDE 10 MILLIGRAM(S): 5 TABLET ORAL at 16:15

## 2020-10-11 RX ADMIN — Medication 1 TABLET(S): at 12:20

## 2020-10-11 RX ADMIN — OXYCODONE HYDROCHLORIDE 5 MILLIGRAM(S): 5 TABLET ORAL at 10:54

## 2020-10-11 RX ADMIN — ENOXAPARIN SODIUM 40 MILLIGRAM(S): 100 INJECTION SUBCUTANEOUS at 21:33

## 2020-10-11 NOTE — CONSULT NOTE ADULT - SUBJECTIVE AND OBJECTIVE BOX
HPI:  60 year old female with PMHx HTN, hyperlipidemia, osteoarthritis, anxiety/depression disorders, GERD, eczema, sinusitis, scoliosis/spinal stenosis- lumbar region, presented to [facility] 20 for scheduled stage 1, L1-5 lumbar lateral interbody fusion with Dr. Valdez. Stage 2 T9-pelvis arthrodesis, L5-S1 TLIF done on 20 with closure of muscle flap done by plastics, Dr. Alegre. Admitted to NSICU post operatively due to hemorrhagic shock requiring fluid resuscitation with IV hydration and pressors. EBL 1500 cc, s/p 2 u prbc. Patient was weaned off IV pressors 10/1/20 and started on midodrine. Transferred to the floor when stable. Surgical drains removed on 10/7/20. Plastic surgery following for incision management. Aquacel dressing changed on 10/3 and 10/8. Hgb remains stable. Pt currently off antihypertensives, now on midodrine- wean to off as indicated.       (10 Oct 2020 10:20)  CC:   c/o shoulder pain(b/l, not new), Pos numbness(b/l feet)-not new.     PAST MEDICAL & SURGICAL HISTORY:  Lumbar spinal stenosis    History of sciatica  mostly right side    H/O sinusitis    Eczema  extremities    Scoliosis    Meniscus tear  left knee    Spinal stenosis in cervical region    Anxiety and depression    Migraine    GERD (gastroesophageal reflux disease)    Seasonal allergies    Hyperlipidemia    HTN (hypertension)    H/O cervical spine surgery  C3-6   2015    History of tonsillectomy    History of appendectomy        FAMILY HISTORY:  FHx: type 2 diabetes mellitus  brother age 65 alive    Family hx of hypertension  brother age 65    Family history of atrial fibrillation  mother  age 94        Social History:  Social:  Tobacco - former smoker with 0.25ppd, quit in 2020  EtOH - 1-2 drinks, 2-4 times per month  Illicit drugs: denies      Current Level of Function:  PT 10/8/20: Fallon bed mobility, Fallon transfers with 1-2 person assist, Fallon gait 8' RW and 1 person assist  OT 10/7/20: supervision rolling/turning, modA other bed mobility (10 Oct 2020 10:20)      MEDICATIONS  (STANDING):  atorvastatin 10 milliGRAM(s) Oral at bedtime  cholecalciferol 1000 Unit(s) Oral daily  dextrose 5%. 1000 milliLiter(s) (50 mL/Hr) IV Continuous <Continuous>  dextrose 50% Injectable 12.5 Gram(s) IV Push once  dextrose 50% Injectable 25 Gram(s) IV Push once  dextrose 50% Injectable 25 Gram(s) IV Push once  DULoxetine 60 milliGRAM(s) Oral two times a day  enoxaparin Injectable 40 milliGRAM(s) SubCutaneous <User Schedule>  fenofibrate Tablet 145 milliGRAM(s) Oral daily  insulin lispro (HumaLOG) corrective regimen sliding scale   SubCutaneous two times a day with meals  magnesium oxide 400 milliGRAM(s) Oral daily  midodrine 5 milliGRAM(s) Oral every 8 hours  multivitamin 1 Tablet(s) Oral daily  pantoprazole    Tablet 40 milliGRAM(s) Oral before breakfast    MEDICATIONS  (PRN):  aluminum hydroxide/magnesium hydroxide/simethicone Suspension 30 milliLiter(s) Oral every 4 hours PRN Dyspepsia  cyclobenzaprine 10 milliGRAM(s) Oral three times a day PRN Muscle Spasm  dextrose 40% Gel 15 Gram(s) Oral once PRN Blood Glucose LESS THAN 70 milliGRAM(s)/deciliter  diphenhydrAMINE 25 milliGRAM(s) Oral every 4 hours PRN Rash and/or Itching  glucagon  Injectable 1 milliGRAM(s) IntraMuscular once PRN Glucose LESS THAN 70 milligrams/deciliter  oxyCODONE    IR 5 milliGRAM(s) Oral every 6 hours PRN Moderate Pain (4 - 6)  oxyCODONE    IR 10 milliGRAM(s) Oral every 8 hours PRN Severe Pain (7 - 10)  polyethylene glycol 3350 17 Gram(s) Oral daily PRN Constipation  senna 2 Tablet(s) Oral at bedtime PRN Constipation  SUMAtriptan 25 milliGRAM(s) Oral four times a day PRN Migraine    Constitutional: No fever, No Chills, No fatigue  HEENT: No eye pain, No visual disturbances, No difficulty hearing, No Dysphagia   Pulm: No cough,  No shortness of breath  Cardio: No chest pain, No palpitations  GI:  +diarrhea , no abdominal pain  : No dysuria, No frequency, No hematuria, No incontinence  Neuro: No headaches, No memory loss, No loss of strength, No numbness, No tremors  Skin: + surgical incisions  Endo: No temperature intolerance  MSK: + bilateral shoulder and knee pain, hamstring tightness, mild back pain  Psych:  No depression,    Vital Signs Last 24 Hrs  T(C): 37.1 (11 Oct 2020 08:00), Max: 37.1 (11 Oct 2020 08:00)  T(F): 98.8 (11 Oct 2020 08:00), Max: 98.8 (11 Oct 2020 08:00)  HR: 82 (11 Oct 2020 08:00) (82 - 101)  BP: 144/79 (11 Oct 2020 08:00) (115/66 - 155/83)  BP(mean): --  RR: 16 (11 Oct 2020 08:00) (16 - 18)  SpO2: 95% (11 Oct 2020 08:00) (94% - 99%)    PHYSICAL EXAM:  GENERAL: NAD  NECK: Supple  NERVOUS SYSTEM:  awake and alert  HEART: S1s2 NL , RRR  CHEST/LUNG: Clear to percussion bilaterally  ABDOMEN: Soft, Nontender, Nondistended; Bowel sounds present  EXTREMITIES:  No edema    CBC Full  -  ( 11 Oct 2020 10:23 )  WBC Count : 13.86 K/uL  RBC Count : 3.10 M/uL  Hemoglobin : 9.4 g/dL  Hematocrit : 29.0 %  Platelet Count - Automated : 478 K/uL  Mean Cell Volume : 93.5 fl  Mean Cell Hemoglobin : 30.3 pg  Mean Cell Hemoglobin Concentration : 32.4 gm/dL  Auto Neutrophil # : 10.95 K/uL  Auto Lymphocyte # : 1.25 K/uL  Auto Monocyte # : 0.83 K/uL  Auto Eosinophil # : 0.55 K/uL  Auto Basophil # : 0.00 K/uL  Auto Neutrophil % : 78.0 %  Auto Lymphocyte % : 9.0 %  Auto Monocyte % : 6.0 %  Auto Eosinophil % : 4.0 %  Auto Basophil % : 0.0 %    10-11    135  |  102  |  16  ----------------------------<  153<H>  3.7   |  27  |  0.63    Ca    8.6      11 Oct 2020 10:23    TPro  5.3<L>  /  Alb  2.2<L>  /  TBili  0.6  /  DBili  x   /  AST  29  /  ALT  46<H>  /  AlkPhos  69  10-11    CAPILLARY BLOOD GLUCOSE      POCT Blood Glucose.: 120 mg/dL (11 Oct 2020 08:04)  POCT Blood Glucose.: 173 mg/dL (10 Oct 2020 17:32)  POCT Blood Glucose.: 114 mg/dL (10 Oct 2020 14:38)    LIVER FUNCTIONS - ( 11 Oct 2020 10:23 )  Alb: 2.2 g/dL / Pro: 5.3 g/dL / ALK PHOS: 69 U/L / ALT: 46 U/L / AST: 29 U/L / GGT: x                 HEALTH ISSUES - PROBLEM Dx:    S/p L1-5 lumbar fusion, T9-pelvis arthrodesis, L5-S1 TLIF  PT/OT per rehab  Pain control    Post-op hypotension  Taper down midodrine to dc(dec to 2.5mg TID today from 5mg)  Hold home BP meds for now    Pre-DM, a1c 6.4  Consistent Carbohydrate Diet  DC humalog SS  Check FS BID for now  Nutrition consult    HLD  fenofibrate/Statin    DVT ppx  Lovenox             HPI:  60 year old female with PMHx HTN, hyperlipidemia, osteoarthritis, anxiety/depression disorders, GERD, eczema, sinusitis, scoliosis/spinal stenosis- lumbar region, presented to [facility] 20 for scheduled stage 1, L1-5 lumbar lateral interbody fusion with Dr. Valdez. Stage 2 T9-pelvis arthrodesis, L5-S1 TLIF done on 20 with closure of muscle flap done by plastics, Dr. Alegre. Admitted to NSICU post operatively due to hemorrhagic shock requiring fluid resuscitation with IV hydration and pressors. EBL 1500 cc, s/p 2 u prbc. Patient was weaned off IV pressors 10/1/20 and started on midodrine. Transferred to the floor when stable. Surgical drains removed on 10/7/20. Plastic surgery following for incision management. Aquacel dressing changed on 10/3 and 10/8. Hgb remains stable. Pt currently off antihypertensives, now on midodrine- wean to off as indicated.       (10 Oct 2020 10:20)  CC:   c/o shoulder pain(b/l, not new), Pos numbness(b/l feet)-not new.     PAST MEDICAL & SURGICAL HISTORY:  Lumbar spinal stenosis    History of sciatica  mostly right side    H/O sinusitis    Eczema  extremities    Scoliosis    Meniscus tear  left knee    Spinal stenosis in cervical region    Anxiety and depression    Migraine    GERD (gastroesophageal reflux disease)    Seasonal allergies    Hyperlipidemia    HTN (hypertension)    H/O cervical spine surgery  C3-6   2015    History of tonsillectomy    History of appendectomy        FAMILY HISTORY:  FHx: type 2 diabetes mellitus  brother age 65 alive    Family hx of hypertension  brother age 65    Family history of atrial fibrillation  mother  age 94        Social History:  Social:  Tobacco - former smoker with 0.25ppd, quit in 2020  EtOH - 1-2 drinks, 2-4 times per month  Illicit drugs: denies      Current Level of Function:  PT 10/8/20: Fallon bed mobility, Fallon transfers with 1-2 person assist, Fallon gait 8' RW and 1 person assist  OT 10/7/20: supervision rolling/turning, modA other bed mobility (10 Oct 2020 10:20)      MEDICATIONS  (STANDING):  atorvastatin 10 milliGRAM(s) Oral at bedtime  cholecalciferol 1000 Unit(s) Oral daily  dextrose 5%. 1000 milliLiter(s) (50 mL/Hr) IV Continuous <Continuous>  dextrose 50% Injectable 12.5 Gram(s) IV Push once  dextrose 50% Injectable 25 Gram(s) IV Push once  dextrose 50% Injectable 25 Gram(s) IV Push once  DULoxetine 60 milliGRAM(s) Oral two times a day  enoxaparin Injectable 40 milliGRAM(s) SubCutaneous <User Schedule>  fenofibrate Tablet 145 milliGRAM(s) Oral daily  insulin lispro (HumaLOG) corrective regimen sliding scale   SubCutaneous two times a day with meals  magnesium oxide 400 milliGRAM(s) Oral daily  midodrine 5 milliGRAM(s) Oral every 8 hours  multivitamin 1 Tablet(s) Oral daily  pantoprazole    Tablet 40 milliGRAM(s) Oral before breakfast    MEDICATIONS  (PRN):  aluminum hydroxide/magnesium hydroxide/simethicone Suspension 30 milliLiter(s) Oral every 4 hours PRN Dyspepsia  cyclobenzaprine 10 milliGRAM(s) Oral three times a day PRN Muscle Spasm  dextrose 40% Gel 15 Gram(s) Oral once PRN Blood Glucose LESS THAN 70 milliGRAM(s)/deciliter  diphenhydrAMINE 25 milliGRAM(s) Oral every 4 hours PRN Rash and/or Itching  glucagon  Injectable 1 milliGRAM(s) IntraMuscular once PRN Glucose LESS THAN 70 milligrams/deciliter  oxyCODONE    IR 5 milliGRAM(s) Oral every 6 hours PRN Moderate Pain (4 - 6)  oxyCODONE    IR 10 milliGRAM(s) Oral every 8 hours PRN Severe Pain (7 - 10)  polyethylene glycol 3350 17 Gram(s) Oral daily PRN Constipation  senna 2 Tablet(s) Oral at bedtime PRN Constipation  SUMAtriptan 25 milliGRAM(s) Oral four times a day PRN Migraine    Constitutional: No fever, No Chills, No fatigue  HEENT: No eye pain, No visual disturbances, No difficulty hearing, No Dysphagia   Pulm: No cough,  No shortness of breath  Cardio: No chest pain, No palpitations  GI:  +diarrhea , no abdominal pain  : No dysuria, No frequency, No hematuria, No incontinence  Neuro: No headaches, No memory loss, No loss of strength, No numbness, No tremors  Skin: + surgical incisions  Endo: No temperature intolerance  MSK: + bilateral shoulder and knee pain, hamstring tightness, mild back pain  Psych:  No depression,    Vital Signs Last 24 Hrs  T(C): 37.1 (11 Oct 2020 08:00), Max: 37.1 (11 Oct 2020 08:00)  T(F): 98.8 (11 Oct 2020 08:00), Max: 98.8 (11 Oct 2020 08:00)  HR: 82 (11 Oct 2020 08:00) (82 - 101)  BP: 144/79 (11 Oct 2020 08:00) (115/66 - 155/83)  BP(mean): --  RR: 16 (11 Oct 2020 08:00) (16 - 18)  SpO2: 95% (11 Oct 2020 08:00) (94% - 99%)    PHYSICAL EXAM:  GENERAL: NAD  NECK: Supple  NERVOUS SYSTEM:  awake and alert  HEART: S1s2 NL , RRR  CHEST/LUNG: Clear to percussion bilaterally  ABDOMEN: Soft, Nontender, Nondistended; Bowel sounds present  EXTREMITIES:  No edema    CBC Full  -  ( 11 Oct 2020 10:23 )  WBC Count : 13.86 K/uL  RBC Count : 3.10 M/uL  Hemoglobin : 9.4 g/dL  Hematocrit : 29.0 %  Platelet Count - Automated : 478 K/uL  Mean Cell Volume : 93.5 fl  Mean Cell Hemoglobin : 30.3 pg  Mean Cell Hemoglobin Concentration : 32.4 gm/dL  Auto Neutrophil # : 10.95 K/uL  Auto Lymphocyte # : 1.25 K/uL  Auto Monocyte # : 0.83 K/uL  Auto Eosinophil # : 0.55 K/uL  Auto Basophil # : 0.00 K/uL  Auto Neutrophil % : 78.0 %  Auto Lymphocyte % : 9.0 %  Auto Monocyte % : 6.0 %  Auto Eosinophil % : 4.0 %  Auto Basophil % : 0.0 %    10-11    135  |  102  |  16  ----------------------------<  153<H>  3.7   |  27  |  0.63    Ca    8.6      11 Oct 2020 10:23    TPro  5.3<L>  /  Alb  2.2<L>  /  TBili  0.6  /  DBili  x   /  AST  29  /  ALT  46<H>  /  AlkPhos  69  10-11    CAPILLARY BLOOD GLUCOSE      POCT Blood Glucose.: 120 mg/dL (11 Oct 2020 08:04)  POCT Blood Glucose.: 173 mg/dL (10 Oct 2020 17:32)  POCT Blood Glucose.: 114 mg/dL (10 Oct 2020 14:38)    LIVER FUNCTIONS - ( 11 Oct 2020 10:23 )  Alb: 2.2 g/dL / Pro: 5.3 g/dL / ALK PHOS: 69 U/L / ALT: 46 U/L / AST: 29 U/L / GGT: x                 HEALTH ISSUES - PROBLEM Dx:    S/p L1-5 lumbar fusion, T9-pelvis arthrodesis, L5-S1 TLIF  PT/OT per rehab  Pain control    Post-op hypotension  Taper down midodrine to dc(dec to 2.5mg TID today from 5mg)  Hold home BP meds for now    Pre-DM, a1c 6.4  Consistent Carbohydrate Diet  DC humalog SS  Check FS BID for now  Nutrition consult    HLD  fenofibrate/Statin    Postop anemia  h/h stable  Monitor for onw    Leukocytosis sec to reactive  afebrile  no si/sx of infection  monitor for now    DVT ppx  Lovenox

## 2020-10-12 LAB
ANION GAP SERPL CALC-SCNC: 5 MMOL/L — SIGNIFICANT CHANGE UP (ref 5–17)
BUN SERPL-MCNC: 13 MG/DL — SIGNIFICANT CHANGE UP (ref 7–23)
CALCIUM SERPL-MCNC: 8.9 MG/DL — SIGNIFICANT CHANGE UP (ref 8.4–10.5)
CHLORIDE SERPL-SCNC: 102 MMOL/L — SIGNIFICANT CHANGE UP (ref 96–108)
CO2 SERPL-SCNC: 29 MMOL/L — SIGNIFICANT CHANGE UP (ref 22–31)
CREAT SERPL-MCNC: 0.66 MG/DL — SIGNIFICANT CHANGE UP (ref 0.5–1.3)
GLUCOSE BLDC GLUCOMTR-MCNC: 123 MG/DL — HIGH (ref 70–99)
GLUCOSE BLDC GLUCOMTR-MCNC: 137 MG/DL — HIGH (ref 70–99)
GLUCOSE SERPL-MCNC: 121 MG/DL — HIGH (ref 70–99)
HCT VFR BLD CALC: 29.5 % — LOW (ref 34.5–45)
HGB BLD-MCNC: 9.3 G/DL — LOW (ref 11.5–15.5)
MCHC RBC-ENTMCNC: 29.5 PG — SIGNIFICANT CHANGE UP (ref 27–34)
MCHC RBC-ENTMCNC: 31.5 GM/DL — LOW (ref 32–36)
MCV RBC AUTO: 93.7 FL — SIGNIFICANT CHANGE UP (ref 80–100)
NRBC # BLD: 0 /100 WBCS — SIGNIFICANT CHANGE UP (ref 0–0)
PLATELET # BLD AUTO: 493 K/UL — HIGH (ref 150–400)
POTASSIUM SERPL-MCNC: 4 MMOL/L — SIGNIFICANT CHANGE UP (ref 3.5–5.3)
POTASSIUM SERPL-SCNC: 4 MMOL/L — SIGNIFICANT CHANGE UP (ref 3.5–5.3)
RBC # BLD: 3.15 M/UL — LOW (ref 3.8–5.2)
RBC # FLD: 14.5 % — SIGNIFICANT CHANGE UP (ref 10.3–14.5)
SODIUM SERPL-SCNC: 136 MMOL/L — SIGNIFICANT CHANGE UP (ref 135–145)
WBC # BLD: 11.85 K/UL — HIGH (ref 3.8–10.5)
WBC # FLD AUTO: 11.85 K/UL — HIGH (ref 3.8–10.5)

## 2020-10-12 PROCEDURE — 99233 SBSQ HOSP IP/OBS HIGH 50: CPT

## 2020-10-12 RX ADMIN — ENOXAPARIN SODIUM 40 MILLIGRAM(S): 100 INJECTION SUBCUTANEOUS at 22:19

## 2020-10-12 RX ADMIN — Medication 1000 UNIT(S): at 11:22

## 2020-10-12 RX ADMIN — OXYCODONE HYDROCHLORIDE 10 MILLIGRAM(S): 5 TABLET ORAL at 09:36

## 2020-10-12 RX ADMIN — Medication 1 TABLET(S): at 11:22

## 2020-10-12 RX ADMIN — MIDODRINE HYDROCHLORIDE 2.5 MILLIGRAM(S): 2.5 TABLET ORAL at 05:46

## 2020-10-12 RX ADMIN — DULOXETINE HYDROCHLORIDE 60 MILLIGRAM(S): 30 CAPSULE, DELAYED RELEASE ORAL at 17:16

## 2020-10-12 RX ADMIN — MAGNESIUM OXIDE 400 MG ORAL TABLET 400 MILLIGRAM(S): 241.3 TABLET ORAL at 11:22

## 2020-10-12 RX ADMIN — ATORVASTATIN CALCIUM 10 MILLIGRAM(S): 80 TABLET, FILM COATED ORAL at 22:19

## 2020-10-12 RX ADMIN — Medication 145 MILLIGRAM(S): at 11:22

## 2020-10-12 RX ADMIN — OXYCODONE HYDROCHLORIDE 10 MILLIGRAM(S): 5 TABLET ORAL at 08:59

## 2020-10-12 RX ADMIN — DULOXETINE HYDROCHLORIDE 60 MILLIGRAM(S): 30 CAPSULE, DELAYED RELEASE ORAL at 05:40

## 2020-10-12 RX ADMIN — OXYCODONE HYDROCHLORIDE 10 MILLIGRAM(S): 5 TABLET ORAL at 22:20

## 2020-10-12 RX ADMIN — MIDODRINE HYDROCHLORIDE 2.5 MILLIGRAM(S): 2.5 TABLET ORAL at 14:09

## 2020-10-12 RX ADMIN — MIDODRINE HYDROCHLORIDE 2.5 MILLIGRAM(S): 2.5 TABLET ORAL at 22:19

## 2020-10-12 RX ADMIN — OXYCODONE HYDROCHLORIDE 10 MILLIGRAM(S): 5 TABLET ORAL at 22:45

## 2020-10-12 RX ADMIN — OXYCODONE HYDROCHLORIDE 5 MILLIGRAM(S): 5 TABLET ORAL at 13:49

## 2020-10-12 RX ADMIN — PANTOPRAZOLE SODIUM 40 MILLIGRAM(S): 20 TABLET, DELAYED RELEASE ORAL at 05:40

## 2020-10-12 RX ADMIN — OXYCODONE HYDROCHLORIDE 5 MILLIGRAM(S): 5 TABLET ORAL at 14:18

## 2020-10-12 NOTE — DIETITIAN INITIAL EVALUATION ADULT. - CONTINUE CURRENT NUTRITION CARE PLAN
Nutrition Education Provided on Consistent Carbohydrate DASH-TLC Diet & Need for Increased Fluids/Fiber/yes

## 2020-10-12 NOTE — DIETITIAN INITIAL EVALUATION ADULT. - OTHER INFO
Initial Nutrition Assessment   60yr Old Female   Dx: Arthrodesis  Diet - Consistent Carbohydrate DASH-TLC Diet w/ Thin Liquids   Denies Food Allergy/Intolerance  Tolerates Diet Well - No Chewing/Swallowing Complications (Per Patient)  Consumes % of Meals (as Per Documentation)  Surgical Incision on Spine & Left Back and No Pressure Ulcers (as Per Nursing Flow Sheets)  No Edema Noted (as Per Nursing Flow Sheets)  No Recent Nausea/Vomiting/Diarrhea & Some Recent Constipation (as Per Patient)

## 2020-10-12 NOTE — PROGRESS NOTE ADULT - ASSESSMENT
CRISTEL WATTS is a HTN, hyperlipidemia, osteoarthritis, anxiety/depression disorders, GERD, eczema, sinusitis, scoliosis/spinal stenosis- lumbar region, presented to [facility] 9/29/20 for scheduled stage 1, L1-5 lumbar lateral interbody fusion with Dr. Valdez. Stage 2 T9-pelvis arthrodesis, L5-S1 TLIF done on 9/30/20 with closure of muscle flap done by plastics, Dr. Alegre. Hospital course complicated by hemorrhagic shock postop requiring pressors and blood transfusions, now stable on Midodrine. Now admitted to [facility] after for initiation of a multidisciplinary rehab program consisting focused on functional mobility, transfers and ADLs (activities of daily living).    #Elective L1-5 lumbar fusion, T9-pelvis arthrodesis, L5-S1 TLIF 9/29-9/30  - Start comprehensive multidisciplinary rehab program, PT/OT 3 hours a day, 5 days a week.  - P&O as needed  - Wound Care:     Remove staples lt hip POD 14 (10/14) if wound looks well. Remove sutures midline spine POD 14 if wound looks well per neurosurgery.      Can remove dressing on 3rd day after surgery and leave incision open to air.      Patient may shower on the 3rd day after surgery.      Last Aquacel dressing change 10/8/20. Aquacel to be changed Q5 days. Next dressing change 10/13/20.  - HOLD ASA and no NSAIDs until cleared by neurosurgery.  - F/u with neurosurgery, Dr. Valdez, outpatient  - F/u with plastics in 1-2 weeks with Dr. Alegre in clinic  - No weight bearing restrictions  - Precautions: falls, spine, neuro    #Hypotension postop - improved  - Continue Midodrine 5mg Q8hr, wean as tolerated  - Home meds for HTN on hold: Norvasc 5mg daily, Bystolic 10mg QAM, Edarbi 40mg QHS  - Monitor BP    #Pre-DM  - HbA1c 6.4 9/29/20  - FS  10/9  change fingersticks to fasting  - Monitor FS.    #Depression/Anxiety:  - Continue Cymbalta 60mg BID  - Consider neuropsychology consult    #Migraines  - Sumatriptan 25mg Q4hr PRN    #Sleep:   - Maintain quiet hours and low stim environment.  - Melatonin PRN to maximize participation in therapy during the day.   - Monitor sleep logs    #Allergies  - Continue Benadryl 25mg Q4hr PRN    Pain Management:  - Flexeril 10mg TID PRN    GI/Bowel:  - At risk for constipation due to neurologic diagnosis, immobility and/or medication use  - Senna QHS, Miralax BID, Milk of Magnesia prn -- discontinued on admission to rehab due to endorsing loose stools  - GI ppx: Protonix daily, Maalox PRN    /Bladder:   - At risk for incontinence and retention due to neurologic diagnosis and limited mobility  - Currently patient voids: independent with bedpan   - Encourage timed voids every 4 hours while awake for independence and to promote continence during therapy.    Skin/Pressure Injury:   - Skin assessment on admission: no pressure injuries noted   - Monitor Incisions: Thoraco-lumbar incision and left flank incision - wound care as above  - Turn every 2 hours while in bed, air mattress  - Soft heel protectors  - Skin barrier cream as needed  - Nursing to monitor skin Qshift    Diet:  - Diet Consistency/Modifications: regular/DASH    DVT ppx:  - Lovenox  - SCDs

## 2020-10-12 NOTE — PROGRESS NOTE ADULT - SUBJECTIVE AND OBJECTIVE BOX
60 year old female with PMHx HTN, hyperlipidemia, osteoarthritis, anxiety/depression disorders, GERD, eczema, sinusitis, scoliosis/spinal stenosis- lumbar region, presented to [facility] 9/29/20 for scheduled stage 1, L1-5 lumbar lateral interbody fusion with Dr. Valdez. Stage 2 T9-pelvis arthrodesis, L5-S1 TLIF done on 9/30/20 with closure of muscle flap done by plastics, Dr. Alegre. Admitted to NSICU post operatively due to hemorrhagic shock requiring fluid resuscitation with IV hydration and pressors. EBL 1500 cc, s/p 2 u prbc. Patient was weaned off IV pressors 10/1/20 and started on midodrine. Transferred to the floor when stable. Surgical drains removed on 10/7/20. Plastic surgery following for incision management. Aquacel dressing changed on 10/3 and 10/8. Hgb remains stable. Pt currently off antihypertensives, now on midodrine- wean to off as indicated.     Patient is medically and neurologically stable for discharge to [place] for multidisciplinary acute rehab 10/10/20.  Patient arrived hemodynamically stable condition. Seen at bedside with her  present. She reports bilateral shoulder pain due to history of OA (had injections prior to being admitted for spine surgery).  Currently reports 7/10 with movement.  She endorses 2-3 episodes of diarrhea the last two days. Denies abdominal pain, fevers, chills.       ROS - CO increasing numbness Right>Left LE post op and since arrival to [facility]- describes it as patchy  Voiding well although with episodes of poor control  fecal incontinence- Last  10/11  no N,V  No dizziness, no headaches  no Chest pain, no SOB    MEDICATIONS  (STANDING):  atorvastatin 10 milliGRAM(s) Oral at bedtime  cholecalciferol 1000 Unit(s) Oral daily  dextrose 5%. 1000 milliLiter(s) (50 mL/Hr) IV Continuous <Continuous>  dextrose 50% Injectable 12.5 Gram(s) IV Push once  dextrose 50% Injectable 25 Gram(s) IV Push once  dextrose 50% Injectable 25 Gram(s) IV Push once  DULoxetine 60 milliGRAM(s) Oral two times a day  enoxaparin Injectable 40 milliGRAM(s) SubCutaneous <User Schedule>  fenofibrate Tablet 145 milliGRAM(s) Oral daily  magnesium oxide 400 milliGRAM(s) Oral daily  midodrine 2.5 milliGRAM(s) Oral every 8 hours  multivitamin 1 Tablet(s) Oral daily  pantoprazole    Tablet 40 milliGRAM(s) Oral before breakfast    MEDICATIONS  (PRN):  aluminum hydroxide/magnesium hydroxide/simethicone Suspension 30 milliLiter(s) Oral every 4 hours PRN Dyspepsia  cyclobenzaprine 10 milliGRAM(s) Oral three times a day PRN Muscle Spasm  dextrose 40% Gel 15 Gram(s) Oral once PRN Blood Glucose LESS THAN 70 milliGRAM(s)/deciliter  diphenhydrAMINE 25 milliGRAM(s) Oral every 4 hours PRN Rash and/or Itching  glucagon  Injectable 1 milliGRAM(s) IntraMuscular once PRN Glucose LESS THAN 70 milligrams/deciliter  oxyCODONE    IR 5 milliGRAM(s) Oral every 6 hours PRN Moderate Pain (4 - 6)  oxyCODONE    IR 10 milliGRAM(s) Oral every 8 hours PRN Severe Pain (7 - 10)  polyethylene glycol 3350 17 Gram(s) Oral daily PRN Constipation  senna 2 Tablet(s) Oral at bedtime PRN Constipation  SUMAtriptan 25 milliGRAM(s) Oral four times a day PRN Migraine                            9.3    11.85 )-----------( 493      ( 12 Oct 2020 05:50 )             29.5     10-12    136  |  102  |  13  ----------------------------<  121<H>  4.0   |  29  |  0.66    Ca    8.9      12 Oct 2020 05:50    TPro  5.3<L>  /  Alb  2.2<L>  /  TBili  0.6  /  DBili  x   /  AST  29  /  ALT  46<H>  /  AlkPhos  69  10-11        CAPILLARY BLOOD GLUCOSE      POCT Blood Glucose.: 123 mg/dL (12 Oct 2020 16:48)  POCT Blood Glucose.: 137 mg/dL (12 Oct 2020 08:05)      Vital Signs Last 24 Hrs  T(C): 36.7 (12 Oct 2020 09:02), Max: 36.7 (12 Oct 2020 09:02)  T(F): 98.1 (12 Oct 2020 09:02), Max: 98.1 (12 Oct 2020 09:02)  HR: 90 (12 Oct 2020 14:15) (78 - 90)  BP: 121/72 (12 Oct 2020 14:15) (121/72 - 135/81)  BP(mean): --  RR: 16 (12 Oct 2020 09:02) (16 - 18)  SpO2: 99% (12 Oct 2020 09:02) (96% - 99%)    Constitutional - NAD, Comfortable, lying in bed  HEENT - NCAT, EOMI  Chest - Breathing comfortably  Cardiovascular - RRR  Abdomen - Soft, mildly distended, hyperactive bowel sounds  Extremities - No C/C/E, No calf tenderness   Neurologic Exam -                    Cognitive - Awake, Alert, AAO to self, place, date, year, situation     Communication - Fluent, No dysarthria     Cranial Nerves - no facial asymmetry, shoulder shrug intact,      Motor -                     LEFT    UE - ShAB 5/5, EF 5/5, EE 5/5, WE 5/5,  5/5                    RIGHT UE - ShAB 5/5, EF 5/5, EE 5/5, WE 5/5,  5/5                    LEFT    LE - HF 4/5, KE 4/5, DF 3/5, PF 5/5                    RIGHT LE - HF 3/5, KE 3/5, DF 3/5, PF 5/5        Sensory - decreased to light touch medial leg on RIGHT and dorsum of foot on LEFT  now with diminished sensation laterally in Right thigh as well     Reflexes - DTR Intact, No primitive reflexes     Psychiatric - Mood stable, Affect WNL     Skin:         thoraco-lumbar surgical incision approximated with staples and dressed with Aquacel         Left flank with ecchymosis and surgical incision dressed with Aquacel         Healed drain incisions lower lumbar          No pressure injuries noted on sacrum, coccyx, or heels

## 2020-10-12 NOTE — DIETITIAN INITIAL EVALUATION ADULT. - DIET TYPE
on Consistent Carbohydrate DASH-TLC Diet w/ Thin Liquids   Nutrition Education Provided on Consistent Carbohydrate DASH-TLC Diet & Need for Increased Fluids/Fiber

## 2020-10-12 NOTE — PROGRESS NOTE ADULT - ASSESSMENT
This is a 61 y/o F with PMHx HTN, HLD, osteoarthritis, anxiety/depression disorders, GERD, eczema, sinusitis, scoliosis/spinal stenosis- lumbar region, presented to [facility] 9/29/20 for scheduled stage 1, L1-5 lumbar lateral interbody fusion with Dr. Valdez. Stage 2 T9-pelvis arthrodesis, L5-S1 TLIF done on 9/30/20 with closure of muscle flap done by plastics, Dr. Alegre. Post-op course complicated by hemorrhagic shock requiring transfer to ICU, fluid resuscitation with IV hydration and pressors. EBL 1500 cc, s/p 2 u prbc. Patient was weaned off IV pressors 10/1/20 and started on midodrine. Surgical drains removed on 10/7/20. Plastic surgery following for incision management recommended Aquacel dressing changed on 10/3 and 10/8. Patient was transferred to [facility] on 10/10/20 for comprehensive rehab.     #S/p L1-5 lumbar fusion, T9-pelvis arthrodesis, L5-S1 TLIF (9/30/20)  -Continue comprehensive rehab - PT/OT per rehab  -Pain management per rehab. continue bowel regimen  -Wound care per rehab - cont with aquacel q5 days, next dressing change 10/13/20    #Post-op hypotension  -Taper off midodrine - decreased from 5mg to 2.5mg TID on 10/11/20  -Hold home BP meds for now: Norvasc 5mg daily, Bystolic 10mg QAM, Edarbi 40mg QHS    #Pre-DM, a1c 6.4  -Consistent Carbohydrate Diet  -Check FS BID for now  -Nutrition consult    #HLD  -Cont fenofibrate/Statin    #Postop anemia  -H/H stable  -Monitor for now. transfuse for Hb < 7.0  -Hold ASA, NSAIDs    #Leukocytosis - likely reactive   -Afebrile, no s/s of infection  -Cont to monitor     #Depression, Anxiety  -Mood stable   -Cont cymbalta   -Neuropsych as indicated    #Migraines  -Cont sumatriptan     #Allergies  -Would avoid benadryl. consider zrytec qhs if symptomatic     #DVT ppx - Lovenox  #GI ppx - PPI   #Standard precautions - aspiration, fall, safety, seizure, skin

## 2020-10-12 NOTE — DIETITIAN INITIAL EVALUATION ADULT. - ENERGY NEEDS
Height: 65Inches   Weight: 205lb   Body Mass Index (BMI): 34.2kg/m2   Ideal Body Weight Range: 125lb (+/-10%)   Percent Ideal Body Weight ~164%

## 2020-10-12 NOTE — PROGRESS NOTE ADULT - SUBJECTIVE AND OBJECTIVE BOX
Patient is a 60y old  Female who presents with a chief complaint of functional deficits due to L1-L5 lumbar fusion, T9-pelvis arthrodesis, L5-S1 TLIF  03.9 Other Neurologic (11 Oct 2020 11:56)      Patient seen and examined at bedside stable, though with persistent b/l LE numbness, starting at the pelvis radiating to toes. endorses intermittent improvement with positional changes, however overall unimproved since surgery. Otherwise well, denies headaches, fevers, chills, cp, sob, abd pain, nausea, vomiting, diarrhea, or constipation. mood, appetite, sleep wnl.     ALLERGIES:  penicillin (Other)    MEDICATIONS  (STANDING):  atorvastatin 10 milliGRAM(s) Oral at bedtime  cholecalciferol 1000 Unit(s) Oral daily  dextrose 5%. 1000 milliLiter(s) (50 mL/Hr) IV Continuous <Continuous>  dextrose 50% Injectable 12.5 Gram(s) IV Push once  dextrose 50% Injectable 25 Gram(s) IV Push once  dextrose 50% Injectable 25 Gram(s) IV Push once  DULoxetine 60 milliGRAM(s) Oral two times a day  enoxaparin Injectable 40 milliGRAM(s) SubCutaneous <User Schedule>  fenofibrate Tablet 145 milliGRAM(s) Oral daily  magnesium oxide 400 milliGRAM(s) Oral daily  midodrine 2.5 milliGRAM(s) Oral every 8 hours  multivitamin 1 Tablet(s) Oral daily  pantoprazole    Tablet 40 milliGRAM(s) Oral before breakfast    MEDICATIONS  (PRN):  aluminum hydroxide/magnesium hydroxide/simethicone Suspension 30 milliLiter(s) Oral every 4 hours PRN Dyspepsia  cyclobenzaprine 10 milliGRAM(s) Oral three times a day PRN Muscle Spasm  dextrose 40% Gel 15 Gram(s) Oral once PRN Blood Glucose LESS THAN 70 milliGRAM(s)/deciliter  diphenhydrAMINE 25 milliGRAM(s) Oral every 4 hours PRN Rash and/or Itching  glucagon  Injectable 1 milliGRAM(s) IntraMuscular once PRN Glucose LESS THAN 70 milligrams/deciliter  oxyCODONE    IR 5 milliGRAM(s) Oral every 6 hours PRN Moderate Pain (4 - 6)  oxyCODONE    IR 10 milliGRAM(s) Oral every 8 hours PRN Severe Pain (7 - 10)  polyethylene glycol 3350 17 Gram(s) Oral daily PRN Constipation  senna 2 Tablet(s) Oral at bedtime PRN Constipation  SUMAtriptan 25 milliGRAM(s) Oral four times a day PRN Migraine    Vital Signs Last 24 Hrs  T(F): 98.1 (12 Oct 2020 09:02), Max: 98.1 (12 Oct 2020 09:02)  HR: 87 (12 Oct 2020 09:02) (78 - 88)  BP: 135/81 (12 Oct 2020 09:02) (130/70 - 135/81)  RR: 16 (12 Oct 2020 09:02) (16 - 18)  SpO2: 99% (12 Oct 2020 09:02) (96% - 99%)  I&O's Summary    BMI (kg/m2): 34.2 (10-11-20 @ 07:54)    PHYSICAL EXAM:  General: Obese, NAD, A/O x3, pleasant  ENT: MMM, no scleral icterus  Neck: Supple, No JVD  Lungs: Respirations unlabored. Clear to auscultation bilaterally, no wheezes, rales, rhonchi  Cardio: RRR, S1/S2, No murmurs  Abdomen: Soft, Nontender, Nondistended; Bowel sounds present  Extremities: No calf tenderness, No pitting edema. + b/l LE numbness    LABS:                        9.3    11.85 )-----------( 493      ( 12 Oct 2020 05:50 )             29.5       10-12    136  |  102  |  13  ----------------------------<  121  4.0   |  29  |  0.66    Ca    8.9      12 Oct 2020 05:50    TPro  5.3  /  Alb  2.2  /  TBili  0.6  /  DBili  x   /  AST  29  /  ALT  46  /  AlkPhos  69  10-11     eGFR if Non African American: 96 mL/min/1.73M2 (10-12-20 @ 05:50)  eGFR if African American: 111 mL/min/1.73M2 (10-12-20 @ 05:50)    POCT Blood Glucose.: 137 mg/dL (12 Oct 2020 08:05)    RADIOLOGY & ADDITIONAL TESTS: reviewed    Care Discussed with Consultants/Other Providers: yes

## 2020-10-12 NOTE — DIETITIAN INITIAL EVALUATION ADULT. - ADD RECOMMEND
1) Monitor Weights, Intake, Tolerance, Skin & Labwork   2) Nutrition Education Provided on Consistent Carbohydrate DASH-TLC Diet & Need for Increased Fluids/Fiber 3) Continue Nutrition Plan of Care

## 2020-10-13 LAB
APPEARANCE UR: ABNORMAL
BACTERIA # UR AUTO: ABNORMAL /HPF
BILIRUB UR-MCNC: NEGATIVE — SIGNIFICANT CHANGE UP
COLOR SPEC: YELLOW — SIGNIFICANT CHANGE UP
DIFF PNL FLD: ABNORMAL
EPI CELLS # UR: SIGNIFICANT CHANGE UP
GLUCOSE BLDC GLUCOMTR-MCNC: 181 MG/DL — HIGH (ref 70–99)
GLUCOSE UR QL: NEGATIVE — SIGNIFICANT CHANGE UP
KETONES UR-MCNC: NEGATIVE — SIGNIFICANT CHANGE UP
LEUKOCYTE ESTERASE UR-ACNC: ABNORMAL
NITRITE UR-MCNC: NEGATIVE — SIGNIFICANT CHANGE UP
PH UR: 7 — SIGNIFICANT CHANGE UP (ref 5–8)
PROT UR-MCNC: 15
RBC CASTS # UR COMP ASSIST: ABNORMAL /HPF (ref 0–4)
SP GR SPEC: 1 — LOW (ref 1.01–1.02)
UROBILINOGEN FLD QL: NEGATIVE — SIGNIFICANT CHANGE UP
WBC UR QL: >50 /HPF (ref 0–5)

## 2020-10-13 PROCEDURE — 99233 SBSQ HOSP IP/OBS HIGH 50: CPT

## 2020-10-13 RX ORDER — OXYCODONE HYDROCHLORIDE 5 MG/1
10 TABLET ORAL
Refills: 0 | Status: DISCONTINUED | OUTPATIENT
Start: 2020-10-13 | End: 2020-10-19

## 2020-10-13 RX ORDER — POLYETHYLENE GLYCOL 3350 17 G/17G
17 POWDER, FOR SOLUTION ORAL DAILY
Refills: 0 | Status: DISCONTINUED | OUTPATIENT
Start: 2020-10-13 | End: 2020-11-03

## 2020-10-13 RX ADMIN — Medication 145 MILLIGRAM(S): at 12:31

## 2020-10-13 RX ADMIN — ATORVASTATIN CALCIUM 10 MILLIGRAM(S): 80 TABLET, FILM COATED ORAL at 21:58

## 2020-10-13 RX ADMIN — PANTOPRAZOLE SODIUM 40 MILLIGRAM(S): 20 TABLET, DELAYED RELEASE ORAL at 06:34

## 2020-10-13 RX ADMIN — OXYCODONE HYDROCHLORIDE 5 MILLIGRAM(S): 5 TABLET ORAL at 12:34

## 2020-10-13 RX ADMIN — MIDODRINE HYDROCHLORIDE 2.5 MILLIGRAM(S): 2.5 TABLET ORAL at 13:29

## 2020-10-13 RX ADMIN — Medication 1000 UNIT(S): at 12:30

## 2020-10-13 RX ADMIN — OXYCODONE HYDROCHLORIDE 10 MILLIGRAM(S): 5 TABLET ORAL at 22:45

## 2020-10-13 RX ADMIN — MAGNESIUM OXIDE 400 MG ORAL TABLET 400 MILLIGRAM(S): 241.3 TABLET ORAL at 12:30

## 2020-10-13 RX ADMIN — Medication 1 TABLET(S): at 12:31

## 2020-10-13 RX ADMIN — OXYCODONE HYDROCHLORIDE 10 MILLIGRAM(S): 5 TABLET ORAL at 22:13

## 2020-10-13 RX ADMIN — DULOXETINE HYDROCHLORIDE 60 MILLIGRAM(S): 30 CAPSULE, DELAYED RELEASE ORAL at 06:34

## 2020-10-13 RX ADMIN — OXYCODONE HYDROCHLORIDE 10 MILLIGRAM(S): 5 TABLET ORAL at 06:37

## 2020-10-13 RX ADMIN — POLYETHYLENE GLYCOL 3350 17 GRAM(S): 17 POWDER, FOR SOLUTION ORAL at 17:23

## 2020-10-13 RX ADMIN — OXYCODONE HYDROCHLORIDE 5 MILLIGRAM(S): 5 TABLET ORAL at 13:15

## 2020-10-13 RX ADMIN — ENOXAPARIN SODIUM 40 MILLIGRAM(S): 100 INJECTION SUBCUTANEOUS at 21:58

## 2020-10-13 RX ADMIN — MIDODRINE HYDROCHLORIDE 2.5 MILLIGRAM(S): 2.5 TABLET ORAL at 06:35

## 2020-10-13 RX ADMIN — OXYCODONE HYDROCHLORIDE 10 MILLIGRAM(S): 5 TABLET ORAL at 06:59

## 2020-10-13 RX ADMIN — DULOXETINE HYDROCHLORIDE 60 MILLIGRAM(S): 30 CAPSULE, DELAYED RELEASE ORAL at 17:23

## 2020-10-13 NOTE — PROGRESS NOTE ADULT - ASSESSMENT
CRISTEL WATTS is a HTN, hyperlipidemia, osteoarthritis, anxiety/depression disorders, GERD, eczema, sinusitis, scoliosis/spinal stenosis- lumbar region, presented to [facility] 9/29/20 for scheduled stage 1, L1-5 lumbar lateral interbody fusion with Dr. Valdez. Stage 2 T9-pelvis arthrodesis, L5-S1 TLIF done on 9/30/20 with closure of muscle flap done by plastics, Dr. Alegre. Hospital course complicated by hemorrhagic shock postop requiring pressors and blood transfusions, now stable on Midodrine. Now admitted to [facility] after for initiation of a multidisciplinary rehab program consisting focused on functional mobility, transfers and ADLs (activities of daily living).    #Elective L1-5 lumbar fusion, T9-pelvis arthrodesis, L5-S1 TLIF 9/29-9/30  - comprehensive multidisciplinary rehab program, PT/OT 3 hours a day, 5 days a week.  - P&O as needed  - Wound Care:     Remove staples lt hip POD 14 (10/14) if wound looks well. Remove sutures midline spine POD 14 if wound looks well per neurosurgery.    will remove dressing in AM     Patient may shower on the 3rd day after surgery.      Last Aquacel dressing change 10/8/20. Aquacel to be changed Q5 days. Next dressing change 10/13/20.? DC dressing  - HOLD ASA and no NSAIDs until cleared by neurosurgery.  - F/u with neurosurgery, Dr. Valdez, outpatient  - F/u with plastics in 1-2 weeks with Dr. Alegre in clinic  - No weight bearing restrictions  - Precautions: falls, spine, neuro    #Hypotension postop - improved  Will Dc midodrine- SYST BPs 130-140  - consider restarting some home BP meds: Norvasc 5mg daily, Bystolic 10mg QAM, Edarbi 40mg QHS  - Monitor BP    #Pre-DM  - HbA1c 6.4 9/29/20  - FS  10/9  change fingersticks to fasting  - Monitor FS.    #Depression/Anxiety:  - Continue Cymbalta 60mg BID  - Consider neuropsychology consult    #Migraines  - Sumatriptan 25mg Q4hr PRN    #RO UTI  Send UA/C&S  PVR low    #Allergies  - Continue Benadryl 25mg Q4hr PRN    Pain Management:  - Flexeril 10mg TID PRN    GI/Bowel:  - At risk for constipation due to neurologic diagnosis, immobility and/or medication use  - daily miralax restarted at patient request  - GI ppx: Protonix daily, Maalox PRN    /Bladder:   - At risk for incontinence and retention due to neurologic diagnosis and limited mobility  - Currently patient voids: independent with bedpan   - Encourage timed voids every 4 hours while awake for independence and to promote continence during therapy.    Skin/Pressure Injury:   - Skin assessment on admission: no pressure injuries noted   - Monitor Incisions: Thoraco-lumbar incision and left flank incision - wound care as above  - Turn every 2 hours while in bed, air mattress  - Soft heel protectors  - Skin barrier cream as needed  - Nursing to monitor skin Qshift    Diet:  - Diet Consistency/Modifications: regular/DASH    DVT ppx:  - Lovenox  - SCDs CRISTEL WATTS is a HTN, hyperlipidemia, osteoarthritis, anxiety/depression disorders, GERD, eczema, sinusitis, scoliosis/spinal stenosis- lumbar region, [facility] 9/29/20 for scheduled stage 1, L1-5 lumbar lateral interbody fusion with Dr. Valdez. Stage 2 T9-pelvis arthrodesis, L5-S1 TLIF done on 9/30/20 with closure of muscle flap done by plastics, Dr. Alegre. Hospital course complicated by hemorrhagic shock postop requiring pressors and blood transfusions, now stable on Midodrine. Now admitted to [facility] after for initiation of a multidisciplinary rehab program consisting focused on functional mobility, transfers and ADLs (activities of daily living).    #Elective L1-5 lumbar fusion, T9-pelvis arthrodesis, L5-S1 TLIF 9/29-9/30  - comprehensive multidisciplinary rehab program, PT/OT 3 hours a day, 5 days a week.  - P&O as needed  - Wound Care:     Remove staples lt hip POD 14 (10/14) if wound looks well. Remove sutures midline spine POD 14 if wound looks well per neurosurgery.    will remove dressing in AM     Patient may shower on the 3rd day after surgery.      Last Aquacel dressing change 10/8/20. Aquacel to be changed Q5 days. Next dressing change 10/13/20.? DC dressing  - HOLD ASA and no NSAIDs until cleared by neurosurgery.  - F/u with neurosurgery, Dr. Valdez, outpatient  - F/u with plastics in 1-2 weeks with Dr. Alegre in clinic  - No weight bearing restrictions  - Precautions: falls, spine, neuro    #Hypotension postop - improved  Will Dc midodrine- SYST BPs 130-140  - consider restarting some home BP meds: Norvasc 5mg daily, Bystolic 10mg QAM, Edarbi 40mg QHS  - Monitor BP    #Pre-DM  - HbA1c 6.4 9/29/20  - FS  10/9  change fingersticks to fasting  - Monitor FS.    #Depression/Anxiety:  - Continue Cymbalta 60mg BID  - Consider neuropsychology consult    #Migraines  - Sumatriptan 25mg Q4hr PRN    #RO UTI  Send UA/C&S  PVR low    #Allergies  - Continue Benadryl 25mg Q4hr PRN    Pain Management:  - Flexeril 10mg TID PRN  timed oxycodone 10mg with breakfast and lunch daily before Rehab    GI/Bowel:  - At risk for constipation due to neurologic diagnosis, immobility and/or medication use  - daily miralax restarted at patient request  - GI ppx: Protonix daily, Maalox PRN    /Bladder:   - At risk for incontinence and retention due to neurologic diagnosis and limited mobility  - Currently patient voids: independent with bedpan   - Encourage timed voids every 4 hours while awake for independence and to promote continence during therapy.    Skin/Pressure Injury:   - Skin assessment on admission: no pressure injuries noted   - Monitor Incisions: Thoraco-lumbar incision and left flank incision - wound care as above  - Turn every 2 hours while in bed, air mattress  - Soft heel protectors  - Skin barrier cream as needed  - Nursing to monitor skin Qshift    Diet:  - Diet Consistency/Modifications: regular/DASH    DVT ppx:  - Lovenox  - SCDs

## 2020-10-13 NOTE — PROGRESS NOTE ADULT - ASSESSMENT
This is a 59 y/o F with PMHx HTN, HLD, osteoarthritis, anxiety/depression disorders, GERD, eczema, sinusitis, scoliosis/spinal stenosis- lumbar region, presented to [facility] 9/29/20 for scheduled stage 1, L1-5 lumbar lateral interbody fusion with Dr. Valdez. Stage 2 T9-pelvis arthrodesis, L5-S1 TLIF done on 9/30/20 with closure of muscle flap done by plastics, Dr. Alegre. Post-op course complicated by hemorrhagic shock requiring transfer to ICU, fluid resuscitation with IV hydration and pressors. EBL 1500 cc, s/p 2 u prbc. Patient was weaned off IV pressors 10/1/20 and started on midodrine. Surgical drains removed on 10/7/20. Plastic surgery following for incision management recommended Aquacel dressing changed on 10/3 and 10/8. Patient was transferred to [facility] on 10/10/20 for comprehensive rehab.     #S/p L1-5 lumbar fusion, T9-pelvis arthrodesis, L5-S1 TLIF (9/30/20)  -Continue comprehensive rehab - PT/OT per rehab  -Pain management per rehab. continue bowel regimen  -Wound care per rehab - cont with aquacel q5 days    #Dysuria  -Send UA, UCx  -PVR as indicated    #Post-op hypotension  -Taper off midodrine - decreased from 5mg to 2.5mg TID on 10/11/20  -Hold home BP meds for now: Norvasc 5mg daily, Bystolic 10mg QAM, Edarbi 40mg QHS    #Pre-DM, a1c 6.4  -Consistent Carbohydrate Diet  -Check FS BID for now  -Nutrition consult    #HLD  -Cont fenofibrate/Statin    #Postop anemia  -H/H stable  -Monitor for now. transfuse for Hb < 7.0  -Hold ASA, NSAIDs    #Leukocytosis - likely reactive   -Afebrile, no s/s of infection  -Cont to monitor     #Depression, Anxiety  -Mood stable   -Cont cymbalta   -Neuropsych as indicated    #Migraines  -Cont sumatriptan     #Allergies  -Would avoid benadryl. consider zrytec qhs if symptomatic     #DVT ppx - Lovenox  #GI ppx - PPI   #Standard precautions - aspiration, fall, safety, seizure, skin

## 2020-10-13 NOTE — PROGRESS NOTE ADULT - SUBJECTIVE AND OBJECTIVE BOX
60 year old female with PMHx HTN, hyperlipidemia, osteoarthritis, anxiety/depression disorders, GERD, eczema, sinusitis, scoliosis/spinal stenosis- lumbar region, presented to [facility] 9/29/20 for scheduled stage 1, L1-5 lumbar lateral interbody fusion with Dr. Valdez. Stage 2 T9-pelvis arthrodesis, L5-S1 TLIF done on 9/30/20 with closure of muscle flap done by plastics, Dr. Alegre. Admitted to NSICU post operatively due to hemorrhagic shock requiring fluid resuscitation with IV hydration and pressors. EBL 1500 cc, s/p 2 u prbc. Patient was weaned off IV pressors 10/1/20 and started on midodrine. Transferred to the floor when stable. Surgical drains removed on 10/7/20. Plastic surgery following for incision management. Aquacel dressing changed on 10/3 and 10/8. Hgb remains stable. Pt currently off antihypertensives, now on midodrine- wean to off as indicated.     Patient is medically and neurologically stable for discharge to [place] for multidisciplinary acute rehab 10/10/20.  Patient arrived hemodynamically stable condition. Seen at bedside with her  present. She reports bilateral shoulder pain due to history of OA (had injections prior to being admitted for spine surgery).  Currently reports 7/10 with movement.  She endorses 2-3 episodes of diarrhea the last two days. Denies abdominal pain, fevers, chills.       ROS - CO burning when urinating/ frequency- thinks she has UTI  CO poor control  fecal incontinence- Last BM 10/11  no N,V  No dizziness, no headaches  no Chest pain, no SOB  persistent patchy sensory loss- Left foot, Right thigh mediolaterally  PVR 12 noon 50cc      MEDICATIONS  (STANDING):  atorvastatin 10 milliGRAM(s) Oral at bedtime  cholecalciferol 1000 Unit(s) Oral daily  dextrose 5%. 1000 milliLiter(s) (50 mL/Hr) IV Continuous <Continuous>  dextrose 50% Injectable 12.5 Gram(s) IV Push once  dextrose 50% Injectable 25 Gram(s) IV Push once  dextrose 50% Injectable 25 Gram(s) IV Push once  DULoxetine 60 milliGRAM(s) Oral two times a day  enoxaparin Injectable 40 milliGRAM(s) SubCutaneous <User Schedule>  fenofibrate Tablet 145 milliGRAM(s) Oral daily  magnesium oxide 400 milliGRAM(s) Oral daily  midodrine 2.5 milliGRAM(s) Oral every 8 hours  multivitamin 1 Tablet(s) Oral daily  oxyCODONE    IR 10 milliGRAM(s) Oral <User Schedule>  pantoprazole    Tablet 40 milliGRAM(s) Oral before breakfast  polyethylene glycol 3350 17 Gram(s) Oral daily    MEDICATIONS  (PRN):  aluminum hydroxide/magnesium hydroxide/simethicone Suspension 30 milliLiter(s) Oral every 4 hours PRN Dyspepsia  cyclobenzaprine 10 milliGRAM(s) Oral three times a day PRN Muscle Spasm  dextrose 40% Gel 15 Gram(s) Oral once PRN Blood Glucose LESS THAN 70 milliGRAM(s)/deciliter  diphenhydrAMINE 25 milliGRAM(s) Oral every 4 hours PRN Rash and/or Itching  glucagon  Injectable 1 milliGRAM(s) IntraMuscular once PRN Glucose LESS THAN 70 milligrams/deciliter  oxyCODONE    IR 5 milliGRAM(s) Oral every 6 hours PRN Moderate Pain (4 - 6)  oxyCODONE    IR 10 milliGRAM(s) Oral every 8 hours PRN Severe Pain (7 - 10)  senna 2 Tablet(s) Oral at bedtime PRN Constipation  SUMAtriptan 25 milliGRAM(s) Oral four times a day PRN Migraine                            9.3    11.85 )-----------( 493      ( 12 Oct 2020 05:50 )             29.5     10-12    136  |  102  |  13  ----------------------------<  121<H>  4.0   |  29  |  0.66    Ca    8.9      12 Oct 2020 05:50          CAPILLARY BLOOD GLUCOSE      POCT Blood Glucose.: 181 mg/dL (13 Oct 2020 07:46)  POCT Blood Glucose.: 123 mg/dL (12 Oct 2020 16:48)      Vital Signs Last 24 Hrs  T(C): 36.3 (13 Oct 2020 08:31), Max: 36.6 (12 Oct 2020 23:13)  T(F): 97.4 (13 Oct 2020 08:31), Max: 97.8 (12 Oct 2020 23:13)  HR: 98 (13 Oct 2020 13:30) (88 - 104)  BP: 132/74 (13 Oct 2020 13:30) (124/70 - 142/89)  BP(mean): --  RR: 16 (13 Oct 2020 08:31) (16 - 18)  SpO2: 98% (13 Oct 2020 08:31) (98% - 98%)        Constitutional - NAD, Comfortable, lying in bed  HEENT - NCAT, EOMI  Chest - Breathing comfortably  Cardiovascular - RRR  Abdomen - Soft, mildly distended, hyperactive bowel sounds  Extremities - No C/C/E, No calf tenderness   Neurologic Exam -                    Cognitive - Awake, Alert, AAO to self, place, date, year, situation     Communication - Fluent, No dysarthria     Cranial Nerves - no facial asymmetry, shoulder shrug intact,      Motor -                     LEFT    UE - ShAB 5/5, EF 5/5, EE 5/5, WE 5/5,  5/5                    RIGHT UE - ShAB 5/5, EF 5/5, EE 5/5, WE 5/5,  5/5                    LEFT    LE - HF 4/5, KE 4/5, DF 3/5, PF 5/5                    RIGHT LE - HF 3/5, KE 3/5, DF 3/5, PF 5/5        Sensory - decreased to light touch medial leg on RIGHT and dorsum of foot on LEFT  now with diminished sensation laterally in Right thigh as well     Reflexes - DTR Intact, No primitive reflexes     Psychiatric - Mood stable, Affect WNL     Skin:         thoraco-lumbar surgical incision approximated with staples and dressed with Aquacel         Left flank with ecchymosis and surgical incision dressed with Aquacel         Healed drain incisions lower lumbar          No pressure injuries noted on sacrum, coccyx, or heels    Rehab eval in progress       60 year old female with PMHx HTN, hyperlipidemia, osteoarthritis, anxiety/depression disorders, GERD, eczema, sinusitis, scoliosis/spinal stenosis- lumbar region, presented to [facility] 9[facility]29[facility]20 for scheduled stage 1, L1-5 lumbar lateral interbody fusion with Dr. Valdez. Stage 2 T9-pelvis arthrodesis, L5-S1 TLIF done on 9/30/20 with closure of muscle flap done by plastics, Dr. Alegre. Admitted to NSICU post operatively due to hemorrhagic shock requiring fluid resuscitation with IV hydration and pressors. EBL 1500 cc, s/p 2 u prbc. Patient was weaned off IV pressors 10/1/20 and started on midodrine. Transferred to the floor when stable. Surgical drains removed on 10/7/20. Plastic surgery following for incision management. Aquacel dressing changed on 10/3 and 10/8. Hgb remains stable. Pt currently off antihypertensives, now on midodrine- wean to off as indicated.     Patient is medically and neurologically stable for discharge to [place] for multidisciplinary acute rehab 10/10/20.  Patient arrived hemodynamically stable condition. Seen at bedside with her  present. She reports bilateral shoulder pain due to history of OA (had injections prior to being admitted for spine surgery).  Currently reports 7/10 with movement.  She endorses 2-3 episodes of diarrhea the last two days. Denies abdominal pain, fevers, chills.       ROS - CO burning when urinating/ frequency- thinks she has UTI  CO poor control  fecal incontinence- Last BM 10/11  no N,V  No dizziness, no headaches  no Chest pain, no SOB  persistent patchy sensory loss- Left foot, Right thigh mediolaterally  PVR 12 noon 50cc  wants pain meds timed before therapy      MEDICATIONS  (STANDING):  atorvastatin 10 milliGRAM(s) Oral at bedtime  cholecalciferol 1000 Unit(s) Oral daily  dextrose 5%. 1000 milliLiter(s) (50 mL/Hr) IV Continuous <Continuous>  dextrose 50% Injectable 12.5 Gram(s) IV Push once  dextrose 50% Injectable 25 Gram(s) IV Push once  dextrose 50% Injectable 25 Gram(s) IV Push once  DULoxetine 60 milliGRAM(s) Oral two times a day  enoxaparin Injectable 40 milliGRAM(s) SubCutaneous <User Schedule>  fenofibrate Tablet 145 milliGRAM(s) Oral daily  magnesium oxide 400 milliGRAM(s) Oral daily  midodrine 2.5 milliGRAM(s) Oral every 8 hours  multivitamin 1 Tablet(s) Oral daily  oxyCODONE    IR 10 milliGRAM(s) Oral <User Schedule>  pantoprazole    Tablet 40 milliGRAM(s) Oral before breakfast  polyethylene glycol 3350 17 Gram(s) Oral daily    MEDICATIONS  (PRN):  aluminum hydroxide/magnesium hydroxide/simethicone Suspension 30 milliLiter(s) Oral every 4 hours PRN Dyspepsia  cyclobenzaprine 10 milliGRAM(s) Oral three times a day PRN Muscle Spasm  dextrose 40% Gel 15 Gram(s) Oral once PRN Blood Glucose LESS THAN 70 milliGRAM(s)/deciliter  diphenhydrAMINE 25 milliGRAM(s) Oral every 4 hours PRN Rash and/or Itching  glucagon  Injectable 1 milliGRAM(s) IntraMuscular once PRN Glucose LESS THAN 70 milligrams/deciliter  oxyCODONE    IR 5 milliGRAM(s) Oral every 6 hours PRN Moderate Pain (4 - 6)  oxyCODONE    IR 10 milliGRAM(s) Oral every 8 hours PRN Severe Pain (7 - 10)  senna 2 Tablet(s) Oral at bedtime PRN Constipation  SUMAtriptan 25 milliGRAM(s) Oral four times a day PRN Migraine                            9.3    11.85 )-----------( 493      ( 12 Oct 2020 05:50 )             29.5     10-12    136  |  102  |  13  ----------------------------<  121<H>  4.0   |  29  |  0.66    Ca    8.9      12 Oct 2020 05:50          CAPILLARY BLOOD GLUCOSE      POCT Blood Glucose.: 181 mg/dL (13 Oct 2020 07:46)  POCT Blood Glucose.: 123 mg/dL (12 Oct 2020 16:48)      Vital Signs Last 24 Hrs  T(C): 36.3 (13 Oct 2020 08:31), Max: 36.6 (12 Oct 2020 23:13)  T(F): 97.4 (13 Oct 2020 08:31), Max: 97.8 (12 Oct 2020 23:13)  HR: 98 (13 Oct 2020 13:30) (88 - 104)  BP: 132/74 (13 Oct 2020 13:30) (124/70 - 142/89)  BP(mean): --  RR: 16 (13 Oct 2020 08:31) (16 - 18)  SpO2: 98% (13 Oct 2020 08:31) (98% - 98%)        Constitutional - NAD, Comfortable, lying in bed  HEENT - NCAT, EOMI  Chest - Breathing comfortably  Cardiovascular - RRR  Abdomen - Soft, mildly distended, hyperactive bowel sounds  Extremities - No C/C/E, No calf tenderness   Neurologic Exam -                    Cognitive - Awake, Alert, AAO to self, place, date, year, situation     Communication - Fluent, No dysarthria     Cranial Nerves - no facial asymmetry, shoulder shrug intact,      Motor -                     LEFT    UE - ShAB 5/5, EF 5/5, EE 5/5, WE 5/5,  5/5                    RIGHT UE - ShAB 5/5, EF 5/5, EE 5/5, WE 5/5,  5/5                    LEFT    LE - HF 4/5, KE 4/5, DF 3/5, PF 5/5                    RIGHT LE - HF 3/5, KE 3/5, DF 3/5, PF 5/5        Sensory - decreased to light touch medial leg on RIGHT and dorsum of foot on LEFT  now with diminished sensation laterally in Right thigh as well     Reflexes - DTR Intact, No primitive reflexes     Psychiatric - Mood stable, Affect WNL     Skin:         thoraco-lumbar surgical incision approximated with staples and dressed with Aquacel         Left flank with ecchymosis and surgical incision dressed with Aquacel         Healed drain incisions lower lumbar          No pressure injuries noted on sacrum, coccyx, or heels    Rehab eval in progress

## 2020-10-13 NOTE — PROGRESS NOTE ADULT - SUBJECTIVE AND OBJECTIVE BOX
Patient is a 60y old  Female who presents with a chief complaint of functional deficits due to L1-L5 lumbar fusion, T9-pelvis arthrodesis, L5-S1 TLIF  03.9 Other Neurologic (11 Oct 2020 11:56)      Patient seen and examined at bedside, with intermittent patchy b/l LE numbness. +dysuria, suprapubic ttp, increased frequency x1 day. denies headaches, fevers, chills, cp, sob, abd pain, nausea, vomiting.    ALLERGIES:  penicillin (Other)    MEDICATIONS  (STANDING):  atorvastatin 10 milliGRAM(s) Oral at bedtime  cholecalciferol 1000 Unit(s) Oral daily  dextrose 5%. 1000 milliLiter(s) (50 mL/Hr) IV Continuous <Continuous>  dextrose 50% Injectable 12.5 Gram(s) IV Push once  dextrose 50% Injectable 25 Gram(s) IV Push once  dextrose 50% Injectable 25 Gram(s) IV Push once  DULoxetine 60 milliGRAM(s) Oral two times a day  enoxaparin Injectable 40 milliGRAM(s) SubCutaneous <User Schedule>  fenofibrate Tablet 145 milliGRAM(s) Oral daily  magnesium oxide 400 milliGRAM(s) Oral daily  midodrine 2.5 milliGRAM(s) Oral every 8 hours  multivitamin 1 Tablet(s) Oral daily  pantoprazole    Tablet 40 milliGRAM(s) Oral before breakfast    MEDICATIONS  (PRN):  aluminum hydroxide/magnesium hydroxide/simethicone Suspension 30 milliLiter(s) Oral every 4 hours PRN Dyspepsia  cyclobenzaprine 10 milliGRAM(s) Oral three times a day PRN Muscle Spasm  dextrose 40% Gel 15 Gram(s) Oral once PRN Blood Glucose LESS THAN 70 milliGRAM(s)/deciliter  diphenhydrAMINE 25 milliGRAM(s) Oral every 4 hours PRN Rash and/or Itching  glucagon  Injectable 1 milliGRAM(s) IntraMuscular once PRN Glucose LESS THAN 70 milligrams/deciliter  oxyCODONE    IR 5 milliGRAM(s) Oral every 6 hours PRN Moderate Pain (4 - 6)  oxyCODONE    IR 10 milliGRAM(s) Oral every 8 hours PRN Severe Pain (7 - 10)  polyethylene glycol 3350 17 Gram(s) Oral daily PRN Constipation  senna 2 Tablet(s) Oral at bedtime PRN Constipation  SUMAtriptan 25 milliGRAM(s) Oral four times a day PRN Migraine    Vital Signs Last 24 Hrs  T(F): 98.1 (12 Oct 2020 09:02), Max: 98.1 (12 Oct 2020 09:02)  HR: 87 (12 Oct 2020 09:02) (78 - 88)  BP: 135/81 (12 Oct 2020 09:02) (130/70 - 135/81)  RR: 16 (12 Oct 2020 09:02) (16 - 18)  SpO2: 99% (12 Oct 2020 09:02) (96% - 99%)  I&O's Summary    BMI (kg/m2): 34.2 (10-11-20 @ 07:54)    PHYSICAL EXAM:  General: Obese, NAD, A/O x3, pleasant  ENT: MMM, no scleral icterus  Neck: Supple, No JVD  Lungs: Respirations unlabored. Clear to auscultation bilaterally, no wheezes, rales, rhonchi  Cardio: RRR, S1/S2, No murmurs  Abdomen: Soft, Nondistended; Bowel sounds present. +mild suprapubic ttp   Extremities: No calf tenderness, No pitting edema. + b/l LE numbness    LABS:                        9.3    11.85 )-----------( 493      ( 12 Oct 2020 05:50 )             29.5       10-12    136  |  102  |  13  ----------------------------<  121  4.0   |  29  |  0.66    Ca    8.9      12 Oct 2020 05:50    TPro  5.3  /  Alb  2.2  /  TBili  0.6  /  DBili  x   /  AST  29  /  ALT  46  /  AlkPhos  69  10-11     eGFR if Non African American: 96 mL/min/1.73M2 (10-12-20 @ 05:50)  eGFR if African American: 111 mL/min/1.73M2 (10-12-20 @ 05:50)    POCT Blood Glucose.: 137 mg/dL (12 Oct 2020 08:05)    RADIOLOGY & ADDITIONAL TESTS: reviewed    Care Discussed with Consultants/Other Providers: yes

## 2020-10-13 NOTE — CHART NOTE - NSCHARTNOTEFT_GEN_A_CORE
[facility] Plan of Care    REHABILITATION DIAGNOSIS:  Status post arthrodesis          COMORBIDITIES/COMPLICATING CONDITIONS IMPACTING REHABILITATION:  HEALTH ISSUES - PROBLEM Dx:  arthritis  Post op pain      PAST MEDICAL & SURGICAL HISTORY:  Lumbar spinal stenosis    History of sciatica  mostly right side    H/O sinusitis    Eczema  extremities    Scoliosis    Meniscus tear  left knee    Spinal stenosis in cervical region    Anxiety and depression    Migraine    GERD (gastroesophageal reflux disease)    Seasonal allergies    Hyperlipidemia    HTN (hypertension)    H/O cervical spine surgery  C3-6   11/2015    History of tonsillectomy    History of appendectomy        Based upon consideration of the patient's impairments, functional status, complicating conditions and any other contributing factors and after information garnered from the assessments of all therapy disciplines involved in treating the patient and other pertinent clinicians:    INTERDISCIPLINARY REHABILITATION INTERVENTIONS:    [ X  ] Transfer Training  [ X  ] Bed Mobility  [ X  ] Therapeutic Exercise  [ X ] Balance/Coordination Exercises  [ X ] Locomotion retraining  [ X  ] Stairs  [  X ] Functional Transfer Training  [  x ] Bowel/Bladder program  [ x  ] Pain Management  [  x ] Skin/Wound Care  [   ] Visual/Perceptual Training  [   ] Therapeutic Recreation Activities  [   ] Neuromuscular Re-education  [ X  ] Activities of Daily Living  [   ] Speech Exercise  [   ] Swallowing Exercises  [   ] Vital Stim  [   ] Dietary Supplements  [   ] Calorie Count  [   ] Cognitive Exercises  [   ] Congnitive/Linguistic Treatment  [   ] Behavior Program  [   ] Neuropsych Therapy  [ X  ] Patient/Family Counseling  [ X ] Family Training  [ X  ] Community Re-entry  [   ] Orthotic Evaluation  [   ] Prosthetic Eval/Training    MEDICAL PROGNOSIS:  good    REHAB POTENTIAL:  good  EXPECTED DAILY THERAPY:         PT:2hrs       OT:1hr       ST:       P&O:    EXPECTED INTENSITY OF PROGRAM:  3 hrs / Day    EXPECTED FREQUENCY OF PROGRAM: 5 Days/ Week    ESTIMATED LOS:  [  ] 5-7 Days  [  ] 7-10 Days  [ x ] 10- 14 Days  [  ] 14- 18 Days  [  ] 18- 21 Days    ESTIMATED DISPOSITION:  [  ] Home   [  ] Home with Outpatient Therapies  [x  ] Home with Home Therapies  [  ] Assisted Living  [  ] Nursing Home  [  ] Long Term Acute Care    INTERDISCIPLINARY FUNCTIONAL OUTCOMES/GOALS:         Gait/Mobility:6       Transfers:6       ADLs:6       Functional Transfers:6       Medication Management:7       Communication:NA       Cognitive:NA       Dysphagia:NA       Bladder7       Bowel:7     Functional Independent Measures:   7 = Independent  6 = Modified Independent  5 = Supervision  4 = Minimal Assist/ Contact Guard  3 = Moderate Assistance  2 = Maximum Assistance  1 = Total Assistance  0 = Unable to assess

## 2020-10-14 LAB
ANION GAP SERPL CALC-SCNC: 3 MMOL/L — LOW (ref 5–17)
BUN SERPL-MCNC: 15 MG/DL — SIGNIFICANT CHANGE UP (ref 7–23)
CALCIUM SERPL-MCNC: 8.7 MG/DL — SIGNIFICANT CHANGE UP (ref 8.4–10.5)
CHLORIDE SERPL-SCNC: 102 MMOL/L — SIGNIFICANT CHANGE UP (ref 96–108)
CO2 SERPL-SCNC: 30 MMOL/L — SIGNIFICANT CHANGE UP (ref 22–31)
CREAT SERPL-MCNC: 0.59 MG/DL — SIGNIFICANT CHANGE UP (ref 0.5–1.3)
GLUCOSE BLDC GLUCOMTR-MCNC: 117 MG/DL — HIGH (ref 70–99)
GLUCOSE SERPL-MCNC: 110 MG/DL — HIGH (ref 70–99)
HCT VFR BLD CALC: 30.2 % — LOW (ref 34.5–45)
HGB BLD-MCNC: 9.7 G/DL — LOW (ref 11.5–15.5)
MCHC RBC-ENTMCNC: 29.8 PG — SIGNIFICANT CHANGE UP (ref 27–34)
MCHC RBC-ENTMCNC: 32.1 GM/DL — SIGNIFICANT CHANGE UP (ref 32–36)
MCV RBC AUTO: 92.9 FL — SIGNIFICANT CHANGE UP (ref 80–100)
NRBC # BLD: 0 /100 WBCS — SIGNIFICANT CHANGE UP (ref 0–0)
PLATELET # BLD AUTO: 478 K/UL — HIGH (ref 150–400)
POTASSIUM SERPL-MCNC: 4 MMOL/L — SIGNIFICANT CHANGE UP (ref 3.5–5.3)
POTASSIUM SERPL-SCNC: 4 MMOL/L — SIGNIFICANT CHANGE UP (ref 3.5–5.3)
RBC # BLD: 3.25 M/UL — LOW (ref 3.8–5.2)
RBC # FLD: 14.6 % — HIGH (ref 10.3–14.5)
SODIUM SERPL-SCNC: 135 MMOL/L — SIGNIFICANT CHANGE UP (ref 135–145)
WBC # BLD: 9.48 K/UL — SIGNIFICANT CHANGE UP (ref 3.8–10.5)
WBC # FLD AUTO: 9.48 K/UL — SIGNIFICANT CHANGE UP (ref 3.8–10.5)

## 2020-10-14 PROCEDURE — 96116 NUBHVL XM PHYS/QHP 1ST HR: CPT

## 2020-10-14 PROCEDURE — 99233 SBSQ HOSP IP/OBS HIGH 50: CPT

## 2020-10-14 RX ORDER — CEPHALEXIN 500 MG
500 CAPSULE ORAL ONCE
Refills: 0 | Status: COMPLETED | OUTPATIENT
Start: 2020-10-14 | End: 2020-10-14

## 2020-10-14 RX ORDER — DULOXETINE HYDROCHLORIDE 30 MG/1
120 CAPSULE, DELAYED RELEASE ORAL AT BEDTIME
Refills: 0 | Status: DISCONTINUED | OUTPATIENT
Start: 2020-10-15 | End: 2020-11-03

## 2020-10-14 RX ORDER — DULOXETINE HYDROCHLORIDE 30 MG/1
60 CAPSULE, DELAYED RELEASE ORAL AT BEDTIME
Refills: 0 | Status: COMPLETED | OUTPATIENT
Start: 2020-10-14 | End: 2020-10-14

## 2020-10-14 RX ORDER — SACCHAROMYCES BOULARDII 250 MG
250 POWDER IN PACKET (EA) ORAL
Refills: 0 | Status: DISCONTINUED | OUTPATIENT
Start: 2020-10-14 | End: 2020-11-03

## 2020-10-14 RX ORDER — CEPHALEXIN 500 MG
500 CAPSULE ORAL EVERY 12 HOURS
Refills: 0 | Status: DISCONTINUED | OUTPATIENT
Start: 2020-10-14 | End: 2020-10-17

## 2020-10-14 RX ORDER — AMLODIPINE BESYLATE 2.5 MG/1
5 TABLET ORAL DAILY
Refills: 0 | Status: DISCONTINUED | OUTPATIENT
Start: 2020-10-14 | End: 2020-11-03

## 2020-10-14 RX ADMIN — Medication 250 MILLIGRAM(S): at 17:27

## 2020-10-14 RX ADMIN — AMLODIPINE BESYLATE 5 MILLIGRAM(S): 2.5 TABLET ORAL at 17:32

## 2020-10-14 RX ADMIN — Medication 1000 UNIT(S): at 11:42

## 2020-10-14 RX ADMIN — Medication 145 MILLIGRAM(S): at 11:42

## 2020-10-14 RX ADMIN — OXYCODONE HYDROCHLORIDE 10 MILLIGRAM(S): 5 TABLET ORAL at 07:26

## 2020-10-14 RX ADMIN — OXYCODONE HYDROCHLORIDE 10 MILLIGRAM(S): 5 TABLET ORAL at 08:00

## 2020-10-14 RX ADMIN — MAGNESIUM OXIDE 400 MG ORAL TABLET 400 MILLIGRAM(S): 241.3 TABLET ORAL at 11:42

## 2020-10-14 RX ADMIN — DULOXETINE HYDROCHLORIDE 60 MILLIGRAM(S): 30 CAPSULE, DELAYED RELEASE ORAL at 05:39

## 2020-10-14 RX ADMIN — Medication 1 TABLET(S): at 11:42

## 2020-10-14 RX ADMIN — Medication 500 MILLIGRAM(S): at 17:27

## 2020-10-14 RX ADMIN — OXYCODONE HYDROCHLORIDE 10 MILLIGRAM(S): 5 TABLET ORAL at 12:15

## 2020-10-14 RX ADMIN — OXYCODONE HYDROCHLORIDE 10 MILLIGRAM(S): 5 TABLET ORAL at 22:05

## 2020-10-14 RX ADMIN — DULOXETINE HYDROCHLORIDE 60 MILLIGRAM(S): 30 CAPSULE, DELAYED RELEASE ORAL at 21:04

## 2020-10-14 RX ADMIN — Medication 24 MILLIGRAM(S): at 17:27

## 2020-10-14 RX ADMIN — PANTOPRAZOLE SODIUM 40 MILLIGRAM(S): 20 TABLET, DELAYED RELEASE ORAL at 05:39

## 2020-10-14 RX ADMIN — ENOXAPARIN SODIUM 40 MILLIGRAM(S): 100 INJECTION SUBCUTANEOUS at 21:04

## 2020-10-14 RX ADMIN — Medication 500 MILLIGRAM(S): at 11:09

## 2020-10-14 RX ADMIN — OXYCODONE HYDROCHLORIDE 10 MILLIGRAM(S): 5 TABLET ORAL at 21:09

## 2020-10-14 RX ADMIN — ATORVASTATIN CALCIUM 10 MILLIGRAM(S): 80 TABLET, FILM COATED ORAL at 21:04

## 2020-10-14 RX ADMIN — OXYCODONE HYDROCHLORIDE 10 MILLIGRAM(S): 5 TABLET ORAL at 11:42

## 2020-10-14 RX ADMIN — POLYETHYLENE GLYCOL 3350 17 GRAM(S): 17 POWDER, FOR SOLUTION ORAL at 11:42

## 2020-10-14 NOTE — CONSULT NOTE ADULT - ASSESSMENT
Assessment: Pt presents with grossly intact cognitive functioning, with very mild difficulties noted in working and short-term memory, as well as visuomotor integration, which are reasonably explained by aftereffects of her spine surgery including pain and discomfort, and use of pain medications. On the other hand, her affect is constricted and depressed, and she reports several adjustment symptoms, both of anxiety and depression, in response to her current situation and physical limitations. She is also in the process of grieving her mother, of whom she was the caregiver, and who passed away only a few months ago. Also, her supportive social network is short and inconsistently available. FIM scores: Social Interaction 4; Problem Solving 6; Memory 6.  Plan: Individual supportive psychotherapy to monitor cognition, affect/mood, and behavior. Pt will benefit from consultation with psychiatry to check if changes to her current antidepressant medication are needed. Pt will also benefit from being switched to a bed farther away from the room's door in order to improve privacy and to minimize external stimuli that interfere with sleep and rest. Participation in recreation/art therapy in order to have pleasure and mastery experiences and regain/reestablish a sense of routine.

## 2020-10-14 NOTE — CONSULT NOTE ADULT - SUBJECTIVE AND OBJECTIVE BOX
Pt is a 59 y/o left-handed female with PMHx of HTN, hyperlipidemia, osteoarthritis, anxiety/depression disorders, GERD, eczema, sinusitis, scoliosis/spinal stenosis- lumbar region, presented to [facility] 9/29/20 for scheduled stage 1, L1-5 lumbar lateral interbody fusion with Dr. Valdez. Stage 2 T9-pelvis arthrodesis, L5-S1 TLIF done on 9/30/20 with closure of muscle flap done by plastics, Dr. Alegre. Admitted to NSICU post operatively due to hemorrhagic shock requiring fluid resuscitation with IV hydration and pressors. EBL 1500 cc, s/p 2 u prbc. Pt was weaned off IV pressors 10/1/20 and started on midodrine. Transferred to the floor when stable. Surgical drains removed on 10/7/20. Plastic surgery following for incision management. Aquacel dressing changed on 10/3 and 10/8. Hgb remains stable. Pt currently off antihypertensives, now on midodrine- wean to off as indicated.   Pt is medically and neurologically stable for discharge to [place] for multidisciplinary acute rehab 10/10/20.  Patient arrived hemodynamically stable condition. Seen at bedside with her  present. She reports bilateral shoulder pain due to history of OA (had injections prior to being admitted for spine surgery).  Currently reports 7/10 with movement.  She endorses 2-3 episodes of diarrhea the last two days. Denies abdominal pain, fevers, chills. PMHx: As reported above. Current meds: Cymbalta 120 mg QD. Please see list in Pt’s chart. Social Hx: Pt is  and has one step-daughter. She graduated from college with a degree in medical technology and is on disability since about 5 years ago. Pt lacks hx of mental illness and substance abuse. She is Buddhist, but considers herself as spiritual. Pt used to enjoy scuba diving and snorkeling, as well as doing libertad and baking.    Findings: Pt was seen for an initial assessment of her cognitive and emotional functioning. MMSE was administered; Pt’s results (28/30) were in the Normal range. Her scores in mood measures suggested moderate levels of anxiety and moderate to severe depression (GAD7 = 11/21; PHQ9 = /27). Pt was alert, fully Ox3, and cooperative. Attn/Conc: Simple auditory attention - intact. Concentration - intact. Working memory for calculations – closely intact (4/5 serial 7s).  Language: Pt’s speech was of normal volume, pitch and pace. Naming - intact. Sentence repetition - intact. Auditory comprehension - intact. Reading - WNL. Writing - WNL. Memory: Encoding of 3 words was intact  (3/3); delayed verbal recall – (2/3, improving to 3/3). LTM was intact for US presidents (4/4). Visual memory – intact. Visuospatial: Visuomotor integration – mildly impaired for copy of a 3D figure, sloppiness was noted. Executive Functions: Motor planning - intact. Organizational skill - intact. Abstract - reasoning - intact. Verbal Problem Solving - closely intact. Affect - constricted, depressed. Mood - dysphoric ("crappy"); Pt reported experiencing sadness, anxiety, crying spells, helplessness/hopelessness, poor sleep, decreased apptite, low energy and fatigue. On mood measures she additionally reported very frequent anhedonia, low energy/fatigue, decreased appetite, anxiety, worry/difficulty controlling her worry, difficulty relaxing; frequent depressed mood, insomnia, low self-esteem, and poor concentration; as well as occasional psychomotor retardation, restlessness, irritability, and catastrophization. Thought processes were goal-directed. No abnormal thought contents were observed.  Pt reported seeing cartoons. Pt also denied SI/HI/I/P. Insight - WFL. Judgment - fair.    Assessment:    FIM scores: Social Interaction ; Problem Solving ; Memory .  Plan: Individual supportive psychotherapy to monitor cognition, affect/mood, and behavior. Cognitive remediation during speech therapy sessions. Participation in recreation/art therapy in order to have pleasure and mastery experiences and regain/reestablish a sense of routine.   Pt is a 59 y/o left-handed female with PMHx of HTN, hyperlipidemia, osteoarthritis, anxiety/depression disorders, GERD, eczema, sinusitis, scoliosis/spinal stenosis- lumbar region, presented to [facility] 9/29/20 for scheduled stage 1, L1-5 lumbar lateral interbody fusion with Dr. Valdez. Stage 2 T9-pelvis arthrodesis, L5-S1 TLIF done on 9/30/20 with closure of muscle flap done by plastics, Dr. Alegre. Admitted to NSICU post operatively due to hemorrhagic shock requiring fluid resuscitation with IV hydration and pressors. EBL 1500 cc, s/p 2 u prbc. Pt was weaned off IV pressors 10/1/20 and started on midodrine. Transferred to the floor when stable. Surgical drains removed on 10/7/20. Plastic surgery following for incision management. Aquacel dressing changed on 10/3 and 10/8. Hgb remains stable. Pt currently off antihypertensives, now on midodrine- wean to off as indicated.   Pt is medically and neurologically stable for discharge to [place] for multidisciplinary acute rehab 10/10/20.  Patient arrived hemodynamically stable condition. Seen at bedside with her  present. She reports bilateral shoulder pain due to history of OA (had injections prior to being admitted for spine surgery).  Currently reports 7/10 with movement.  She endorses 2-3 episodes of diarrhea the last two days. Denies abdominal pain, fevers, chills. PMHx: As reported above. Current meds: Cymbalta 120 mg QD. Please see list in Pt’s chart. Social Hx: Pt is  and has one step-daughter. She graduated from college with a degree in medical technology and is on disability since about 5 years ago. Pt lacks hx of mental illness and substance abuse. She is Tenriism, but considers herself as spiritual. Pt used to enjoy scuba diving and snorkeling, as well as doing libertad and baking.    Findings: Pt was seen for an initial assessment of her cognitive and emotional functioning. MMSE was administered; Pt’s results (28/30) were in the Normal range. Her scores in mood measures suggested moderate levels of anxiety and moderate to severe depression (GAD7 = 11/21; PHQ9 = /27). Pt was alert, fully Ox3, and cooperative. Attn/Conc: Simple auditory attention - intact. Concentration - intact. Working memory for calculations – closely intact (4/5 serial 7s).  Language: Pt’s speech was of normal volume, pitch and pace. Naming - intact. Sentence repetition - intact. Auditory comprehension - intact. Reading - WNL. Writing - WNL. Memory: Encoding of 3 words was intact  (3/3); delayed verbal recall – (2/3, improving to 3/3). LTM was intact for US presidents (4/4). Visual memory – intact. Visuospatial: Visuomotor integration – mildly impaired for copy of a 3D figure, sloppiness was noted. Executive Functions: Motor planning - intact. Organizational skill - intact. Abstract - reasoning - intact. Verbal Problem Solving - closely intact. Affect - constricted, depressed. Mood - dysphoric ("crappy"); Pt reported experiencing sadness, anxiety, crying spells, helplessness/hopelessness, poor sleep, decreased apptite, low energy and fatigue. On mood measures she additionally reported very frequent anhedonia, low energy/fatigue, decreased appetite, anxiety, worry/difficulty controlling her worry, difficulty relaxing; frequent depressed mood, insomnia, low self-esteem, and poor concentration; as well as occasional psychomotor retardation, restlessness, irritability, and catastrophization. Thought processes were goal-directed. No abnormal thought contents were observed.  Pt reported seeing cartoons. Pt also denied SI/HI/I/P. Insight - WFL. Judgment - fair.

## 2020-10-14 NOTE — PROGRESS NOTE ADULT - ASSESSMENT
CRISTEL WATTS is a HTN, hyperlipidemia, osteoarthritis, anxiety/depression disorders, GERD, eczema, sinusitis, scoliosis/spinal stenosis- lumbar region, presented to [facility] 9/29/20 for scheduled stage 1, L1-5 lumbar lateral interbody fusion with Dr. Valdez. Stage 2 T9-pelvis arthrodesis, L5-S1 TLIF done on 9/30/20 with closure of muscle flap done by plastics, Dr. Alegre. Hospital course complicated by hemorrhagic shock postop requiring pressors and blood transfusions, now stable on Midodrine. Now admitted to [facility] after for initiation of a multidisciplinary rehab program consisting focused on functional mobility, transfers and ADLs (activities of daily living).    #Elective L1-5 lumbar fusion, T9-pelvis arthrodesis, L5-S1 TLIF 9/29-9/30  - comprehensive multidisciplinary rehab program, PT/OT 3 hours a day, 5 days a week.  - P&O as needed  - Wound Care:     Remove staples lt hip POD 14 (10/14) if wound looks well. Remove sutures midline spine POD 14 if wound looks well per neurosurgery.    dressing Dcd -Open to air     Patient may shower on the 3rd day after surgery.      Last Aquacel dressing change 10/8/20. Aquacel to be changed Q5 days.  - HOLD ASA and no NSAIDs until cleared by neurosurgery.  - F/u with neurosurgery, Dr. Valdez, outpatient  - F/u with plastics in 1-2 weeks with Dr. Alegre in clinic  - No weight bearing restrictions  - Precautions: falls, spine, neuro  Spoke with NS Office- To start PO steroids for sensory symptoms Right LE and follow  need to follow glucoses and BP while on steroids    #Hypotension postop - improved  Will Dc midodrine- SYST BPs 130-140  - consider restarting some home BP meds: Norvasc 5mg daily, Bystolic 10mg QAM, Edarbi 40mg QHS  - Monitor BP    #Pre-DM  - HbA1c 6.4 9/29/20  - FS  10/9  change fingersticks to fasting  - Monitor FS.    #Depression/Anxiety:  - Continue Cymbalta 60mg BID  neuropsychology consult ordered    #Migraines  - Sumatriptan 25mg Q4hr PRN    # UTI  UA +/C&S pending  PVR low  start PO Keflex    #Allergies  - Continue Benadryl 25mg Q4hr PRN    Pain Management:  - Flexeril 10mg TID PRN  timed oxycodone 10mg with breakfast and lunch daily before Rehab    GI/Bowel:  - At risk for constipation due to neurologic diagnosis, immobility and/or medication use  - daily miralax restarted at patient request  - GI ppx: Protonix daily, Maalox PRN    /Bladder:   - At risk for incontinence and retention due to neurologic diagnosis and limited mobility  - Currently patient voids: independent with bedpan   - Encourage timed voids every 4 hours while awake for independence and to promote continence during therapy.    Skin/Pressure Injury:   - Skin assessment on admission: no pressure injuries noted   - Monitor Incisions: Thoraco-lumbar incision and left flank incision - wound care as above  - Turn every 2 hours while in bed, air mattress  - Soft heel protectors  - Skin barrier cream as needed  - Nursing to monitor skin Qshift    Diet:  - Diet Consistency/Modifications: regular/DASH    DVT ppx:  - Lovenox  - SCDs

## 2020-10-14 NOTE — PROGRESS NOTE ADULT - SUBJECTIVE AND OBJECTIVE BOX
60 year old female with PMHx HTN, hyperlipidemia, osteoarthritis, anxiety/depression disorders, GERD, eczema, sinusitis, scoliosis/spinal stenosis- lumbar region, presented to [facility] 20 for scheduled stage 1, L1-5 lumbar lateral interbody fusion with Dr. Valdez. Stage 2 T9-pelvis arthrodesis, L5-S1 TLIF done on 20 with closure of muscle flap done by plastics, Dr. Alegre. Admitted to NSICU post operatively due to hemorrhagic shock requiring fluid resuscitation with IV hydration and pressors. EBL 1500 cc, s/p 2 u prbc. Patient was weaned off IV pressors 10/1/20 and started on midodrine. Transferred to the floor when stable. Surgical drains removed on 10/7/20. Plastic surgery following for incision management. Aquacel dressing changed on 10/3 and 10/8. Hgb remains stable. Pt currently off antihypertensives, now on midodrine- wean to off as indicated.     Patient is medically and neurologically stable for discharge to [place] for multidisciplinary acute rehab 10/10/20.  Patient arrived hemodynamically stable condition. Seen at bedside with her  present. She reports bilateral shoulder pain due to history of OA (had injections prior to being admitted for spine surgery).  Currently reports 7/10 with movement.  She endorses 2-3 episodes of diarrhea the last two days. Denies abdominal pain, fevers, chills.       ROS - CO dysuria   sweating claims she is febrilr- temp 98.6  CO increasing numbness/ Pain Right LE not present preoperatively  feels like there is a vice on her Right foot  +bowel incontinence  no N,V  No dizziness, no headaches  no Chest pain, no SOB  PVR 12 noon yesterday 50cc  wants pain meds timed before therapy      MEDICATIONS  (STANDING):  amLODIPine   Tablet 5 milliGRAM(s) Oral daily  atorvastatin 10 milliGRAM(s) Oral at bedtime  cephalexin 500 milliGRAM(s) Oral every 12 hours  cholecalciferol 1000 Unit(s) Oral daily  dextrose 5%. 1000 milliLiter(s) (50 mL/Hr) IV Continuous <Continuous>  dextrose 50% Injectable 12.5 Gram(s) IV Push once  dextrose 50% Injectable 25 Gram(s) IV Push once  dextrose 50% Injectable 25 Gram(s) IV Push once  DULoxetine 60 milliGRAM(s) Oral at bedtime  enoxaparin Injectable 40 milliGRAM(s) SubCutaneous <User Schedule>  fenofibrate Tablet 145 milliGRAM(s) Oral daily  magnesium oxide 400 milliGRAM(s) Oral daily  multivitamin 1 Tablet(s) Oral daily  oxyCODONE    IR 10 milliGRAM(s) Oral <User Schedule>  pantoprazole    Tablet 40 milliGRAM(s) Oral before breakfast  polyethylene glycol 3350 17 Gram(s) Oral daily  saccharomyces boulardii 250 milliGRAM(s) Oral two times a day    MEDICATIONS  (PRN):  aluminum hydroxide/magnesium hydroxide/simethicone Suspension 30 milliLiter(s) Oral every 4 hours PRN Dyspepsia  cyclobenzaprine 10 milliGRAM(s) Oral three times a day PRN Muscle Spasm  dextrose 40% Gel 15 Gram(s) Oral once PRN Blood Glucose LESS THAN 70 milliGRAM(s)/deciliter  diphenhydrAMINE 25 milliGRAM(s) Oral every 4 hours PRN Rash and/or Itching  glucagon  Injectable 1 milliGRAM(s) IntraMuscular once PRN Glucose LESS THAN 70 milligrams/deciliter  oxyCODONE    IR 5 milliGRAM(s) Oral every 6 hours PRN Moderate Pain (4 - 6)  oxyCODONE    IR 10 milliGRAM(s) Oral every 8 hours PRN Severe Pain (7 - 10)  senna 2 Tablet(s) Oral at bedtime PRN Constipation  SUMAtriptan 25 milliGRAM(s) Oral four times a day PRN Migraine                            9.7    9.48  )-----------( 478      ( 14 Oct 2020 11:32 )             30.2     10-    135  |  102  |  15  ----------------------------<  110<H>  4.0   |  30  |  0.59    Ca    8.7      14 Oct 2020 11:32      Urinalysis Basic - ( 13 Oct 2020 15:05 )    Color: Yellow / Appearance: Slightly Turbid / S.005 / pH: x  Gluc: x / Ketone: Negative  / Bili: Negative / Urobili: Negative   Blood: x / Protein: 15 / Nitrite: Negative   Leuk Esterase: Moderate / RBC: 11-25 /HPF / WBC >50 /HPF   Sq Epi: x / Non Sq Epi: Neg.-Few / Bacteria: Many /HPF        CAPILLARY BLOOD GLUCOSE      POCT Blood Glucose.: 117 mg/dL (14 Oct 2020 07:12)      Vital Signs Last 24 Hrs  T(C): 37.1 (14 Oct 2020 08:00), Max: 37.1 (14 Oct 2020 08:00)  T(F): 98.7 (14 Oct 2020 08:00), Max: 98.7 (14 Oct 2020 08:00)  HR: 88 (14 Oct 2020 08:00) (88 - 96)  BP: 158/78 (14 Oct 2020 08:00) (126/70 - 158/78)  BP(mean): --  RR: 16 (14 Oct 2020 08:00) (16 - 18)  SpO2: 96% (14 Oct 2020 08:00) (96% - 98%)        Constitutional - NAD, Comfortable, lying in bed  HEENT - NCAT, EOMI  Chest - Breathing comfortably  Cardiovascular - RRR  Abdomen - Soft, mildly distended, hyperactive bowel sounds  Extremities - No C/C/E, No calf tenderness   Neurologic Exam -                    Cognitive - Awake, Alert, AAO to self, place, date, year, situation     Communication - Fluent, No dysarthria     Cranial Nerves - no facial asymmetry, shoulder shrug intact,      Motor -                     LEFT    UE - ShAB 5/5, EF 5/5, EE 5/5, WE 5/5,  5/5                    RIGHT UE - ShAB 5/5, EF 5/5, EE 5/5, WE 5/5,  5/5                    LEFT    LE - HF 4/5, KE 4/5, DF 3/5, PF 5/5                    RIGHT LE - HF 3/5, KE 3/5, DF 3/5, PF 5/5        Sensory - decreased to light touch medial leg on RIGHT and dorsum of foot on LEFT  now with diminished sensation laterally in Right thigh as well     Reflexes - DTR Intact, No primitive reflexes     Psychiatric - Mood stable, Affect WNL     Skin:         thoraco-lumbar surgical incision approximated with running suture- intact no drainaage no signs of infection- dressing off         Left flank dressing dcd incision intact with staples         Healed drain incisions lower lumbar          No pressure injuries noted on sacrum, coccyx, or heels    Rehab eval in progress

## 2020-10-14 NOTE — PROGRESS NOTE ADULT - SUBJECTIVE AND OBJECTIVE BOX
Patient is a 60y old  Female who presents with a chief complaint of functional deficits due to L1-L5 lumbar fusion, T9-pelvis arthrodesis, L5-S1 TLIF  03.9 Other Neurologic (13 Oct 2020 14:22)    Patient seen and examined at bedside, c/o dysuria and suprapubic discomfort, intermittent b/l LE numbness, unchanged. denies headaches, fevers, chills, cp, sob, abd pain, nausea, vomiting.    ALLERGIES:  penicillin (Other)    MEDICATIONS  (STANDING):  atorvastatin 10 milliGRAM(s) Oral at bedtime  cephalexin 500 milliGRAM(s) Oral every 12 hours  cholecalciferol 1000 Unit(s) Oral daily  dextrose 5%. 1000 milliLiter(s) (50 mL/Hr) IV Continuous <Continuous>  dextrose 50% Injectable 12.5 Gram(s) IV Push once  dextrose 50% Injectable 25 Gram(s) IV Push once  dextrose 50% Injectable 25 Gram(s) IV Push once  DULoxetine 60 milliGRAM(s) Oral two times a day  enoxaparin Injectable 40 milliGRAM(s) SubCutaneous <User Schedule>  fenofibrate Tablet 145 milliGRAM(s) Oral daily  magnesium oxide 400 milliGRAM(s) Oral daily  multivitamin 1 Tablet(s) Oral daily  oxyCODONE    IR 10 milliGRAM(s) Oral <User Schedule>  pantoprazole    Tablet 40 milliGRAM(s) Oral before breakfast  polyethylene glycol 3350 17 Gram(s) Oral daily  saccharomyces boulardii 250 milliGRAM(s) Oral two times a day    MEDICATIONS  (PRN):  aluminum hydroxide/magnesium hydroxide/simethicone Suspension 30 milliLiter(s) Oral every 4 hours PRN Dyspepsia  cyclobenzaprine 10 milliGRAM(s) Oral three times a day PRN Muscle Spasm  dextrose 40% Gel 15 Gram(s) Oral once PRN Blood Glucose LESS THAN 70 milliGRAM(s)/deciliter  diphenhydrAMINE 25 milliGRAM(s) Oral every 4 hours PRN Rash and/or Itching  glucagon  Injectable 1 milliGRAM(s) IntraMuscular once PRN Glucose LESS THAN 70 milligrams/deciliter  oxyCODONE    IR 5 milliGRAM(s) Oral every 6 hours PRN Moderate Pain (4 - 6)  oxyCODONE    IR 10 milliGRAM(s) Oral every 8 hours PRN Severe Pain (7 - 10)  senna 2 Tablet(s) Oral at bedtime PRN Constipation  SUMAtriptan 25 milliGRAM(s) Oral four times a day PRN Migraine    Vital Signs Last 24 Hrs  T(F): 98.7 (14 Oct 2020 08:00), Max: 98.7 (14 Oct 2020 08:00)  HR: 88 (14 Oct 2020 08:00) (88 - 96)  BP: 158/78 (14 Oct 2020 08:00) (126/70 - 158/78)  RR: 16 (14 Oct 2020 08:00) (16 - 18)  SpO2: 96% (14 Oct 2020 08:00) (96% - 98%)  I&O's Summary    BMI (kg/m2): 34.2 (10-11-20 @ 07:54)    PHYSICAL EXAM:  General: Obese, NAD, A/O x3, pleasant  ENT: MMM, no scleral icterus  Neck: Supple, No JVD  Lungs: Respirations unlabored. Clear to auscultation bilaterally, no wheezes, rales, rhonchi  Cardio: RRR, S1/S2, No murmurs  Abdomen: Soft, Nondistended; Bowel sounds present. +mild suprapubic ttp   Extremities: No calf tenderness, No pitting edema. + b/l LE numbness      LABS:                        9.7    9.48  )-----------( 478      ( 14 Oct 2020 11:32 )             30.2       10-14    135  |  102  |  15  ----------------------------<  110  4.0   |  30  |  0.59    Ca    8.7      14 Oct 2020 11:32       eGFR if Non African American: 100 mL/min/1.73M2 (10-14-20 @ 11:32)  eGFR if : 115 mL/min/1.73M2 (10-14-20 @ 11:32)    POCT Blood Glucose.: 117 mg/dL (14 Oct 2020 07:12)    Urinalysis Basic - ( 13 Oct 2020 15:05 )    Color: Yellow / Appearance: Slightly Turbid / S.005 / pH: x  Gluc: x / Ketone: Negative  / Bili: Negative / Urobili: Negative   Blood: x / Protein: 15 / Nitrite: Negative   Leuk Esterase: Moderate / RBC: 11-25 /HPF / WBC >50 /HPF   Sq Epi: x / Non Sq Epi: Neg.-Few / Bacteria: Many /HPF      RADIOLOGY & ADDITIONAL TESTS: reviewed    Care Discussed with Consultants/Other Providers: yes

## 2020-10-14 NOTE — PROGRESS NOTE ADULT - ASSESSMENT
This is a 59 y/o F with PMHx HTN, HLD, osteoarthritis, anxiety/depression disorders, GERD, eczema, sinusitis, scoliosis/spinal stenosis- lumbar region, presented to [facility] 9/29/20 for scheduled stage 1, L1-5 lumbar lateral interbody fusion with Dr. Valdez. Stage 2 T9-pelvis arthrodesis, L5-S1 TLIF done on 9/30/20 with closure of muscle flap done by plastics, Dr. Alegre. Post-op course complicated by hemorrhagic shock requiring transfer to ICU, fluid resuscitation with IV hydration and pressors. EBL 1500 cc, s/p 2 u prbc. Patient was weaned off IV pressors 10/1/20 and started on midodrine. Surgical drains removed on 10/7/20. Plastic surgery following for incision management recommended Aquacel dressing changed on 10/3 and 10/8. Patient was transferred to [facility] on 10/10/20 for comprehensive rehab.     #S/p L1-5 lumbar fusion, T9-pelvis arthrodesis, L5-S1 TLIF (9/30/20)  -Continue comprehensive rehab - PT/OT per rehab  -Pain management per rehab. continue bowel regimen  -Wound care per rehab    #Dysuria  -UA grossly positive. Follow UCx. start keflex PO x 5 days  -PVR as indicated    #Post-op hypotension  -Off midodrine   -Monitor BP, slowly reintroduce home meds for SBP> 140. resume norvasc. Home BP meds: Norvasc 5mg daily, Bystolic 10mg QAM, Edarbi 40mg QHS    #Pre-DM, a1c 6.4  -Consistent Carbohydrate Diet  -Check FS BID for now  -Nutrition consult    #HLD  -Cont fenofibrate/Statin    #Postop anemia  -H/H stable  -Monitor for now. transfuse for Hb < 7.0  -Hold ASA, NSAIDs    #Leukocytosis - likely reactive   -Afebrile, no s/s of infection  -Cont to monitor     #Depression, Anxiety  -Mood stable   -Cont cymbalta   -Neuropsych as indicated    #Migraines  -Cont sumatriptan     #Allergies  -Would avoid benadryl. consider zrytec qhs if symptomatic     #DVT ppx - Lovenox  #GI ppx - PPI   #Standard precautions - aspiration, fall, safety, seizure, skin

## 2020-10-14 NOTE — CHART NOTE - NSCHARTNOTEFT_GEN_A_CORE
IDT meeting 10/14    OT: UBD min A   mod I with socks   max A toilet transfers  eating and grooming set up   goals Mod I     PT: min to mod A for transfers   ambulating 15ft with RW and wc follow   Barriers; home alone during day, stairs to enter home   goals Mod I with transfers and unclear for ambulation at this time       Tentative dc date home with  and home care 10/29

## 2020-10-15 DIAGNOSIS — F43.29 ADJUSTMENT DISORDER WITH OTHER SYMPTOMS: ICD-10-CM

## 2020-10-15 LAB
-  AMIKACIN: SIGNIFICANT CHANGE UP
-  AMOXICILLIN/CLAVULANIC ACID: SIGNIFICANT CHANGE UP
-  AMPICILLIN/SULBACTAM: SIGNIFICANT CHANGE UP
-  AMPICILLIN: SIGNIFICANT CHANGE UP
-  AZTREONAM: SIGNIFICANT CHANGE UP
-  CEFAZOLIN: SIGNIFICANT CHANGE UP
-  CEFEPIME: SIGNIFICANT CHANGE UP
-  CEFOXITIN: SIGNIFICANT CHANGE UP
-  CEFTRIAXONE: SIGNIFICANT CHANGE UP
-  CIPROFLOXACIN: SIGNIFICANT CHANGE UP
-  ERTAPENEM: SIGNIFICANT CHANGE UP
-  GENTAMICIN: SIGNIFICANT CHANGE UP
-  LEVOFLOXACIN: SIGNIFICANT CHANGE UP
-  MEROPENEM: SIGNIFICANT CHANGE UP
-  NITROFURANTOIN: SIGNIFICANT CHANGE UP
-  PIPERACILLIN/TAZOBACTAM: SIGNIFICANT CHANGE UP
-  TOBRAMYCIN: SIGNIFICANT CHANGE UP
-  TRIMETHOPRIM/SULFAMETHOXAZOLE: SIGNIFICANT CHANGE UP
ANION GAP SERPL CALC-SCNC: 10 MMOL/L — SIGNIFICANT CHANGE UP (ref 5–17)
BUN SERPL-MCNC: 18 MG/DL — SIGNIFICANT CHANGE UP (ref 7–23)
CALCIUM SERPL-MCNC: 9.5 MG/DL — SIGNIFICANT CHANGE UP (ref 8.4–10.5)
CHLORIDE SERPL-SCNC: 102 MMOL/L — SIGNIFICANT CHANGE UP (ref 96–108)
CO2 SERPL-SCNC: 25 MMOL/L — SIGNIFICANT CHANGE UP (ref 22–31)
CREAT SERPL-MCNC: 0.64 MG/DL — SIGNIFICANT CHANGE UP (ref 0.5–1.3)
CULTURE RESULTS: SIGNIFICANT CHANGE UP
CULTURE RESULTS: SIGNIFICANT CHANGE UP
GLUCOSE BLDC GLUCOMTR-MCNC: 141 MG/DL — HIGH (ref 70–99)
GLUCOSE SERPL-MCNC: 133 MG/DL — HIGH (ref 70–99)
HCT VFR BLD CALC: 32.1 % — LOW (ref 34.5–45)
HGB BLD-MCNC: 10.4 G/DL — LOW (ref 11.5–15.5)
MCHC RBC-ENTMCNC: 30 PG — SIGNIFICANT CHANGE UP (ref 27–34)
MCHC RBC-ENTMCNC: 32.4 GM/DL — SIGNIFICANT CHANGE UP (ref 32–36)
MCV RBC AUTO: 92.5 FL — SIGNIFICANT CHANGE UP (ref 80–100)
METHOD TYPE: SIGNIFICANT CHANGE UP
NRBC # BLD: 0 /100 WBCS — SIGNIFICANT CHANGE UP (ref 0–0)
ORGANISM # SPEC MICROSCOPIC CNT: SIGNIFICANT CHANGE UP
ORGANISM # SPEC MICROSCOPIC CNT: SIGNIFICANT CHANGE UP
PLATELET # BLD AUTO: 534 K/UL — HIGH (ref 150–400)
POTASSIUM SERPL-MCNC: 4.3 MMOL/L — SIGNIFICANT CHANGE UP (ref 3.5–5.3)
POTASSIUM SERPL-SCNC: 4.3 MMOL/L — SIGNIFICANT CHANGE UP (ref 3.5–5.3)
RBC # BLD: 3.47 M/UL — LOW (ref 3.8–5.2)
RBC # FLD: 14.3 % — SIGNIFICANT CHANGE UP (ref 10.3–14.5)
SODIUM SERPL-SCNC: 137 MMOL/L — SIGNIFICANT CHANGE UP (ref 135–145)
SPECIMEN SOURCE: SIGNIFICANT CHANGE UP
WBC # BLD: 8.09 K/UL — SIGNIFICANT CHANGE UP (ref 3.8–10.5)
WBC # FLD AUTO: 8.09 K/UL — SIGNIFICANT CHANGE UP (ref 3.8–10.5)

## 2020-10-15 PROCEDURE — 99232 SBSQ HOSP IP/OBS MODERATE 35: CPT

## 2020-10-15 PROCEDURE — 90792 PSYCH DIAG EVAL W/MED SRVCS: CPT

## 2020-10-15 RX ORDER — HYDROXYZINE HCL 10 MG
50 TABLET ORAL AT BEDTIME
Refills: 0 | Status: DISCONTINUED | OUTPATIENT
Start: 2020-10-15 | End: 2020-11-03

## 2020-10-15 RX ADMIN — OXYCODONE HYDROCHLORIDE 10 MILLIGRAM(S): 5 TABLET ORAL at 12:51

## 2020-10-15 RX ADMIN — Medication 250 MILLIGRAM(S): at 06:07

## 2020-10-15 RX ADMIN — Medication 50 MILLIGRAM(S): at 21:15

## 2020-10-15 RX ADMIN — OXYCODONE HYDROCHLORIDE 10 MILLIGRAM(S): 5 TABLET ORAL at 08:34

## 2020-10-15 RX ADMIN — MAGNESIUM OXIDE 400 MG ORAL TABLET 400 MILLIGRAM(S): 241.3 TABLET ORAL at 12:49

## 2020-10-15 RX ADMIN — OXYCODONE HYDROCHLORIDE 10 MILLIGRAM(S): 5 TABLET ORAL at 13:25

## 2020-10-15 RX ADMIN — AMLODIPINE BESYLATE 5 MILLIGRAM(S): 2.5 TABLET ORAL at 06:07

## 2020-10-15 RX ADMIN — Medication 250 MILLIGRAM(S): at 17:33

## 2020-10-15 RX ADMIN — OXYCODONE HYDROCHLORIDE 5 MILLIGRAM(S): 5 TABLET ORAL at 15:39

## 2020-10-15 RX ADMIN — Medication 4 MILLIGRAM(S): at 06:06

## 2020-10-15 RX ADMIN — DULOXETINE HYDROCHLORIDE 120 MILLIGRAM(S): 30 CAPSULE, DELAYED RELEASE ORAL at 21:15

## 2020-10-15 RX ADMIN — Medication 4 MILLIGRAM(S): at 17:32

## 2020-10-15 RX ADMIN — Medication 145 MILLIGRAM(S): at 12:49

## 2020-10-15 RX ADMIN — PANTOPRAZOLE SODIUM 40 MILLIGRAM(S): 20 TABLET, DELAYED RELEASE ORAL at 06:06

## 2020-10-15 RX ADMIN — Medication 500 MILLIGRAM(S): at 06:07

## 2020-10-15 RX ADMIN — ATORVASTATIN CALCIUM 10 MILLIGRAM(S): 80 TABLET, FILM COATED ORAL at 21:15

## 2020-10-15 RX ADMIN — Medication 8 MILLIGRAM(S): at 21:14

## 2020-10-15 RX ADMIN — Medication 1000 UNIT(S): at 12:49

## 2020-10-15 RX ADMIN — OXYCODONE HYDROCHLORIDE 10 MILLIGRAM(S): 5 TABLET ORAL at 11:17

## 2020-10-15 RX ADMIN — OXYCODONE HYDROCHLORIDE 5 MILLIGRAM(S): 5 TABLET ORAL at 16:11

## 2020-10-15 RX ADMIN — POLYETHYLENE GLYCOL 3350 17 GRAM(S): 17 POWDER, FOR SOLUTION ORAL at 12:49

## 2020-10-15 RX ADMIN — Medication 4 MILLIGRAM(S): at 12:49

## 2020-10-15 RX ADMIN — Medication 500 MILLIGRAM(S): at 17:33

## 2020-10-15 RX ADMIN — ENOXAPARIN SODIUM 40 MILLIGRAM(S): 100 INJECTION SUBCUTANEOUS at 21:14

## 2020-10-15 RX ADMIN — Medication 1 TABLET(S): at 12:49

## 2020-10-15 NOTE — PROGRESS NOTE ADULT - SUBJECTIVE AND OBJECTIVE BOX
Patient is a 60y old  Female who presents with a chief complaint of functional deficits due to L1-L5 lumbar fusion, T9-pelvis arthrodesis, L5-S1 TLIF  03.9 Other Neurologic (14 Oct 2020 15:26)    Patient seen and examined at bedside, dysuria and suprapubic discomfort improved, intermittent b/l LE numbness, unchanged. denies headaches, fevers, chills, cp, sob, abd pain, nausea, vomiting.    ALLERGIES:  penicillin (Other)    MEDICATIONS  (STANDING):  amLODIPine   Tablet 5 milliGRAM(s) Oral daily  atorvastatin 10 milliGRAM(s) Oral at bedtime  cephalexin 500 milliGRAM(s) Oral every 12 hours  cholecalciferol 1000 Unit(s) Oral daily  dextrose 5%. 1000 milliLiter(s) (50 mL/Hr) IV Continuous <Continuous>  dextrose 50% Injectable 12.5 Gram(s) IV Push once  dextrose 50% Injectable 25 Gram(s) IV Push once  dextrose 50% Injectable 25 Gram(s) IV Push once  DULoxetine 120 milliGRAM(s) Oral at bedtime  enoxaparin Injectable 40 milliGRAM(s) SubCutaneous <User Schedule>  fenofibrate Tablet 145 milliGRAM(s) Oral daily  magnesium oxide 400 milliGRAM(s) Oral daily  methylPREDNISolone 4 milliGRAM(s) Oral before breakfast  methylPREDNISolone 4 milliGRAM(s) Oral after lunch  methylPREDNISolone 4 milliGRAM(s) Oral after dinner  methylPREDNISolone 8 milliGRAM(s) Oral at bedtime  methylPREDNISolone   Oral   multivitamin 1 Tablet(s) Oral daily  oxyCODONE    IR 10 milliGRAM(s) Oral <User Schedule>  pantoprazole    Tablet 40 milliGRAM(s) Oral before breakfast  polyethylene glycol 3350 17 Gram(s) Oral daily  saccharomyces boulardii 250 milliGRAM(s) Oral two times a day    MEDICATIONS  (PRN):  aluminum hydroxide/magnesium hydroxide/simethicone Suspension 30 milliLiter(s) Oral every 4 hours PRN Dyspepsia  cyclobenzaprine 10 milliGRAM(s) Oral three times a day PRN Muscle Spasm  dextrose 40% Gel 15 Gram(s) Oral once PRN Blood Glucose LESS THAN 70 milliGRAM(s)/deciliter  diphenhydrAMINE 25 milliGRAM(s) Oral every 4 hours PRN Rash and/or Itching  glucagon  Injectable 1 milliGRAM(s) IntraMuscular once PRN Glucose LESS THAN 70 milligrams/deciliter  oxyCODONE    IR 5 milliGRAM(s) Oral every 6 hours PRN Moderate Pain (4 - 6)  oxyCODONE    IR 10 milliGRAM(s) Oral every 8 hours PRN Severe Pain (7 - 10)  senna 2 Tablet(s) Oral at bedtime PRN Constipation  SUMAtriptan 25 milliGRAM(s) Oral four times a day PRN Migraine    Vital Signs Last 24 Hrs  T(F): 97.7 (15 Oct 2020 08:18), Max: 99.4 (14 Oct 2020 19:53)  HR: 108 (15 Oct 2020 08:18) (93 - 108)  BP: 166/94 (15 Oct 2020 08:18) (154/80 - 166/94)  RR: 16 (15 Oct 2020 08:18) (16 - 17)  SpO2: 97% (15 Oct 2020 08:18) (94% - 97%)  I&O's Summary    BMI (kg/m2): 34.2 (10-11-20 @ 07:54)    PHYSICAL EXAM:  General: Obese, NAD, A/O x3, pleasant  ENT: MMM, no scleral icterus  Neck: Supple, No JVD  Lungs: Respirations unlabored. Clear to auscultation bilaterally, no wheezes, rales, rhonchi  Cardio: RRR, S1/S2, No murmurs  Abdomen: Soft, Nondistended; Bowel sounds present. +mild suprapubic ttp   Extremities: No calf tenderness, No pitting edema. + b/l LE numbness      LABS:                        10.4   8.09  )-----------( 534      ( 15 Oct 2020 05:30 )             32.1       10-15    137  |  102  |  18  ----------------------------<  133  4.3   |  25  |  0.64    Ca    9.5      15 Oct 2020 05:30       eGFR if : 112 mL/min/1.73M2 (10-15-20 @ 05:30)  eGFR if Non African American: 97 mL/min/1.73M2 (10-15-20 @ 05:30)    POCT Blood Glucose.: 141 mg/dL (15 Oct 2020 07:31)      Urinalysis Basic - ( 13 Oct 2020 15:05 )    Color: Yellow / Appearance: Slightly Turbid / S.005 / pH: x  Gluc: x / Ketone: Negative  / Bili: Negative / Urobili: Negative   Blood: x / Protein: 15 / Nitrite: Negative   Leuk Esterase: Moderate / RBC: 11-25 /HPF / WBC >50 /HPF   Sq Epi: x / Non Sq Epi: Neg.-Few / Bacteria: Many /HPF        Culture - Urine (collected 13 Oct 2020 16:00)  Source: .Urine Clean Catch (Midstream)  Preliminary Report (14 Oct 2020 21:41):    10,000 - 49,000 CFU/mL Proteus mirabilis        RADIOLOGY & ADDITIONAL TESTS: reviewed    Care Discussed with Consultants/Other Providers: yes

## 2020-10-15 NOTE — BEHAVIORAL HEALTH ASSESSMENT NOTE - SUICIDE PROTECTIVE FACTORS
Identifies reasons for living/Has future plans/Responsibility to family and others/Frustration tolerance

## 2020-10-15 NOTE — PROGRESS NOTE ADULT - ASSESSMENT
This is a 59 y/o F with PMHx HTN, HLD, osteoarthritis, anxiety/depression disorders, GERD, eczema, sinusitis, scoliosis/spinal stenosis- lumbar region, presented to [facility] 9/29/20 for scheduled stage 1, L1-5 lumbar lateral interbody fusion with Dr. Valdez. Stage 2 T9-pelvis arthrodesis, L5-S1 TLIF done on 9/30/20 with closure of muscle flap done by plastics, Dr. Alegre. Post-op course complicated by hemorrhagic shock requiring transfer to ICU, fluid resuscitation with IV hydration and pressors. EBL 1500 cc, s/p 2 u prbc. Patient was weaned off IV pressors 10/1/20 and started on midodrine. Surgical drains removed on 10/7/20. Plastic surgery following for incision management recommended Aquacel dressing changed on 10/3 and 10/8. Patient was transferred to [facility] on 10/10/20 for comprehensive rehab.     #S/p L1-5 lumbar fusion, T9-pelvis arthrodesis, L5-S1 TLIF (9/30/20)  -Continue comprehensive rehab - PT/OT per rehab  -Pain management per rehab. continue bowel regimen  -Wound care per rehab    #Dysuria  -UA grossly positive. Follow UCx. start keflex PO x 5 days  -PVR as indicated    #Post-op hypotension  -Off midodrine   -Monitor BP, slowly reintroduce home meds for SBP> 140. cont norvasc. Home BP meds: Norvasc 5mg daily, Bystolic 10mg QAM, Edarbi 40mg QHS    #Pre-DM, a1c 6.4  -Consistent Carbohydrate Diet  -Check FS BID for now  -Nutrition consult    #HLD  -Cont fenofibrate/Statin    #Postop anemia  -H/H stable  -Monitor for now. transfuse for Hb < 7.0  -Hold ASA, NSAIDs    #Leukocytosis - likely reactive   -Afebrile, no s/s of infection  -Cont to monitor     #Depression, Anxiety  -Mood stable   -Cont cymbalta   -Neuropsych noted    #Migraines  -Cont sumatriptan     #Allergies  -Would avoid benadryl. consider zrytec qhs if symptomatic     #DVT ppx - Lovenox  #GI ppx - PPI   #Standard precautions - aspiration, fall, safety, seizure, skin

## 2020-10-15 NOTE — BEHAVIORAL HEALTH ASSESSMENT NOTE - HPI (INCLUDE ILLNESS QUALITY, SEVERITY, DURATION, TIMING, CONTEXT, MODIFYING FACTORS, ASSOCIATED SIGNS AND SYMPTOMS)
60 year old female with PMHx HTN, hyperlipidemia, osteoarthritis, anxiety/depression disorders, GERD, eczema, sinusitis, scoliosis/spinal stenosis- lumbar region, presented to [facility] 9/29/20 for scheduled stage 1, L1-5 lumbar lateral interbody fusion with Dr. Valdez. Stage 2 T9-pelvis arthrodesis, L5-S1 TLIF done on 9/30/20 with closure of muscle flap done by plastics, Dr. Alegre. Admitted to NSICU post operatively due to hemorrhagic shock requiring fluid resuscitation with IV hydration and pressors. EBL 1500 cc, s/p 2 u prbc. Patient was weaned off IV pressors 10/1/20 and started on midodrine. Transferred to the floor when stable. Surgical drains removed on 10/7/20. Pt was on antihypertensives, now on midodrine. Pt then stable for DC to [facility] for multidisciplinary acute rehab 10/10/20.     Psychiatry: Psych C/L service was asked to see pt for evaluation of medication management. Pt currently on Cymbalta 120mg PO Q daily prescribed by Asaf Galarza DO (neuro specialist). Pt has been on Cymbalta 120mg Q daily for 8 years and states her mood is stable on it. She states she was prescribed Cymbalta for pain initially and then for her mood which she reports has been stable. Pt denies feeling anxious.  Pt did c/o being increasingly stressed secondary to the death of her mother who passed away in April 2020 and now being in the hospital.  Pt states she misses her mom although they had a difficult relationship and she now feels guilty for all the fighting they use to do. Pt c/o having poor family relationships with her 1 brother, and her stepdaughter. She states that she has been frustrated at the thought of depending on others when she goes home. She is use to being independent and now she may have to depend on relatives to help her grocery shop and get through her day. Pt c/o missing her  and states he works in the [place], they live in [place], and [facility] is not convenient for him to visit him regularly. She states she has never seen a Psychiatrist, but has seen a therapist for a virtual session prior to being admitted to the hospital. She may consider going back to having psychotherapy VIA virtual appointments.   Pt denies feeling hopeless. She is looking forward to returning home after her rehab course is completed. She likes to bake, and do house chores. She is looking forward to reorganizing her home.  Pt c/o poor sleep that is interrupted by the noise during the night. She denies suicidal/homicidal ideations or perceptual disturbances.  Pt was also very appreciative of writer and states she is pleased with the care she is receiving in [facility].   Recommendations below (continue current Cymbalta 120mg po Q daily). Psychiatry will continue to follow to provide psychological support.

## 2020-10-15 NOTE — BEHAVIORAL HEALTH ASSESSMENT NOTE - NSBHCHARTREVIEWINVESTIGATE_PSY_A_CORE FT
< from: 12 Lead ECG (10.21.15 @ 09:30) >  Ventricular Rate 74 BPM  Atrial Rate 74 BPM  P-R Interval 170 ms  QRS Duration 84 ms   ms  QTc 404 ms  P Axis 50 degrees  R Axis -7 degrees  T Axis 44 degrees  Diagnosis Line   NORMAL SINUS RHYTHM  NON SPECIFIC ST CHANGES  < end of copied text >

## 2020-10-15 NOTE — PROGRESS NOTE ADULT - ASSESSMENT
CRISTEL WATTS is a HTN, hyperlipidemia, osteoarthritis, anxiety/depression disorders, GERD, eczema, sinusitis, scoliosis/spinal stenosis- lumbar region, presented to [facility] 9/29/20 for scheduled stage 1, L1-5 lumbar lateral interbody fusion with Dr. Valdez. Stage 2 T9-pelvis arthrodesis, L5-S1 TLIF done on 9/30/20 with closure of muscle flap done by plastics, Dr. Alegre. Hospital course complicated by hemorrhagic shock postop requiring pressors and blood transfusions, now stable on Midodrine. Now admitted to [facility] after for initiation of a multidisciplinary rehab program consisting focused on functional mobility, transfers and ADLs (activities of daily living).    #Elective L1-5 lumbar fusion, T9-pelvis arthrodesis, L5-S1 TLIF 9/29-9/30  - comprehensive multidisciplinary rehab program, PT/OT 3 hours a day, 5 days a week.  - P&O as needed  - Wound Care:     Remove staples lt hip POD 14 (10/14) if wound looks well. Remove sutures midline spine POD 14 if wound looks well per neurosurgery.  Will consider Dcing sutures/staples in AM    dressing Dcd -Open to air     Patient may shower on the 3rd day after surgery.   - HOLD ASA and no NSAIDs until cleared by neurosurgery.  - F/u with neurosurgery, Dr. Valdez, outpatient  - F/u with plastics in 1-2 weeks with Dr. Alegre in clinic  - No weight bearing restrictions  - Precautions: falls, spine, neuro  continue Steroids  Xray TL spine as per NS    #Hypotension postop - improved  Will Dc midodrine- SYST BPs 130-140  : Norvasc 5mg daily restarted by hospitalist, Bystolic 10mg QAM, Edarbi 40mg QHSstill on hold  - Monitor BP    #Pre-DM  - HbA1c 6.4 9/29/20  - FS  10/9  change fingersticks to fasting  - Monitor FS.    #Depression/Anxiety:  - Continue Cymbalta 60mg BID  neuropsychology consult ordered    #Migraines  - Sumatriptan 25mg Q4hr PRN    # UTI  urineC&S proteus 10-49k  PVR low  cont PO Keflex 5x/day    #Allergies  - Continue Benadryl 25mg Q4hr PRN    Pain Management:  - Flexeril 10mg TID PRN  timed oxycodone 10mg with breakfast and lunch daily before Rehab    GI/Bowel:  - At risk for constipation due to neurologic diagnosis, immobility and/or medication use  - daily miralax restarted at patient request  - GI ppx: Protonix daily, Maalox PRN    /Bladder:   - At risk for incontinence and retention due to neurologic diagnosis and limited mobility  - Currently patient voids: independent with bedpan   - Encourage timed voids every 4 hours while awake for independence and to promote continence during therapy.    Skin/Pressure Injury:   - Skin assessment on admission: no pressure injuries noted   - Monitor Incisions: Thoraco-lumbar incision and left flank incision - wound care as above  - Turn every 2 hours while in bed, air mattress  - Soft heel protectors  - Skin barrier cream as needed  - Nursing to monitor skin Qshift    Diet:  - Diet Consistency/Modifications: regular/DASH    DVT ppx:  - Lovenox  - SCDs

## 2020-10-15 NOTE — BEHAVIORAL HEALTH ASSESSMENT NOTE - NSBHCHARTREVIEWVS_PSY_A_CORE FT
ICU Vital Signs Last 24 Hrs  T(C): 36.5 (15 Oct 2020 08:18), Max: 37.4 (14 Oct 2020 19:53)  T(F): 97.7 (15 Oct 2020 08:18), Max: 99.4 (14 Oct 2020 19:53)  HR: 108 (15 Oct 2020 08:18) (93 - 108)  BP: 166/94 (15 Oct 2020 08:18) (154/80 - 166/94)  RR: 16 (15 Oct 2020 08:18) (16 - 17)  SpO2: 97% (15 Oct 2020 08:18) (94% - 97%)

## 2020-10-15 NOTE — BEHAVIORAL HEALTH ASSESSMENT NOTE - NSBHCONSULTRECOMMENDOTHER_PSY_A_CORE FT
Continue current psychotropic medications SNRI (Cymbalta 120mg PO Q Daily).    Vistaril 50mg PO PRN to improve sleep (at patients request).

## 2020-10-15 NOTE — BEHAVIORAL HEALTH ASSESSMENT NOTE - ACTIVATING EVENTS/STRESSORS
Current or pending social isolation/Acute medical problem/Perceived burden on family or others/Triggering events leading to humiliation, shame, and/or despair (e.g. Loss of relationship, financial or health status) (real or anticipated)

## 2020-10-15 NOTE — BEHAVIORAL HEALTH ASSESSMENT NOTE - SUMMARY
60 year old female with PMHx HTN, hyperlipidemia, osteoarthritis, anxiety/depression disorders, GERD, eczema, sinusitis, scoliosis/spinal stenosis- lumbar region. Psychiatry: Psych C/L service was asked to see pt for evaluation of medication management. Pt currently on Cymbalta 120mg PO Q daily prescribed by Asaf Galarza DO (neuro specialist). Pt has been on Cymbalta 120mg Q daily for 8 years and states her mood is stable on it. Recommend to continue Cymbalta at current dosage.

## 2020-10-15 NOTE — BEHAVIORAL HEALTH ASSESSMENT NOTE - RISK ASSESSMENT
Low Acute Suicide Risk Low risk, has no access to lethal meds. Is cooperative and appreciative of staff. Wants to return home in the near future.

## 2020-10-15 NOTE — BEHAVIORAL HEALTH ASSESSMENT NOTE - COMMENTS ON SUICIDE RISK/PROTECTIVE FACTORS:
Pt at low risk for suicide. Has protective factors including her  whom she has a stable relationship with.

## 2020-10-15 NOTE — BEHAVIORAL HEALTH ASSESSMENT NOTE - VIOLENCE PROTECTIVE FACTORS:
Relationship stability/Engagement in treatment/Good treatment response/compliance/Residential stability

## 2020-10-15 NOTE — PROGRESS NOTE ADULT - SUBJECTIVE AND OBJECTIVE BOX
60 year old female with PMHx HTN, hyperlipidemia, osteoarthritis, anxiety/depression disorders, GERD, eczema, sinusitis, scoliosis/spinal stenosis- lumbar region, presented to [facility] 9/29/20 for scheduled stage 1, L1-5 lumbar lateral interbody fusion with Dr. Valdez. Stage 2 T9-pelvis arthrodesis, L5-S1 TLIF done on 9/30/20 with closure of muscle flap done by plastics, Dr. Alegre. Admitted to NSICU post operatively due to hemorrhagic shock requiring fluid resuscitation with IV hydration and pressors. EBL 1500 cc, s/p 2 u prbc. Patient was weaned off IV pressors 10/1/20 and started on midodrine. Transferred to the floor when stable. Surgical drains removed on 10/7/20. Plastic surgery following for incision management. Aquacel dressing changed on 10/3 and 10/8. Hgb remains stable. Pt currently off antihypertensives, now on midodrine- wean to off as indicated.     Patient is medically and neurologically stable for discharge to [facility] for multidisciplinary acute rehab 10/10/20.  Patient arrived hemodynamically stable condition. Seen at bedside with her  present. She reports bilateral shoulder pain due to history of OA (had injections prior to being admitted for spine surgery).  Currently reports 7/10 with movement.  She endorses 2-3 episodes of diarrhea the last two days. Denies abdominal pain, fevers, chills.       ROS - dysuria Sl better  Pain In RLE reported feels less since steroids started  spoke with NS- to get TL spine films   still feels tight band over left foot  bowel incontinence Better- + Bm in toilet  no N,V  No dizziness, no headaches  no Chest pain, no SOB  awaiting Psych evaluation          MEDICATIONS  (STANDING):  amLODIPine   Tablet 5 milliGRAM(s) Oral daily  atorvastatin 10 milliGRAM(s) Oral at bedtime  cephalexin 500 milliGRAM(s) Oral every 12 hours  cholecalciferol 1000 Unit(s) Oral daily  dextrose 5%. 1000 milliLiter(s) (50 mL/Hr) IV Continuous <Continuous>  dextrose 50% Injectable 12.5 Gram(s) IV Push once  dextrose 50% Injectable 25 Gram(s) IV Push once  dextrose 50% Injectable 25 Gram(s) IV Push once  DULoxetine 120 milliGRAM(s) Oral at bedtime  enoxaparin Injectable 40 milliGRAM(s) SubCutaneous <User Schedule>  fenofibrate Tablet 145 milliGRAM(s) Oral daily  hydrOXYzine hydrochloride 50 milliGRAM(s) Oral at bedtime  magnesium oxide 400 milliGRAM(s) Oral daily  methylPREDNISolone   Oral   methylPREDNISolone 4 milliGRAM(s) Oral before breakfast  methylPREDNISolone 4 milliGRAM(s) Oral after lunch  methylPREDNISolone 4 milliGRAM(s) Oral after dinner  methylPREDNISolone 8 milliGRAM(s) Oral at bedtime  multivitamin 1 Tablet(s) Oral daily  oxyCODONE    IR 10 milliGRAM(s) Oral <User Schedule>  pantoprazole    Tablet 40 milliGRAM(s) Oral before breakfast  polyethylene glycol 3350 17 Gram(s) Oral daily  saccharomyces boulardii 250 milliGRAM(s) Oral two times a day    MEDICATIONS  (PRN):  aluminum hydroxide/magnesium hydroxide/simethicone Suspension 30 milliLiter(s) Oral every 4 hours PRN Dyspepsia  cyclobenzaprine 10 milliGRAM(s) Oral three times a day PRN Muscle Spasm  dextrose 40% Gel 15 Gram(s) Oral once PRN Blood Glucose LESS THAN 70 milliGRAM(s)/deciliter  diphenhydrAMINE 25 milliGRAM(s) Oral every 4 hours PRN Rash and/or Itching  glucagon  Injectable 1 milliGRAM(s) IntraMuscular once PRN Glucose LESS THAN 70 milligrams/deciliter  oxyCODONE    IR 5 milliGRAM(s) Oral every 6 hours PRN Moderate Pain (4 - 6)  oxyCODONE    IR 10 milliGRAM(s) Oral every 8 hours PRN Severe Pain (7 - 10)  senna 2 Tablet(s) Oral at bedtime PRN Constipation  SUMAtriptan 25 milliGRAM(s) Oral four times a day PRN Migraine                            10.4   8.09  )-----------( 534      ( 15 Oct 2020 05:30 )             32.1     10-15    137  |  102  |  18  ----------------------------<  133<H>  4.3   |  25  |  0.64    Ca    9.5      15 Oct 2020 05:30          CAPILLARY BLOOD GLUCOSE      POCT Blood Glucose.: 141 mg/dL (15 Oct 2020 07:31)      Vital Signs Last 24 Hrs  T(C): 36.5 (15 Oct 2020 08:18), Max: 37.4 (14 Oct 2020 19:53)  T(F): 97.7 (15 Oct 2020 08:18), Max: 99.4 (14 Oct 2020 19:53)  HR: 108 (15 Oct 2020 08:18) (93 - 108)  BP: 166/94 (15 Oct 2020 08:18) (154/80 - 166/94)  BP(mean): --  RR: 16 (15 Oct 2020 08:18) (16 - 17)  SpO2: 97% (15 Oct 2020 08:18) (94% - 97%)      Constitutional - NAD, Comfortable, lying in bed  HEENT - NCAT, EOMI  Chest - Breathing comfortably  Cardiovascular - RRR  Abdomen - Soft, mildly distended, hyperactive bowel sounds  Extremities - No C/C/E, No calf tenderness   Neurologic Exam -                    Cognitive - Awake, Alert, AAO to self, place, date, year, situation     Communication - Fluent, No dysarthria     Cranial Nerves - no facial asymmetry, shoulder shrug intact,      Motor -                     LEFT    UE - ShAB 5/5, EF 5/5, EE 5/5, WE 5/5,  5/5                    RIGHT UE - ShAB 5/5, EF 5/5, EE 5/5, WE 5/5,  5/5                    LEFT    LE - HF 4/5, KE 4/5, DF 3/5, PF 5/5                    RIGHT LE - HF 3/5, KE 3/5, DF 3/5, PF 5/5        Sensory - decreased to light touch medial leg on RIGHT and dorsum of foot on LEFT  now with diminished sensation laterally in Right thigh as well     Reflexes - DTR Intact, No primitive reflexes     Psychiatric - Mood stable, Affect WNL     Skin:         thoraco-lumbar surgical incision approximated with running suture- intact no drainaage no signs of infection- dressing off         Left flank dressing dcd incision intact with staples         Healed drain incisions lower lumbar          No pressure injuries noted on sacrum, coccyx, or heels    Rehab eval in progress

## 2020-10-15 NOTE — BEHAVIORAL HEALTH ASSESSMENT NOTE - NSBHCONSULTFOLLOWAFTERCARE_PSY_A_CORE FT
Follow up with Asaf Galarza DO (Pts neuro MD).     Would recommend pt to be referred to a Psychiatrist to continue psychotropic medications.  Pt may benefit from Psychotherapy to discuss stressors in her life.

## 2020-10-15 NOTE — BEHAVIORAL HEALTH ASSESSMENT NOTE - NSBHCHARTREVIEWLAB_PSY_A_CORE FT
10.4   8.09  )-----------( 534      ( 15 Oct 2020 05:30 )             32.1   10-15    137  |  102  |  18  ----------------------------<  133<H>  4.3   |  25  |  0.64    Ca    9.5      15 Oct 2020 05:30

## 2020-10-16 LAB — GLUCOSE BLDC GLUCOMTR-MCNC: 128 MG/DL — HIGH (ref 70–99)

## 2020-10-16 PROCEDURE — 72100 X-RAY EXAM L-S SPINE 2/3 VWS: CPT | Mod: 26

## 2020-10-16 PROCEDURE — 99232 SBSQ HOSP IP/OBS MODERATE 35: CPT

## 2020-10-16 PROCEDURE — 72072 X-RAY EXAM THORAC SPINE 3VWS: CPT | Mod: 26

## 2020-10-16 RX ORDER — OXYCODONE HYDROCHLORIDE 5 MG/1
5 TABLET ORAL EVERY 6 HOURS
Refills: 0 | Status: DISCONTINUED | OUTPATIENT
Start: 2020-10-16 | End: 2020-10-22

## 2020-10-16 RX ORDER — OXYCODONE HYDROCHLORIDE 5 MG/1
10 TABLET ORAL EVERY 8 HOURS
Refills: 0 | Status: DISCONTINUED | OUTPATIENT
Start: 2020-10-16 | End: 2020-10-22

## 2020-10-16 RX ADMIN — Medication 250 MILLIGRAM(S): at 05:14

## 2020-10-16 RX ADMIN — OXYCODONE HYDROCHLORIDE 10 MILLIGRAM(S): 5 TABLET ORAL at 13:35

## 2020-10-16 RX ADMIN — OXYCODONE HYDROCHLORIDE 10 MILLIGRAM(S): 5 TABLET ORAL at 00:35

## 2020-10-16 RX ADMIN — ATORVASTATIN CALCIUM 10 MILLIGRAM(S): 80 TABLET, FILM COATED ORAL at 22:02

## 2020-10-16 RX ADMIN — OXYCODONE HYDROCHLORIDE 10 MILLIGRAM(S): 5 TABLET ORAL at 09:10

## 2020-10-16 RX ADMIN — Medication 4 MILLIGRAM(S): at 22:03

## 2020-10-16 RX ADMIN — PANTOPRAZOLE SODIUM 40 MILLIGRAM(S): 20 TABLET, DELAYED RELEASE ORAL at 05:14

## 2020-10-16 RX ADMIN — MAGNESIUM OXIDE 400 MG ORAL TABLET 400 MILLIGRAM(S): 241.3 TABLET ORAL at 12:32

## 2020-10-16 RX ADMIN — Medication 250 MILLIGRAM(S): at 17:45

## 2020-10-16 RX ADMIN — OXYCODONE HYDROCHLORIDE 10 MILLIGRAM(S): 5 TABLET ORAL at 01:15

## 2020-10-16 RX ADMIN — OXYCODONE HYDROCHLORIDE 10 MILLIGRAM(S): 5 TABLET ORAL at 12:37

## 2020-10-16 RX ADMIN — Medication 4 MILLIGRAM(S): at 05:14

## 2020-10-16 RX ADMIN — Medication 500 MILLIGRAM(S): at 17:45

## 2020-10-16 RX ADMIN — Medication 1000 UNIT(S): at 12:32

## 2020-10-16 RX ADMIN — Medication 1 TABLET(S): at 12:32

## 2020-10-16 RX ADMIN — POLYETHYLENE GLYCOL 3350 17 GRAM(S): 17 POWDER, FOR SOLUTION ORAL at 12:32

## 2020-10-16 RX ADMIN — SENNA PLUS 2 TABLET(S): 8.6 TABLET ORAL at 12:32

## 2020-10-16 RX ADMIN — Medication 4 MILLIGRAM(S): at 12:32

## 2020-10-16 RX ADMIN — OXYCODONE HYDROCHLORIDE 10 MILLIGRAM(S): 5 TABLET ORAL at 08:22

## 2020-10-16 RX ADMIN — Medication 4 MILLIGRAM(S): at 17:45

## 2020-10-16 RX ADMIN — Medication 500 MILLIGRAM(S): at 05:13

## 2020-10-16 RX ADMIN — AMLODIPINE BESYLATE 5 MILLIGRAM(S): 2.5 TABLET ORAL at 05:14

## 2020-10-16 RX ADMIN — Medication 50 MILLIGRAM(S): at 22:03

## 2020-10-16 RX ADMIN — ENOXAPARIN SODIUM 40 MILLIGRAM(S): 100 INJECTION SUBCUTANEOUS at 22:02

## 2020-10-16 RX ADMIN — Medication 145 MILLIGRAM(S): at 12:32

## 2020-10-16 RX ADMIN — DULOXETINE HYDROCHLORIDE 120 MILLIGRAM(S): 30 CAPSULE, DELAYED RELEASE ORAL at 22:03

## 2020-10-16 NOTE — CHART NOTE - NSCHARTNOTEFT_GEN_A_CORE
Patient found in bed this afternoon. Sutures to thoracic/lumbar spine removed without complication. Staples to the left flank also removed without complication. Both incision lines cleansed with betadine and steristrips applied.  Patient tolerated well.

## 2020-10-16 NOTE — PROGRESS NOTE BEHAVIORAL HEALTH - NSBHCHARTREVIEWVS_PSY_A_CORE FT
ICU Vital Signs Last 24 Hrs  T(C): 36.6 (16 Oct 2020 08:35), Max: 36.8 (15 Oct 2020 19:56)  T(F): 97.8 (16 Oct 2020 08:35), Max: 98.3 (15 Oct 2020 19:56)  HR: 80 (16 Oct 2020 08:35) (80 - 95)  BP: 134/75 (16 Oct 2020 08:35) (134/75 - 155/75)  RR: 16 (16 Oct 2020 08:35) (16 - 18)  SpO2: 99% (16 Oct 2020 08:35) (94% - 99%)

## 2020-10-16 NOTE — CHART NOTE - NSCHARTNOTEFT_GEN_A_CORE
Nutrition Follow Up Note  Hospital Course   (Per Electronic Medical Record)    Source:  Patient [X]  Medical Record [X]      Diet:   Consistent Carbohydrate DASH-TLC Diet w/ Thin Liquids  Tolerates Diet Well  No Chewing/Swallowing Difficulties  No Recent Nausea, Vomiting, Diarrhea & Some Constipation  Consumes % of Meals (as Per Documentation) - Intake Improving (Per Patient)   Education Provided on Blood Glucose Management  Obtained Food Preferences from Patient     Enteral/Parenteral Nutrition: Not Applicable    Current Weight: 205.2lb on 10/11  Obtain New Weight to Confirm Change  Obtain Weights Weekly     Pertinent Medications: MEDICATIONS  (STANDING):  amLODIPine   Tablet 5 milliGRAM(s) Oral daily  atorvastatin 10 milliGRAM(s) Oral at bedtime  cephalexin 500 milliGRAM(s) Oral every 12 hours  cholecalciferol 1000 Unit(s) Oral daily  dextrose 5%. 1000 milliLiter(s) (50 mL/Hr) IV Continuous <Continuous>  dextrose 50% Injectable 12.5 Gram(s) IV Push once  dextrose 50% Injectable 25 Gram(s) IV Push once  dextrose 50% Injectable 25 Gram(s) IV Push once  DULoxetine 120 milliGRAM(s) Oral at bedtime  enoxaparin Injectable 40 milliGRAM(s) SubCutaneous <User Schedule>  fenofibrate Tablet 145 milliGRAM(s) Oral daily  hydrOXYzine hydrochloride 50 milliGRAM(s) Oral at bedtime  magnesium oxide 400 milliGRAM(s) Oral daily  methylPREDNISolone 4 milliGRAM(s) Oral before breakfast  methylPREDNISolone 4 milliGRAM(s) Oral after lunch  methylPREDNISolone 4 milliGRAM(s) Oral after dinner  methylPREDNISolone 4 milliGRAM(s) Oral at bedtime  methylPREDNISolone   Oral   multivitamin 1 Tablet(s) Oral daily  oxyCODONE    IR 10 milliGRAM(s) Oral <User Schedule>  pantoprazole    Tablet 40 milliGRAM(s) Oral before breakfast  polyethylene glycol 3350 17 Gram(s) Oral daily  saccharomyces boulardii 250 milliGRAM(s) Oral two times a day    MEDICATIONS  (PRN):  aluminum hydroxide/magnesium hydroxide/simethicone Suspension 30 milliLiter(s) Oral every 4 hours PRN Dyspepsia  cyclobenzaprine 10 milliGRAM(s) Oral three times a day PRN Muscle Spasm  dextrose 40% Gel 15 Gram(s) Oral once PRN Blood Glucose LESS THAN 70 milliGRAM(s)/deciliter  diphenhydrAMINE 25 milliGRAM(s) Oral every 4 hours PRN Rash and/or Itching  glucagon  Injectable 1 milliGRAM(s) IntraMuscular once PRN Glucose LESS THAN 70 milligrams/deciliter  oxyCODONE    IR 5 milliGRAM(s) Oral every 6 hours PRN Moderate Pain (4 - 6)  oxyCODONE    IR 10 milliGRAM(s) Oral every 8 hours PRN Severe Pain (7 - 10)  senna 2 Tablet(s) Oral at bedtime PRN Constipation  SUMAtriptan 25 milliGRAM(s) Oral four times a day PRN Migraine    Pertinent Labs:  10-15 Na137 mmol/L Glu 133 mg/dL<H> K+ 4.3 mmol/L Cr  0.64 mg/dL BUN 18 mg/dL 10-11 Alb 2.2 g/dL<L>    POCT (over Last 3 Days) - Ranging from 117-181    Skin: No Pressure Ulcers  Surgical Incision on Spine  (as Per Nursing Flow Sheet)     Edema: None Noted     Last Bowel Movement: on 10/14    Estimated Needs:   [X] No Change Since Previous Assessment    Previous Nutrition Diagnosis:   Obese    Nutrition Diagnosis is [X] Ongoing - Education Provided on Blood Glucose Management     New Nutrition Diagnosis: [X] Not Applicable    Interventions:   1. Education Provided on Blood Glucose Management   2. Recommend Continue Nutrition Plan of Care     Monitoring & Evaluation:   [X] Weights   [X] PO Intake   [X] Skin Integrity   [X] Follow Up (Per Protocol)  [X] Tolerance to Diet Prescription   [X] Other: Labs & POCT    Registered Dietitian/Nutritionist Remains Available.  Terrence Gamez RDN    Pager # [phone]  Phone# [phone]

## 2020-10-16 NOTE — PROGRESS NOTE ADULT - SUBJECTIVE AND OBJECTIVE BOX
Patient is a 60y old  Female who presents with a chief complaint of functional deficits due to L1-L5 lumbar fusion, T9-pelvis arthrodesis, L5-S1 TLIF  03.9 Other Neurologic (14 Oct 2020 15:26)    Patient seen and examined at bedside. dysuria and suprapubic discomfort improved, intermittent b/l LE numbness, unchanged. noted intermittent b/l hand tremor with action. denies headaches, fevers, chills, cp, sob, abd pain, nausea, vomiting.     ALLERGIES:  penicillin (Other)    MEDICATIONS  (STANDING):  amLODIPine   Tablet 5 milliGRAM(s) Oral daily  atorvastatin 10 milliGRAM(s) Oral at bedtime  cephalexin 500 milliGRAM(s) Oral every 12 hours  cholecalciferol 1000 Unit(s) Oral daily  dextrose 5%. 1000 milliLiter(s) (50 mL/Hr) IV Continuous <Continuous>  dextrose 50% Injectable 12.5 Gram(s) IV Push once  dextrose 50% Injectable 25 Gram(s) IV Push once  dextrose 50% Injectable 25 Gram(s) IV Push once  DULoxetine 120 milliGRAM(s) Oral at bedtime  enoxaparin Injectable 40 milliGRAM(s) SubCutaneous <User Schedule>  fenofibrate Tablet 145 milliGRAM(s) Oral daily  magnesium oxide 400 milliGRAM(s) Oral daily  methylPREDNISolone 4 milliGRAM(s) Oral before breakfast  methylPREDNISolone 4 milliGRAM(s) Oral after lunch  methylPREDNISolone 4 milliGRAM(s) Oral after dinner  methylPREDNISolone 8 milliGRAM(s) Oral at bedtime  methylPREDNISolone   Oral   multivitamin 1 Tablet(s) Oral daily  oxyCODONE    IR 10 milliGRAM(s) Oral <User Schedule>  pantoprazole    Tablet 40 milliGRAM(s) Oral before breakfast  polyethylene glycol 3350 17 Gram(s) Oral daily  saccharomyces boulardii 250 milliGRAM(s) Oral two times a day    MEDICATIONS  (PRN):  aluminum hydroxide/magnesium hydroxide/simethicone Suspension 30 milliLiter(s) Oral every 4 hours PRN Dyspepsia  cyclobenzaprine 10 milliGRAM(s) Oral three times a day PRN Muscle Spasm  dextrose 40% Gel 15 Gram(s) Oral once PRN Blood Glucose LESS THAN 70 milliGRAM(s)/deciliter  diphenhydrAMINE 25 milliGRAM(s) Oral every 4 hours PRN Rash and/or Itching  glucagon  Injectable 1 milliGRAM(s) IntraMuscular once PRN Glucose LESS THAN 70 milligrams/deciliter  oxyCODONE    IR 5 milliGRAM(s) Oral every 6 hours PRN Moderate Pain (4 - 6)  oxyCODONE    IR 10 milliGRAM(s) Oral every 8 hours PRN Severe Pain (7 - 10)  senna 2 Tablet(s) Oral at bedtime PRN Constipation  SUMAtriptan 25 milliGRAM(s) Oral four times a day PRN Migraine    Vital Signs Last 24 Hrs  T(F): 97.7 (15 Oct 2020 08:18), Max: 99.4 (14 Oct 2020 19:53)  HR: 108 (15 Oct 2020 08:18) (93 - 108)  BP: 166/94 (15 Oct 2020 08:18) (154/80 - 166/94)  RR: 16 (15 Oct 2020 08:18) (16 - 17)  SpO2: 97% (15 Oct 2020 08:18) (94% - 97%)  I&O's Summary    BMI (kg/m2): 34.2 (10-11-20 @ 07:54)    PHYSICAL EXAM:  General: Obese, NAD, A/O x3, pleasant  ENT: MMM, no scleral icterus  Neck: Supple, No JVD  Lungs: Respirations unlabored. Clear to auscultation bilaterally, no wheezes, rales, rhonchi  Cardio: RRR, S1/S2, No murmurs  Abdomen: Soft, Nondistended; Bowel sounds present. no suprapubic ttp   Extremities: No calf tenderness, No pitting edema. + b/l LE numbness      LABS:                        10.4   8.09  )-----------( 534      ( 15 Oct 2020 05:30 )             32.1       10-15    137  |  102  |  18  ----------------------------<  133  4.3   |  25  |  0.64    Ca    9.5      15 Oct 2020 05:30       eGFR if : 112 mL/min/1.73M2 (10-15-20 @ 05:30)  eGFR if Non African American: 97 mL/min/1.73M2 (10-15-20 @ 05:30)    POCT Blood Glucose.: 141 mg/dL (15 Oct 2020 07:31)      Urinalysis Basic - ( 13 Oct 2020 15:05 )    Color: Yellow / Appearance: Slightly Turbid / S.005 / pH: x  Gluc: x / Ketone: Negative  / Bili: Negative / Urobili: Negative   Blood: x / Protein: 15 / Nitrite: Negative   Leuk Esterase: Moderate / RBC: 11-25 /HPF / WBC >50 /HPF   Sq Epi: x / Non Sq Epi: Neg.-Few / Bacteria: Many /HPF        Culture - Urine (collected 13 Oct 2020 16:00)  Source: .Urine Clean Catch (Midstream)  Preliminary Report (14 Oct 2020 21:41):    10,000 - 49,000 CFU/mL Proteus mirabilis        RADIOLOGY & ADDITIONAL TESTS: reviewed    Care Discussed with Consultants/Other Providers: yes

## 2020-10-16 NOTE — PROGRESS NOTE ADULT - ASSESSMENT
CRISTEL WATTS is a HTN, hyperlipidemia, osteoarthritis, anxiety/depression disorders, GERD, eczema, sinusitis, scoliosis/spinal stenosis- lumbar region, presented to [facility] 9/29/20 for scheduled stage 1, L1-5 lumbar lateral interbody fusion with Dr. Valdez. Stage 2 T9-pelvis arthrodesis, L5-S1 TLIF done on 9/30/20 with closure of muscle flap done by plastics, Dr. Alegre. Hospital course complicated by hemorrhagic shock postop requiring pressors and blood transfusions, now stable on Midodrine. Now admitted to [facility] after for initiation of a multidisciplinary rehab program consisting focused on functional mobility, transfers and ADLs (activities of daily living).    #Elective L1-5 lumbar fusion, T9-pelvis arthrodesis, L5-S1 TLIF 9/29-9/30  - comprehensive multidisciplinary rehab program, PT/OT 3 hours a day, 5 days a week.  - P&O as needed  - Wound Care:     Remove staples lt hip POD 14 (10/14) if wound looks well. Remove sutures midline spine POD 14 if wound looks well per neurosurgery.  Will consider Dcing sutures/staples in AM    dressing Dcd -Open to air     Patient may shower on the 3rd day after surgery.   - HOLD ASA and no NSAIDs until cleared by neurosurgery.  - F/u with neurosurgery, Dr. Valdez, outpatient  - F/u with plastics in 1-2 weeks with Dr. Alegre in clinic  - No weight bearing restrictions  - Precautions: falls, spine, neuro  continue Steroids  Xray TL spine as per NS    #Hypotension postop - improved  Will Dc midodrine- SYST BPs 130-140  : Norvasc 5mg daily restarted by hospitalist, Bystolic 10mg QAM, Edarbi 40mg QHSstill on hold  - Monitor BP    #Pre-DM  - HbA1c 6.4 9/29/20  - FS  10/9  change fingersticks to fasting  - Monitor FS.    #Depression/Anxiety:  - Continue Cymbalta 60mg BID  neuropsychology consult ordered    #Migraines  - Sumatriptan 25mg Q4hr PRN    # UTI  urineC&S proteus 10-49k  PVR low  cont PO Keflex 5x/day    #Allergies  - Continue Benadryl 25mg Q4hr PRN    Pain Management:  - Flexeril 10mg TID PRN  timed oxycodone 10mg with breakfast and lunch daily before Rehab    GI/Bowel:  - At risk for constipation due to neurologic diagnosis, immobility and/or medication use  - daily miralax restarted at patient request  - GI ppx: Protonix daily, Maalox PRN    /Bladder:   - At risk for incontinence and retention due to neurologic diagnosis and limited mobility  - Currently patient voids: independent with bedpan   - Encourage timed voids every 4 hours while awake for independence and to promote continence during therapy.    Skin/Pressure Injury:   - Skin assessment on admission: no pressure injuries noted   - Monitor Incisions: Thoraco-lumbar incision and left flank incision - wound care as above  - Turn every 2 hours while in bed, air mattress  - Soft heel protectors  - Skin barrier cream as needed  - Nursing to monitor skin Qshift    Diet:  - Diet Consistency/Modifications: regular/DASH    DVT ppx:  - Lovenox  - SCDs CRISTEL WATTS is a HTN, hyperlipidemia, osteoarthritis, anxiety/depression disorders, GERD, eczema, sinusitis, scoliosis/spinal stenosis- lumbar region, presented to [facility] 9/29/20 for scheduled stage 1, L1-5 lumbar lateral interbody fusion with Dr. Valdez. Stage 2 T9-pelvis arthrodesis, L5-S1 TLIF done on 9/30/20 with closure of muscle flap done by plastics, Dr. Alegre. Hospital course complicated by hemorrhagic shock postop requiring pressors and blood transfusions, now stable on Midodrine. Now admitted to [facility] after for initiation of a multidisciplinary rehab program consisting focused on functional mobility, transfers and ADLs (activities of daily living).    #Elective L1-5 lumbar fusion, T9-pelvis arthrodesis, L5-S1 TLIF 9/29-9/30  - comprehensive multidisciplinary rehab program, PT/OT 3 hours a day, 5 days a week.  - P&O as needed  - Wound Care:  Staples/sutures for Dc today   dressing Dcd -Open to air     Patient may shower on the 3rd day after surgery.   - HOLD ASA and no NSAIDs until cleared by neurosurgery.  - F/u with neurosurgery, Dr. Valdez, outpatient  - F/u with plastics in 1-2 weeks with Dr. Alegre in clinic  - No weight bearing restrictions  - Precautions: falls, spine, neuro  continue Steroids  Xray TL spine as per NS- will review and advise NS    #Hypotension postop - improved  -Will Dc midodrine- SYST BPs 130-140  -Norvasc 5mg daily restarted by hospitalist, Bystolic 10mg QAM, Edarbi 40mg QHSstill on hold  - Monitor BP    #Pre-DM  - HbA1c 6.4 9/29/20  - FS  10/9  change fingersticks to fasting  - Monitor FS.    #Depression/Anxiety:  - Continue Cymbalta 60mg BID  neuropsychology consult ordered    #Migraines  - Sumatriptan 25mg Q4hr PRN    # UTI  urineC&S proteus 10-49k  PVR low  cont PO Keflex 5x/day    #Allergies  - Continue Benadryl 25mg Q4hr PRN    Pain Management:  - Flexeril 10mg TID PRN  timed oxycodone 10mg with breakfast and lunch daily before Rehab    GI/Bowel:  - At risk for constipation due to neurologic diagnosis, immobility and/or medication use  - daily miralax restarted at patient request  - GI ppx: Protonix daily, Maalox PRN    /Bladder:   - At risk for incontinence and retention due to neurologic diagnosis and limited mobility  - Currently patient voids: independent with bedpan   - Encourage timed voids every 4 hours while awake for independence and to promote continence during therapy.    Skin/Pressure Injury:   - Skin assessment on admission: no pressure injuries noted   - Monitor Incisions: Thoraco-lumbar incision and left flank incision - wound care as above  - Turn every 2 hours while in bed, air mattress  - Soft heel protectors  - Skin barrier cream as needed  - Nursing to monitor skin Qshift    Diet:  - Diet Consistency/Modifications: regular/DASH    DVT ppx:  - Lovenox  - SCDs

## 2020-10-16 NOTE — PROGRESS NOTE BEHAVIORAL HEALTH - NSBHCONSULTMEDS_PSY_A_CORE FT
DULoxetine 120 milliGRAM(s) Oral at bedtime  hydrOXYzine hydrochloride 50 milliGRAM(s) Oral at bedtime

## 2020-10-16 NOTE — PROGRESS NOTE ADULT - ASSESSMENT
This is a 59 y/o F with PMHx HTN, HLD, osteoarthritis, anxiety/depression disorders, GERD, eczema, sinusitis, scoliosis/spinal stenosis- lumbar region, presented to [facility] 9/29/20 for scheduled stage 1, L1-5 lumbar lateral interbody fusion with Dr. Valdez. Stage 2 T9-pelvis arthrodesis, L5-S1 TLIF done on 9/30/20 with closure of muscle flap done by plastics, Dr. Alegre. Post-op course complicated by hemorrhagic shock requiring transfer to ICU, fluid resuscitation with IV hydration and pressors. EBL 1500 cc, s/p 2 u prbc. Patient was weaned off IV pressors 10/1/20 and started on midodrine. Surgical drains removed on 10/7/20. Plastic surgery following for incision management recommended Aquacel dressing changed on 10/3 and 10/8. Patient was transferred to [facility] on 10/10/20 for comprehensive rehab.     #S/p L1-5 lumbar fusion, T9-pelvis arthrodesis, L5-S1 TLIF (9/30/20)  -Continue comprehensive rehab - PT/OT per rehab  -Pain management per rehab. continue bowel regimen  -Wound care per rehab    #UTI  -UCx < 10,000 proteius, sensitive to cephalosporins. sx improved with abx. cont keflex PO total 5 days  -PVR as indicated    #Post-op hypotension  -Off midodrine   -Monitor BP, slowly reintroduce home meds for SBP> 140. cont norvasc. Home BP meds: Norvasc 5mg daily, Bystolic 10mg QAM, Edarbi 40mg QHS    #Pre-DM, a1c 6.4  -Consistent Carbohydrate Diet  -Check FS BID for now  -Nutrition consult    #HLD  -Cont fenofibrate/Statin    #Postop anemia  -H/H stable  -Monitor for now. transfuse for Hb < 7.0  -Hold ASA, NSAIDs    #Leukocytosis - likely reactive   -Afebrile, no s/s of infection  -Cont to monitor     #Depression, Anxiety  -Mood stable   -Cont cymbalta   -Neuropsych noted    #Migraines  -Cont sumatriptan     #Allergies  -Would avoid benadryl. consider zrytec qhs if symptomatic     #DVT ppx - Lovenox  #GI ppx - PPI   #Standard precautions - aspiration, fall, safety, seizure, skin

## 2020-10-16 NOTE — PROGRESS NOTE BEHAVIORAL HEALTH - NSBHCONSULTRECOMMENDOTHER_PSY_A_CORE FT
Would recommend EKG since pt has not had one recently on file.     Continue current psychotropic medications SNRI (Cymbalta 120mg PO Q Daily).    Continue w/ Vistaril 50mg PO PRN to improve sleep.

## 2020-10-16 NOTE — PROGRESS NOTE BEHAVIORAL HEALTH - NSBHCHARTREVIEWLAB_PSY_A_CORE FT
10.4   8.09  )-----------( 534      ( 15 Oct 2020 05:30 )             32.1   10-15    137  |  102  |  18  ----------------------------<  133<H>  4.3   |  25  |  0.64    Ca    9.5      15 Oct 2020 05:30 no JVD/supple

## 2020-10-16 NOTE — PROGRESS NOTE ADULT - SUBJECTIVE AND OBJECTIVE BOX
60 year old female with PMHx HTN, hyperlipidemia, osteoarthritis, anxiety/depression disorders, GERD, eczema, sinusitis, scoliosis/spinal stenosis- lumbar region, presented to [facility] 9/29/20 for scheduled stage 1, L1-5 lumbar lateral interbody fusion with Dr. Valdez. Stage 2 T9-pelvis arthrodesis, L5-S1 TLIF done on 9/30/20 with closure of muscle flap done by plastics, Dr. Alegre. Admitted to NSICU post operatively due to hemorrhagic shock requiring fluid resuscitation with IV hydration and pressors. EBL 1500 cc, s/p 2 u prbc. Patient was weaned off IV pressors 10/1/20 and started on midodrine. Transferred to the floor when stable. Surgical drains removed on 10/7/20. Plastic surgery following for incision management. Aquacel dressing changed on 10/3 and 10/8. Hgb remains stable. Pt currently off antihypertensives, now on midodrine- wean to off as indicated.     Patient is medically and neurologically stable for discharge to [place] for multidisciplinary acute rehab 10/10/20.  Patient arrived hemodynamically stable condition. Seen at bedside with her  present. She reports bilateral shoulder pain due to history of OA (had injections prior to being admitted for spine surgery).  Currently reports 7/10 with movement.  She endorses 2-3 episodes of diarrhea the last two days. Denies abdominal pain, fevers, chills.       ROS - dysuria  better less bladder incontinence  Pain In RLE reported feels less since steroids started  spoke with NS- to get TL spine films   still feels tight band over left foot  bowel incontinence Better- + Bm in toilet  no N,V  No dizziness, no headaches  no Chest pain, no SOB  awaiting Psych evaluation          MEDICATIONS  (STANDING):  amLODIPine   Tablet 5 milliGRAM(s) Oral daily  atorvastatin 10 milliGRAM(s) Oral at bedtime  cephalexin 500 milliGRAM(s) Oral every 12 hours  cholecalciferol 1000 Unit(s) Oral daily  dextrose 5%. 1000 milliLiter(s) (50 mL/Hr) IV Continuous <Continuous>  dextrose 50% Injectable 12.5 Gram(s) IV Push once  dextrose 50% Injectable 25 Gram(s) IV Push once  dextrose 50% Injectable 25 Gram(s) IV Push once  DULoxetine 120 milliGRAM(s) Oral at bedtime  enoxaparin Injectable 40 milliGRAM(s) SubCutaneous <User Schedule>  fenofibrate Tablet 145 milliGRAM(s) Oral daily  hydrOXYzine hydrochloride 50 milliGRAM(s) Oral at bedtime  magnesium oxide 400 milliGRAM(s) Oral daily  methylPREDNISolone   Oral   methylPREDNISolone 4 milliGRAM(s) Oral before breakfast  methylPREDNISolone 4 milliGRAM(s) Oral after lunch  methylPREDNISolone 4 milliGRAM(s) Oral after dinner  methylPREDNISolone 8 milliGRAM(s) Oral at bedtime  multivitamin 1 Tablet(s) Oral daily  oxyCODONE    IR 10 milliGRAM(s) Oral <User Schedule>  pantoprazole    Tablet 40 milliGRAM(s) Oral before breakfast  polyethylene glycol 3350 17 Gram(s) Oral daily  saccharomyces boulardii 250 milliGRAM(s) Oral two times a day    MEDICATIONS  (PRN):  aluminum hydroxide/magnesium hydroxide/simethicone Suspension 30 milliLiter(s) Oral every 4 hours PRN Dyspepsia  cyclobenzaprine 10 milliGRAM(s) Oral three times a day PRN Muscle Spasm  dextrose 40% Gel 15 Gram(s) Oral once PRN Blood Glucose LESS THAN 70 milliGRAM(s)/deciliter  diphenhydrAMINE 25 milliGRAM(s) Oral every 4 hours PRN Rash and/or Itching  glucagon  Injectable 1 milliGRAM(s) IntraMuscular once PRN Glucose LESS THAN 70 milligrams/deciliter  oxyCODONE    IR 5 milliGRAM(s) Oral every 6 hours PRN Moderate Pain (4 - 6)  oxyCODONE    IR 10 milliGRAM(s) Oral every 8 hours PRN Severe Pain (7 - 10)  senna 2 Tablet(s) Oral at bedtime PRN Constipation  SUMAtriptan 25 milliGRAM(s) Oral four times a day PRN Migraine                            10.4   8.09  )-----------( 534      ( 15 Oct 2020 05:30 )             32.1     10-15    137  |  102  |  18  ----------------------------<  133<H>  4.3   |  25  |  0.64    Ca    9.5      15 Oct 2020 05:30          CAPILLARY BLOOD GLUCOSE      POCT Blood Glucose.: 141 mg/dL (15 Oct 2020 07:31)      Vital Signs Last 24 Hrs  T(C): 36.5 (15 Oct 2020 08:18), Max: 37.4 (14 Oct 2020 19:53)  T(F): 97.7 (15 Oct 2020 08:18), Max: 99.4 (14 Oct 2020 19:53)  HR: 108 (15 Oct 2020 08:18) (93 - 108)  BP: 166/94 (15 Oct 2020 08:18) (154/80 - 166/94)  BP(mean): --  RR: 16 (15 Oct 2020 08:18) (16 - 17)  SpO2: 97% (15 Oct 2020 08:18) (94% - 97%)      Constitutional - NAD, Comfortable, lying in bed  HEENT - NCAT, EOMI  Chest - Breathing comfortably  Cardiovascular - RRR  Abdomen - Soft, mildly distended, hyperactive bowel sounds  Extremities - No C/C/E, No calf tenderness   Neurologic Exam -                    Cognitive - Awake, Alert, AAO to self, place, date, year, situation     Communication - Fluent, No dysarthria     Cranial Nerves - no facial asymmetry, shoulder shrug intact,      Motor -                     LEFT    UE - ShAB 5/5, EF 5/5, EE 5/5, WE 5/5,  5/5                    RIGHT UE - ShAB 5/5, EF 5/5, EE 5/5, WE 5/5,  5/5                    LEFT    LE - HF 4/5, KE 4/5, DF 3/5, PF 5/5                    RIGHT LE - HF 3/5, KE 3/5, DF 3/5, PF 5/5        Sensory - decreased to light touch medial leg on RIGHT and dorsum of foot on LEFT  now with diminished sensation laterally in Right thigh as well     Reflexes - DTR Intact, No primitive reflexes     Psychiatric - Mood stable, Affect WNL     Skin:         thoraco-lumbar surgical incision approximated with running suture- intact no drainaage no signs of infection- dressing off         Left flank dressing dcd incision intact with staples         Healed drain incisions lower lumbar          No pressure injuries noted on sacrum, coccyx, or heels    Rehab eval in progress       60 year old female with PMHx HTN, hyperlipidemia, osteoarthritis, anxiety/depression disorders, GERD, eczema, sinusitis, scoliosis/spinal stenosis- lumbar region, presented to [facility] 9/29/20 for scheduled stage 1, L1-5 lumbar lateral interbody fusion with Dr. Valdez. Stage 2 T9-pelvis arthrodesis, L5-S1 TLIF done on 9/30/20 with closure of muscle flap done by plastics, Dr. Alegre. Admitted to NSICU post operatively due to hemorrhagic shock requiring fluid resuscitation with IV hydration and pressors. EBL 1500 cc, s/p 2 u prbc. Patient was weaned off IV pressors 10/1/20 and started on midodrine. Transferred to the floor when stable. Surgical drains removed on 10/7/20. Plastic surgery following for incision management. Aquacel dressing changed on 10/3 and 10/8. Hgb remains stable. Pt currently off antihypertensives, now on midodrine- wean to off as indicated.     Patient is medically and neurologically stable for discharge to [place] for multidisciplinary acute rehab 10/10/20.  Patient arrived hemodynamically stable condition. Seen at bedside with her  present. She reports bilateral shoulder pain due to history of OA (had injections prior to being admitted for spine surgery).  Currently reports 7/10 with movement.  She endorses 2-3 episodes of diarrhea the last two days. Denies abdominal pain, fevers, chills.       ROS - dysuria  better less bladder incontinence, controlling bowels  Pain In RLE reported feels less now CO Low back pain    TL spine films pending  still feels tight band over left foot  no N,V  No dizziness, no headaches  no Chest pain, no SOB  seen by Psych    MEDICATIONS  (STANDING):  amLODIPine   Tablet 5 milliGRAM(s) Oral daily  atorvastatin 10 milliGRAM(s) Oral at bedtime  cephalexin 500 milliGRAM(s) Oral every 12 hours  cholecalciferol 1000 Unit(s) Oral daily  dextrose 5%. 1000 milliLiter(s) (50 mL/Hr) IV Continuous <Continuous>  dextrose 50% Injectable 12.5 Gram(s) IV Push once  dextrose 50% Injectable 25 Gram(s) IV Push once  dextrose 50% Injectable 25 Gram(s) IV Push once  DULoxetine 120 milliGRAM(s) Oral at bedtime  enoxaparin Injectable 40 milliGRAM(s) SubCutaneous <User Schedule>  fenofibrate Tablet 145 milliGRAM(s) Oral daily  hydrOXYzine hydrochloride 50 milliGRAM(s) Oral at bedtime  magnesium oxide 400 milliGRAM(s) Oral daily  methylPREDNISolone 4 milliGRAM(s) Oral before breakfast  methylPREDNISolone 4 milliGRAM(s) Oral after lunch  methylPREDNISolone 4 milliGRAM(s) Oral after dinner  methylPREDNISolone 4 milliGRAM(s) Oral at bedtime  methylPREDNISolone   Oral   multivitamin 1 Tablet(s) Oral daily  oxyCODONE    IR 10 milliGRAM(s) Oral <User Schedule>  pantoprazole    Tablet 40 milliGRAM(s) Oral before breakfast  polyethylene glycol 3350 17 Gram(s) Oral daily  saccharomyces boulardii 250 milliGRAM(s) Oral two times a day    MEDICATIONS  (PRN):  aluminum hydroxide/magnesium hydroxide/simethicone Suspension 30 milliLiter(s) Oral every 4 hours PRN Dyspepsia  cyclobenzaprine 10 milliGRAM(s) Oral three times a day PRN Muscle Spasm  dextrose 40% Gel 15 Gram(s) Oral once PRN Blood Glucose LESS THAN 70 milliGRAM(s)/deciliter  diphenhydrAMINE 25 milliGRAM(s) Oral every 4 hours PRN Rash and/or Itching  glucagon  Injectable 1 milliGRAM(s) IntraMuscular once PRN Glucose LESS THAN 70 milligrams/deciliter  oxyCODONE    IR 5 milliGRAM(s) Oral every 6 hours PRN Moderate Pain (4 - 6)  oxyCODONE    IR 10 milliGRAM(s) Oral every 8 hours PRN Severe Pain (7 - 10)  senna 2 Tablet(s) Oral at bedtime PRN Constipation  SUMAtriptan 25 milliGRAM(s) Oral four times a day PRN Migraine                            10.4   8.09  )-----------( 534      ( 15 Oct 2020 05:30 )             32.1     10-15    137  |  102  |  18  ----------------------------<  133<H>  4.3   |  25  |  0.64    Ca    9.5      15 Oct 2020 05:30          CAPILLARY BLOOD GLUCOSE      POCT Blood Glucose.: 128 mg/dL (16 Oct 2020 07:57)      Vital Signs Last 24 Hrs  T(C): 36.6 (16 Oct 2020 08:35), Max: 36.8 (15 Oct 2020 19:56)  T(F): 97.8 (16 Oct 2020 08:35), Max: 98.3 (15 Oct 2020 19:56)  HR: 80 (16 Oct 2020 08:35) (80 - 95)  BP: 134/75 (16 Oct 2020 08:35) (134/75 - 155/75)  BP(mean): --  RR: 16 (16 Oct 2020 08:35) (16 - 18)  SpO2: 99% (16 Oct 2020 08:35) (94% - 99%)      Constitutional - NAD, Comfortable, lying in bed  HEENT - NCAT, EOMI  Chest - Breathing comfortably  Cardiovascular - RRR  Abdomen - Soft, mildly distended, hyperactive bowel sounds  Extremities - No C/C/E, No calf tenderness   Neurologic Exam -                    Cognitive - Awake, Alert, AAO to self, place, date, year, situation     Communication - Fluent, No dysarthria     Cranial Nerves - no facial asymmetry, shoulder shrug intact,      Motor -                     LEFT    UE - ShAB 5/5, EF 5/5, EE 5/5, WE 5/5,  5/5                    RIGHT UE - ShAB 5/5, EF 5/5, EE 5/5, WE 5/5,  5/5                    LEFT    LE - HF 4/5, KE 4/5, DF 3/5, PF 5/5                    RIGHT LE - HF 3/5, KE 3/5, DF 3/5, PF 5/5        Sensory - decreased to light touch medial leg on RIGHT and dorsum of foot on LEFT  now with diminished sensation laterally in Right thigh as well     Reflexes - DTR Intact, No primitive reflexes     Psychiatric - Mood stable, Affect WNL     Skin:         thoraco-lumbar surgical incision approximated with running suture- intact no drainaage no signs of infection- dressing off         Left flank dressing dcd incision intact with staples         Healed drain incisions lower lumbar          No pressure injuries noted on sacrum, coccyx, or heels    Rehab eval in progress

## 2020-10-17 LAB — GLUCOSE BLDC GLUCOMTR-MCNC: 145 MG/DL — HIGH (ref 70–99)

## 2020-10-17 PROCEDURE — 99232 SBSQ HOSP IP/OBS MODERATE 35: CPT

## 2020-10-17 RX ORDER — METOPROLOL TARTRATE 50 MG
25 TABLET ORAL
Refills: 0 | Status: DISCONTINUED | OUTPATIENT
Start: 2020-10-17 | End: 2020-11-03

## 2020-10-17 RX ADMIN — Medication 1 TABLET(S): at 12:44

## 2020-10-17 RX ADMIN — Medication 4 MILLIGRAM(S): at 05:56

## 2020-10-17 RX ADMIN — ATORVASTATIN CALCIUM 10 MILLIGRAM(S): 80 TABLET, FILM COATED ORAL at 21:46

## 2020-10-17 RX ADMIN — Medication 500 MILLIGRAM(S): at 05:56

## 2020-10-17 RX ADMIN — AMLODIPINE BESYLATE 5 MILLIGRAM(S): 2.5 TABLET ORAL at 05:57

## 2020-10-17 RX ADMIN — ENOXAPARIN SODIUM 40 MILLIGRAM(S): 100 INJECTION SUBCUTANEOUS at 21:46

## 2020-10-17 RX ADMIN — Medication 4 MILLIGRAM(S): at 12:44

## 2020-10-17 RX ADMIN — MAGNESIUM OXIDE 400 MG ORAL TABLET 400 MILLIGRAM(S): 241.3 TABLET ORAL at 12:44

## 2020-10-17 RX ADMIN — Medication 4 MILLIGRAM(S): at 21:46

## 2020-10-17 RX ADMIN — CYCLOBENZAPRINE HYDROCHLORIDE 10 MILLIGRAM(S): 10 TABLET, FILM COATED ORAL at 09:15

## 2020-10-17 RX ADMIN — Medication 50 MILLIGRAM(S): at 21:46

## 2020-10-17 RX ADMIN — OXYCODONE HYDROCHLORIDE 10 MILLIGRAM(S): 5 TABLET ORAL at 12:45

## 2020-10-17 RX ADMIN — Medication 250 MILLIGRAM(S): at 18:04

## 2020-10-17 RX ADMIN — POLYETHYLENE GLYCOL 3350 17 GRAM(S): 17 POWDER, FOR SOLUTION ORAL at 12:44

## 2020-10-17 RX ADMIN — PANTOPRAZOLE SODIUM 40 MILLIGRAM(S): 20 TABLET, DELAYED RELEASE ORAL at 05:57

## 2020-10-17 RX ADMIN — OXYCODONE HYDROCHLORIDE 10 MILLIGRAM(S): 5 TABLET ORAL at 13:30

## 2020-10-17 RX ADMIN — Medication 25 MILLIGRAM(S): at 18:04

## 2020-10-17 RX ADMIN — DULOXETINE HYDROCHLORIDE 120 MILLIGRAM(S): 30 CAPSULE, DELAYED RELEASE ORAL at 21:46

## 2020-10-17 RX ADMIN — OXYCODONE HYDROCHLORIDE 10 MILLIGRAM(S): 5 TABLET ORAL at 09:00

## 2020-10-17 RX ADMIN — Medication 1000 UNIT(S): at 12:44

## 2020-10-17 RX ADMIN — Medication 250 MILLIGRAM(S): at 05:56

## 2020-10-17 RX ADMIN — OXYCODONE HYDROCHLORIDE 10 MILLIGRAM(S): 5 TABLET ORAL at 08:17

## 2020-10-17 RX ADMIN — Medication 145 MILLIGRAM(S): at 12:44

## 2020-10-17 NOTE — PROGRESS NOTE ADULT - ASSESSMENT
This is a 61 y/o F with PMHx HTN, HLD, osteoarthritis, anxiety/depression disorders, GERD, eczema, sinusitis, scoliosis/spinal stenosis- lumbar region, presented to [facility] 9/29/20 for scheduled stage 1, L1-5 lumbar lateral interbody fusion with Dr. Valdez. Stage 2 T9-pelvis arthrodesis, L5-S1 TLIF done on 9/30/20 with closure of muscle flap done by plastics, Dr. Alegre. Post-op course complicated by hemorrhagic shock requiring transfer to ICU, fluid resuscitation with IV hydration and pressors. EBL 1500 cc, s/p 2 u prbc. Patient was weaned off IV pressors 10/1/20 and started on midodrine. Surgical drains removed on 10/7/20. Plastic surgery following for incision management recommended Aquacel dressing changed on 10/3 and 10/8. Patient was transferred to [facility] on 10/10/20 for comprehensive rehab.     #S/p L1-5 lumbar fusion, T9-pelvis arthrodesis, L5-S1 TLIF (9/30/20)  -Continue comprehensive rehab - PT/OT per rehab  -Pain management per rehab. continue bowel regimen  -Wound care per rehab    #UTI  -UCx 10k-49k proteius, sensitive to cephalosporins. sx improved with abx.   -Completed 4 day course of Keflex. No need for additional Abx. Discontinue  -PVR as indicated    #Post-op hypotension  #HTN  -Hypotension resolved, now off midodrine   -Continue Amlodipine  -Home meds Norvasc 5mg daily, Bystolic 10mg QAM, Edarbi 40mg QHS  -Start Metoprolol tart 12.5mg BID as Bystolic not in formulary. Titrate as tolerated  -Monitor vitals    #Pre-DM, a1c 6.4  -Consistent Carbohydrate Diet  -Check FS BID for now  -Nutrition consult    #HLD  -Cont fenofibrate/Statin    #Postop anemia  -H/H stable  -Monitor for now. transfuse for Hb < 7.0  -Hold ASA, NSAIDs    #Leukocytosis - likely reactive   -Afebrile, no s/s of infection  -Cont to monitor     #Depression, Anxiety  -Mood stable   -Cont cymbalta   -Neuropsych noted    #Migraines  -Cont sumatriptan     #Allergies  -Would avoid benadryl. consider zrytec qhs if symptomatic     #DVT ppx - Lovenox  #GI ppx - PPI   #Standard precautions - aspiration, fall, safety, seizure, skin

## 2020-10-17 NOTE — PROGRESS NOTE ADULT - SUBJECTIVE AND OBJECTIVE BOX
Patient is a 60y old  Female who presents with a chief complaint of functional deficits due to L1-L5 lumbar fusion, T9-pelvis arthrodesis, L5-S1 TLIF  03.9 Other Neurologic (16 Oct 2020 12:32)      SUBJECTIVE / OVERNIGHT EVENTS:  Pt seen and examined at bedside. No acute events overnight.  Pt denies cp, palpitations, sob, abd pain, N/V, fever, chills.    ROS:  All other review of systems negative    Allergies    penicillin (Other)    Intolerances        MEDICATIONS  (STANDING):  amLODIPine   Tablet 5 milliGRAM(s) Oral daily  atorvastatin 10 milliGRAM(s) Oral at bedtime  cephalexin 500 milliGRAM(s) Oral every 12 hours  cholecalciferol 1000 Unit(s) Oral daily  dextrose 5%. 1000 milliLiter(s) (50 mL/Hr) IV Continuous <Continuous>  dextrose 50% Injectable 12.5 Gram(s) IV Push once  dextrose 50% Injectable 25 Gram(s) IV Push once  dextrose 50% Injectable 25 Gram(s) IV Push once  DULoxetine 120 milliGRAM(s) Oral at bedtime  enoxaparin Injectable 40 milliGRAM(s) SubCutaneous <User Schedule>  fenofibrate Tablet 145 milliGRAM(s) Oral daily  hydrOXYzine hydrochloride 50 milliGRAM(s) Oral at bedtime  magnesium oxide 400 milliGRAM(s) Oral daily  methylPREDNISolone 4 milliGRAM(s) Oral before breakfast  methylPREDNISolone 4 milliGRAM(s) Oral after lunch  methylPREDNISolone 4 milliGRAM(s) Oral at bedtime  methylPREDNISolone   Oral   multivitamin 1 Tablet(s) Oral daily  oxyCODONE    IR 10 milliGRAM(s) Oral <User Schedule>  pantoprazole    Tablet 40 milliGRAM(s) Oral before breakfast  polyethylene glycol 3350 17 Gram(s) Oral daily  saccharomyces boulardii 250 milliGRAM(s) Oral two times a day    MEDICATIONS  (PRN):  aluminum hydroxide/magnesium hydroxide/simethicone Suspension 30 milliLiter(s) Oral every 4 hours PRN Dyspepsia  cyclobenzaprine 10 milliGRAM(s) Oral three times a day PRN Muscle Spasm  dextrose 40% Gel 15 Gram(s) Oral once PRN Blood Glucose LESS THAN 70 milliGRAM(s)/deciliter  diphenhydrAMINE 25 milliGRAM(s) Oral every 4 hours PRN Rash and/or Itching  glucagon  Injectable 1 milliGRAM(s) IntraMuscular once PRN Glucose LESS THAN 70 milligrams/deciliter  oxyCODONE    IR 5 milliGRAM(s) Oral every 6 hours PRN Moderate Pain (4 - 6)  oxyCODONE    IR 10 milliGRAM(s) Oral every 8 hours PRN Severe Pain (7 - 10)  senna 2 Tablet(s) Oral at bedtime PRN Constipation  SUMAtriptan 25 milliGRAM(s) Oral four times a day PRN Migraine      Vital Signs Last 24 Hrs  T(C): 37.1 (17 Oct 2020 07:54), Max: 37.1 (17 Oct 2020 07:54)  T(F): 98.8 (17 Oct 2020 07:54), Max: 98.8 (17 Oct 2020 07:54)  HR: 102 (17 Oct 2020 07:54) (90 - 102)  BP: 169/90 (17 Oct 2020 07:54) (150/75 - 177/87)  BP(mean): --  RR: 16 (17 Oct 2020 07:54) (16 - 17)  SpO2: 97% (17 Oct 2020 07:54) (95% - 97%)  CAPILLARY BLOOD GLUCOSE      POCT Blood Glucose.: 145 mg/dL (17 Oct 2020 08:14)    I&O's Summary      PHYSICAL EXAM:  GENERAL: NAD, obese female  HEAD:  Atraumatic, Normocephalic  EYES: EOMI, PERRLA, conjunctiva and sclera clear  NECK: Supple, No JVD  CHEST/LUNG: Clear to auscultation bilaterally; No wheeze  HEART: Regular rate and rhythm; No murmurs, rubs, or gallops  ABDOMEN: Soft, Nontender, Nondistended; Bowel sounds present  EXTREMITIES:  2+ Peripheral Pulses, No clubbing, cyanosis, or edema  NEUROLOGY: AAOx3, b/l le numbness  PSYCH: calm      LABS:                    RADIOLOGY & ADDITIONAL TESTS:  Results Reviewed:   Imaging Personally Reviewed:  Electrocardiogram Personally Reviewed:    COORDINATION OF CARE:  Care Discussed with Consultants/Other Providers [Y/N]:  Prior or Outpatient Records Reviewed [Y/N]:

## 2020-10-17 NOTE — PROGRESS NOTE ADULT - SUBJECTIVE AND OBJECTIVE BOX
HPI:  60 year old female with PMHx HTN, hyperlipidemia, osteoarthritis, anxiety/depression disorders, GERD, eczema, sinusitis, scoliosis/spinal stenosis- lumbar region, presented to [facility] 9/29/20 for scheduled stage 1, L1-5 lumbar lateral interbody fusion with Dr. Valdez. Stage 2 T9-pelvis arthrodesis, L5-S1 TLIF done on 9/30/20 with closure of muscle flap done by plastics, Dr. Alegre. Admitted to NSICU post operatively due to hemorrhagic shock requiring fluid resuscitation with IV hydration and pressors. EBL 1500 cc, s/p 2 u prbc. Patient was weaned off IV pressors 10/1/20 and started on midodrine. Transferred to the floor when stable. Surgical drains removed on 10/7/20. Plastic surgery following for incision management. Aquacel dressing changed on 10/3 and 10/8. Hgb remains stable. Pt currently off antihypertensives, now on midodrine- wean to off as indicated.     Patient is medically and neurologically stable for discharge to [place] for multidisciplinary acute rehab 10/10/20.      TODAY'S SUBJECTIVE & REVIEW OF SYMPTOMS: Feels well, had a good night.  RLE neuropathic pain appears more today, but tolerable  + BM       [X] Constitutional WNL        [X] Cardio WNL              [X] Resp WNL  [X] GI WNL                            [X]  WNL                    [X] Heme WNL      PHYSICAL EXAM    Vital Signs Last 24 Hrs  T(C): 37.1 (17 Oct 2020 07:54), Max: 37.1 (17 Oct 2020 07:54)  T(F): 98.8 (17 Oct 2020 07:54), Max: 98.8 (17 Oct 2020 07:54)  HR: 102 (17 Oct 2020 07:54) (90 - 102)  BP: 169/90 (17 Oct 2020 07:54) (150/75 - 177/87)  BP(mean): --  RR: 16 (17 Oct 2020 07:54) (16 - 17)  SpO2: 97% (17 Oct 2020 07:54) (95% - 97%)    Constitutional - NAD, Comfortable  Chest - CTAB  Cardiovascular - RRR  Abdomen - BS+, Soft, NTND  Extremities - No C/C/E, No calf tenderness   Skin: back incision with steristrips + , no drainage  Psychiatric - Mood stable, Affect WNL    MEDICATIONS  (STANDING):  amLODIPine   Tablet 5 milliGRAM(s) Oral daily  atorvastatin 10 milliGRAM(s) Oral at bedtime  cholecalciferol 1000 Unit(s) Oral daily  dextrose 5%. 1000 milliLiter(s) (50 mL/Hr) IV Continuous <Continuous>  dextrose 50% Injectable 12.5 Gram(s) IV Push once  dextrose 50% Injectable 25 Gram(s) IV Push once  dextrose 50% Injectable 25 Gram(s) IV Push once  DULoxetine 120 milliGRAM(s) Oral at bedtime  enoxaparin Injectable 40 milliGRAM(s) SubCutaneous <User Schedule>  fenofibrate Tablet 145 milliGRAM(s) Oral daily  hydrOXYzine hydrochloride 50 milliGRAM(s) Oral at bedtime  magnesium oxide 400 milliGRAM(s) Oral daily  methylPREDNISolone 4 milliGRAM(s) Oral before breakfast  methylPREDNISolone 4 milliGRAM(s) Oral at bedtime  methylPREDNISolone   Oral   metoprolol tartrate 25 milliGRAM(s) Oral two times a day  multivitamin 1 Tablet(s) Oral daily  oxyCODONE    IR 10 milliGRAM(s) Oral <User Schedule>  pantoprazole    Tablet 40 milliGRAM(s) Oral before breakfast  polyethylene glycol 3350 17 Gram(s) Oral daily  saccharomyces boulardii 250 milliGRAM(s) Oral two times a day    MEDICATIONS  (PRN):  aluminum hydroxide/magnesium hydroxide/simethicone Suspension 30 milliLiter(s) Oral every 4 hours PRN Dyspepsia  cyclobenzaprine 10 milliGRAM(s) Oral three times a day PRN Muscle Spasm  dextrose 40% Gel 15 Gram(s) Oral once PRN Blood Glucose LESS THAN 70 milliGRAM(s)/deciliter  diphenhydrAMINE 25 milliGRAM(s) Oral every 4 hours PRN Rash and/or Itching  glucagon  Injectable 1 milliGRAM(s) IntraMuscular once PRN Glucose LESS THAN 70 milligrams/deciliter  oxyCODONE    IR 5 milliGRAM(s) Oral every 6 hours PRN Moderate Pain (4 - 6)  oxyCODONE    IR 10 milliGRAM(s) Oral every 8 hours PRN Severe Pain (7 - 10)  senna 2 Tablet(s) Oral at bedtime PRN Constipation  SUMAtriptan 25 milliGRAM(s) Oral four times a day PRN Migraine    < from: Xray Lumbar Spine AP + Lateral (10.16.20 @ 11:12) >  XAM:  SPINE THORAC AP & LAT W SWIM    EXAM:  LUMBAR SPINE      PROCEDURE DATE:  10/16/2020        INTERPRETATION:  Radiographs of the lumbar spine and thoracic spine    CLINICAL INFORMATION: Postoperative radiographs. Follow-up    TECHNIQUE:  Frontal, lateral lumbar spine and AP pelvis images.  AP lateral thoracic spine radiographs  FINDINGS:.    The spinal fusion fusion spinal fusion fixation apparatus transfixes the thoracal lumbar spine and sacroiliac bones. Interpedicular screws and vertical rods in place. L1-S1 intervertebral prosthetic disc in place.. No loosening of the prosthesis. There is a mild dextro scoliosis of the lumbar curve.  Lower cervical plate screw fixation device in place with the multiple prosthetic discs.  No acute fractures of. Paraspinal line is not widened.    IMPRESSION:   Thoracolumbar, sacral and iliac spinal fusion fixation apparatus in place.  Cervical spine plate-screw fixation device with prosthetic disc in place..  No fracture or displacement of the prosthetic spinal fusion fixation devices...                < end of copied text >      A/P:     60 year old female  with h/o HTN, hyperlipidemia, osteoarthritis, anxiety/depression disorders, GERD, eczema, sinusitis, scoliosis/spinal stenosis- lumbar region, presented to [facility] 9/29/20 for scheduled stage 1, L1-5 lumbar lateral interbody fusion with Dr. Valdez. Stage 2 T9-pelvis arthrodesis, L5-S1 TLIF done on 9/30/20 with closure of muscle flap done by plastics.  Hospital course complicated by hemorrhagic shock.     Continue rehab program: PT/OT 3 hrs/day 5 days/week    UTI : on abx for total of 5 days    Pain management: onoxycodone IR standing 2 times/day and prn  on steroid dose josefa     Mood: on cymbalta    BP : on amlodipine    DVT prophylaxis: on Lovenox

## 2020-10-18 LAB — GLUCOSE BLDC GLUCOMTR-MCNC: 114 MG/DL — HIGH (ref 70–99)

## 2020-10-18 PROCEDURE — 72100 X-RAY EXAM L-S SPINE 2/3 VWS: CPT | Mod: 26

## 2020-10-18 PROCEDURE — 99232 SBSQ HOSP IP/OBS MODERATE 35: CPT

## 2020-10-18 PROCEDURE — 73562 X-RAY EXAM OF KNEE 3: CPT | Mod: 26,RT

## 2020-10-18 RX ADMIN — Medication 4 MILLIGRAM(S): at 05:58

## 2020-10-18 RX ADMIN — Medication 145 MILLIGRAM(S): at 11:52

## 2020-10-18 RX ADMIN — MAGNESIUM OXIDE 400 MG ORAL TABLET 400 MILLIGRAM(S): 241.3 TABLET ORAL at 11:52

## 2020-10-18 RX ADMIN — Medication 1000 UNIT(S): at 11:52

## 2020-10-18 RX ADMIN — POLYETHYLENE GLYCOL 3350 17 GRAM(S): 17 POWDER, FOR SOLUTION ORAL at 11:52

## 2020-10-18 RX ADMIN — OXYCODONE HYDROCHLORIDE 10 MILLIGRAM(S): 5 TABLET ORAL at 08:21

## 2020-10-18 RX ADMIN — DULOXETINE HYDROCHLORIDE 120 MILLIGRAM(S): 30 CAPSULE, DELAYED RELEASE ORAL at 21:13

## 2020-10-18 RX ADMIN — OXYCODONE HYDROCHLORIDE 10 MILLIGRAM(S): 5 TABLET ORAL at 12:30

## 2020-10-18 RX ADMIN — Medication 1 TABLET(S): at 11:52

## 2020-10-18 RX ADMIN — ATORVASTATIN CALCIUM 10 MILLIGRAM(S): 80 TABLET, FILM COATED ORAL at 21:13

## 2020-10-18 RX ADMIN — Medication 4 MILLIGRAM(S): at 21:13

## 2020-10-18 RX ADMIN — PANTOPRAZOLE SODIUM 40 MILLIGRAM(S): 20 TABLET, DELAYED RELEASE ORAL at 05:58

## 2020-10-18 RX ADMIN — Medication 50 MILLIGRAM(S): at 21:13

## 2020-10-18 RX ADMIN — OXYCODONE HYDROCHLORIDE 10 MILLIGRAM(S): 5 TABLET ORAL at 09:00

## 2020-10-18 RX ADMIN — Medication 250 MILLIGRAM(S): at 05:58

## 2020-10-18 RX ADMIN — Medication 25 MILLIGRAM(S): at 05:58

## 2020-10-18 RX ADMIN — Medication 25 MILLIGRAM(S): at 17:21

## 2020-10-18 RX ADMIN — ENOXAPARIN SODIUM 40 MILLIGRAM(S): 100 INJECTION SUBCUTANEOUS at 21:13

## 2020-10-18 RX ADMIN — AMLODIPINE BESYLATE 5 MILLIGRAM(S): 2.5 TABLET ORAL at 05:58

## 2020-10-18 RX ADMIN — OXYCODONE HYDROCHLORIDE 10 MILLIGRAM(S): 5 TABLET ORAL at 11:53

## 2020-10-18 RX ADMIN — Medication 250 MILLIGRAM(S): at 17:21

## 2020-10-18 NOTE — CHART NOTE - NSCHARTNOTEFT_GEN_A_CORE
Called to evaluate patient after a fall.   Patient seen and evaluated at bedside. Although fall was unwitnessed, patient alert and oriented to describe events leading to fall. States she was on the edge of bed and slipped off landing on her right knee.  Denies head trauma stating "not even close". Denies dizziness, headache, Nausea, lightheadedness, new back pain.   Neuro exam unchanged from prior with RLE decreased sensation and mild weakness.     Instructed nurse and patient to notify if any change in neurological status or develops symptoms such as headaches or nausea.   Ordered XR right knee to r/o osseous injury given she has decreased sensation to knee and some soft tissue swelling/ redness on exam. Called to evaluate patient after a fall.   Patient seen and evaluated at bedside. Although fall was unwitnessed, patient alert and oriented to describe events leading to fall. States she was on the edge of bed and slipped off landing on her right knee.  Denies head trauma stating "not even close". Denies dizziness, headache, Nausea, lightheadedness, new back pain.   VSS  Neuro exam unchanged from prior with RLE decreased sensation and mild weakness.     Vital Signs Last 24 Hrs  T(C): 36.7 (18 Oct 2020 08:29), Max: 36.9 (17 Oct 2020 19:53)  T(F): 98.1 (18 Oct 2020 08:29), Max: 98.4 (17 Oct 2020 19:53)  HR: 69 (18 Oct 2020 08:29) (69 - 90)  BP: 103/78 (18 Oct 2020 08:29) (103/78 - 154/71)  BP(mean): --  RR: 18 (18 Oct 2020 08:29) (16 - 18)  SpO2: 98% (18 Oct 2020 08:29) (95% - 98%)    Instructed nurse and patient to notify if any change in neurological status or develops symptoms such as headaches or nausea.   Ordered XR right knee to r/o osseous injury given she has decreased sensation to knee and some soft tissue swelling/ redness on exam.

## 2020-10-18 NOTE — PROVIDER CONTACT NOTE (FALL NOTIFICATION) - SITUATION
pt found on floor on knees in front of bed at approximatly 1420 Stated she sat up in bed with legs dangling and pushed off the bed with one arm to straighten herself off and slid off bed

## 2020-10-18 NOTE — PROGRESS NOTE ADULT - ASSESSMENT
This is a 59 y/o F with PMHx HTN, HLD, osteoarthritis, anxiety/depression disorders, GERD, eczema, sinusitis, scoliosis/spinal stenosis- lumbar region, presented to [facility] 9/29/20 for scheduled stage 1, L1-5 lumbar lateral interbody fusion with Dr. Valdez. Stage 2 T9-pelvis arthrodesis, L5-S1 TLIF done on 9/30/20 with closure of muscle flap done by plastics, Dr. Alegre. Post-op course complicated by hemorrhagic shock requiring transfer to ICU, fluid resuscitation with IV hydration and pressors. EBL 1500 cc, s/p 2 u prbc. Patient was weaned off IV pressors 10/1/20 and started on midodrine. Surgical drains removed on 10/7/20. Plastic surgery following for incision management recommended Aquacel dressing changed on 10/3 and 10/8. Patient was transferred to [facility] on 10/10/20 for comprehensive rehab.     #S/p L1-5 lumbar fusion, T9-pelvis arthrodesis, L5-S1 TLIF (9/30/20)  -Continue comprehensive rehab - PT/OT per rehab  -Pain management per rehab. continue bowel regimen  -Wound care per rehab    #UTI  -UCx 10k-49k proteius, sensitive to cephalosporins. sx improved with abx.   -Completed 4 day course of Keflexe  -PVR as indicated    #Post-op hypotension  #HTN  -Hypotension resolved, now off midodrine   -Continue Amlodipine  -Continue Metoprolol tart 12.5mg BID  -Home meds Norvasc 5mg daily, Bystolic 10mg QAM, Edarbi 40mg QHS  -Monitor vitals    #Pre-DM, a1c 6.4  -Consistent Carbohydrate Diet  -Check FS BID for now  -Nutrition consult    #HLD  -Cont fenofibrate/Statin    #Postop anemia  -H/H stable  -Monitor for now. transfuse for Hb < 7.0  -Hold ASA, NSAIDs    #Leukocytosis  -Resolved  -Cont to monitor     #Depression, Anxiety  -Mood stable   -Cont cymbalta   -Neuropsych noted    #Migraines  -Cont sumatriptan     #Allergies  -Would avoid benadryl. consider zrytec qhs if symptomatic     #DVT ppx - Lovenox  #GI ppx - PPI   #Standard precautions - aspiration, fall, safety, seizure, skin

## 2020-10-19 LAB
ANION GAP SERPL CALC-SCNC: 7 MMOL/L — SIGNIFICANT CHANGE UP (ref 5–17)
BUN SERPL-MCNC: 21 MG/DL — SIGNIFICANT CHANGE UP (ref 7–23)
CALCIUM SERPL-MCNC: 9.6 MG/DL — SIGNIFICANT CHANGE UP (ref 8.4–10.5)
CHLORIDE SERPL-SCNC: 105 MMOL/L — SIGNIFICANT CHANGE UP (ref 96–108)
CO2 SERPL-SCNC: 29 MMOL/L — SIGNIFICANT CHANGE UP (ref 22–31)
CREAT SERPL-MCNC: 0.76 MG/DL — SIGNIFICANT CHANGE UP (ref 0.5–1.3)
GLUCOSE BLDC GLUCOMTR-MCNC: 115 MG/DL — HIGH (ref 70–99)
GLUCOSE SERPL-MCNC: 117 MG/DL — HIGH (ref 70–99)
HCT VFR BLD CALC: 35.6 % — SIGNIFICANT CHANGE UP (ref 34.5–45)
HGB BLD-MCNC: 11 G/DL — LOW (ref 11.5–15.5)
MCHC RBC-ENTMCNC: 28.7 PG — SIGNIFICANT CHANGE UP (ref 27–34)
MCHC RBC-ENTMCNC: 30.9 GM/DL — LOW (ref 32–36)
MCV RBC AUTO: 93 FL — SIGNIFICANT CHANGE UP (ref 80–100)
NRBC # BLD: 0 /100 WBCS — SIGNIFICANT CHANGE UP (ref 0–0)
PLATELET # BLD AUTO: 504 K/UL — HIGH (ref 150–400)
POTASSIUM SERPL-MCNC: 4.2 MMOL/L — SIGNIFICANT CHANGE UP (ref 3.5–5.3)
POTASSIUM SERPL-SCNC: 4.2 MMOL/L — SIGNIFICANT CHANGE UP (ref 3.5–5.3)
RBC # BLD: 3.83 M/UL — SIGNIFICANT CHANGE UP (ref 3.8–5.2)
RBC # FLD: 14.3 % — SIGNIFICANT CHANGE UP (ref 10.3–14.5)
SODIUM SERPL-SCNC: 141 MMOL/L — SIGNIFICANT CHANGE UP (ref 135–145)
WBC # BLD: 7.18 K/UL — SIGNIFICANT CHANGE UP (ref 3.8–10.5)
WBC # FLD AUTO: 7.18 K/UL — SIGNIFICANT CHANGE UP (ref 3.8–10.5)

## 2020-10-19 PROCEDURE — 99232 SBSQ HOSP IP/OBS MODERATE 35: CPT

## 2020-10-19 PROCEDURE — 93010 ELECTROCARDIOGRAM REPORT: CPT

## 2020-10-19 RX ORDER — OXYCODONE HYDROCHLORIDE 5 MG/1
10 TABLET ORAL
Refills: 0 | Status: DISCONTINUED | OUTPATIENT
Start: 2020-10-19 | End: 2020-10-26

## 2020-10-19 RX ADMIN — Medication 25 MILLIGRAM(S): at 18:05

## 2020-10-19 RX ADMIN — MAGNESIUM OXIDE 400 MG ORAL TABLET 400 MILLIGRAM(S): 241.3 TABLET ORAL at 12:20

## 2020-10-19 RX ADMIN — Medication 145 MILLIGRAM(S): at 12:20

## 2020-10-19 RX ADMIN — DULOXETINE HYDROCHLORIDE 120 MILLIGRAM(S): 30 CAPSULE, DELAYED RELEASE ORAL at 21:16

## 2020-10-19 RX ADMIN — OXYCODONE HYDROCHLORIDE 5 MILLIGRAM(S): 5 TABLET ORAL at 23:00

## 2020-10-19 RX ADMIN — POLYETHYLENE GLYCOL 3350 17 GRAM(S): 17 POWDER, FOR SOLUTION ORAL at 12:19

## 2020-10-19 RX ADMIN — Medication 4 MILLIGRAM(S): at 05:42

## 2020-10-19 RX ADMIN — OXYCODONE HYDROCHLORIDE 10 MILLIGRAM(S): 5 TABLET ORAL at 12:20

## 2020-10-19 RX ADMIN — OXYCODONE HYDROCHLORIDE 10 MILLIGRAM(S): 5 TABLET ORAL at 13:20

## 2020-10-19 RX ADMIN — ATORVASTATIN CALCIUM 10 MILLIGRAM(S): 80 TABLET, FILM COATED ORAL at 21:16

## 2020-10-19 RX ADMIN — Medication 250 MILLIGRAM(S): at 18:05

## 2020-10-19 RX ADMIN — Medication 1 TABLET(S): at 12:20

## 2020-10-19 RX ADMIN — OXYCODONE HYDROCHLORIDE 5 MILLIGRAM(S): 5 TABLET ORAL at 21:19

## 2020-10-19 RX ADMIN — Medication 25 MILLIGRAM(S): at 05:42

## 2020-10-19 RX ADMIN — Medication 1000 UNIT(S): at 12:19

## 2020-10-19 RX ADMIN — Medication 50 MILLIGRAM(S): at 21:16

## 2020-10-19 RX ADMIN — OXYCODONE HYDROCHLORIDE 10 MILLIGRAM(S): 5 TABLET ORAL at 09:10

## 2020-10-19 RX ADMIN — OXYCODONE HYDROCHLORIDE 10 MILLIGRAM(S): 5 TABLET ORAL at 08:07

## 2020-10-19 RX ADMIN — ENOXAPARIN SODIUM 40 MILLIGRAM(S): 100 INJECTION SUBCUTANEOUS at 21:16

## 2020-10-19 RX ADMIN — PANTOPRAZOLE SODIUM 40 MILLIGRAM(S): 20 TABLET, DELAYED RELEASE ORAL at 05:42

## 2020-10-19 RX ADMIN — AMLODIPINE BESYLATE 5 MILLIGRAM(S): 2.5 TABLET ORAL at 05:42

## 2020-10-19 RX ADMIN — Medication 250 MILLIGRAM(S): at 05:42

## 2020-10-19 NOTE — PROGRESS NOTE ADULT - ASSESSMENT
CRISTEL WATTS is a HTN, hyperlipidemia, osteoarthritis, anxiety/depression disorders, GERD, eczema, sinusitis, scoliosis/spinal stenosis- lumbar region, presented to [facility] 9/29/20 for scheduled stage 1, L1-5 lumbar lateral interbody fusion with Dr. Vladez. Stage 2 T9-pelvis arthrodesis, L5-S1 TLIF done on 9/30/20 with closure of muscle flap done by plastics, Dr. Alegre. Hospital course complicated by hemorrhagic shock postop requiring pressors and blood transfusions, now stable on Midodrine. Now admitted to [facility] after for initiation of a multidisciplinary rehab program consisting focused on functional mobility, transfers and ADLs (activities of daily living).    #Elective L1-5 lumbar fusion, T9-pelvis arthrodesis, L5-S1 TLIF 9/29-9/30  - comprehensive multidisciplinary rehab program, PT/OT 3 hours a day, 5 days a week.  - P&O as needed  - Wound Care:  Staples/sutures dcd   dressing Dcd -Open to air     Patient may shower  - HOLD ASA and no NSAIDs until cleared by neurosurgery.  - F/u with neurosurgery, Dr. Valdez, outpatient  - F/u with plastics in 1-2 weeks with Dr. Alegre in clinic  - No weight bearing restrictions  - Precautions: falls, spine, neuro  continue Steroids  Xray TL spine as per NS- reviewed with hardware intact  no significant sequelae from fall    #Hypotension postop - improved  -Will Dc midodrine- SYST BPs 130-140  -Norvasc 5mg daily restarted by hospitalist, lopressor 25mg Q12h added,Edarbi 40mg QHSstill on hold  - BP better    #Pre-DM  - HbA1c 6.4 9/29/20  - FS  10/9  change fingersticks to fasting  - Monitor FS.    #Depression/Anxiety:  - Continue Cymbalta 60mg BID  neuropsychology consult ordered    #Migraines  - Sumatriptan 25mg Q4hr PRN    # UTI  urineC&S proteus 10-49k  PVR low  cont PO Keflex 5x/day    #Allergies  - Continue Benadryl 25mg Q4hr PRN    Pain Management:  - Flexeril 10mg TID PRN  timed oxycodone 10mg with breakfast and lunch daily before Rehab    GI/Bowel:  - At risk for constipation due to neurologic diagnosis, immobility and/or medication use  - daily miralax restarted at patient request  - GI ppx: Protonix daily, Maalox PRN    /Bladder:   - At risk for incontinence and retention due to neurologic diagnosis and limited mobility  - Currently patient voids: independent with bedpan   - Encourage timed voids every 4 hours while awake for independence and to promote continence during therapy.    Skin/Pressure Injury:   - Skin assessment on admission: no pressure injuries noted   - Monitor Incisions: Thoraco-lumbar incision and left flank incision - wound care as above  - Turn every 2 hours while in bed, air mattress  - Soft heel protectors  - Skin barrier cream as needed  - Nursing to monitor skin Qshift    Diet:  - Diet Consistency/Modifications: regular/DASH    DVT ppx:  - Lovenox  - SCDs

## 2020-10-19 NOTE — PROGRESS NOTE ADULT - SUBJECTIVE AND OBJECTIVE BOX
60 year old female with PMHx HTN, hyperlipidemia, osteoarthritis, anxiety/depression disorders, GERD, eczema, sinusitis, scoliosis/spinal stenosis- lumbar region, presented to [facility] 9/29/20 for scheduled stage 1, L1-5 lumbar lateral interbody fusion with Dr. Valdez. Stage 2 T9-pelvis arthrodesis, L5-S1 TLIF done on 9/30/20 with closure of muscle flap done by plastics, Dr. Alegre. Admitted to NSICU post operatively due to hemorrhagic shock requiring fluid resuscitation with IV hydration and pressors. EBL 1500 cc, s/p 2 u prbc. Patient was weaned off IV pressors 10/1/20 and started on midodrine. Transferred to the floor when stable. Surgical drains removed on 10/7/20. Plastic surgery following for incision management. Aquacel dressing changed on 10/3 and 10/8. Hgb remains stable. Pt currently off antihypertensives, now on midodrine- wean to off as indicated.     Patient is medically and neurologically stable for discharge to [facility] for multidisciplinary acute rehab 10/10/20.  Patient arrived hemodynamically stable condition. Seen at bedside with her  present. She reports bilateral shoulder pain due to history of OA (had injections prior to being admitted for spine surgery).  Currently reports 7/10 with movement.  She endorses 2-3 episodes of diarrhea the last two days. Denies abdominal pain, fevers, chills.       ROS - patient states bowel and bladder control is better  less numbness Right LE and Left foot  Sp fall over weekend- slipped off bed onto floor  TL spine films OK  no N,V  No dizziness, no headaches  no Chest pain, no SOB      MEDICATIONS  (STANDING):  amLODIPine   Tablet 5 milliGRAM(s) Oral daily  atorvastatin 10 milliGRAM(s) Oral at bedtime  cholecalciferol 1000 Unit(s) Oral daily  dextrose 5%. 1000 milliLiter(s) (50 mL/Hr) IV Continuous <Continuous>  dextrose 50% Injectable 12.5 Gram(s) IV Push once  dextrose 50% Injectable 25 Gram(s) IV Push once  dextrose 50% Injectable 25 Gram(s) IV Push once  DULoxetine 120 milliGRAM(s) Oral at bedtime  enoxaparin Injectable 40 milliGRAM(s) SubCutaneous <User Schedule>  fenofibrate Tablet 145 milliGRAM(s) Oral daily  hydrOXYzine hydrochloride 50 milliGRAM(s) Oral at bedtime  magnesium oxide 400 milliGRAM(s) Oral daily  metoprolol tartrate 25 milliGRAM(s) Oral two times a day  multivitamin 1 Tablet(s) Oral daily  oxyCODONE    IR 10 milliGRAM(s) Oral <User Schedule>  pantoprazole    Tablet 40 milliGRAM(s) Oral before breakfast  polyethylene glycol 3350 17 Gram(s) Oral daily  saccharomyces boulardii 250 milliGRAM(s) Oral two times a day    MEDICATIONS  (PRN):  aluminum hydroxide/magnesium hydroxide/simethicone Suspension 30 milliLiter(s) Oral every 4 hours PRN Dyspepsia  cyclobenzaprine 10 milliGRAM(s) Oral three times a day PRN Muscle Spasm  dextrose 40% Gel 15 Gram(s) Oral once PRN Blood Glucose LESS THAN 70 milliGRAM(s)/deciliter  diphenhydrAMINE 25 milliGRAM(s) Oral every 4 hours PRN Rash and/or Itching  glucagon  Injectable 1 milliGRAM(s) IntraMuscular once PRN Glucose LESS THAN 70 milligrams/deciliter  oxyCODONE    IR 5 milliGRAM(s) Oral every 6 hours PRN Moderate Pain (4 - 6)  oxyCODONE    IR 10 milliGRAM(s) Oral every 8 hours PRN Severe Pain (7 - 10)  senna 2 Tablet(s) Oral at bedtime PRN Constipation  SUMAtriptan 25 milliGRAM(s) Oral four times a day PRN Migraine                            11.0   7.18  )-----------( 504      ( 19 Oct 2020 05:30 )             35.6     10-19    141  |  105  |  21  ----------------------------<  117<H>  4.2   |  29  |  0.76    Ca    9.6      19 Oct 2020 05:30          CAPILLARY BLOOD GLUCOSE      POCT Blood Glucose.: 115 mg/dL (19 Oct 2020 08:01)      Vital Signs Last 24 Hrs  T(C): 37.1 (19 Oct 2020 08:29), Max: 37.1 (18 Oct 2020 19:53)  T(F): 98.7 (19 Oct 2020 08:29), Max: 98.8 (18 Oct 2020 19:53)  HR: 74 (19 Oct 2020 08:29) (74 - 97)  BP: 147/74 (19 Oct 2020 08:29) (118/70 - 151/74)  BP(mean): --  RR: 16 (19 Oct 2020 08:29) (16 - 18)  SpO2: 96% (19 Oct 2020 08:29) (96% - 97%)      Constitutional - NAD, Comfortable, lying in bed  HEENT - NCAT, EOMI  Chest - Breathing comfortably  Cardiovascular - RRR  Abdomen - Soft, mildly distended, hyperactive bowel sounds  Extremities - No C/C/E, No calf tenderness   Neurologic Exam -                    Cognitive - Awake, Alert, AAO to self, place, date, year, situation     Communication - Fluent, No dysarthria     Cranial Nerves - no facial asymmetry, shoulder shrug intact,      Motor -                     LEFT    UE - ShAB 5/5, EF 5/5, EE 5/5, WE 5/5,  5/5                    RIGHT UE - ShAB 5/5, EF 5/5, EE 5/5, WE 5/5,  5/5                    LEFT    LE - HF 4/5, KE 4/5, DF 3/5, PF 5/5                    RIGHT LE - HF 3/5, KE 3/5, DF 3/5, PF 5/5        Sensory - decreased to light touch medial leg on RIGHT and dorsum of foot on LEFT but better     Reflexes - DTR Intact, No primitive reflexes     Psychiatric - Mood stable, Affect WNL     Skin:         thoraco-lumbar surgical incision approximated with running suture- intact no drainaage no signs of infection- dressing off         Left flank dressing dcd incision intact with staples         Healed drain incisions lower lumbar          No pressure injuries noted on sacrum, coccyx, or heels        IDT meeting 10/14    OT: UBD min A   mod I with socks   max A toilet transfers  eating and grooming set up   goals Mod I     PT: min to mod A for transfers   ambulating 15ft with RW and wc follow   Barriers; home alone during day, stairs to enter home   goals Mod I with transfers and unclear for ambulation at this time       Tentative dc date home with  and home care 10/29.

## 2020-10-19 NOTE — PROGRESS NOTE ADULT - ASSESSMENT
This is a 59 y/o F with PMHx HTN, HLD, osteoarthritis, anxiety/depression disorders, GERD, eczema, sinusitis, scoliosis/spinal stenosis- lumbar region, presented to [facility] 9/29/20 for scheduled stage 1, L1-5 lumbar lateral interbody fusion with Dr. Valdez. Stage 2 T9-pelvis arthrodesis, L5-S1 TLIF done on 9/30/20 with closure of muscle flap done by plastics, Dr. Alegre. Post-op course complicated by hemorrhagic shock requiring transfer to ICU, fluid resuscitation with IV hydration and pressors. EBL 1500 cc, s/p 2 u prbc. Patient was weaned off IV pressors 10/1/20 and started on midodrine. Surgical drains removed on 10/7/20. Plastic surgery following for incision management recommended Aquacel dressing changed on 10/3 and 10/8. Patient was transferred to [facility] on 10/10/20 for comprehensive rehab.     #S/p L1-5 lumbar fusion, T9-pelvis arthrodesis, L5-S1 TLIF (9/30/20)  -Continue comprehensive rehab - PT/OT per rehab  -Pain management per rehab. continue bowel regimen  -Wound care per rehab    #Fall  -Mechanical fall  -No head injury or LOC  -No concern for any acute fractures  -Follow up with official read of spine and right knee xray  -Fall precaution    #UTI  -UCx 10k-49k proteius, sensitive to cephalosporins. sx improved with abx.   -Completed 4 day course of Keflexe  -PVR as indicated    #Post-op hypotension  #HTN  -Hypotension resolved, now off midodrine   -Continue Amlodipine  -Continue Metoprolol tart 12.5mg BID  -Home meds Norvasc 5mg daily, Bystolic 10mg QAM, Edarbi 40mg QHS  -Monitor vitals    #Pre-DM, a1c 6.4  -Consistent Carbohydrate Diet  -Check FS BID for now  -Nutrition consult    #HLD  -Cont fenofibrate/Statin    #Postop anemia  -H/H stable  -Monitor for now. transfuse for Hb < 7.0  -Hold ASA, NSAIDs    #Leukocytosis  -Resolved  -Cont to monitor     #Depression, Anxiety  -Mood stable   -Cont cymbalta   -Neuropsych noted    #Migraines  -Cont sumatriptan     #Allergies  -Would avoid benadryl. consider zrytec qhs if symptomatic     #DVT ppx - Lovenox  #GI ppx - PPI   #Standard precautions - aspiration, fall, safety, seizure, skin

## 2020-10-19 NOTE — PROGRESS NOTE ADULT - SUBJECTIVE AND OBJECTIVE BOX
Patient is a 60y old  Female who presents with a chief complaint of functional deficits due to L1-L5 lumbar fusion, T9-pelvis arthrodesis, L5-S1 TLIF  03.9 Other Neurologic (18 Oct 2020 09:28)      SUBJECTIVE / OVERNIGHT EVENTS:  Pt seen and examined at bedside. No acute events overnight. Yesterday, episode of falling out of bed. Pt states she felt she could go to the bathroom independently. Attempted to get out of bed but slid off, landing on her butt. Had complaints of back pain and right knee pain. No head injury or LOC.     This AM, pt states she doesn't know if she injured her knee as she has minimal sensation to her knee. She does endorse right hand middle finger pain and swelling s/p fall. Denies pain with movement.   Pt denies cp, palpitations, sob, abd pain, N/V, fever, chills.    ROS:  All other review of systems negative    Allergies    penicillin (Other)    Intolerances        MEDICATIONS  (STANDING):  amLODIPine   Tablet 5 milliGRAM(s) Oral daily  atorvastatin 10 milliGRAM(s) Oral at bedtime  cholecalciferol 1000 Unit(s) Oral daily  dextrose 5%. 1000 milliLiter(s) (50 mL/Hr) IV Continuous <Continuous>  dextrose 50% Injectable 12.5 Gram(s) IV Push once  dextrose 50% Injectable 25 Gram(s) IV Push once  dextrose 50% Injectable 25 Gram(s) IV Push once  DULoxetine 120 milliGRAM(s) Oral at bedtime  enoxaparin Injectable 40 milliGRAM(s) SubCutaneous <User Schedule>  fenofibrate Tablet 145 milliGRAM(s) Oral daily  hydrOXYzine hydrochloride 50 milliGRAM(s) Oral at bedtime  magnesium oxide 400 milliGRAM(s) Oral daily  metoprolol tartrate 25 milliGRAM(s) Oral two times a day  multivitamin 1 Tablet(s) Oral daily  oxyCODONE    IR 10 milliGRAM(s) Oral <User Schedule>  pantoprazole    Tablet 40 milliGRAM(s) Oral before breakfast  polyethylene glycol 3350 17 Gram(s) Oral daily  saccharomyces boulardii 250 milliGRAM(s) Oral two times a day    MEDICATIONS  (PRN):  aluminum hydroxide/magnesium hydroxide/simethicone Suspension 30 milliLiter(s) Oral every 4 hours PRN Dyspepsia  cyclobenzaprine 10 milliGRAM(s) Oral three times a day PRN Muscle Spasm  dextrose 40% Gel 15 Gram(s) Oral once PRN Blood Glucose LESS THAN 70 milliGRAM(s)/deciliter  diphenhydrAMINE 25 milliGRAM(s) Oral every 4 hours PRN Rash and/or Itching  glucagon  Injectable 1 milliGRAM(s) IntraMuscular once PRN Glucose LESS THAN 70 milligrams/deciliter  oxyCODONE    IR 5 milliGRAM(s) Oral every 6 hours PRN Moderate Pain (4 - 6)  oxyCODONE    IR 10 milliGRAM(s) Oral every 8 hours PRN Severe Pain (7 - 10)  senna 2 Tablet(s) Oral at bedtime PRN Constipation  SUMAtriptan 25 milliGRAM(s) Oral four times a day PRN Migraine      Vital Signs Last 24 Hrs  T(C): 37.1 (18 Oct 2020 19:53), Max: 37.1 (18 Oct 2020 19:53)  T(F): 98.8 (18 Oct 2020 19:53), Max: 98.8 (18 Oct 2020 19:53)  HR: 87 (19 Oct 2020 05:40) (69 - 97)  BP: 145/77 (19 Oct 2020 05:40) (118/70 - 151/74)  BP(mean): --  RR: 16 (18 Oct 2020 19:53) (16 - 18)  SpO2: 97% (18 Oct 2020 19:53) (97% - 98%)  CAPILLARY BLOOD GLUCOSE      POCT Blood Glucose.: 115 mg/dL (19 Oct 2020 08:01)    I&O's Summary      PHYSICAL EXAM:  GENERAL: NAD, obese female  CHEST/LUNG: Clear to auscultation bilaterally; No wheeze  HEART: Regular rate and rhythm; No murmurs, rubs, or gallops  ABDOMEN: Soft, Nontender, Nondistended; Bowel sounds present  EXTREMITIES:  2+ Peripheral Pulses, No clubbing, cyanosis, or edema. Right hand Proximal end of middle finger between MCP and PIP joint mild swelling. Full ROM. Neurovascularly intact. No swelling of Right knee  NEUROLOGY: AAOx3, b/l le numbness  PSYCH: calm    LABS:                        11.0   7.18  )-----------( 504      ( 19 Oct 2020 05:30 )             35.6     10-19    141  |  105  |  21  ----------------------------<  117<H>  4.2   |  29  |  0.76    Ca    9.6      19 Oct 2020 05:30                RADIOLOGY & ADDITIONAL TESTS:  Results Reviewed:   Imaging Personally Reviewed:  Electrocardiogram Personally Reviewed:    COORDINATION OF CARE:  Care Discussed with Consultants/Other Providers [Y/N]:  Prior or Outpatient Records Reviewed [Y/N]:

## 2020-10-20 LAB — GLUCOSE BLDC GLUCOMTR-MCNC: 112 MG/DL — HIGH (ref 70–99)

## 2020-10-20 PROCEDURE — 99232 SBSQ HOSP IP/OBS MODERATE 35: CPT

## 2020-10-20 RX ADMIN — Medication 250 MILLIGRAM(S): at 05:26

## 2020-10-20 RX ADMIN — Medication 25 MILLIGRAM(S): at 05:26

## 2020-10-20 RX ADMIN — MAGNESIUM OXIDE 400 MG ORAL TABLET 400 MILLIGRAM(S): 241.3 TABLET ORAL at 12:25

## 2020-10-20 RX ADMIN — ATORVASTATIN CALCIUM 10 MILLIGRAM(S): 80 TABLET, FILM COATED ORAL at 21:50

## 2020-10-20 RX ADMIN — PANTOPRAZOLE SODIUM 40 MILLIGRAM(S): 20 TABLET, DELAYED RELEASE ORAL at 05:26

## 2020-10-20 RX ADMIN — AMLODIPINE BESYLATE 5 MILLIGRAM(S): 2.5 TABLET ORAL at 05:26

## 2020-10-20 RX ADMIN — Medication 145 MILLIGRAM(S): at 12:25

## 2020-10-20 RX ADMIN — OXYCODONE HYDROCHLORIDE 10 MILLIGRAM(S): 5 TABLET ORAL at 09:04

## 2020-10-20 RX ADMIN — Medication 250 MILLIGRAM(S): at 17:21

## 2020-10-20 RX ADMIN — OXYCODONE HYDROCHLORIDE 5 MILLIGRAM(S): 5 TABLET ORAL at 22:12

## 2020-10-20 RX ADMIN — OXYCODONE HYDROCHLORIDE 10 MILLIGRAM(S): 5 TABLET ORAL at 12:22

## 2020-10-20 RX ADMIN — OXYCODONE HYDROCHLORIDE 5 MILLIGRAM(S): 5 TABLET ORAL at 21:50

## 2020-10-20 RX ADMIN — Medication 1 TABLET(S): at 12:25

## 2020-10-20 RX ADMIN — Medication 50 MILLIGRAM(S): at 21:50

## 2020-10-20 RX ADMIN — Medication 1000 UNIT(S): at 12:25

## 2020-10-20 RX ADMIN — DULOXETINE HYDROCHLORIDE 120 MILLIGRAM(S): 30 CAPSULE, DELAYED RELEASE ORAL at 21:50

## 2020-10-20 RX ADMIN — Medication 25 MILLIGRAM(S): at 17:21

## 2020-10-20 RX ADMIN — ENOXAPARIN SODIUM 40 MILLIGRAM(S): 100 INJECTION SUBCUTANEOUS at 21:50

## 2020-10-20 NOTE — PROGRESS NOTE ADULT - ASSESSMENT
CRISTEL WATTS is a HTN, hyperlipidemia, osteoarthritis, anxiety/depression disorders, GERD, eczema, sinusitis, scoliosis/spinal stenosis- lumbar region, presented to [facility] 9/29/20 for scheduled stage 1, L1-5 lumbar lateral interbody fusion with Dr. Valdez. Stage 2 T9-pelvis arthrodesis, L5-S1 TLIF done on 9/30/20 with closure of muscle flap done by plastics, Dr. Alegre. Hospital course complicated by hemorrhagic shock postop requiring pressors and blood transfusions, now stable on Midodrine. Now admitted to [facility] after for initiation of a multidisciplinary rehab program consisting focused on functional mobility, transfers and ADLs (activities of daily living).    #Elective L1-5 lumbar fusion, T9-pelvis arthrodesis, L5-S1 TLIF 9/29-9/30  - comprehensive multidisciplinary rehab program, PT/OT 3 hours a day, 5 days a week.  - P&O as needed  - Wound Care:  Staples/sutures dcd   dressing Dcd -Open to air     Patient may shower  - HOLD ASA and no NSAIDs until cleared by neurosurgery.  - F/u with neurosurgery, Dr. Valdez, outpatient  - F/u with plastics in 1-2 weeks with Dr. Alegre in clinic  - No weight bearing restrictions  - Precautions: falls, spine, neuro  continue Steroids  Xray TL spine as per NS- reviewed with hardware intact  no significant sequelae from fall  will follow neuro exam- left foot weakness?subjective    #Hypotension postop - improved  -Will Dc midodrine- SYST BPs 130-140  -Norvasc 5mg daily restarted by hospitalist, lopressor 25mg Q12h added,Edarbi 40mg QHSstill on hold  - BP better    #Pre-DM  - HbA1c 6.4 9/29/20  - FS  10/9  change fingersticks to fasting  - Monitor FS.    #Depression/Anxiety:  - Continue Cymbalta 60mg BID  neuropsychology consult ordered    #Migraines  - Sumatriptan 25mg Q4hr PRN    # UTI  urineC&S proteus 10-49k  PVR low  cont PO Keflex 5x/day    #Allergies  - Continue Benadryl 25mg Q4hr PRN    Pain Management:  - Flexeril 10mg TID PRN  timed oxycodone 10mg with breakfast and lunch daily before Rehab    GI/Bowel:  - At risk for constipation due to neurologic diagnosis, immobility and/or medication use  - daily miralax restarted at patient request  - GI ppx: Protonix daily, Maalox PRN    /Bladder:   - At risk for incontinence and retention due to neurologic diagnosis and limited mobility  - Currently patient voids: independent with bedpan   - Encourage timed voids every 4 hours while awake for independence and to promote continence during therapy.    Skin/Pressure Injury:   - Skin assessment on admission: no pressure injuries noted   - Monitor Incisions: Thoraco-lumbar incision and left flank incision - wound care as above  - Turn every 2 hours while in bed, air mattress  - Soft heel protectors  - Skin barrier cream as needed  - Nursing to monitor skin Qshift    Diet:  - Diet Consistency/Modifications: regular/DASH    DVT ppx:  - Lovenox  - SCDs

## 2020-10-20 NOTE — PROGRESS NOTE ADULT - ASSESSMENT
This is a 59 y/o F with PMHx HTN, HLD, osteoarthritis, anxiety/depression disorders, GERD, eczema, sinusitis, scoliosis/spinal stenosis- lumbar region, presented to [facility] 9/29/20 for scheduled stage 1, L1-5 lumbar lateral interbody fusion with Dr. Valdez. Stage 2 T9-pelvis arthrodesis, L5-S1 TLIF done on 9/30/20 with closure of muscle flap done by plastics, Dr. Alegre. Post-op course complicated by hemorrhagic shock requiring transfer to ICU, fluid resuscitation with IV hydration and pressors. EBL 1500 cc, s/p 2 u prbc. Patient was weaned off IV pressors 10/1/20 and started on midodrine. Surgical drains removed on 10/7/20. Plastic surgery following for incision management recommended Aquacel dressing changed on 10/3 and 10/8. Patient was transferred to [facility] on 10/10/20 for comprehensive rehab.     #S/p L1-5 lumbar fusion, T9-pelvis arthrodesis, L5-S1 TLIF (9/30/20)  -Continue comprehensive rehab - PT/OT per rehab  -Pain management per rehab. continue bowel regimen  -Wound care per rehab    #Fall  -Mechanical fall  -No head injury or LOC  -No concern for any acute fractures  -Follow up with official read of spine and right knee xray  -Fall precaution    #UTI  -UCx 10k-49k proteius, sensitive to cephalosporins. sx improved with abx.   -Completed 4 day course of Keflexe  -PVR as indicated    #Post-op hypotension  #HTN  -Hypotension resolved, now off midodrine   -Continue Amlodipine  -Continue Metoprolol tart 12.5mg BID  -Home meds Norvasc 5mg daily, Bystolic 10mg QAM, Edarbi 40mg QHS  -Monitor vitals    #Pre-DM, a1c 6.4  -Consistent Carbohydrate Diet  -Check FS BID for now  -Nutrition consult    #HLD  -Cont fenofibrate/Statin    #Postop anemia  -H/H stable  -Monitor for now. transfuse for Hb < 7.0  -Hold ASA, NSAIDs    #Leukocytosis  -Resolved  -Cont to monitor     #Depression, Anxiety  -Mood stable   -Cont cymbalta   -Neuropsych noted    #Migraines  -Cont sumatriptan     #Allergies  -Would avoid benadryl. consider zrytec qhs if symptomatic     #DVT ppx - Lovenox  #GI ppx - PPI   #Standard precautions - aspiration, fall, safety, seizure, skin This is a 59 y/o F with PMHx HTN, HLD, osteoarthritis, anxiety/depression disorders, GERD, eczema, sinusitis, scoliosis/spinal stenosis- lumbar region, presented to [facility] 9/29/20 for scheduled stage 1, L1-5 lumbar lateral interbody fusion with Dr. Valdez. Stage 2 T9-pelvis arthrodesis, L5-S1 TLIF done on 9/30/20 with closure of muscle flap done by plastics, Dr. Alegre. Post-op course complicated by hemorrhagic shock requiring transfer to ICU, fluid resuscitation with IV hydration and pressors. EBL 1500 cc, s/p 2 u prbc. Patient was weaned off IV pressors 10/1/20 and started on midodrine. Surgical drains removed on 10/7/20. Plastic surgery following for incision management recommended Aquacel dressing changed on 10/3 and 10/8. Patient was transferred to [facility] on 10/10/20 for comprehensive rehab.     #S/p L1-5 lumbar fusion, T9-pelvis arthrodesis, L5-S1 TLIF (9/30/20)  -Continue comprehensive rehab - PT/OT per rehab  -Pain management per rehab. continue bowel regimen  -Wound care per rehab    #Fall  -Mechanical fall  -No head injury or LOC  -No concern for any acute fractures  -Xray of spine and right knee negative for acute pathology  -Fall precaution    #UTI  -UCx 10k-49k proteius, sensitive to cephalosporins. sx improved with abx.   -Completed 4 day course of Keflexe  -PVR as indicated    #Post-op hypotension  #HTN  -Hypotension resolved, now off midodrine   -Continue Amlodipine  -Continue Metoprolol tart 12.5mg BID  -Home meds Norvasc 5mg daily, Bystolic 10mg QAM, Edarbi 40mg QHS  -Monitor vitals    #Pre-DM, a1c 6.4  -Consistent Carbohydrate Diet  -Fingersticks well controlled    #HLD  -Cont fenofibrate/Statin    #Postop anemia  -H/H stable  -Monitor for now. transfuse for Hb < 7.0  -Hold ASA, NSAIDs    #Leukocytosis  -Resolved  -Cont to monitor     #Depression, Anxiety  -Mood stable   -Cont cymbalta   -Neuropsych noted    #Migraines  -Cont sumatriptan     #Allergies  -Would avoid benadryl. consider zrytec qhs if symptomatic     #DVT ppx - Lovenox  #GI ppx - PPI   #Standard precautions - aspiration, fall, safety, seizure, skin

## 2020-10-20 NOTE — PROGRESS NOTE ADULT - SUBJECTIVE AND OBJECTIVE BOX
Patient is a 60y old  Female who presents with a chief complaint of functional deficits due to L1-L5 lumbar fusion, T9-pelvis arthrodesis, L5-S1 TLIF  03.9 Other Neurologic (19 Oct 2020 14:11)      SUBJECTIVE / OVERNIGHT EVENTS:  Pt seen and examined at bedside. No acute events overnight.  Pt denies cp, palpitations, sob, abd pain, N/V, fever, chills.    ROS:  All other review of systems negative    Allergies    penicillin (Other)    Intolerances        MEDICATIONS  (STANDING):  amLODIPine   Tablet 5 milliGRAM(s) Oral daily  atorvastatin 10 milliGRAM(s) Oral at bedtime  cholecalciferol 1000 Unit(s) Oral daily  dextrose 5%. 1000 milliLiter(s) (50 mL/Hr) IV Continuous <Continuous>  dextrose 50% Injectable 12.5 Gram(s) IV Push once  dextrose 50% Injectable 25 Gram(s) IV Push once  dextrose 50% Injectable 25 Gram(s) IV Push once  DULoxetine 120 milliGRAM(s) Oral at bedtime  enoxaparin Injectable 40 milliGRAM(s) SubCutaneous <User Schedule>  fenofibrate Tablet 145 milliGRAM(s) Oral daily  hydrOXYzine hydrochloride 50 milliGRAM(s) Oral at bedtime  magnesium oxide 400 milliGRAM(s) Oral daily  metoprolol tartrate 25 milliGRAM(s) Oral two times a day  multivitamin 1 Tablet(s) Oral daily  oxyCODONE    IR 10 milliGRAM(s) Oral <User Schedule>  pantoprazole    Tablet 40 milliGRAM(s) Oral before breakfast  polyethylene glycol 3350 17 Gram(s) Oral daily  saccharomyces boulardii 250 milliGRAM(s) Oral two times a day    MEDICATIONS  (PRN):  aluminum hydroxide/magnesium hydroxide/simethicone Suspension 30 milliLiter(s) Oral every 4 hours PRN Dyspepsia  cyclobenzaprine 10 milliGRAM(s) Oral three times a day PRN Muscle Spasm  dextrose 40% Gel 15 Gram(s) Oral once PRN Blood Glucose LESS THAN 70 milliGRAM(s)/deciliter  diphenhydrAMINE 25 milliGRAM(s) Oral every 4 hours PRN Rash and/or Itching  glucagon  Injectable 1 milliGRAM(s) IntraMuscular once PRN Glucose LESS THAN 70 milligrams/deciliter  oxyCODONE    IR 5 milliGRAM(s) Oral every 6 hours PRN Moderate Pain (4 - 6)  oxyCODONE    IR 10 milliGRAM(s) Oral every 8 hours PRN Severe Pain (7 - 10)  senna 2 Tablet(s) Oral at bedtime PRN Constipation  SUMAtriptan 25 milliGRAM(s) Oral four times a day PRN Migraine      Vital Signs Last 24 Hrs  T(C): 36.9 (20 Oct 2020 10:02), Max: 36.9 (20 Oct 2020 10:02)  T(F): 98.4 (20 Oct 2020 10:02), Max: 98.4 (20 Oct 2020 10:02)  HR: 78 (20 Oct 2020 10:02) (78 - 99)  BP: 130/78 (20 Oct 2020 10:02) (129/78 - 146/79)  BP(mean): --  RR: 14 (20 Oct 2020 10:02) (14 - 15)  SpO2: 95% (20 Oct 2020 10:02) (95% - 95%)  CAPILLARY BLOOD GLUCOSE      POCT Blood Glucose.: 112 mg/dL (20 Oct 2020 07:58)    I&O's Summary      PHYSICAL EXAM:  GENERAL: NAD, obese female  CHEST/LUNG: Clear to auscultation bilaterally; No wheeze  HEART: Regular rate and rhythm; No murmurs, rubs, or gallops  ABDOMEN: Soft, Nontender, Nondistended; Bowel sounds present  EXTREMITIES:  2+ Peripheral Pulses, No clubbing, cyanosis, or edema. Right hand Proximal end of middle finger between MCP and PIP joint mild swelling. Full ROM. Neurovascularly intact. No swelling of Right knee  NEUROLOGY: AAOx3, b/l le numbness  PSYCH: calm    LABS:                        11.0   7.18  )-----------( 504      ( 19 Oct 2020 05:30 )             35.6     10-19    141  |  105  |  21  ----------------------------<  117<H>  4.2   |  29  |  0.76    Ca    9.6      19 Oct 2020 05:30                RADIOLOGY & ADDITIONAL TESTS:  Results Reviewed:   Imaging Personally Reviewed:  Electrocardiogram Personally Reviewed:    COORDINATION OF CARE:  Care Discussed with Consultants/Other Providers [Y/N]:  Prior or Outpatient Records Reviewed [Y/N]:

## 2020-10-20 NOTE — PROGRESS NOTE ADULT - SUBJECTIVE AND OBJECTIVE BOX
60 year old female with PMHx HTN, hyperlipidemia, osteoarthritis, anxiety/depression disorders, GERD, eczema, sinusitis, scoliosis/spinal stenosis- lumbar region, presented to [facility] 9/29/20 for scheduled stage 1, L1-5 lumbar lateral interbody fusion with Dr. Valdez. Stage 2 T9-pelvis arthrodesis, L5-S1 TLIF done on 9/30/20 with closure of muscle flap done by plastics, Dr. Alegre. Admitted to NSICU post operatively due to hemorrhagic shock requiring fluid resuscitation with IV hydration and pressors. EBL 1500 cc, s/p 2 u prbc. Patient was weaned off IV pressors 10/1/20 and started on midodrine. Transferred to the floor when stable. Surgical drains removed on 10/7/20. Plastic surgery following for incision management. Aquacel dressing changed on 10/3 and 10/8. Hgb remains stable. Pt currently off antihypertensives, now on midodrine- wean to off as indicated.     Patient is medically and neurologically stable for discharge to [place] for multidisciplinary acute rehab 10/10/20.  Patient arrived hemodynamically stable condition. Seen at bedside with her  present. She reports bilateral shoulder pain due to history of OA (had injections prior to being admitted for spine surgery).  Currently reports 7/10 with movement.  She endorses 2-3 episodes of diarrhea the last two days. Denies abdominal pain, fevers, chills.       ROS - patient states bowel and bladder control is better  less numbness Right LE and Left foot  SP fall over weekend- slipped off bed onto floor  TL spine films OK  no N,V  No dizziness, no headaches  no Chest pain, no SOB  today CO Left Foot weakness  no increased Back pain no new sensory complaints      MEDICATIONS  (STANDING):  amLODIPine   Tablet 5 milliGRAM(s) Oral daily  atorvastatin 10 milliGRAM(s) Oral at bedtime  cholecalciferol 1000 Unit(s) Oral daily  dextrose 5%. 1000 milliLiter(s) (50 mL/Hr) IV Continuous <Continuous>  dextrose 50% Injectable 12.5 Gram(s) IV Push once  dextrose 50% Injectable 25 Gram(s) IV Push once  dextrose 50% Injectable 25 Gram(s) IV Push once  DULoxetine 120 milliGRAM(s) Oral at bedtime  enoxaparin Injectable 40 milliGRAM(s) SubCutaneous <User Schedule>  fenofibrate Tablet 145 milliGRAM(s) Oral daily  hydrOXYzine hydrochloride 50 milliGRAM(s) Oral at bedtime  magnesium oxide 400 milliGRAM(s) Oral daily  metoprolol tartrate 25 milliGRAM(s) Oral two times a day  multivitamin 1 Tablet(s) Oral daily  oxyCODONE    IR 10 milliGRAM(s) Oral <User Schedule>  pantoprazole    Tablet 40 milliGRAM(s) Oral before breakfast  polyethylene glycol 3350 17 Gram(s) Oral daily  saccharomyces boulardii 250 milliGRAM(s) Oral two times a day    MEDICATIONS  (PRN):  aluminum hydroxide/magnesium hydroxide/simethicone Suspension 30 milliLiter(s) Oral every 4 hours PRN Dyspepsia  cyclobenzaprine 10 milliGRAM(s) Oral three times a day PRN Muscle Spasm  dextrose 40% Gel 15 Gram(s) Oral once PRN Blood Glucose LESS THAN 70 milliGRAM(s)/deciliter  diphenhydrAMINE 25 milliGRAM(s) Oral every 4 hours PRN Rash and/or Itching  glucagon  Injectable 1 milliGRAM(s) IntraMuscular once PRN Glucose LESS THAN 70 milligrams/deciliter  oxyCODONE    IR 5 milliGRAM(s) Oral every 6 hours PRN Moderate Pain (4 - 6)  oxyCODONE    IR 10 milliGRAM(s) Oral every 8 hours PRN Severe Pain (7 - 10)  senna 2 Tablet(s) Oral at bedtime PRN Constipation  SUMAtriptan 25 milliGRAM(s) Oral four times a day PRN Migraine                            11.0   7.18  )-----------( 504      ( 19 Oct 2020 05:30 )             35.6     10-19    141  |  105  |  21  ----------------------------<  117<H>  4.2   |  29  |  0.76    Ca    9.6      19 Oct 2020 05:30          CAPILLARY BLOOD GLUCOSE      POCT Blood Glucose.: 112 mg/dL (20 Oct 2020 07:58)      Vital Signs Last 24 Hrs  T(C): 36.9 (20 Oct 2020 10:02), Max: 36.9 (20 Oct 2020 10:02)  T(F): 98.4 (20 Oct 2020 10:02), Max: 98.4 (20 Oct 2020 10:02)  HR: 78 (20 Oct 2020 10:02) (78 - 99)  BP: 130/78 (20 Oct 2020 10:02) (129/78 - 146/79)  BP(mean): --  RR: 14 (20 Oct 2020 10:02) (14 - 15)  SpO2: 95% (20 Oct 2020 10:02) (95% - 95%)      Constitutional - NAD, Comfortable, lying in bed  HEENT - NCAT, EOMI  Chest - Breathing comfortably  Cardiovascular - RRR  Abdomen - Soft, mildly distended, hyperactive bowel sounds  Extremities - No C/C/E, No calf tenderness   Neurologic Exam -                    Cognitive - Awake, Alert, AAO to self, place, date, year, situation     Communication - Fluent, No dysarthria     Cranial Nerves - no facial asymmetry, shoulder shrug intact,      Motor -                     LEFT    UE - ShAB 5/5, EF 5/5, EE 5/5, WE 5/5,  5/5                    RIGHT UE - ShAB 5/5, EF 5/5, EE 5/5, WE 5/5,  5/5                    LEFT    LE - HF 4/5, KE 4/5, DF 3+/5, PF 5/5 EHL 5/5                    RIGHT LE - HF 3/5, KE 4/5, DF 4/5, PF 5/5        Sensory - decreased to light touch medial leg on RIGHT and dorsum of foot on LEFT but better     Reflexes - DTR Intact, No primitive reflexes     Psychiatric - Mood stable, Affect WNL     Skin:         thoraco-lumbar surgical incision approximated with running suture- intact no drainaage no signs of infection- dressing off         Left flank dressing dcd incision intact with staples         Healed drain incisions lower lumbar          No pressure injuries noted on sacrum, coccyx, or heels        IDT meeting 10/14    OT: UBD min A   mod I with socks   max A toilet transfers  eating and grooming set up   goals Mod I     PT: min to mod A for transfers   ambulating 15ft with RW and wc follow   Barriers; home alone during day, stairs to enter home   goals Mod I with transfers and unclear for ambulation at this time       Tentative dc date home with  and home care 10/29.

## 2020-10-21 LAB — GLUCOSE BLDC GLUCOMTR-MCNC: 105 MG/DL — HIGH (ref 70–99)

## 2020-10-21 PROCEDURE — 99232 SBSQ HOSP IP/OBS MODERATE 35: CPT

## 2020-10-21 RX ORDER — CELECOXIB 200 MG/1
200 CAPSULE ORAL DAILY
Refills: 0 | Status: DISCONTINUED | OUTPATIENT
Start: 2020-10-21 | End: 2020-10-21

## 2020-10-21 RX ADMIN — OXYCODONE HYDROCHLORIDE 5 MILLIGRAM(S): 5 TABLET ORAL at 19:15

## 2020-10-21 RX ADMIN — Medication 50 MILLIGRAM(S): at 22:12

## 2020-10-21 RX ADMIN — PANTOPRAZOLE SODIUM 40 MILLIGRAM(S): 20 TABLET, DELAYED RELEASE ORAL at 06:11

## 2020-10-21 RX ADMIN — MAGNESIUM OXIDE 400 MG ORAL TABLET 400 MILLIGRAM(S): 241.3 TABLET ORAL at 11:44

## 2020-10-21 RX ADMIN — OXYCODONE HYDROCHLORIDE 10 MILLIGRAM(S): 5 TABLET ORAL at 11:45

## 2020-10-21 RX ADMIN — OXYCODONE HYDROCHLORIDE 10 MILLIGRAM(S): 5 TABLET ORAL at 12:30

## 2020-10-21 RX ADMIN — Medication 250 MILLIGRAM(S): at 06:11

## 2020-10-21 RX ADMIN — OXYCODONE HYDROCHLORIDE 10 MILLIGRAM(S): 5 TABLET ORAL at 08:30

## 2020-10-21 RX ADMIN — POLYETHYLENE GLYCOL 3350 17 GRAM(S): 17 POWDER, FOR SOLUTION ORAL at 11:44

## 2020-10-21 RX ADMIN — OXYCODONE HYDROCHLORIDE 10 MILLIGRAM(S): 5 TABLET ORAL at 07:56

## 2020-10-21 RX ADMIN — Medication 25 MILLIGRAM(S): at 17:57

## 2020-10-21 RX ADMIN — ATORVASTATIN CALCIUM 10 MILLIGRAM(S): 80 TABLET, FILM COATED ORAL at 22:12

## 2020-10-21 RX ADMIN — Medication 1000 UNIT(S): at 11:44

## 2020-10-21 RX ADMIN — Medication 25 MILLIGRAM(S): at 06:11

## 2020-10-21 RX ADMIN — DULOXETINE HYDROCHLORIDE 120 MILLIGRAM(S): 30 CAPSULE, DELAYED RELEASE ORAL at 22:12

## 2020-10-21 RX ADMIN — AMLODIPINE BESYLATE 5 MILLIGRAM(S): 2.5 TABLET ORAL at 06:11

## 2020-10-21 RX ADMIN — Medication 145 MILLIGRAM(S): at 11:44

## 2020-10-21 RX ADMIN — ENOXAPARIN SODIUM 40 MILLIGRAM(S): 100 INJECTION SUBCUTANEOUS at 22:13

## 2020-10-21 RX ADMIN — Medication 250 MILLIGRAM(S): at 17:57

## 2020-10-21 RX ADMIN — Medication 1 TABLET(S): at 11:44

## 2020-10-21 NOTE — CHART NOTE - NSCHARTNOTEFT_GEN_A_CORE
Nutrition Follow Up Note  Hospital Course   (Per Electronic Medical Record)    Source:  Patient ASleepx3  Medical Record [X]      Diet:   Consistent Carbohydrate, DASH-TLC Diet w/ Thin Liquids   Tolerates Diet Well  Consumes % of Meals (as Per Documentation)    Enteral/Parenteral Nutrition: Not Applicable    Current Weight: 210.1lb on 10/10  Obtain New Weight  Obtain Weights Weekly     Pertinent Medications: MEDICATIONS  (STANDING):  amLODIPine   Tablet 5 milliGRAM(s) Oral daily  atorvastatin 10 milliGRAM(s) Oral at bedtime  cholecalciferol 1000 Unit(s) Oral daily  dextrose 5%. 1000 milliLiter(s) (50 mL/Hr) IV Continuous <Continuous>  dextrose 50% Injectable 12.5 Gram(s) IV Push once  dextrose 50% Injectable 25 Gram(s) IV Push once  dextrose 50% Injectable 25 Gram(s) IV Push once  DULoxetine 120 milliGRAM(s) Oral at bedtime  enoxaparin Injectable 40 milliGRAM(s) SubCutaneous <User Schedule>  fenofibrate Tablet 145 milliGRAM(s) Oral daily  hydrOXYzine hydrochloride 50 milliGRAM(s) Oral at bedtime  magnesium oxide 400 milliGRAM(s) Oral daily  metoprolol tartrate 25 milliGRAM(s) Oral two times a day  multivitamin 1 Tablet(s) Oral daily  oxyCODONE    IR 10 milliGRAM(s) Oral <User Schedule>  pantoprazole    Tablet 40 milliGRAM(s) Oral before breakfast  polyethylene glycol 3350 17 Gram(s) Oral daily  saccharomyces boulardii 250 milliGRAM(s) Oral two times a day    MEDICATIONS  (PRN):  aluminum hydroxide/magnesium hydroxide/simethicone Suspension 30 milliLiter(s) Oral every 4 hours PRN Dyspepsia  cyclobenzaprine 10 milliGRAM(s) Oral three times a day PRN Muscle Spasm  dextrose 40% Gel 15 Gram(s) Oral once PRN Blood Glucose LESS THAN 70 milliGRAM(s)/deciliter  diphenhydrAMINE 25 milliGRAM(s) Oral every 4 hours PRN Rash and/or Itching  glucagon  Injectable 1 milliGRAM(s) IntraMuscular once PRN Glucose LESS THAN 70 milligrams/deciliter  oxyCODONE    IR 5 milliGRAM(s) Oral every 6 hours PRN Moderate Pain (4 - 6)  oxyCODONE    IR 10 milliGRAM(s) Oral every 8 hours PRN Severe Pain (7 - 10)  senna 2 Tablet(s) Oral at bedtime PRN Constipation  SUMAtriptan 25 milliGRAM(s) Oral four times a day PRN Migraine    Pertinent Labs:  10-19 Na141 mmol/L Glu 117 mg/dL<H> K+ 4.2 mmol/L Cr  0.76 mg/dL BUN 21 mg/dL    Skin: No Pressure Ulcers  Multiple Surgical Incisions  (as Per Nursing Flow Sheet)     Edema: None Noted     Last Bowel Movement: on 10/20    Estimated Needs:   [X] No Change Since Previous Assessment    Previous Nutrition Diagnosis:   Obese    Nutrition Diagnosis is [X] Ongoing - Continue Nutrition Plan of Care     New Nutrition Diagnosis: [X] Not Applicable    Interventions:   1. Recommend Continue Nutrition Plan of Care     Monitoring & Evaluation:   [X] Weights   [X] PO Intake   [X] Skin Integrity   [X] Follow Up (Per Protocol)  [X] Tolerance to Diet Prescription   [X] Other: Labs & POCT    Registered Dietitian/Nutritionist Remains Available.  Terrence Gamez RDN    Pager # [phone]  Phone# [phone]

## 2020-10-21 NOTE — PROGRESS NOTE ADULT - SUBJECTIVE AND OBJECTIVE BOX
60 year old female with PMHx HTN, hyperlipidemia, osteoarthritis, anxiety/depression disorders, GERD, eczema, sinusitis, scoliosis/spinal stenosis- lumbar region, presented to [facility] 9/29/20 for scheduled stage 1, L1-5 lumbar lateral interbody fusion with Dr. Valdez. Stage 2 T9-pelvis arthrodesis, L5-S1 TLIF done on 9/30/20 with closure of muscle flap done by plastics, Dr. Alegre. Admitted to NSICU post operatively due to hemorrhagic shock requiring fluid resuscitation with IV hydration and pressors. EBL 1500 cc, s/p 2 u prbc. Patient was weaned off IV pressors 10/1/20 and started on midodrine. Transferred to the floor when stable. Surgical drains removed on 10/7/20. Plastic surgery following for incision management. Aquacel dressing changed on 10/3 and 10/8. Hgb remains stable. Pt currently off antihypertensives, now on midodrine- wean to off as indicated.     Patient is medically and neurologically stable for discharge to [place] for multidisciplinary acute rehab 10/10/20.  Patient arrived hemodynamically stable condition. Seen at bedside with her  present. She reports bilateral shoulder pain due to history of OA (had injections prior to being admitted for spine surgery).  Currently reports 7/10 with movement.  She endorses 2-3 episodes of diarrhea the last two days. Denies abdominal pain, fevers, chills.       ROS - patient states bowel and bladder control is better  had episode incontinence last night  less numbness Right LE and Left foot  increased motor function Left foot  no N,V  No dizziness, no headaches  no Chest pain, no SOB  patient wants to start celebrex again      MEDICATIONS  (STANDING):  amLODIPine   Tablet 5 milliGRAM(s) Oral daily  atorvastatin 10 milliGRAM(s) Oral at bedtime  cholecalciferol 1000 Unit(s) Oral daily  dextrose 5%. 1000 milliLiter(s) (50 mL/Hr) IV Continuous <Continuous>  dextrose 50% Injectable 12.5 Gram(s) IV Push once  dextrose 50% Injectable 25 Gram(s) IV Push once  dextrose 50% Injectable 25 Gram(s) IV Push once  DULoxetine 120 milliGRAM(s) Oral at bedtime  enoxaparin Injectable 40 milliGRAM(s) SubCutaneous <User Schedule>  fenofibrate Tablet 145 milliGRAM(s) Oral daily  hydrOXYzine hydrochloride 50 milliGRAM(s) Oral at bedtime  magnesium oxide 400 milliGRAM(s) Oral daily  metoprolol tartrate 25 milliGRAM(s) Oral two times a day  multivitamin 1 Tablet(s) Oral daily  oxyCODONE    IR 10 milliGRAM(s) Oral <User Schedule>  pantoprazole    Tablet 40 milliGRAM(s) Oral before breakfast  polyethylene glycol 3350 17 Gram(s) Oral daily  saccharomyces boulardii 250 milliGRAM(s) Oral two times a day    MEDICATIONS  (PRN):  aluminum hydroxide/magnesium hydroxide/simethicone Suspension 30 milliLiter(s) Oral every 4 hours PRN Dyspepsia  cyclobenzaprine 10 milliGRAM(s) Oral three times a day PRN Muscle Spasm  dextrose 40% Gel 15 Gram(s) Oral once PRN Blood Glucose LESS THAN 70 milliGRAM(s)/deciliter  diphenhydrAMINE 25 milliGRAM(s) Oral every 4 hours PRN Rash and/or Itching  glucagon  Injectable 1 milliGRAM(s) IntraMuscular once PRN Glucose LESS THAN 70 milligrams/deciliter  oxyCODONE    IR 5 milliGRAM(s) Oral every 6 hours PRN Moderate Pain (4 - 6)  oxyCODONE    IR 10 milliGRAM(s) Oral every 8 hours PRN Severe Pain (7 - 10)  senna 2 Tablet(s) Oral at bedtime PRN Constipation  SUMAtriptan 25 milliGRAM(s) Oral four times a day PRN Migraine          POCT Blood Glucose.: 105 mg/dL (21 Oct 2020 07:55)      Vital Signs Last 24 Hrs  T(C): 36.9 (21 Oct 2020 08:34), Max: 37.1 (20 Oct 2020 20:17)  T(F): 98.4 (21 Oct 2020 08:34), Max: 98.7 (20 Oct 2020 20:17)  HR: 77 (21 Oct 2020 08:34) (77 - 99)  BP: 138/79 (21 Oct 2020 08:34) (133/79 - 147/82)  BP(mean): --  RR: 16 (21 Oct 2020 08:34) (16 - 16)  SpO2: 96% (21 Oct 2020 08:34) (96% - 98%)          Constitutional - NAD, Comfortable, lying in bed  HEENT - NCAT, EOMI  Chest - Breathing comfortably  Cardiovascular - RRR  Abdomen - Soft, mildly distended, hyperactive bowel sounds  Extremities - No C/C/E, No calf tenderness   Neurologic Exam -                    Cognitive - Awake, Alert, AAO to self, place, date, year, situation     Communication - Fluent, No dysarthria     Cranial Nerves - no facial asymmetry, shoulder shrug intact,      Motor -                     LEFT    UE - ShAB 5/5, EF 5/5, EE 5/5, WE 5/5,  5/5                    RIGHT UE - ShAB 5/5, EF 5/5, EE 5/5, WE 5/5,  5/5                    LEFT    LE - HF 4/5, KE 4/5, DF 4/5, PF 5/5 EHL 5/5                    RIGHT LE - HF 3/5, KE 4/5, DF 4/5, PF 5/5        Sensory - decreased to light touch medial leg on RIGHT and dorsum of foot on LEFT but better     Reflexes - DTR Intact, No primitive reflexes     Psychiatric - Mood stable, Affect WNL     Skin:         thoraco-lumbar surgical incision intact- sutures out         Left flank incision with staples dcd         Healed drain incisions lower lumbar          No pressure injuries noted on sacrum, coccyx, or heels      IDT meeting 10/21    OT: independent eating   set up for grooming   mod I UBD   LBD CG   toileting CG to min for balance   toilet transfers CG   shower transfers min A   Goals mod I for ADLs except supervision for showering/bathing    PT: 120ft yesterday, 60ft today (self limiting)   stairs with min A, aptricia on stairs (right knee)    Tentative dc to home with  and home services on 10/29     Family education next week.

## 2020-10-21 NOTE — PROGRESS NOTE ADULT - ASSESSMENT
CRISTEL WATTS is a HTN, hyperlipidemia, osteoarthritis, anxiety/depression disorders, GERD, eczema, sinusitis, scoliosis/spinal stenosis- lumbar region, presented to [facility] 9/29/20 for scheduled stage 1, L1-5 lumbar lateral interbody fusion with Dr. Valdez. Stage 2 T9-pelvis arthrodesis, L5-S1 TLIF done on 9/30/20 with closure of muscle flap done by plastics, Dr. Alegre. Hospital course complicated by hemorrhagic shock postop requiring pressors and blood transfusions, now stable on Midodrine. Now admitted to [facility] after for initiation of a multidisciplinary rehab program consisting focused on functional mobility, transfers and ADLs (activities of daily living).    #Elective L1-5 lumbar fusion, T9-pelvis arthrodesis, L5-S1 TLIF 9/29-9/30  - comprehensive multidisciplinary rehab program, PT/OT 3 hours a day, 5 days a week.  - P&O as needed  - Wound Care:  Staples/sutures dcd   dressing Dcd -Open to air     Patient may shower  - need to clear with neurosurgery use of celebrex  - F/u with neurosurgery, Dr. Valdez, outpatient  - F/u with plastics in 1-2 weeks with Dr. Alegre in clinic  - No weight bearing restrictions  - Precautions: falls, spine, neuro  finishing steroids  Xray TL spine as per NS- reviewed with hardware intact        #Hypotension postop - improved  -Will Dc midodrine- SYST BPs 130-140  -Norvasc 5mg daily restarted by hospitalist, lopressor 25mg Q12h added,Edarbi 40mg QHSstill on hold  - BP better    #Pre-DM  - HbA1c 6.4 9/29/20  - FS  10/9  change fingersticks to fasting  - Monitor FS.    #Depression/Anxiety:  - Continue Cymbalta 60mg BID  neuropsychology consult ordered    #Migraines  - Sumatriptan 25mg Q4hr PRN    # UTI  urineC&S proteus 10-49k  PVR low  cont PO Keflex 5x/day    #Allergies  - Continue Benadryl 25mg Q4hr PRN    Pain Management:  - Flexeril 10mg TID PRN  timed oxycodone 10mg with breakfast and lunch daily before Rehab    GI/Bowel:  - At risk for constipation due to neurologic diagnosis, immobility and/or medication use  - daily miralax restarted at patient request  - GI ppx: Protonix daily, Maalox PRN    /Bladder:   - At risk for incontinence and retention due to neurologic diagnosis and limited mobility  - Currently patient voids: independent with bedpan   - Encourage timed voids every 4 hours while awake for independence and to promote continence during therapy.    Skin/Pressure Injury:   - Skin assessment on admission: no pressure injuries noted   - Monitor Incisions: Thoraco-lumbar incision and left flank incision - wound care as above  - Turn every 2 hours while in bed, air mattress  - Soft heel protectors  - Skin barrier cream as needed  - Nursing to monitor skin Qshift    Diet:  - Diet Consistency/Modifications: regular/DASH    DVT ppx:  - Lovenox  - SCDs

## 2020-10-21 NOTE — PROGRESS NOTE ADULT - ASSESSMENT
This is a 61 y/o F with PMHx HTN, HLD, osteoarthritis, anxiety/depression disorders, GERD, eczema, sinusitis, scoliosis/spinal stenosis- lumbar region, presented to [facility] 9/29/20 for scheduled stage 1, L1-5 lumbar lateral interbody fusion with Dr. Valdez. Stage 2 T9-pelvis arthrodesis, L5-S1 TLIF done on 9/30/20 with closure of muscle flap done by plastics, Dr. Alegre. Post-op course complicated by hemorrhagic shock requiring transfer to ICU, fluid resuscitation with IV hydration and pressors. EBL 1500 cc, s/p 2 u prbc. Patient was weaned off IV pressors 10/1/20 and started on midodrine. Surgical drains removed on 10/7/20. Plastic surgery following for incision management recommended Aquacel dressing changed on 10/3 and 10/8. Patient was transferred to [facility] on 10/10/20 for comprehensive rehab.     #S/p L1-5 lumbar fusion, T9-pelvis arthrodesis, L5-S1 TLIF (9/30/20)  -Continue comprehensive rehab - PT/OT per rehab  -Pain management per rehab. continue bowel regimen  -Wound care per rehab    #Fall  -Mechanical fall  -No head injury or LOC  -No concern for any acute fractures  -Xray of spine and right knee negative for acute pathology  -Fall precaution    #UTI  -Completed 4 day course of Keflex  -PVR as indicated    #Post-op hypotension  #HTN  -Hypotension resolved, now off midodrine   -Continue Amlodipine  -Continue Metoprolol tart 12.5mg BID  -Home meds Norvasc 5mg daily, Bystolic 10mg QAM, Edarbi 40mg QHS  -Monitor vitals    #Pre-DM, a1c 6.4  -Consistent Carbohydrate Diet  -Fingersticks well controlled    #HLD  -Cont fenofibrate/Statin    #Postop anemia  -H/H stable  -Monitor for now. transfuse for Hb < 7.0  -Hold ASA, NSAIDs    #Leukocytosis  -Resolved  -Cont to monitor     #Depression, Anxiety  -Mood stable   -Cont cymbalta   -Neuropsych noted    #Migraines  -Cont sumatriptan     #Allergies  -Would avoid benadryl. consider zrytec qhs if symptomatic     #DVT ppx - Lovenox  #GI ppx - PPI   #Standard precautions - aspiration, fall, safety, seizure, skin

## 2020-10-21 NOTE — PROGRESS NOTE ADULT - SUBJECTIVE AND OBJECTIVE BOX
Patient is a 60y old  Female who presents with a chief complaint of functional deficits due to L1-L5 lumbar fusion, T9-pelvis arthrodesis, L5-S1 TLIF  03.9 Other Neurologic (20 Oct 2020 15:06)      SUBJECTIVE / OVERNIGHT EVENTS:  Pt seen and examined at bedside. No acute events overnight.  Pt denies cp, palpitations, sob, abd pain, N/V, fever, chills.    ROS:  All other review of systems negative    Allergies    penicillin (Other)    Intolerances        MEDICATIONS  (STANDING):  amLODIPine   Tablet 5 milliGRAM(s) Oral daily  atorvastatin 10 milliGRAM(s) Oral at bedtime  cholecalciferol 1000 Unit(s) Oral daily  dextrose 5%. 1000 milliLiter(s) (50 mL/Hr) IV Continuous <Continuous>  dextrose 50% Injectable 12.5 Gram(s) IV Push once  dextrose 50% Injectable 25 Gram(s) IV Push once  dextrose 50% Injectable 25 Gram(s) IV Push once  DULoxetine 120 milliGRAM(s) Oral at bedtime  enoxaparin Injectable 40 milliGRAM(s) SubCutaneous <User Schedule>  fenofibrate Tablet 145 milliGRAM(s) Oral daily  hydrOXYzine hydrochloride 50 milliGRAM(s) Oral at bedtime  magnesium oxide 400 milliGRAM(s) Oral daily  metoprolol tartrate 25 milliGRAM(s) Oral two times a day  multivitamin 1 Tablet(s) Oral daily  oxyCODONE    IR 10 milliGRAM(s) Oral <User Schedule>  pantoprazole    Tablet 40 milliGRAM(s) Oral before breakfast  polyethylene glycol 3350 17 Gram(s) Oral daily  saccharomyces boulardii 250 milliGRAM(s) Oral two times a day    MEDICATIONS  (PRN):  aluminum hydroxide/magnesium hydroxide/simethicone Suspension 30 milliLiter(s) Oral every 4 hours PRN Dyspepsia  cyclobenzaprine 10 milliGRAM(s) Oral three times a day PRN Muscle Spasm  dextrose 40% Gel 15 Gram(s) Oral once PRN Blood Glucose LESS THAN 70 milliGRAM(s)/deciliter  diphenhydrAMINE 25 milliGRAM(s) Oral every 4 hours PRN Rash and/or Itching  glucagon  Injectable 1 milliGRAM(s) IntraMuscular once PRN Glucose LESS THAN 70 milligrams/deciliter  oxyCODONE    IR 5 milliGRAM(s) Oral every 6 hours PRN Moderate Pain (4 - 6)  oxyCODONE    IR 10 milliGRAM(s) Oral every 8 hours PRN Severe Pain (7 - 10)  senna 2 Tablet(s) Oral at bedtime PRN Constipation  SUMAtriptan 25 milliGRAM(s) Oral four times a day PRN Migraine      Vital Signs Last 24 Hrs  T(C): 36.9 (21 Oct 2020 08:34), Max: 37.1 (20 Oct 2020 20:17)  T(F): 98.4 (21 Oct 2020 08:34), Max: 98.7 (20 Oct 2020 20:17)  HR: 77 (21 Oct 2020 08:34) (77 - 99)  BP: 138/79 (21 Oct 2020 08:34) (130/78 - 147/82)  BP(mean): --  RR: 16 (21 Oct 2020 08:34) (14 - 16)  SpO2: 96% (21 Oct 2020 08:34) (95% - 98%)  CAPILLARY BLOOD GLUCOSE      POCT Blood Glucose.: 105 mg/dL (21 Oct 2020 07:55)    I&O's Summary      PHYSICAL EXAM:  GENERAL: NAD, obese female  CHEST/LUNG: Clear to auscultation bilaterally; No wheeze  HEART: Regular rate and rhythm; No murmurs, rubs, or gallops  ABDOMEN: Soft, Nontender, Nondistended; Bowel sounds present  EXTREMITIES:  2+ Peripheral Pulses, No clubbing, cyanosis, or edema  NEUROLOGY: AAOx3, b/l le numbness  PSYCH: calm    LABS:                    RADIOLOGY & ADDITIONAL TESTS:  Results Reviewed:   Imaging Personally Reviewed:  Electrocardiogram Personally Reviewed:    COORDINATION OF CARE:  Care Discussed with Consultants/Other Providers [Y/N]:  Prior or Outpatient Records Reviewed [Y/N]:

## 2020-10-21 NOTE — CHART NOTE - NSCHARTNOTEFT_GEN_A_CORE
IDT meeting 10/21    OT: independent eating   set up for grooming   mod I UBD   LBD CG   toileting CG to min for balance   toilet transfers CG   shower transfers min A   Goals mod I for ADLs except supervision for showering/bathing    PT: 120ft yesterday, 60ft today (self limiting)   stairs with min A, patricia on stairs (right knee)    Tentative dc to home with  and home services on 10/29     Family education next week

## 2020-10-22 LAB
ANION GAP SERPL CALC-SCNC: 8 MMOL/L — SIGNIFICANT CHANGE UP (ref 5–17)
BUN SERPL-MCNC: 18 MG/DL — SIGNIFICANT CHANGE UP (ref 7–23)
CALCIUM SERPL-MCNC: 9.3 MG/DL — SIGNIFICANT CHANGE UP (ref 8.4–10.5)
CHLORIDE SERPL-SCNC: 104 MMOL/L — SIGNIFICANT CHANGE UP (ref 96–108)
CO2 SERPL-SCNC: 29 MMOL/L — SIGNIFICANT CHANGE UP (ref 22–31)
CREAT SERPL-MCNC: 0.7 MG/DL — SIGNIFICANT CHANGE UP (ref 0.5–1.3)
GLUCOSE BLDC GLUCOMTR-MCNC: 109 MG/DL — HIGH (ref 70–99)
GLUCOSE SERPL-MCNC: 112 MG/DL — HIGH (ref 70–99)
HCT VFR BLD CALC: 35.1 % — SIGNIFICANT CHANGE UP (ref 34.5–45)
HGB BLD-MCNC: 11.2 G/DL — LOW (ref 11.5–15.5)
MCHC RBC-ENTMCNC: 29.9 PG — SIGNIFICANT CHANGE UP (ref 27–34)
MCHC RBC-ENTMCNC: 31.9 GM/DL — LOW (ref 32–36)
MCV RBC AUTO: 93.6 FL — SIGNIFICANT CHANGE UP (ref 80–100)
NRBC # BLD: 0 /100 WBCS — SIGNIFICANT CHANGE UP (ref 0–0)
PLATELET # BLD AUTO: 411 K/UL — HIGH (ref 150–400)
POTASSIUM SERPL-MCNC: 4.1 MMOL/L — SIGNIFICANT CHANGE UP (ref 3.5–5.3)
POTASSIUM SERPL-SCNC: 4.1 MMOL/L — SIGNIFICANT CHANGE UP (ref 3.5–5.3)
RBC # BLD: 3.75 M/UL — LOW (ref 3.8–5.2)
RBC # FLD: 14.1 % — SIGNIFICANT CHANGE UP (ref 10.3–14.5)
SODIUM SERPL-SCNC: 141 MMOL/L — SIGNIFICANT CHANGE UP (ref 135–145)
WBC # BLD: 5.7 K/UL — SIGNIFICANT CHANGE UP (ref 3.8–10.5)
WBC # FLD AUTO: 5.7 K/UL — SIGNIFICANT CHANGE UP (ref 3.8–10.5)

## 2020-10-22 RX ORDER — OXYCODONE HYDROCHLORIDE 5 MG/1
10 TABLET ORAL EVERY 6 HOURS
Refills: 0 | Status: DISCONTINUED | OUTPATIENT
Start: 2020-10-22 | End: 2020-10-24

## 2020-10-22 RX ORDER — OXYCODONE HYDROCHLORIDE 5 MG/1
10 TABLET ORAL EVERY 8 HOURS
Refills: 0 | Status: DISCONTINUED | OUTPATIENT
Start: 2020-10-22 | End: 2020-10-22

## 2020-10-22 RX ORDER — OXYCODONE HYDROCHLORIDE 5 MG/1
5 TABLET ORAL EVERY 6 HOURS
Refills: 0 | Status: DISCONTINUED | OUTPATIENT
Start: 2020-10-22 | End: 2020-10-24

## 2020-10-22 RX ADMIN — PANTOPRAZOLE SODIUM 40 MILLIGRAM(S): 20 TABLET, DELAYED RELEASE ORAL at 06:04

## 2020-10-22 RX ADMIN — Medication 250 MILLIGRAM(S): at 06:05

## 2020-10-22 RX ADMIN — ENOXAPARIN SODIUM 40 MILLIGRAM(S): 100 INJECTION SUBCUTANEOUS at 21:12

## 2020-10-22 RX ADMIN — OXYCODONE HYDROCHLORIDE 5 MILLIGRAM(S): 5 TABLET ORAL at 12:59

## 2020-10-22 RX ADMIN — Medication 25 MILLIGRAM(S): at 06:04

## 2020-10-22 RX ADMIN — CYCLOBENZAPRINE HYDROCHLORIDE 10 MILLIGRAM(S): 10 TABLET, FILM COATED ORAL at 08:15

## 2020-10-22 RX ADMIN — AMLODIPINE BESYLATE 5 MILLIGRAM(S): 2.5 TABLET ORAL at 06:04

## 2020-10-22 RX ADMIN — OXYCODONE HYDROCHLORIDE 5 MILLIGRAM(S): 5 TABLET ORAL at 22:05

## 2020-10-22 RX ADMIN — Medication 50 MILLIGRAM(S): at 21:13

## 2020-10-22 RX ADMIN — OXYCODONE HYDROCHLORIDE 10 MILLIGRAM(S): 5 TABLET ORAL at 08:15

## 2020-10-22 RX ADMIN — DULOXETINE HYDROCHLORIDE 120 MILLIGRAM(S): 30 CAPSULE, DELAYED RELEASE ORAL at 21:12

## 2020-10-22 RX ADMIN — OXYCODONE HYDROCHLORIDE 5 MILLIGRAM(S): 5 TABLET ORAL at 13:45

## 2020-10-22 RX ADMIN — OXYCODONE HYDROCHLORIDE 10 MILLIGRAM(S): 5 TABLET ORAL at 09:00

## 2020-10-22 RX ADMIN — Medication 250 MILLIGRAM(S): at 18:17

## 2020-10-22 RX ADMIN — Medication 145 MILLIGRAM(S): at 12:17

## 2020-10-22 RX ADMIN — Medication 1000 UNIT(S): at 12:17

## 2020-10-22 RX ADMIN — OXYCODONE HYDROCHLORIDE 5 MILLIGRAM(S): 5 TABLET ORAL at 21:12

## 2020-10-22 RX ADMIN — Medication 25 MILLIGRAM(S): at 18:17

## 2020-10-22 RX ADMIN — MAGNESIUM OXIDE 400 MG ORAL TABLET 400 MILLIGRAM(S): 241.3 TABLET ORAL at 12:17

## 2020-10-22 RX ADMIN — Medication 1 TABLET(S): at 12:18

## 2020-10-22 RX ADMIN — ATORVASTATIN CALCIUM 10 MILLIGRAM(S): 80 TABLET, FILM COATED ORAL at 21:13

## 2020-10-22 RX ADMIN — POLYETHYLENE GLYCOL 3350 17 GRAM(S): 17 POWDER, FOR SOLUTION ORAL at 12:18

## 2020-10-22 NOTE — PROGRESS NOTE ADULT - SUBJECTIVE AND OBJECTIVE BOX
60 year old female with PMHx HTN, hyperlipidemia, osteoarthritis, anxiety/depression disorders, GERD, eczema, sinusitis, scoliosis/spinal stenosis- lumbar region, presented to [facility] 9/29/20 for scheduled stage 1, L1-5 lumbar lateral interbody fusion with Dr. Valdez. Stage 2 T9-pelvis arthrodesis, L5-S1 TLIF done on 9/30/20 with closure of muscle flap done by plastics, Dr. Alegre. Admitted to NSICU post operatively due to hemorrhagic shock requiring fluid resuscitation with IV hydration and pressors. EBL 1500 cc, s/p 2 u prbc. Patient was weaned off IV pressors 10/1/20 and started on midodrine. Transferred to the floor when stable. Surgical drains removed on 10/7/20. Plastic surgery following for incision management. Aquacel dressing changed on 10/3 and 10/8. Hgb remains stable. Pt currently off antihypertensives, now on midodrine- wean to off as indicated.     Patient is medically and neurologically stable for discharge to [place] for multidisciplinary acute rehab 10/10/20.  Patient arrived hemodynamically stable condition. Seen at bedside with her  present. She reports bilateral shoulder pain due to history of OA (had injections prior to being admitted for spine surgery).  Currently reports 7/10 with movement.  She endorses 2-3 episodes of diarrhea the last two days. Denies abdominal pain, fevers, chills.       ROS - patient states bowel and bladder control is better  no further urinary incontinence  less numbness Right LE and Left foot  increased motor function Left foot but still weak  loss of sensation lower abdomen since OR  no N,V  No dizziness, no headaches  no Chest pain, no SOB  patient wants to start celebrex again  called NS for clearance- awaiting callback      MEDICATIONS  (STANDING):  amLODIPine   Tablet 5 milliGRAM(s) Oral daily  atorvastatin 10 milliGRAM(s) Oral at bedtime  cholecalciferol 1000 Unit(s) Oral daily  dextrose 5%. 1000 milliLiter(s) (50 mL/Hr) IV Continuous <Continuous>  dextrose 50% Injectable 12.5 Gram(s) IV Push once  dextrose 50% Injectable 25 Gram(s) IV Push once  dextrose 50% Injectable 25 Gram(s) IV Push once  DULoxetine 120 milliGRAM(s) Oral at bedtime  enoxaparin Injectable 40 milliGRAM(s) SubCutaneous <User Schedule>  fenofibrate Tablet 145 milliGRAM(s) Oral daily  hydrOXYzine hydrochloride 50 milliGRAM(s) Oral at bedtime  magnesium oxide 400 milliGRAM(s) Oral daily  metoprolol tartrate 25 milliGRAM(s) Oral two times a day  multivitamin 1 Tablet(s) Oral daily  oxyCODONE    IR 10 milliGRAM(s) Oral <User Schedule>  pantoprazole    Tablet 40 milliGRAM(s) Oral before breakfast  polyethylene glycol 3350 17 Gram(s) Oral daily  saccharomyces boulardii 250 milliGRAM(s) Oral two times a day    MEDICATIONS  (PRN):  aluminum hydroxide/magnesium hydroxide/simethicone Suspension 30 milliLiter(s) Oral every 4 hours PRN Dyspepsia  cyclobenzaprine 10 milliGRAM(s) Oral three times a day PRN Muscle Spasm  dextrose 40% Gel 15 Gram(s) Oral once PRN Blood Glucose LESS THAN 70 milliGRAM(s)/deciliter  diphenhydrAMINE 25 milliGRAM(s) Oral every 4 hours PRN Rash and/or Itching  glucagon  Injectable 1 milliGRAM(s) IntraMuscular once PRN Glucose LESS THAN 70 milligrams/deciliter  oxyCODONE    IR 5 milliGRAM(s) Oral every 6 hours PRN Moderate Pain (4 - 6)  oxyCODONE    IR 10 milliGRAM(s) Oral every 6 hours PRN Severe Pain (7 - 10)  senna 2 Tablet(s) Oral at bedtime PRN Constipation  SUMAtriptan 25 milliGRAM(s) Oral four times a day PRN Migraine                            11.2   5.70  )-----------( 411      ( 22 Oct 2020 05:30 )             35.1     10-22    141  |  104  |  18  ----------------------------<  112<H>  4.1   |  29  |  0.70    Ca    9.3      22 Oct 2020 05:30          CAPILLARY BLOOD GLUCOSE      POCT Blood Glucose.: 109 mg/dL (22 Oct 2020 06:01)      Vital Signs Last 24 Hrs  T(C): 37.1 (22 Oct 2020 08:18), Max: 37.1 (22 Oct 2020 08:18)  T(F): 98.7 (22 Oct 2020 08:18), Max: 98.7 (22 Oct 2020 08:18)  HR: 85 (22 Oct 2020 08:18) (83 - 100)  BP: 115/73 (22 Oct 2020 08:18) (115/73 - 138/79)  BP(mean): --  RR: 16 (22 Oct 2020 08:18) (15 - 16)  SpO2: 95% (22 Oct 2020 08:18) (95% - 98%)        Constitutional - NAD, Comfortable, lying in bed  HEENT - NCAT, EOMI  Chest - Breathing comfortably  Cardiovascular - RRR  Abdomen - Soft, mildly distended, hyperactive bowel sounds  Extremities - No C/C/E, No calf tenderness   Neurologic Exam -                    Cognitive - Awake, Alert, AAO to self, place, date, year, situation     Communication - Fluent, No dysarthria     Cranial Nerves - no facial asymmetry, shoulder shrug intact,      Motor -                     LEFT    UE - ShAB 5/5, EF 5/5, EE 5/5, WE 5/5,  5/5                    RIGHT UE - ShAB 5/5, EF 5/5, EE 5/5, WE 5/5,  5/5                    LEFT    LE - HF 4/5, KE 4/5, DF 3+/5, PF 5/5 EHL 5/5                    RIGHT LE - HF 3/5, KE 4/5, DF 4/5, PF 5/5        Sensory - decreased to light touch medial leg on RIGHT and dorsum of foot on LEFT but better     Reflexes - DTR Intact, No primitive reflexes     Psychiatric - Mood stable, Affect WNL     Skin:         thoraco-lumbar surgical incision intact- sutures out         Left flank incision with staples dcd         Healed drain incisions lower lumbar          No pressure injuries noted on sacrum, coccyx, or heels      IDT meeting 10/21    OT: independent eating   set up for grooming   mod I UBD   LBD CG   toileting CG to min for balance   toilet transfers CG   shower transfers min A   Goals mod I for ADLs except supervision for showering/bathing    PT: 120ft yesterday, 60ft today (self limiting)   stairs with min A, patricia on stairs (right knee)    Tentative dc to home with  and home services on 10/29     Family education next week.

## 2020-10-22 NOTE — PROGRESS NOTE ADULT - ASSESSMENT
CRISTEL WATTS is a HTN, hyperlipidemia, osteoarthritis, anxiety/depression disorders, GERD, eczema, sinusitis, scoliosis/spinal stenosis- lumbar region, presented to [facility] 9/29/20 for scheduled stage 1, L1-5 lumbar lateral interbody fusion with Dr. Valdez. Stage 2 T9-pelvis arthrodesis, L5-S1 TLIF done on 9/30/20 with closure of muscle flap done by plastics, Dr. Alegre. Hospital course complicated by hemorrhagic shock postop requiring pressors and blood transfusions, now stable on Midodrine. Now admitted to [facility] after for initiation of a multidisciplinary rehab program consisting focused on functional mobility, transfers and ADLs (activities of daily living).    #Elective L1-5 lumbar fusion, T9-pelvis arthrodesis, L5-S1 TLIF 9/29-9/30  - comprehensive multidisciplinary rehab program, PT/OT 3 hours a day, 5 days a week.  - P&O as needed  - Wound Care:  Staples/sutures dcd   dressing Dcd -Open to air     Patient may shower  - need to clear with neurosurgery use of celebrex  - F/u with neurosurgery, Dr. Valdez, outpatient  - F/u with plastics in 1-2 weeks with Dr. Alegre in clinic  - No weight bearing restrictions  - Precautions: falls, spine, neuro  finishing steroids  Xray TL spine as per NS- reviewed with hardware intact        #Hypotension postop - improved  -Will Dc midodrine- SYST BPs 130-140  -Norvasc 5mg daily restarted by hospitalist, lopressor 25mg Q12h added,Edarbi 40mg QHSstill on hold  - BP better    #Pre-DM  - HbA1c 6.4 9/29/20  - FS  10/9  change fingersticks to fasting  - Monitor FS.    #Depression/Anxiety:  - Continue Cymbalta 60mg BID  neuropsychology consult noted    #Migraines  - Sumatriptan 25mg Q4hr PRN    # UTI  urineC&S proteus 10-49k  finished antibiotics  clinically asymptomatic    #Allergies  - Continue Benadryl 25mg Q4hr PRN    Pain Management:  - Flexeril 10mg TID PRN  timed oxycodone 10mg with breakfast and lunch daily before Rehab    GI/Bowel:  - At risk for constipation due to neurologic diagnosis, immobility and/or medication use  - daily miralax restarted at patient request  - GI ppx: Protonix daily, Maalox PRN    /Bladder:   - At risk for incontinence and retention due to neurologic diagnosis and limited mobility  - Currently patient voids: independent with bedpan   - Encourage timed voids every 4 hours while awake for independence and to promote continence during therapy.    Skin/Pressure Injury:   - Skin assessment on admission: no pressure injuries noted   - Monitor Incisions: Thoraco-lumbar incision and left flank incision - wound care as above  - Turn every 2 hours while in bed, air mattress  - Soft heel protectors  - Skin barrier cream as needed  - Nursing to monitor skin Qshift    Diet:  - Diet Consistency/Modifications: regular/DASH    DVT ppx:  - Lovenox  - SCDs

## 2020-10-23 LAB — GLUCOSE BLDC GLUCOMTR-MCNC: 107 MG/DL — HIGH (ref 70–99)

## 2020-10-23 PROCEDURE — 99232 SBSQ HOSP IP/OBS MODERATE 35: CPT

## 2020-10-23 PROCEDURE — 90832 PSYTX W PT 30 MINUTES: CPT

## 2020-10-23 RX ORDER — LIDOCAINE 4 G/100G
1 CREAM TOPICAL EVERY 24 HOURS
Refills: 0 | Status: DISCONTINUED | OUTPATIENT
Start: 2020-10-23 | End: 2020-11-03

## 2020-10-23 RX ADMIN — Medication 145 MILLIGRAM(S): at 12:19

## 2020-10-23 RX ADMIN — Medication 25 MILLIGRAM(S): at 05:34

## 2020-10-23 RX ADMIN — LIDOCAINE 1 PATCH: 4 CREAM TOPICAL at 12:20

## 2020-10-23 RX ADMIN — Medication 25 MILLIGRAM(S): at 17:09

## 2020-10-23 RX ADMIN — OXYCODONE HYDROCHLORIDE 10 MILLIGRAM(S): 5 TABLET ORAL at 12:23

## 2020-10-23 RX ADMIN — DULOXETINE HYDROCHLORIDE 120 MILLIGRAM(S): 30 CAPSULE, DELAYED RELEASE ORAL at 21:29

## 2020-10-23 RX ADMIN — OXYCODONE HYDROCHLORIDE 10 MILLIGRAM(S): 5 TABLET ORAL at 09:37

## 2020-10-23 RX ADMIN — MAGNESIUM OXIDE 400 MG ORAL TABLET 400 MILLIGRAM(S): 241.3 TABLET ORAL at 12:19

## 2020-10-23 RX ADMIN — CYCLOBENZAPRINE HYDROCHLORIDE 10 MILLIGRAM(S): 10 TABLET, FILM COATED ORAL at 05:33

## 2020-10-23 RX ADMIN — Medication 1 TABLET(S): at 12:19

## 2020-10-23 RX ADMIN — OXYCODONE HYDROCHLORIDE 10 MILLIGRAM(S): 5 TABLET ORAL at 10:35

## 2020-10-23 RX ADMIN — Medication 1000 UNIT(S): at 12:19

## 2020-10-23 RX ADMIN — PANTOPRAZOLE SODIUM 40 MILLIGRAM(S): 20 TABLET, DELAYED RELEASE ORAL at 05:34

## 2020-10-23 RX ADMIN — ENOXAPARIN SODIUM 40 MILLIGRAM(S): 100 INJECTION SUBCUTANEOUS at 21:28

## 2020-10-23 RX ADMIN — OXYCODONE HYDROCHLORIDE 10 MILLIGRAM(S): 5 TABLET ORAL at 13:25

## 2020-10-23 RX ADMIN — Medication 50 MILLIGRAM(S): at 21:29

## 2020-10-23 RX ADMIN — LIDOCAINE 1 PATCH: 4 CREAM TOPICAL at 19:45

## 2020-10-23 RX ADMIN — AMLODIPINE BESYLATE 5 MILLIGRAM(S): 2.5 TABLET ORAL at 05:33

## 2020-10-23 RX ADMIN — Medication 250 MILLIGRAM(S): at 17:09

## 2020-10-23 RX ADMIN — ATORVASTATIN CALCIUM 10 MILLIGRAM(S): 80 TABLET, FILM COATED ORAL at 21:29

## 2020-10-23 RX ADMIN — Medication 250 MILLIGRAM(S): at 05:33

## 2020-10-23 RX ADMIN — POLYETHYLENE GLYCOL 3350 17 GRAM(S): 17 POWDER, FOR SOLUTION ORAL at 12:19

## 2020-10-23 NOTE — PROGRESS NOTE ADULT - SUBJECTIVE AND OBJECTIVE BOX
Pt was seen for 30 min. Pt c/o . Pt reported doing well since last seen, except this morning when she had a meltdown during PT. Pt reported that this was triggered by family issues, especially difficulties with her stepdaughter, that have affected both her  and Pt, for several years now. When her  visited yesterday, she became realized that her stepdaughter was not being supportive of Pt's  during the time she's been in the hospital, and felt upset but kept it to herself. She was unable to sleep well last night, and with a change in the schedule of her txs this morning, the combination of emotional distress and fatigue, and having the most physically demanding tx first thing in the morning resulted in what she considered was her best session since admission. After normalizing Pt's feelings and thoughts, discussed the need to maintain clear boundaries in order to not get affected by problems that are not her own, and also educated her about the process of healing in rehab, where it may be unrealistic to expect that every session will be a success. Pt agreed with the topics discussed and expressed willingness to continue her efforts to get better. Otherwise, Pt reported substantial improvement in PT and OT and awareness that she may be d/c'ed next week, likely on Thursday. Support and encouragement were provided.

## 2020-10-23 NOTE — PROGRESS NOTE ADULT - ASSESSMENT
Pt alert, attentive, mildly constricted affect but responsive to humor, euthymic mood, making efforts to cope by realistic means. Plan: Continue tx.

## 2020-10-23 NOTE — PROGRESS NOTE ADULT - ASSESSMENT
This is a 59 y/o F with PMHx HTN, HLD, osteoarthritis, anxiety/depression disorders, GERD, eczema, sinusitis, scoliosis/spinal stenosis- lumbar region, presented to [facility] 9/29/20 for scheduled stage 1, L1-5 lumbar lateral interbody fusion with Dr. Valdez. Stage 2 T9-pelvis arthrodesis, L5-S1 TLIF done on 9/30/20 with closure of muscle flap done by plastics, Dr. Alegre. Post-op course complicated by hemorrhagic shock requiring transfer to ICU, fluid resuscitation with IV hydration and pressors. EBL 1500 cc, s/p 2 u prbc. Patient was weaned off IV pressors 10/1/20 and started on midodrine. Surgical drains removed on 10/7/20. Plastic surgery following for incision management recommended Aquacel dressing changed on 10/3 and 10/8. Patient was transferred to [facility] on 10/10/20 for comprehensive rehab.     #S/p L1-5 lumbar fusion, T9-pelvis arthrodesis, L5-S1 TLIF (9/30/20)  -Continue comprehensive rehab - PT/OT per rehab  -Pain management per rehab. continue bowel regimen  -Lidocaine patch at affected area  -Wound care per rehab    #Fall  -Mechanical fall  -No head injury or LOC  -No concern for any acute fractures  -Xray of spine and right knee negative for acute pathology  -Fall precaution    #UTI  -Completed 4 day course of Keflex  -PVR as indicated    #Post-op hypotension  #HTN  -Hypotension resolved, now off midodrine   -Continue Amlodipine  -Continue Metoprolol tart 12.5mg BID  -Home meds Norvasc 5mg daily, Bystolic 10mg QAM, Edarbi 40mg QHS  -Monitor vitals    #Pre-DM, a1c 6.4  -Consistent Carbohydrate Diet  -Fingersticks well controlled    #HLD  -Cont fenofibrate/Statin    #Postop anemia  -H/H stable  -Monitor for now. transfuse for Hb < 7.0  -Hold ASA, NSAIDs    #Leukocytosis  -Resolved  -Cont to monitor     #Depression, Anxiety  -Mood stable   -Cont cymbalta   -Neuropsych noted    #Migraines  -Cont sumatriptan     #DVT ppx - Lovenox  #GI ppx - PPI   #Standard precautions - aspiration, fall, safety, seizure, skin

## 2020-10-23 NOTE — PROGRESS NOTE ADULT - ASSESSMENT
CRISTEL WATTS is a HTN, hyperlipidemia, osteoarthritis, anxiety/depression disorders, GERD, eczema, sinusitis, scoliosis/spinal stenosis- lumbar region, presented to [facility] 9/29/20 for scheduled stage 1, L1-5 lumbar lateral interbody fusion with Dr. Valdez. Stage 2 T9-pelvis arthrodesis, L5-S1 TLIF done on 9/30/20 with closure of muscle flap done by plastics, Dr. Alegre. Hospital course complicated by hemorrhagic shock postop requiring pressors and blood transfusions, now stable on Midodrine. Now admitted to [facility] after for initiation of a multidisciplinary rehab program consisting focused on functional mobility, transfers and ADLs (activities of daily living).    #Elective L1-5 lumbar fusion, T9-pelvis arthrodesis, L5-S1 TLIF 9/29-9/30  - comprehensive multidisciplinary rehab program, PT/OT 3 hours a day, 5 days a week.  - P&O as needed  - Wound Care:  Staples/sutures dcd   dressing Dcd -Open to air     Patient may shower  - no celebrex per NS  - F/u with neurosurgery, Dr. Valdez, outpatient  - F/u with plastics in 1-2 weeks with Dr. Alegre in clinic  - No weight bearing restrictions  - Precautions: falls, spine, neuro  finishing steroids  Xray TL spine as per NS- reviewed with hardware intact  lidoderm to Right ribs        #Hypotension postop - improved  -Will Dc midodrine- SYST BPs 130-140  -Norvasc 5mg daily restarted by hospitalist, lopressor 25mg Q12h added,Edarbi 40mg QHSstill on hold  - BP well controlled    #Pre-DM  - HbA1c 6.4 9/29/20  - FS  10/9  change fingersticks to fasting  - Monitor FS.    #Depression/Anxiety:  - Continue Cymbalta 60mg BID  neuropsychology consult noted    #Migraines  - Sumatriptan 25mg Q4hr PRN    # UTI  urineC&S proteus 10-49k  finished antibiotics  clinically asymptomatic    #Allergies  - Continue Benadryl 25mg Q4hr PRN    Pain Management:  - Flexeril 10mg TID PRN  timed oxycodone 10mg with breakfast and lunch daily before Rehab    GI/Bowel:  - At risk for constipation due to neurologic diagnosis, immobility and/or medication use  - daily miralax restarted at patient request  - GI ppx: Protonix daily, Maalox PRN    /Bladder:   - At risk for incontinence and retention due to neurologic diagnosis and limited mobility  - Currently patient voids: independent  - Encourage timed voids every 4 hours while awake for independence and to promote continence during therapy.    Skin/Pressure Injury:   - Skin assessment on admission: no pressure injuries noted   - Monitor Incisions: Thoraco-lumbar incision and left flank incision - intact  - Turn every 2 hours while in bed, air mattress  - Soft heel protectors  - Skin barrier cream as needed  - Nursing to monitor skin Qshift    Diet:  - Diet Consistency/Modifications: regular/DASH    DVT ppx:  - Lovenox  - SCDs

## 2020-10-23 NOTE — PROGRESS NOTE ADULT - SUBJECTIVE AND OBJECTIVE BOX
Patient is a 60y old  Female who presents with a chief complaint of functional deficits due to L1-L5 lumbar fusion, T9-pelvis arthrodesis, L5-S1 TLIF  03.9 Other Neurologic (22 Oct 2020 14:01)      SUBJECTIVE / OVERNIGHT EVENTS:  Pt seen and examined at bedside. No acute events overnight. Complaints of pain in back, paraspinal. States had improvement with Flexeril with Oxycodone.   Pt denies cp, palpitations, sob, abd pain, N/V, fever, chills.    ROS:  All other review of systems negative    Allergies    penicillin (Other)    Intolerances        MEDICATIONS  (STANDING):  amLODIPine   Tablet 5 milliGRAM(s) Oral daily  atorvastatin 10 milliGRAM(s) Oral at bedtime  cholecalciferol 1000 Unit(s) Oral daily  dextrose 5%. 1000 milliLiter(s) (50 mL/Hr) IV Continuous <Continuous>  dextrose 50% Injectable 12.5 Gram(s) IV Push once  dextrose 50% Injectable 25 Gram(s) IV Push once  dextrose 50% Injectable 25 Gram(s) IV Push once  DULoxetine 120 milliGRAM(s) Oral at bedtime  enoxaparin Injectable 40 milliGRAM(s) SubCutaneous <User Schedule>  fenofibrate Tablet 145 milliGRAM(s) Oral daily  hydrOXYzine hydrochloride 50 milliGRAM(s) Oral at bedtime  lidocaine   Patch 1 Patch Transdermal every 24 hours  magnesium oxide 400 milliGRAM(s) Oral daily  metoprolol tartrate 25 milliGRAM(s) Oral two times a day  multivitamin 1 Tablet(s) Oral daily  oxyCODONE    IR 10 milliGRAM(s) Oral <User Schedule>  pantoprazole    Tablet 40 milliGRAM(s) Oral before breakfast  polyethylene glycol 3350 17 Gram(s) Oral daily  saccharomyces boulardii 250 milliGRAM(s) Oral two times a day    MEDICATIONS  (PRN):  aluminum hydroxide/magnesium hydroxide/simethicone Suspension 30 milliLiter(s) Oral every 4 hours PRN Dyspepsia  cyclobenzaprine 10 milliGRAM(s) Oral three times a day PRN Muscle Spasm  dextrose 40% Gel 15 Gram(s) Oral once PRN Blood Glucose LESS THAN 70 milliGRAM(s)/deciliter  diphenhydrAMINE 25 milliGRAM(s) Oral every 4 hours PRN Rash and/or Itching  glucagon  Injectable 1 milliGRAM(s) IntraMuscular once PRN Glucose LESS THAN 70 milligrams/deciliter  oxyCODONE    IR 5 milliGRAM(s) Oral every 6 hours PRN Moderate Pain (4 - 6)  oxyCODONE    IR 10 milliGRAM(s) Oral every 6 hours PRN Severe Pain (7 - 10)  senna 2 Tablet(s) Oral at bedtime PRN Constipation  SUMAtriptan 25 milliGRAM(s) Oral four times a day PRN Migraine      Vital Signs Last 24 Hrs  T(C): 36.8 (22 Oct 2020 21:03), Max: 36.8 (22 Oct 2020 18:16)  T(F): 98.3 (22 Oct 2020 21:03), Max: 98.3 (22 Oct 2020 21:03)  HR: 90 (23 Oct 2020 05:30) (87 - 99)  BP: 135/73 (23 Oct 2020 05:30) (125/62 - 135/73)  BP(mean): --  RR: 16 (22 Oct 2020 21:03) (16 - 16)  SpO2: 96% (22 Oct 2020 21:03) (96% - 96%)  CAPILLARY BLOOD GLUCOSE      POCT Blood Glucose.: 107 mg/dL (23 Oct 2020 07:43)    I&O's Summary    22 Oct 2020 07:01  -  23 Oct 2020 07:00  --------------------------------------------------------  IN: 0 mL / OUT: 4 mL / NET: -4 mL        PHYSICAL EXAM:  GENERAL: NAD, obese female  CHEST/LUNG: Clear to auscultation bilaterally; No wheeze  HEART: Regular rate and rhythm; No murmurs, rubs, or gallops  ABDOMEN: Soft, Nontender, Nondistended; Bowel sounds present  EXTREMITIES:  2+ Peripheral Pulses, No clubbing, cyanosis, or edema  NEUROLOGY: AAOx3, b/l le numbness  PSYCH: calm    LABS:                        11.2   5.70  )-----------( 411      ( 22 Oct 2020 05:30 )             35.1     10-22    141  |  104  |  18  ----------------------------<  112<H>  4.1   |  29  |  0.70    Ca    9.3      22 Oct 2020 05:30                RADIOLOGY & ADDITIONAL TESTS:  Results Reviewed:   Imaging Personally Reviewed:  Electrocardiogram Personally Reviewed:    COORDINATION OF CARE:  Care Discussed with Consultants/Other Providers [Y/N]:  Prior or Outpatient Records Reviewed [Y/N]:

## 2020-10-23 NOTE — PROGRESS NOTE ADULT - SUBJECTIVE AND OBJECTIVE BOX
60 year old female with PMHx HTN, hyperlipidemia, osteoarthritis, anxiety/depression disorders, GERD, eczema, sinusitis, scoliosis/spinal stenosis- lumbar region, presented to [facility] 9/29/20 for scheduled stage 1, L1-5 lumbar lateral interbody fusion with Dr. Valdez. Stage 2 T9-pelvis arthrodesis, L5-S1 TLIF done on 9/30/20 with closure of muscle flap done by plastics, Dr. Alegre. Admitted to NSICU post operatively due to hemorrhagic shock requiring fluid resuscitation with IV hydration and pressors. EBL 1500 cc, s/p 2 u prbc. Patient was weaned off IV pressors 10/1/20 and started on midodrine. Transferred to the floor when stable. Surgical drains removed on 10/7/20. Plastic surgery following for incision management. Aquacel dressing changed on 10/3 and 10/8. Hgb remains stable. Pt currently off antihypertensives, now on midodrine- wean to off as indicated.     Patient is medically and neurologically stable for discharge to [place] for multidisciplinary acute rehab 10/10/20.  Patient arrived hemodynamically stable condition. Seen at bedside with her  present. She reports bilateral shoulder pain due to history of OA (had injections prior to being admitted for spine surgery).  Currently reports 7/10 with movement.  She endorses 2-3 episodes of diarrhea the last two days. Denies abdominal pain, fevers, chills.       ROS - patient states bowel and bladder control is better  no further urinary incontinence  less numbness Right LE and Left foot  increased motor function Left foot but still weak  loss of sensation lower abdomen since OR  CO Pulled muscle on Right side ribs  no N,V  No dizziness, no headaches  no Chest pain, no SOB  spoke with NS- no celebrex X 6-12 months        MEDICATIONS  (STANDING):  amLODIPine   Tablet 5 milliGRAM(s) Oral daily  atorvastatin 10 milliGRAM(s) Oral at bedtime  cholecalciferol 1000 Unit(s) Oral daily  dextrose 5%. 1000 milliLiter(s) (50 mL/Hr) IV Continuous <Continuous>  dextrose 50% Injectable 12.5 Gram(s) IV Push once  dextrose 50% Injectable 25 Gram(s) IV Push once  dextrose 50% Injectable 25 Gram(s) IV Push once  DULoxetine 120 milliGRAM(s) Oral at bedtime  enoxaparin Injectable 40 milliGRAM(s) SubCutaneous <User Schedule>  fenofibrate Tablet 145 milliGRAM(s) Oral daily  hydrOXYzine hydrochloride 50 milliGRAM(s) Oral at bedtime  lidocaine   Patch 1 Patch Transdermal every 24 hours  magnesium oxide 400 milliGRAM(s) Oral daily  metoprolol tartrate 25 milliGRAM(s) Oral two times a day  multivitamin 1 Tablet(s) Oral daily  oxyCODONE    IR 10 milliGRAM(s) Oral <User Schedule>  pantoprazole    Tablet 40 milliGRAM(s) Oral before breakfast  polyethylene glycol 3350 17 Gram(s) Oral daily  saccharomyces boulardii 250 milliGRAM(s) Oral two times a day    MEDICATIONS  (PRN):  aluminum hydroxide/magnesium hydroxide/simethicone Suspension 30 milliLiter(s) Oral every 4 hours PRN Dyspepsia  cyclobenzaprine 10 milliGRAM(s) Oral three times a day PRN Muscle Spasm  dextrose 40% Gel 15 Gram(s) Oral once PRN Blood Glucose LESS THAN 70 milliGRAM(s)/deciliter  diphenhydrAMINE 25 milliGRAM(s) Oral every 4 hours PRN Rash and/or Itching  glucagon  Injectable 1 milliGRAM(s) IntraMuscular once PRN Glucose LESS THAN 70 milligrams/deciliter  oxyCODONE    IR 5 milliGRAM(s) Oral every 6 hours PRN Moderate Pain (4 - 6)  oxyCODONE    IR 10 milliGRAM(s) Oral every 6 hours PRN Severe Pain (7 - 10)  senna 2 Tablet(s) Oral at bedtime PRN Constipation  SUMAtriptan 25 milliGRAM(s) Oral four times a day PRN Migraine                            11.2   5.70  )-----------( 411      ( 22 Oct 2020 05:30 )             35.1     10-22    141  |  104  |  18  ----------------------------<  112<H>  4.1   |  29  |  0.70    Ca    9.3      22 Oct 2020 05:30          CAPILLARY BLOOD GLUCOSE      POCT Blood Glucose.: 107 mg/dL (23 Oct 2020 07:43)      Vital Signs Last 24 Hrs  T(C): 37.1 (23 Oct 2020 09:39), Max: 37.1 (23 Oct 2020 09:39)  T(F): 98.7 (23 Oct 2020 09:39), Max: 98.7 (23 Oct 2020 09:39)  HR: 76 (23 Oct 2020 09:39) (76 - 99)  BP: 122/71 (23 Oct 2020 09:39) (122/71 - 135/73)  BP(mean): --  RR: 16 (23 Oct 2020 09:39) (16 - 16)  SpO2: 97% (23 Oct 2020 09:39) (96% - 97%)        Constitutional - NAD, Comfortable, lying in bed  HEENT - NCAT, EOMI  Chest - Breathing comfortably  Cardiovascular - RRR  Abdomen - Soft, mildly distended, hyperactive bowel sounds  Extremities - No C/C/E, No calf tenderness  Right ribs Sl tender to palpation  Neurologic Exam -                    Cognitive - Awake, Alert, AAO to self, place, date, year, situation     Communication - Fluent, No dysarthria     Cranial Nerves - no facial asymmetry, shoulder shrug intact,      Motor -                     LEFT    UE - ShAB 5/5, EF 5/5, EE 5/5, WE 5/5,  5/5                    RIGHT UE - ShAB 5/5, EF 5/5, EE 5/5, WE 5/5,  5/5                    LEFT    LE - HF 4/5, KE 4/5, DF 3+/5, PF 5/5 EHL 5/5                    RIGHT LE - HF 3/5, KE 4/5, DF 4/5, PF 5/5        Sensory - decreased to light touch medial leg on RIGHT and dorsum of foot on LEFT but better     Reflexes - DTR Intact, No primitive reflexes     Psychiatric - Mood stable, Affect WNL     Skin:         thoraco-lumbar surgical incision intact- sutures out         Left flank incision with staples dcd         Healed drain incisions lower lumbar          No pressure injuries noted on sacrum, coccyx, or heels      IDT meeting 10/21    OT: independent eating   set up for grooming   mod I UBD   LBD CG   toileting CG to min for balance   toilet transfers CG   shower transfers min A   Goals mod I for ADLs except supervision for showering/bathing    PT: 120ft yesterday, 60ft today (self limiting)   stairs with min A, patricia on stairs (right knee)    Tentative dc to home with  and home services on 10/29     Family education next week.

## 2020-10-24 LAB — GLUCOSE BLDC GLUCOMTR-MCNC: 132 MG/DL — HIGH (ref 70–99)

## 2020-10-24 PROCEDURE — 99232 SBSQ HOSP IP/OBS MODERATE 35: CPT

## 2020-10-24 RX ORDER — OXYCODONE HYDROCHLORIDE 5 MG/1
5 TABLET ORAL EVERY 6 HOURS
Refills: 0 | Status: DISCONTINUED | OUTPATIENT
Start: 2020-10-24 | End: 2020-10-28

## 2020-10-24 RX ORDER — OXYCODONE HYDROCHLORIDE 5 MG/1
10 TABLET ORAL EVERY 6 HOURS
Refills: 0 | Status: DISCONTINUED | OUTPATIENT
Start: 2020-10-24 | End: 2020-10-28

## 2020-10-24 RX ADMIN — LIDOCAINE 1 PATCH: 4 CREAM TOPICAL at 00:52

## 2020-10-24 RX ADMIN — Medication 1000 UNIT(S): at 11:51

## 2020-10-24 RX ADMIN — AMLODIPINE BESYLATE 5 MILLIGRAM(S): 2.5 TABLET ORAL at 05:41

## 2020-10-24 RX ADMIN — Medication 50 MILLIGRAM(S): at 21:59

## 2020-10-24 RX ADMIN — Medication 25 MILLIGRAM(S): at 18:12

## 2020-10-24 RX ADMIN — LIDOCAINE 1 PATCH: 4 CREAM TOPICAL at 19:56

## 2020-10-24 RX ADMIN — ATORVASTATIN CALCIUM 10 MILLIGRAM(S): 80 TABLET, FILM COATED ORAL at 21:59

## 2020-10-24 RX ADMIN — Medication 25 MILLIGRAM(S): at 05:41

## 2020-10-24 RX ADMIN — Medication 250 MILLIGRAM(S): at 18:12

## 2020-10-24 RX ADMIN — PANTOPRAZOLE SODIUM 40 MILLIGRAM(S): 20 TABLET, DELAYED RELEASE ORAL at 05:41

## 2020-10-24 RX ADMIN — ENOXAPARIN SODIUM 40 MILLIGRAM(S): 100 INJECTION SUBCUTANEOUS at 21:59

## 2020-10-24 RX ADMIN — DULOXETINE HYDROCHLORIDE 120 MILLIGRAM(S): 30 CAPSULE, DELAYED RELEASE ORAL at 21:59

## 2020-10-24 RX ADMIN — Medication 145 MILLIGRAM(S): at 11:51

## 2020-10-24 RX ADMIN — LIDOCAINE 1 PATCH: 4 CREAM TOPICAL at 11:50

## 2020-10-24 RX ADMIN — OXYCODONE HYDROCHLORIDE 5 MILLIGRAM(S): 5 TABLET ORAL at 08:15

## 2020-10-24 RX ADMIN — OXYCODONE HYDROCHLORIDE 5 MILLIGRAM(S): 5 TABLET ORAL at 09:15

## 2020-10-24 RX ADMIN — MAGNESIUM OXIDE 400 MG ORAL TABLET 400 MILLIGRAM(S): 241.3 TABLET ORAL at 11:51

## 2020-10-24 RX ADMIN — OXYCODONE HYDROCHLORIDE 5 MILLIGRAM(S): 5 TABLET ORAL at 15:54

## 2020-10-24 RX ADMIN — Medication 1 TABLET(S): at 11:51

## 2020-10-24 RX ADMIN — Medication 250 MILLIGRAM(S): at 05:41

## 2020-10-24 RX ADMIN — POLYETHYLENE GLYCOL 3350 17 GRAM(S): 17 POWDER, FOR SOLUTION ORAL at 11:51

## 2020-10-24 NOTE — PROGRESS NOTE ADULT - ASSESSMENT
CRISTEL WATTS is a HTN, hyperlipidemia, osteoarthritis, anxiety/depression disorders, GERD, eczema, sinusitis, scoliosis/spinal stenosis- lumbar region, presented to [facility] 9/29/20 for scheduled stage 1, L1-5 lumbar lateral interbody fusion with Dr. Valdez. Stage 2 T9-pelvis arthrodesis, L5-S1 TLIF done on 9/30/20 with closure of muscle flap done by plastics, Dr. Alegre. Hospital course complicated by hemorrhagic shock postop requiring pressors and blood transfusions, now stable on Midodrine. Now admitted to [facility] after for initiation of a multidisciplinary rehab program consisting focused on functional mobility, transfers and ADLs (activities of daily living).    #Elective L1-5 lumbar fusion, T9-pelvis arthrodesis, L5-S1 TLIF 9/29-9/30  - comprehensive multidisciplinary rehab program, PT/OT 3 hours a day, 5 days a week.  - P&O as needed  - Wound Care:  Staples/sutures dcd   dressing Dcd -Open to air     Patient may shower  - no celebrex per NS  - F/u with neurosurgery, Dr. Valdez, outpatient  - F/u with plastics in 1-2 weeks with Dr. Alegre in clinic  - No weight bearing restrictions  - Precautions: falls, spine, neuro  finishing steroids  Xray TL spine as per NS- reviewed with hardware intact  lidoderm to Right ribs        #Hypotension postop - improved  -Will Dc midodrine- SYST BPs 130-140  -Norvasc 5mg daily restarted by hospitalist, lopressor 25mg Q12h added,Edarbi 40mg QHSstill on hold  - BP well controlled    #Pre-DM  - HbA1c 6.4 9/29/20  - FS  10/9  change fingersticks to fasting  - Monitor FS.    #Depression/Anxiety:  - Continue Cymbalta 60mg BID  neuropsychology consult noted    #Migraines  - Sumatriptan 25mg Q4hr PRN    # UTI  urineC&S proteus 10-49k  finished antibiotics  clinically asymptomatic    #Allergies  - Continue Benadryl 25mg Q4hr PRN    Pain Management:  - Flexeril 10mg TID PRN  timed oxycodone 10mg with breakfast and lunch daily before Rehab    GI/Bowel:  - At risk for constipation due to neurologic diagnosis, immobility and/or medication use  - daily miralax restarted at patient request  - GI ppx: Protonix daily, Maalox PRN    /Bladder:   - At risk for incontinence and retention due to neurologic diagnosis and limited mobility  - Currently patient voids: independent  - Encourage timed voids every 4 hours while awake for independence and to promote continence during therapy.    Skin/Pressure Injury:   - Skin assessment on admission: no pressure injuries noted   - Monitor Incisions: Thoraco-lumbar incision and left flank incision - intact  - Turn every 2 hours while in bed, air mattress  - Soft heel protectors  - Skin barrier cream as needed  - Nursing to monitor skin Qshift    Diet:  - Diet Consistency/Modifications: regular/DASH    DVT ppx:  - Lovenox  - SCDs

## 2020-10-24 NOTE — PROGRESS NOTE ADULT - SUBJECTIVE AND OBJECTIVE BOX
Patient is a 60y old  Female who presents with a chief complaint of Functional deficits due to L1-L5 lumbar fusion, T9-pelvis arthrodesis, L5-S1 TLIF  Other Neurologic      No overnight events noted.  Patient denies acute/new symptoms.  Back pain is improving ( on flexeril, oxycodone and lidoderm patch).  Patient seen and examined at bedside.    ALLERGIES:  penicillin (Other)    MEDICATIONS  (STANDING):  amLODIPine   Tablet 5 milliGRAM(s) Oral daily  atorvastatin 10 milliGRAM(s) Oral at bedtime  cholecalciferol 1000 Unit(s) Oral daily  dextrose 5%. 1000 milliLiter(s) (50 mL/Hr) IV Continuous <Continuous>  dextrose 50% Injectable 12.5 Gram(s) IV Push once  dextrose 50% Injectable 25 Gram(s) IV Push once  dextrose 50% Injectable 25 Gram(s) IV Push once  DULoxetine 120 milliGRAM(s) Oral at bedtime  enoxaparin Injectable 40 milliGRAM(s) SubCutaneous <User Schedule>  fenofibrate Tablet 145 milliGRAM(s) Oral daily  hydrOXYzine hydrochloride 50 milliGRAM(s) Oral at bedtime  lidocaine   Patch 1 Patch Transdermal every 24 hours  magnesium oxide 400 milliGRAM(s) Oral daily  metoprolol tartrate 25 milliGRAM(s) Oral two times a day  multivitamin 1 Tablet(s) Oral daily  oxyCODONE    IR 10 milliGRAM(s) Oral <User Schedule>  pantoprazole    Tablet 40 milliGRAM(s) Oral before breakfast  polyethylene glycol 3350 17 Gram(s) Oral daily  saccharomyces boulardii 250 milliGRAM(s) Oral two times a day    MEDICATIONS  (PRN):  aluminum hydroxide/magnesium hydroxide/simethicone Suspension 30 milliLiter(s) Oral every 4 hours PRN Dyspepsia  cyclobenzaprine 10 milliGRAM(s) Oral three times a day PRN Muscle Spasm  dextrose 40% Gel 15 Gram(s) Oral once PRN Blood Glucose LESS THAN 70 milliGRAM(s)/deciliter  diphenhydrAMINE 25 milliGRAM(s) Oral every 4 hours PRN Rash and/or Itching  glucagon  Injectable 1 milliGRAM(s) IntraMuscular once PRN Glucose LESS THAN 70 milligrams/deciliter  oxyCODONE    IR 5 milliGRAM(s) Oral every 6 hours PRN Moderate Pain (4 - 6)  oxyCODONE    IR 10 milliGRAM(s) Oral every 6 hours PRN Severe Pain (7 - 10)  senna 2 Tablet(s) Oral at bedtime PRN Constipation  SUMAtriptan 25 milliGRAM(s) Oral four times a day PRN Migraine    Vital Signs Last 24 Hrs  T(F): 98.9 (23 Oct 2020 20:25), Max: 98.9 (23 Oct 2020 20:25)  HR: 89 (24 Oct 2020 05:38) (76 - 91)  BP: 136/75 (24 Oct 2020 05:38) (122/71 - 146/79)  RR: 17 (23 Oct 2020 20:25) (16 - 17)  SpO2: 98% (23 Oct 2020 20:25) (97% - 98%)    PHYSICAL EXAM:  GENERAL: NAD, obese female  CHEST/LUNG: Clear to auscultation bilaterally; No wheeze  HEART: Regular rate and rhythm; No murmurs, rubs, or gallops  ABDOMEN: Soft, Nontender, Nondistended; Bowel sounds present  EXTREMITIES:  2+ Peripheral Pulses, No clubbing, cyanosis, or edema  NEUROLOGY: AAOx3, b/l le numbness  PSYCH: calm    LABS:                        11.2   5.70  )-----------( 411      ( 22 Oct 2020 05:30 )             35.1       10-22    141  |  104  |  18  ----------------------------<  112  4.1   |  29  |  0.70    Ca    9.3      22 Oct 2020 05:30       eGFR if African American: 109 mL/min/1.73M2 (10-22-20 @ 05:30)  eGFR if Non African American: 94 mL/min/1.73M2 (10-22-20 @ 05:30)    POCT Blood Glucose.: 132 mg/dL (24 Oct 2020 07:53)

## 2020-10-24 NOTE — PROGRESS NOTE ADULT - SUBJECTIVE AND OBJECTIVE BOX
60 year old female with PMHx HTN, hyperlipidemia, osteoarthritis, anxiety/depression disorders, GERD, eczema, sinusitis, scoliosis/spinal stenosis- lumbar region, presented to [facility] 9/29/20 for scheduled stage 1, L1-5 lumbar lateral interbody fusion with Dr. Valdez. Stage 2 T9-pelvis arthrodesis, L5-S1 TLIF done on 9/30/20 with closure of muscle flap done by plastics, Dr. Alegre. Admitted to NSICU post operatively due to hemorrhagic shock requiring fluid resuscitation with IV hydration and pressors. EBL 1500 cc, s/p 2 u prbc. Patient was weaned off IV pressors 10/1/20 and started on midodrine. Transferred to the floor when stable. Surgical drains removed on 10/7/20. Plastic surgery following for incision management. Aquacel dressing changed on 10/3 and 10/8. Hgb remains stable. Pt currently off antihypertensives, now on midodrine- wean to off as indicated.     Patient is medically and neurologically stable for discharge to [place] for multidisciplinary acute rehab 10/10/20.  Patient arrived hemodynamically stable condition. Seen at bedside with her  present. She reports bilateral shoulder pain due to history of OA (had injections prior to being admitted for spine surgery).  Currently reports 7/10 with movement.  She endorses 2-3 episodes of diarrhea the last two days. Denies abdominal pain, fevers, chills.       SUB: Patient seen and evaluated this morning. No acute events overnight. Participating well in therapy. Pain is well controlled. Denies chest pain, SOB, nausea, vomiting, constipation or diarrhea. Slept well last night.         MEDICATIONS  (STANDING):  amLODIPine   Tablet 5 milliGRAM(s) Oral daily  atorvastatin 10 milliGRAM(s) Oral at bedtime  cholecalciferol 1000 Unit(s) Oral daily  dextrose 5%. 1000 milliLiter(s) (50 mL/Hr) IV Continuous <Continuous>  dextrose 50% Injectable 12.5 Gram(s) IV Push once  dextrose 50% Injectable 25 Gram(s) IV Push once  dextrose 50% Injectable 25 Gram(s) IV Push once  DULoxetine 120 milliGRAM(s) Oral at bedtime  enoxaparin Injectable 40 milliGRAM(s) SubCutaneous <User Schedule>  fenofibrate Tablet 145 milliGRAM(s) Oral daily  hydrOXYzine hydrochloride 50 milliGRAM(s) Oral at bedtime  lidocaine   Patch 1 Patch Transdermal every 24 hours  magnesium oxide 400 milliGRAM(s) Oral daily  metoprolol tartrate 25 milliGRAM(s) Oral two times a day  multivitamin 1 Tablet(s) Oral daily  oxyCODONE    IR 10 milliGRAM(s) Oral <User Schedule>  pantoprazole    Tablet 40 milliGRAM(s) Oral before breakfast  polyethylene glycol 3350 17 Gram(s) Oral daily  saccharomyces boulardii 250 milliGRAM(s) Oral two times a day    MEDICATIONS  (PRN):  aluminum hydroxide/magnesium hydroxide/simethicone Suspension 30 milliLiter(s) Oral every 4 hours PRN Dyspepsia  cyclobenzaprine 10 milliGRAM(s) Oral three times a day PRN Muscle Spasm  dextrose 40% Gel 15 Gram(s) Oral once PRN Blood Glucose LESS THAN 70 milliGRAM(s)/deciliter  diphenhydrAMINE 25 milliGRAM(s) Oral every 4 hours PRN Rash and/or Itching  glucagon  Injectable 1 milliGRAM(s) IntraMuscular once PRN Glucose LESS THAN 70 milligrams/deciliter  oxyCODONE    IR 5 milliGRAM(s) Oral every 6 hours PRN Moderate Pain (4 - 6)  oxyCODONE    IR 10 milliGRAM(s) Oral every 6 hours PRN Severe Pain (7 - 10)  senna 2 Tablet(s) Oral at bedtime PRN Constipation  SUMAtriptan 25 milliGRAM(s) Oral four times a day PRN Migraine                            11.2   5.70  )-----------( 411      ( 22 Oct 2020 05:30 )             35.1     10-22    141  |  104  |  18  ----------------------------<  112<H>  4.1   |  29  |  0.70    Ca    9.3      22 Oct 2020 05:30          CAPILLARY BLOOD GLUCOSE      POCT Blood Glucose.: 107 mg/dL (23 Oct 2020 07:43)       Vital Signs Last 24 Hrs  T(C): 37.2 (24 Oct 2020 09:08), Max: 37.2 (23 Oct 2020 20:25)  T(F): 98.9 (24 Oct 2020 09:08), Max: 98.9 (23 Oct 2020 20:25)  HR: 112 (24 Oct 2020 18:09) (75 - 112)  BP: 146/74 (24 Oct 2020 18:09) (129/84 - 146/74)  BP(mean): --  RR: 16 (24 Oct 2020 09:08) (16 - 17)  SpO2: 98% (24 Oct 2020 09:08) (98% - 98%)        Constitutional - NAD, Comfortable, lying in bed  HEENT - NCAT, EOMI  Chest - Breathing comfortably  Cardiovascular - RRR  Abdomen - Soft, mildly distended, hyperactive bowel sounds  Extremities - No C/C/E, No calf tenderness  Right ribs Sl tender to palpation  Neurologic Exam -                    Cognitive - Awake, Alert, AAO to self, place, date, year, situation     Communication - Fluent, No dysarthria     Cranial Nerves - no facial asymmetry, shoulder shrug intact,      Motor -                     LEFT    UE - ShAB 5/5, EF 5/5, EE 5/5, WE 5/5,  5/5                    RIGHT UE - ShAB 5/5, EF 5/5, EE 5/5, WE 5/5,  5/5                    LEFT    LE - HF 4/5, KE 4/5, DF 3+/5, PF 5/5 EHL 5/5                    RIGHT LE - HF 3/5, KE 4/5, DF 4/5, PF 5/5        Sensory - decreased to light touch medial leg on RIGHT and dorsum of foot on LEFT but better     Reflexes - DTR Intact, No primitive reflexes     Psychiatric - Mood stable, Affect WNL     Skin:         thoraco-lumbar surgical incision intact- sutures out         Left flank incision with staples dcd         Healed drain incisions lower lumbar          No pressure injuries noted on sacrum, coccyx, or heels      IDT meeting 10/21    OT: independent eating   set up for grooming   mod I UBD   LBD CG   toileting CG to min for balance   toilet transfers CG   shower transfers min A   Goals mod I for ADLs except supervision for showering/bathing    PT: 120ft yesterday, 60ft today (self limiting)   stairs with min A, patricia on stairs (right knee)    Tentative dc to home with  and home services on 10/29     Family education next week.

## 2020-10-24 NOTE — PROGRESS NOTE ADULT - ASSESSMENT
59 y/o F with PMHx HTN, HLD, osteoarthritis, anxiety/depression disorders, GERD, eczema, sinusitis, scoliosis/spinal stenosis- lumbar region, presented to [facility] 9/29/20 for scheduled stage 1, L1-5 lumbar lateral interbody fusion with Dr. Valdez. Stage 2 T9-pelvis arthrodesis, L5-S1 TLIF done on 9/30/20 with closure of muscle flap done by plastics, Dr. Alegre. Post-op course complicated by hemorrhagic shock requiring transfer to ICU, fluid resuscitation with IV hydration and pressors. EBL 1500 cc, s/p 2 u prbc. Patient was weaned off IV pressors 10/1/20 and started on midodrine. Surgical drains removed on 10/7/20. Plastic surgery following for incision management recommended Aquacel dressing changed on 10/3 and 10/8. Patient was transferred to [facility] on 10/10/20 for comprehensive rehab.     #S/p L1-5 lumbar fusion, T9-pelvis arthrodesis, L5-S1 TLIF (9/30/20)  -comprehensive rehab per primary team  -Pain management and wound care per primary team    #Fall  -Mechanical fall  -No head injury or LOC  -No concern for any acute fractures  -Xray of spine and right knee negative for acute pathology  -Fall precautions    #UTI  -Completed 4 day course of Keflex    #Post-op hypotension  #HTN  -Hypotension resolved, now off midodrine   -Continue Amlodipine  -Continue Metoprolol tart 12.5mg BID  -Home meds Norvasc 5mg daily, Bystolic 10mg QAM, Edarbi 40mg QHS    #Pre-DM, a1c 6.4  -Consistent Carbohydrate Diet  -Fingersticks well controlled    #HLD  -Cont fenofibrate/Statin    #Postop anemia  -H/H stable  -transfuse for Hb < 7.0  -Hold ASA, NSAIDs    #Leukocytosis  -Resolved    #Depression, Anxiety  -Mood stable   -Cont cymbalta   -Neuropsych consult reviewed    #Migraines  -Cont sumatriptan     #DVT ppx - Lovenox

## 2020-10-25 LAB — GLUCOSE BLDC GLUCOMTR-MCNC: 109 MG/DL — HIGH (ref 70–99)

## 2020-10-25 PROCEDURE — 99232 SBSQ HOSP IP/OBS MODERATE 35: CPT

## 2020-10-25 RX ADMIN — Medication 250 MILLIGRAM(S): at 17:43

## 2020-10-25 RX ADMIN — Medication 145 MILLIGRAM(S): at 12:08

## 2020-10-25 RX ADMIN — Medication 1000 UNIT(S): at 12:08

## 2020-10-25 RX ADMIN — PANTOPRAZOLE SODIUM 40 MILLIGRAM(S): 20 TABLET, DELAYED RELEASE ORAL at 06:00

## 2020-10-25 RX ADMIN — Medication 50 MILLIGRAM(S): at 21:40

## 2020-10-25 RX ADMIN — OXYCODONE HYDROCHLORIDE 10 MILLIGRAM(S): 5 TABLET ORAL at 09:47

## 2020-10-25 RX ADMIN — MAGNESIUM OXIDE 400 MG ORAL TABLET 400 MILLIGRAM(S): 241.3 TABLET ORAL at 12:08

## 2020-10-25 RX ADMIN — Medication 250 MILLIGRAM(S): at 06:00

## 2020-10-25 RX ADMIN — DULOXETINE HYDROCHLORIDE 120 MILLIGRAM(S): 30 CAPSULE, DELAYED RELEASE ORAL at 21:40

## 2020-10-25 RX ADMIN — ENOXAPARIN SODIUM 40 MILLIGRAM(S): 100 INJECTION SUBCUTANEOUS at 21:40

## 2020-10-25 RX ADMIN — Medication 1 TABLET(S): at 12:09

## 2020-10-25 RX ADMIN — OXYCODONE HYDROCHLORIDE 10 MILLIGRAM(S): 5 TABLET ORAL at 08:17

## 2020-10-25 RX ADMIN — OXYCODONE HYDROCHLORIDE 5 MILLIGRAM(S): 5 TABLET ORAL at 20:32

## 2020-10-25 RX ADMIN — POLYETHYLENE GLYCOL 3350 17 GRAM(S): 17 POWDER, FOR SOLUTION ORAL at 12:09

## 2020-10-25 RX ADMIN — Medication 25 MILLIGRAM(S): at 17:43

## 2020-10-25 RX ADMIN — LIDOCAINE 1 PATCH: 4 CREAM TOPICAL at 19:51

## 2020-10-25 RX ADMIN — LIDOCAINE 1 PATCH: 4 CREAM TOPICAL at 00:42

## 2020-10-25 RX ADMIN — Medication 25 MILLIGRAM(S): at 06:00

## 2020-10-25 RX ADMIN — LIDOCAINE 1 PATCH: 4 CREAM TOPICAL at 12:07

## 2020-10-25 RX ADMIN — ATORVASTATIN CALCIUM 10 MILLIGRAM(S): 80 TABLET, FILM COATED ORAL at 21:40

## 2020-10-25 RX ADMIN — AMLODIPINE BESYLATE 5 MILLIGRAM(S): 2.5 TABLET ORAL at 06:01

## 2020-10-25 RX ADMIN — OXYCODONE HYDROCHLORIDE 5 MILLIGRAM(S): 5 TABLET ORAL at 19:48

## 2020-10-25 NOTE — PROVIDER CONTACT NOTE (FALL NOTIFICATION) - ASSESSMENT
pt was able to go back to wheel chair. put back to bed. even though pt stated not hitting anything but knee, skin check done. noted left knee redness and abrasion. no c/o pain. v/s /77, Temp. 98.8F, HR 87, O2 Sat. 98% room air, RR 15. no apparent distress noted. pt was able to go back to wheel chair. put back to bed. even though pt stated not hitting anything but knee, skin check done. noted right knee redness and abrasion. no c/o pain. v/s /77, Temp. 98.8F, HR 87, O2 Sat. 98% room air, RR 15. no apparent distress noted.

## 2020-10-25 NOTE — PROGRESS NOTE ADULT - SUBJECTIVE AND OBJECTIVE BOX
60 year old female with PMHx HTN, hyperlipidemia, osteoarthritis, anxiety/depression disorders, GERD, eczema, sinusitis, scoliosis/spinal stenosis- lumbar region, presented to [facility] 9/29/20 for scheduled stage 1, L1-5 lumbar lateral interbody fusion with Dr. Valdez. Stage 2 T9-pelvis arthrodesis, L5-S1 TLIF done on 9/30/20 with closure of muscle flap done by plastics, Dr. Alegre. Admitted to NSICU post operatively due to hemorrhagic shock requiring fluid resuscitation with IV hydration and pressors. EBL 1500 cc, s/p 2 u prbc. Patient was weaned off IV pressors 10/1/20 and started on midodrine. Transferred to the floor when stable. Surgical drains removed on 10/7/20. Plastic surgery following for incision management. Aquacel dressing changed on 10/3 and 10/8. Hgb remains stable. Pt currently off antihypertensives, now on midodrine- wean to off as indicated.     Patient is medically and neurologically stable for discharge to [facility] for multidisciplinary acute rehab 10/10/20.  Patient arrived hemodynamically stable condition. Seen at bedside with her  present. She reports bilateral shoulder pain due to history of OA (had injections prior to being admitted for spine surgery).  Currently reports 7/10 with movement.  She endorses 2-3 episodes of diarrhea the last two days. Denies abdominal pain, fevers, chills.       SUB: Patient seen and evaluated this morning. No acute events overnight. Participating well in therapy. Pain is well controlled. Denies chest pain, SOB, nausea, vomiting, constipation or diarrhea. Slept well last night.         MEDICATIONS  (STANDING):  amLODIPine   Tablet 5 milliGRAM(s) Oral daily  atorvastatin 10 milliGRAM(s) Oral at bedtime  cholecalciferol 1000 Unit(s) Oral daily  dextrose 5%. 1000 milliLiter(s) (50 mL/Hr) IV Continuous <Continuous>  dextrose 50% Injectable 12.5 Gram(s) IV Push once  dextrose 50% Injectable 25 Gram(s) IV Push once  dextrose 50% Injectable 25 Gram(s) IV Push once  DULoxetine 120 milliGRAM(s) Oral at bedtime  enoxaparin Injectable 40 milliGRAM(s) SubCutaneous <User Schedule>  fenofibrate Tablet 145 milliGRAM(s) Oral daily  hydrOXYzine hydrochloride 50 milliGRAM(s) Oral at bedtime  lidocaine   Patch 1 Patch Transdermal every 24 hours  magnesium oxide 400 milliGRAM(s) Oral daily  metoprolol tartrate 25 milliGRAM(s) Oral two times a day  multivitamin 1 Tablet(s) Oral daily  oxyCODONE    IR 10 milliGRAM(s) Oral <User Schedule>  pantoprazole    Tablet 40 milliGRAM(s) Oral before breakfast  polyethylene glycol 3350 17 Gram(s) Oral daily  saccharomyces boulardii 250 milliGRAM(s) Oral two times a day    MEDICATIONS  (PRN):  aluminum hydroxide/magnesium hydroxide/simethicone Suspension 30 milliLiter(s) Oral every 4 hours PRN Dyspepsia  cyclobenzaprine 10 milliGRAM(s) Oral three times a day PRN Muscle Spasm  dextrose 40% Gel 15 Gram(s) Oral once PRN Blood Glucose LESS THAN 70 milliGRAM(s)/deciliter  diphenhydrAMINE 25 milliGRAM(s) Oral every 4 hours PRN Rash and/or Itching  glucagon  Injectable 1 milliGRAM(s) IntraMuscular once PRN Glucose LESS THAN 70 milligrams/deciliter  oxyCODONE    IR 5 milliGRAM(s) Oral every 6 hours PRN Moderate Pain (4 - 6)  oxyCODONE    IR 10 milliGRAM(s) Oral every 6 hours PRN Severe Pain (7 - 10)  senna 2 Tablet(s) Oral at bedtime PRN Constipation  SUMAtriptan 25 milliGRAM(s) Oral four times a day PRN Migraine                            11.2   5.70  )-----------( 411      ( 22 Oct 2020 05:30 )             35.1     10-22    141  |  104  |  18  ----------------------------<  112<H>  4.1   |  29  |  0.70    Ca    9.3      22 Oct 2020 05:30          CAPILLARY BLOOD GLUCOSE      POCT Blood Glucose.: 107 mg/dL (23 Oct 2020 07:43)     Vital Signs Last 24 Hrs  T(C): 37.1 (25 Oct 2020 08:48), Max: 37.2 (25 Oct 2020 08:05)  T(F): 98.8 (25 Oct 2020 08:48), Max: 98.9 (25 Oct 2020 08:05)  HR: 87 (25 Oct 2020 08:48) (71 - 112)  BP: 130/77 (25 Oct 2020 08:48) (130/77 - 146/76)  BP(mean): --  RR: 15 (25 Oct 2020 08:48) (15 - 15)  SpO2: 98% (25 Oct 2020 08:48) (94% - 98%)        Constitutional - NAD, Comfortable, lying in bed  HEENT - NCAT, EOMI  Chest - Breathing comfortably  Cardiovascular - RRR  Abdomen - Soft, mildly distended, hyperactive bowel sounds  Extremities - No C/C/E, No calf tenderness  Right ribs Sl tender to palpation  Neurologic Exam -                    Cognitive - Awake, Alert, AAO to self, place, date, year, situation     Communication - Fluent, No dysarthria     Cranial Nerves - no facial asymmetry, shoulder shrug intact,      Motor -                     LEFT    UE - ShAB 5/5, EF 5/5, EE 5/5, WE 5/5,  5/5                    RIGHT UE - ShAB 5/5, EF 5/5, EE 5/5, WE 5/5,  5/5                    LEFT    LE - HF 4/5, KE 4/5, DF 3+/5, PF 5/5 EHL 5/5                    RIGHT LE - HF 3/5, KE 4/5, DF 4/5, PF 5/5        Sensory - decreased to light touch medial leg on RIGHT and dorsum of foot on LEFT but better     Reflexes - DTR Intact, No primitive reflexes     Psychiatric - Mood stable, Affect WNL     Skin:         thoraco-lumbar surgical incision intact- sutures out         Left flank incision with staples dcd         Healed drain incisions lower lumbar          No pressure injuries noted on sacrum, coccyx, or heels      IDT meeting 10/21    OT: independent eating   set up for grooming   mod I UBD   LBD CG   toileting CG to min for balance   toilet transfers CG   shower transfers min A   Goals mod I for ADLs except supervision for showering/bathing    PT: 120ft yesterday, 60ft today (self limiting)   stairs with min A, patricia on stairs (right knee)    Tentative dc to home with  and home services on 10/29     Family education next week.

## 2020-10-25 NOTE — PROVIDER CONTACT NOTE (FALL NOTIFICATION) - RECOMMENDATIONS
will address to PT might want a brace to left knee... will address to PT might want a brace to right knee...

## 2020-10-25 NOTE — CHART NOTE - NSCHARTNOTEFT_GEN_A_CORE
Resident notified that patient had a fall in the bathroom. Per nurse and patient, she was standing up after using the toilet with both hands on the rail. She took one hand off the rail to wipe and her right knee buckled. She was able to place her hand back on the rail, but her knee slid down the fall to the floor. The PCA witnessed the event and confirmed no headstrike. Pt had already notified  and declined for the resident to call.   On physical exam, there is a abrasion at the right anterior knee with no bleeding or bruising. Motor and sensory exams are stable. There is no tenderness to palpation at the site.    Will continue to monitor for swelling, bruising, and bleeding.    Fox Stroud MD  PGY-3

## 2020-10-25 NOTE — PROGRESS NOTE ADULT - ASSESSMENT
CRISTEL WATTS is a HTN, hyperlipidemia, osteoarthritis, anxiety/depression disorders, GERD, eczema, sinusitis, scoliosis/spinal stenosis- lumbar region, presented to [facility] 9/29/20 for scheduled stage 1, L1-5 lumbar lateral interbody fusion with Dr. Valdez. Stage 2 T9-pelvis arthrodesis, L5-S1 TLIF done on 9/30/20 with closure of muscle flap done by plastics, Dr. Alegre. Hospital course complicated by hemorrhagic shock postop requiring pressors and blood transfusions, now stable on Midodrine. Now admitted to [facility] after for initiation of a multidisciplinary rehab program consisting focused on functional mobility, transfers and ADLs (activities of daily living).    #Elective L1-5 lumbar fusion, T9-pelvis arthrodesis, L5-S1 TLIF 9/29-9/30  - comprehensive multidisciplinary rehab program, PT/OT 3 hours a day, 5 days a week.  - P&O as needed  - Wound Care:  Staples/sutures dcd   dressing Dcd -Open to air     Patient may shower  - no celebrex per NS  - F/u with neurosurgery, Dr. Valdez, outpatient  - F/u with plastics in 1-2 weeks with Dr. Alegre in clinic  - No weight bearing restrictions  - Precautions: falls, spine, neuro  finishing steroids  Xray TL spine as per NS- reviewed with hardware intact  lidoderm to Right ribs        #Hypotension postop - improved  -Will Dc midodrine- SYST BPs 130-140  -Norvasc 5mg daily restarted by hospitalist, lopressor 25mg Q12h added,Edarbi 40mg QHSstill on hold  - BP well controlled    #Pre-DM  - HbA1c 6.4 9/29/20  - FS  10/9  change fingersticks to fasting  - Monitor FS.    #Depression/Anxiety:  - Continue Cymbalta 60mg BID  neuropsychology consult noted    #Migraines  - Sumatriptan 25mg Q4hr PRN    # UTI  urineC&S proteus 10-49k  finished antibiotics  clinically asymptomatic    #Allergies  - Continue Benadryl 25mg Q4hr PRN    Pain Management:  - Flexeril 10mg TID PRN  timed oxycodone 10mg with breakfast and lunch daily before Rehab    GI/Bowel:  - At risk for constipation due to neurologic diagnosis, immobility and/or medication use  - daily miralax restarted at patient request  - GI ppx: Protonix daily, Maalox PRN    /Bladder:   - At risk for incontinence and retention due to neurologic diagnosis and limited mobility  - Currently patient voids: independent  - Encourage timed voids every 4 hours while awake for independence and to promote continence during therapy.    Skin/Pressure Injury:   - Skin assessment on admission: no pressure injuries noted   - Monitor Incisions: Thoraco-lumbar incision and left flank incision - intact  - Turn every 2 hours while in bed, air mattress  - Soft heel protectors  - Skin barrier cream as needed  - Nursing to monitor skin Qshift    Diet:  - Diet Consistency/Modifications: regular/DASH    DVT ppx:  - Lovenox  - SCDs

## 2020-10-25 NOTE — PROGRESS NOTE ADULT - SUBJECTIVE AND OBJECTIVE BOX
Patient is a 60y old  Female who presents with a chief complaint of functional deficits due to L1-L5 lumbar fusion, T9-pelvis arthrodesis, L5-S1 TLIF.      Patient tells me she fell earlier this morning.  Hurt her right knee, but feels better now.  Patient seen and examined at bedside.    ALLERGIES:  penicillin (Other)    MEDICATIONS  (STANDING):  amLODIPine   Tablet 5 milliGRAM(s) Oral daily  atorvastatin 10 milliGRAM(s) Oral at bedtime  cholecalciferol 1000 Unit(s) Oral daily  dextrose 5%. 1000 milliLiter(s) (50 mL/Hr) IV Continuous <Continuous>  dextrose 50% Injectable 12.5 Gram(s) IV Push once  dextrose 50% Injectable 25 Gram(s) IV Push once  dextrose 50% Injectable 25 Gram(s) IV Push once  DULoxetine 120 milliGRAM(s) Oral at bedtime  enoxaparin Injectable 40 milliGRAM(s) SubCutaneous <User Schedule>  fenofibrate Tablet 145 milliGRAM(s) Oral daily  hydrOXYzine hydrochloride 50 milliGRAM(s) Oral at bedtime  lidocaine   Patch 1 Patch Transdermal every 24 hours  magnesium oxide 400 milliGRAM(s) Oral daily  metoprolol tartrate 25 milliGRAM(s) Oral two times a day  multivitamin 1 Tablet(s) Oral daily  oxyCODONE    IR 10 milliGRAM(s) Oral <User Schedule>  pantoprazole    Tablet 40 milliGRAM(s) Oral before breakfast  polyethylene glycol 3350 17 Gram(s) Oral daily  saccharomyces boulardii 250 milliGRAM(s) Oral two times a day    MEDICATIONS  (PRN):  aluminum hydroxide/magnesium hydroxide/simethicone Suspension 30 milliLiter(s) Oral every 4 hours PRN Dyspepsia  cyclobenzaprine 10 milliGRAM(s) Oral three times a day PRN Muscle Spasm  dextrose 40% Gel 15 Gram(s) Oral once PRN Blood Glucose LESS THAN 70 milliGRAM(s)/deciliter  diphenhydrAMINE 25 milliGRAM(s) Oral every 4 hours PRN Rash and/or Itching  glucagon  Injectable 1 milliGRAM(s) IntraMuscular once PRN Glucose LESS THAN 70 milligrams/deciliter  oxyCODONE    IR 5 milliGRAM(s) Oral every 6 hours PRN Moderate Pain (4 - 6)  oxyCODONE    IR 10 milliGRAM(s) Oral every 6 hours PRN Severe Pain (7 - 10)  senna 2 Tablet(s) Oral at bedtime PRN Constipation  SUMAtriptan 25 milliGRAM(s) Oral four times a day PRN Migraine    Vital Signs Last 24 Hrs  T(F): 98.8 (25 Oct 2020 08:48), Max: 98.9 (25 Oct 2020 08:05)  HR: 87 (25 Oct 2020 08:48) (71 - 112)  BP: 130/77 (25 Oct 2020 08:48) (130/77 - 146/76)  RR: 15 (25 Oct 2020 08:48) (15 - 15)  SpO2: 98% (25 Oct 2020 08:48) (94% - 98%)    PHYSICAL EXAM:  GENERAL: NAD, obese female  CHEST/LUNG: cta b/l no wheezing/ rhonchi  HEART: s1s2 no  murmurs  ABDOMEN: obese soft nontender +bs  EXTREMITIES:  +abrasion to right knee, no significant edema ble  NEUROLOGY: AAOx3, b/l le numbness  PSYCH: calm    LABS:                 11.2   5.70  )-----------( 411      ( 22 Oct 2020 05:30 )             35.1       10-22    141  |  104  |  18  ----------------------------<  112  4.1   |  29  |  0.70    Ca    9.3      22 Oct 2020 05:30    POCT Blood Glucose.: 109 mg/dL (25 Oct 2020 07:58)

## 2020-10-25 NOTE — PROVIDER CONTACT NOTE (FALL NOTIFICATION) - SITUATION
pt assist of 1 person to wheel chair. requested to use bathroom. when done, pt pressed call bell, PCA went in, pt had one knee (left) on the floor. pt stated her left knee buckled. pt assist of 1 person to wheel chair. requested to use bathroom. when done, pt pressed call bell, PCA went in, pt had one knee (right) on the floor. pt stated her right knee buckled.

## 2020-10-25 NOTE — PROGRESS NOTE ADULT - ASSESSMENT
61 y/o F with PMHx HTN, HLD, osteoarthritis, anxiety/depression disorders, GERD, eczema, sinusitis, scoliosis/spinal stenosis- lumbar region, presented to [facility] 9/29/20 for scheduled stage 1, L1-5 lumbar lateral interbody fusion with Dr. Valdez. Stage 2 T9-pelvis arthrodesis, L5-S1 TLIF done on 9/30/20 with closure of muscle flap done by plastics, Dr. Alegre. Post-op course complicated by hemorrhagic shock requiring transfer to ICU, fluid resuscitation with IV hydration and pressors. EBL 1500 cc, s/p 2 u prbc. Patient was weaned off IV pressors 10/1/20 and started on midodrine. Surgical drains removed on 10/7/20. Plastic surgery following for incision management recommended Aquacel dressing changed on 10/3 and 10/8. Patient was transferred to [facility] on 10/10/20 for comprehensive rehab.     #S/p L1-5 lumbar fusion, T9-pelvis arthrodesis, L5-S1 TLIF (9/30/20)  -comprehensive rehab per primary team  -Pain management and wound care per primary team    #Fall  -Mechanical fall  -No head injury or LOC  -No concern for any acute fractures  -Xray of spine and right knee negative for acute pathology  -Fall precautions    #UTI  -Completed 4 day course of Keflex    #Post-op hypotension  #HTN  -Hypotension resolved, now off midodrine   -Continue Amlodipine  -Continue Metoprolol tart 12.5mg BID  -Home meds Norvasc 5mg daily, Bystolic 10mg QAM, Edarbi 40mg QHS    #Pre-DM, a1c 6.4  -Consistent Carbohydrate Diet  -Fingersticks well controlled    #HLD  -Cont fenofibrate/Statin    #Postop anemia  -H/H stable  -transfuse for Hb < 7.0  -Hold ASA, NSAIDs    #Leukocytosis  -Resolved    #Depression, Anxiety  -Mood stable   -Cont cymbalta   -Neuropsych consult reviewed    #Migraines  -Cont sumatriptan     #DVT ppx - Lovenox

## 2020-10-26 LAB
ANION GAP SERPL CALC-SCNC: 8 MMOL/L — SIGNIFICANT CHANGE UP (ref 5–17)
BUN SERPL-MCNC: 15 MG/DL — SIGNIFICANT CHANGE UP (ref 7–23)
CALCIUM SERPL-MCNC: 9.3 MG/DL — SIGNIFICANT CHANGE UP (ref 8.4–10.5)
CHLORIDE SERPL-SCNC: 106 MMOL/L — SIGNIFICANT CHANGE UP (ref 96–108)
CO2 SERPL-SCNC: 30 MMOL/L — SIGNIFICANT CHANGE UP (ref 22–31)
CREAT SERPL-MCNC: 0.79 MG/DL — SIGNIFICANT CHANGE UP (ref 0.5–1.3)
GLUCOSE BLDC GLUCOMTR-MCNC: 113 MG/DL — HIGH (ref 70–99)
GLUCOSE SERPL-MCNC: 124 MG/DL — HIGH (ref 70–99)
HCT VFR BLD CALC: 37.3 % — SIGNIFICANT CHANGE UP (ref 34.5–45)
HGB BLD-MCNC: 11.7 G/DL — SIGNIFICANT CHANGE UP (ref 11.5–15.5)
MCHC RBC-ENTMCNC: 28.7 PG — SIGNIFICANT CHANGE UP (ref 27–34)
MCHC RBC-ENTMCNC: 31.4 GM/DL — LOW (ref 32–36)
MCV RBC AUTO: 91.4 FL — SIGNIFICANT CHANGE UP (ref 80–100)
NRBC # BLD: 0 /100 WBCS — SIGNIFICANT CHANGE UP (ref 0–0)
PLATELET # BLD AUTO: 418 K/UL — HIGH (ref 150–400)
POTASSIUM SERPL-MCNC: 3.6 MMOL/L — SIGNIFICANT CHANGE UP (ref 3.5–5.3)
POTASSIUM SERPL-SCNC: 3.6 MMOL/L — SIGNIFICANT CHANGE UP (ref 3.5–5.3)
RBC # BLD: 4.08 M/UL — SIGNIFICANT CHANGE UP (ref 3.8–5.2)
RBC # FLD: 13.7 % — SIGNIFICANT CHANGE UP (ref 10.3–14.5)
SODIUM SERPL-SCNC: 144 MMOL/L — SIGNIFICANT CHANGE UP (ref 135–145)
WBC # BLD: 5.06 K/UL — SIGNIFICANT CHANGE UP (ref 3.8–10.5)
WBC # FLD AUTO: 5.06 K/UL — SIGNIFICANT CHANGE UP (ref 3.8–10.5)

## 2020-10-26 PROCEDURE — 99232 SBSQ HOSP IP/OBS MODERATE 35: CPT

## 2020-10-26 RX ORDER — OXYCODONE HYDROCHLORIDE 5 MG/1
10 TABLET ORAL
Refills: 0 | Status: DISCONTINUED | OUTPATIENT
Start: 2020-10-27 | End: 2020-11-02

## 2020-10-26 RX ADMIN — LIDOCAINE 1 PATCH: 4 CREAM TOPICAL at 22:58

## 2020-10-26 RX ADMIN — LIDOCAINE 1 PATCH: 4 CREAM TOPICAL at 00:07

## 2020-10-26 RX ADMIN — Medication 25 MILLIGRAM(S): at 05:45

## 2020-10-26 RX ADMIN — OXYCODONE HYDROCHLORIDE 10 MILLIGRAM(S): 5 TABLET ORAL at 00:00

## 2020-10-26 RX ADMIN — MAGNESIUM OXIDE 400 MG ORAL TABLET 400 MILLIGRAM(S): 241.3 TABLET ORAL at 11:48

## 2020-10-26 RX ADMIN — OXYCODONE HYDROCHLORIDE 10 MILLIGRAM(S): 5 TABLET ORAL at 11:48

## 2020-10-26 RX ADMIN — ATORVASTATIN CALCIUM 10 MILLIGRAM(S): 80 TABLET, FILM COATED ORAL at 21:54

## 2020-10-26 RX ADMIN — PANTOPRAZOLE SODIUM 40 MILLIGRAM(S): 20 TABLET, DELAYED RELEASE ORAL at 05:45

## 2020-10-26 RX ADMIN — OXYCODONE HYDROCHLORIDE 10 MILLIGRAM(S): 5 TABLET ORAL at 07:47

## 2020-10-26 RX ADMIN — Medication 50 MILLIGRAM(S): at 21:54

## 2020-10-26 RX ADMIN — CYCLOBENZAPRINE HYDROCHLORIDE 10 MILLIGRAM(S): 10 TABLET, FILM COATED ORAL at 19:41

## 2020-10-26 RX ADMIN — DULOXETINE HYDROCHLORIDE 120 MILLIGRAM(S): 30 CAPSULE, DELAYED RELEASE ORAL at 21:54

## 2020-10-26 RX ADMIN — ENOXAPARIN SODIUM 40 MILLIGRAM(S): 100 INJECTION SUBCUTANEOUS at 21:54

## 2020-10-26 RX ADMIN — Medication 25 MILLIGRAM(S): at 17:38

## 2020-10-26 RX ADMIN — AMLODIPINE BESYLATE 5 MILLIGRAM(S): 2.5 TABLET ORAL at 05:45

## 2020-10-26 RX ADMIN — Medication 250 MILLIGRAM(S): at 17:38

## 2020-10-26 RX ADMIN — Medication 145 MILLIGRAM(S): at 11:48

## 2020-10-26 RX ADMIN — Medication 250 MILLIGRAM(S): at 05:45

## 2020-10-26 RX ADMIN — LIDOCAINE 1 PATCH: 4 CREAM TOPICAL at 11:49

## 2020-10-26 RX ADMIN — POLYETHYLENE GLYCOL 3350 17 GRAM(S): 17 POWDER, FOR SOLUTION ORAL at 11:48

## 2020-10-26 RX ADMIN — LIDOCAINE 1 PATCH: 4 CREAM TOPICAL at 19:32

## 2020-10-26 RX ADMIN — Medication 1000 UNIT(S): at 11:48

## 2020-10-26 RX ADMIN — Medication 1 TABLET(S): at 11:48

## 2020-10-26 RX ADMIN — OXYCODONE HYDROCHLORIDE 10 MILLIGRAM(S): 5 TABLET ORAL at 08:40

## 2020-10-26 NOTE — PROGRESS NOTE ADULT - SUBJECTIVE AND OBJECTIVE BOX
60 year old female with PMHx HTN, hyperlipidemia, osteoarthritis, anxiety/depression disorders, GERD, eczema, sinusitis, scoliosis/spinal stenosis- lumbar region, presented to [facility] 9/29/20 for scheduled stage 1, L1-5 lumbar lateral interbody fusion with Dr. Valdez. Stage 2 T9-pelvis arthrodesis, L5-S1 TLIF done on 9/30/20 with closure of muscle flap done by plastics, Dr. Alegre. Admitted to NSICU post operatively due to hemorrhagic shock requiring fluid resuscitation with IV hydration and pressors. EBL 1500 cc, s/p 2 u prbc. Patient was weaned off IV pressors 10/1/20 and started on midodrine. Transferred to the floor when stable. Surgical drains removed on 10/7/20. Plastic surgery following for incision management. Aquacel dressing changed on 10/3 and 10/8. Hgb remains stable. Pt currently off antihypertensives, now on midodrine- wean to off as indicated.     Patient is medically and neurologically stable for discharge to [facility] for multidisciplinary acute rehab 10/10/20.  Patient arrived hemodynamically stable condition. Seen at bedside with her  present. She reports bilateral shoulder pain due to history of OA (had injections prior to being admitted for spine surgery).  Currently reports 7/10 with movement.  She endorses 2-3 episodes of diarrhea the last two days. Denies abdominal pain, fevers, chills.       ROS - patient states bowel and bladder control is better  no further urinary incontinence  Fell in bathroom over weekend - hit  Right knee  no further pain/ nbo increased back pain  RLE with persistent numbness  increased motor function Left foot but still weak  loss of sensation lower abdomen since OR  no N,V  No dizziness, no headaches  no Chest pain, no SOB  spoke with NS- no celebrex X 6-12 months      MEDICATIONS  (STANDING):  amLODIPine   Tablet 5 milliGRAM(s) Oral daily  atorvastatin 10 milliGRAM(s) Oral at bedtime  cholecalciferol 1000 Unit(s) Oral daily  dextrose 5%. 1000 milliLiter(s) (50 mL/Hr) IV Continuous <Continuous>  dextrose 50% Injectable 12.5 Gram(s) IV Push once  dextrose 50% Injectable 25 Gram(s) IV Push once  dextrose 50% Injectable 25 Gram(s) IV Push once  DULoxetine 120 milliGRAM(s) Oral at bedtime  enoxaparin Injectable 40 milliGRAM(s) SubCutaneous <User Schedule>  fenofibrate Tablet 145 milliGRAM(s) Oral daily  hydrOXYzine hydrochloride 50 milliGRAM(s) Oral at bedtime  lidocaine   Patch 1 Patch Transdermal every 24 hours  magnesium oxide 400 milliGRAM(s) Oral daily  metoprolol tartrate 25 milliGRAM(s) Oral two times a day  multivitamin 1 Tablet(s) Oral daily  pantoprazole    Tablet 40 milliGRAM(s) Oral before breakfast  polyethylene glycol 3350 17 Gram(s) Oral daily  saccharomyces boulardii 250 milliGRAM(s) Oral two times a day    MEDICATIONS  (PRN):  aluminum hydroxide/magnesium hydroxide/simethicone Suspension 30 milliLiter(s) Oral every 4 hours PRN Dyspepsia  cyclobenzaprine 10 milliGRAM(s) Oral three times a day PRN Muscle Spasm  dextrose 40% Gel 15 Gram(s) Oral once PRN Blood Glucose LESS THAN 70 milliGRAM(s)/deciliter  diphenhydrAMINE 25 milliGRAM(s) Oral every 4 hours PRN Rash and/or Itching  glucagon  Injectable 1 milliGRAM(s) IntraMuscular once PRN Glucose LESS THAN 70 milligrams/deciliter  oxyCODONE    IR 5 milliGRAM(s) Oral every 6 hours PRN Moderate Pain (4 - 6)  oxyCODONE    IR 10 milliGRAM(s) Oral every 6 hours PRN Severe Pain (7 - 10)  senna 2 Tablet(s) Oral at bedtime PRN Constipation  SUMAtriptan 25 milliGRAM(s) Oral four times a day PRN Migraine                            11.7   5.06  )-----------( 418      ( 26 Oct 2020 06:15 )             37.3     10-26    144  |  106  |  15  ----------------------------<  124<H>  3.6   |  30  |  0.79    Ca    9.3      26 Oct 2020 06:15          CAPILLARY BLOOD GLUCOSE      POCT Blood Glucose.: 113 mg/dL (26 Oct 2020 07:51)      Vital Signs Last 24 Hrs  T(C): 37.3 (26 Oct 2020 07:53), Max: 37.3 (26 Oct 2020 07:53)  T(F): 99.1 (26 Oct 2020 07:53), Max: 99.1 (26 Oct 2020 07:53)  HR: 81 (26 Oct 2020 07:53) (81 - 89)  BP: 125/85 (26 Oct 2020 07:53) (125/85 - 133/81)  BP(mean): --  RR: 16 (26 Oct 2020 07:53) (16 - 16)  SpO2: 98% (26 Oct 2020 07:53) (96% - 98%)      Constitutional - NAD, Comfortable, lying in bed  HEENT - NCAT, EOMI  Chest - Breathing comfortably  Cardiovascular - RRR  Abdomen - Soft, mildly distended, hyperactive bowel sounds  Extremities - No C/C/E, No calf tenderness  Right ribs Sl tender to palpation  Neurologic Exam -                    Cognitive - Awake, Alert, AAO to self, place, date, year, situation     Communication - Fluent, No dysarthria     Cranial Nerves - no facial asymmetry, shoulder shrug intact,      Motor -                     LEFT    UE - ShAB 5/5, EF 5/5, EE 5/5, WE 5/5,  5/5                    RIGHT UE - ShAB 5/5, EF 5/5, EE 5/5, WE 5/5,  5/5                    LEFT    LE - HF 4/5, KE 4/5, DF 3+/5, PF 5/5 EHL 5/5                    RIGHT LE - HF 3/5, KE 4/5, DF 4/5, PF 5/5        Sensory - decreased to light touch medial leg on RIGHT and dorsum of foot on LEFT but better     Reflexes - DTR Intact, No primitive reflexes     Psychiatric - Mood stable, Affect WNL     Skin:         thoraco-lumbar surgical incision intact- sutures out         Left flank incision with staples dcd         Healed drain incisions lower lumbar          No pressure injuries noted on sacrum, coccyx, or heels      IDT meeting 10/21    OT: independent eating   set up for grooming   mod I UBD   LBD CG   toileting CG to min for balance   toilet transfers CG   shower transfers min A   Goals mod I for ADLs except supervision for showering/bathing    PT: 120ft yesterday, 60ft today (self limiting)   stairs with min A, patricia on stairs (right knee)    Tentative dc to home with  and home services on 10/29     Family education next week.

## 2020-10-26 NOTE — PROGRESS NOTE ADULT - SUBJECTIVE AND OBJECTIVE BOX
Patient is a 60y old  Female who presents with a chief complaint of functional deficits due to L1-L5 lumbar fusion, T9-pelvis arthrodesis, L5-S1 TLIF  03.9 Other Neurologic (25 Oct 2020 12:00)    Patient seen and examined at bedside. No acute overnight events. Reports fall in bathroom over weekend due to R knee giving out. Was able to stop her fall, no LOC or head trauma reported. C/o left knee numbness.    ALLERGIES:  penicillin (Other)    MEDICATIONS  (STANDING):  amLODIPine   Tablet 5 milliGRAM(s) Oral daily  atorvastatin 10 milliGRAM(s) Oral at bedtime  cholecalciferol 1000 Unit(s) Oral daily  dextrose 5%. 1000 milliLiter(s) (50 mL/Hr) IV Continuous <Continuous>  dextrose 50% Injectable 12.5 Gram(s) IV Push once  dextrose 50% Injectable 25 Gram(s) IV Push once  dextrose 50% Injectable 25 Gram(s) IV Push once  DULoxetine 120 milliGRAM(s) Oral at bedtime  enoxaparin Injectable 40 milliGRAM(s) SubCutaneous <User Schedule>  fenofibrate Tablet 145 milliGRAM(s) Oral daily  hydrOXYzine hydrochloride 50 milliGRAM(s) Oral at bedtime  lidocaine   Patch 1 Patch Transdermal every 24 hours  magnesium oxide 400 milliGRAM(s) Oral daily  metoprolol tartrate 25 milliGRAM(s) Oral two times a day  multivitamin 1 Tablet(s) Oral daily  pantoprazole    Tablet 40 milliGRAM(s) Oral before breakfast  polyethylene glycol 3350 17 Gram(s) Oral daily  saccharomyces boulardii 250 milliGRAM(s) Oral two times a day    MEDICATIONS  (PRN):  aluminum hydroxide/magnesium hydroxide/simethicone Suspension 30 milliLiter(s) Oral every 4 hours PRN Dyspepsia  cyclobenzaprine 10 milliGRAM(s) Oral three times a day PRN Muscle Spasm  dextrose 40% Gel 15 Gram(s) Oral once PRN Blood Glucose LESS THAN 70 milliGRAM(s)/deciliter  diphenhydrAMINE 25 milliGRAM(s) Oral every 4 hours PRN Rash and/or Itching  glucagon  Injectable 1 milliGRAM(s) IntraMuscular once PRN Glucose LESS THAN 70 milligrams/deciliter  oxyCODONE    IR 5 milliGRAM(s) Oral every 6 hours PRN Moderate Pain (4 - 6)  oxyCODONE    IR 10 milliGRAM(s) Oral every 6 hours PRN Severe Pain (7 - 10)  senna 2 Tablet(s) Oral at bedtime PRN Constipation  SUMAtriptan 25 milliGRAM(s) Oral four times a day PRN Migraine    Vital Signs Last 24 Hrs  T(F): 99.1 (26 Oct 2020 07:53), Max: 99.1 (26 Oct 2020 07:53)  HR: 81 (26 Oct 2020 07:53) (81 - 89)  BP: 125/85 (26 Oct 2020 07:53) (125/85 - 133/81)  RR: 16 (26 Oct 2020 07:53) (16 - 16)  SpO2: 98% (26 Oct 2020 07:53) (96% - 98%)  I&O's Summary    PHYSICAL EXAM:  General: NAD, A/O x 3  ENT: MMM, no tonsilar exudate  Neck: Supple, No JVD  Lungs: Clear to auscultation bilaterally, no wheezes. Good air entry bilaterally   Cardio: RRR, S1/S2, No murmurs  Abdomen: Soft, Nontender, Nondistended; Bowel sounds present  Extremities: No calf tenderness, No pitting edema. +R knee abrasion    LABS:                        11.7   5.06  )-----------( 418      ( 26 Oct 2020 06:15 )             37.3       10-26    144  |  106  |  15  ----------------------------<  124  3.6   |  30  |  0.79    Ca    9.3      26 Oct 2020 06:15     eGFR if Non African American: 82 mL/min/1.73M2 (10-26-20 @ 06:15)  eGFR if African American: 95 mL/min/1.73M2 (10-26-20 @ 06:15)  POCT Blood Glucose.: 113 mg/dL (26 Oct 2020 07:51)

## 2020-10-26 NOTE — PROGRESS NOTE ADULT - ASSESSMENT
CRISTEL WATTS is a HTN, hyperlipidemia, osteoarthritis, anxiety/depression disorders, GERD, eczema, sinusitis, scoliosis/spinal stenosis- lumbar region, presented to [facility] 9/29/20 for scheduled stage 1, L1-5 lumbar lateral interbody fusion with Dr. Valdez. Stage 2 T9-pelvis arthrodesis, L5-S1 TLIF done on 9/30/20 with closure of muscle flap done by plastics, Dr. Alegre. Hospital course complicated by hemorrhagic shock postop requiring pressors and blood transfusions, now stable on Midodrine. Now admitted to [facility] after for initiation of a multidisciplinary rehab program consisting focused on functional mobility, transfers and ADLs (activities of daily living).    #Elective L1-5 lumbar fusion, T9-pelvis arthrodesis, L5-S1 TLIF 9/29-9/30  - comprehensive multidisciplinary rehab program, PT/OT 3 hours a day, 5 days a week.  - P&O as needed  - Wound Care:  Staples/sutures dcd   dressing Dcd -Open to air     Patient may shower  - no celebrex per NS  - F/u with neurosurgery, Dr. Valdez, outpatient  - F/u with plastics in 1-2 weeks with Dr. Alegre in clinic  - No weight bearing restrictions  - Precautions: falls, spine, neuro  finishing steroids  Xray TL spine as per NS- reviewed with hardware intact  lidoderm to Right ribs        #Hypotension postop - improved  -Will Dc midodrine- SYST BPs 130-140  -Norvasc 5mg daily restarted by hospitalist, lopressor 25mg Q12h added,Edarbi 40mg QHSstill on hold  - BP well controlled    #Pre-DM  - HbA1c 6.4 9/29/20  - FS  10/9  change fingersticks to fasting  - Monitor FS.    #SP Fall  fall precautions  OT to address toileting while seated    #Depression/Anxiety:  - Continue Cymbalta 60mg BID  neuropsychology consult noted    #Migraines  - Sumatriptan 25mg Q4hr PRN    # UTI  urineC&S proteus 10-49k  finished antibiotics  clinically asymptomatic    #Allergies  - Continue Benadryl 25mg Q4hr PRN    Pain Management:  - Flexeril 10mg TID PRN  timed oxycodone 10mg with breakfast and lunch daily before Rehab    GI/Bowel:  - At risk for constipation due to neurologic diagnosis, immobility and/or medication use  - daily miralax restarted at patient request  - GI ppx: Protonix daily, Maalox PRN    /Bladder:   - At risk for incontinence and retention due to neurologic diagnosis and limited mobility  - Currently patient voids: independent  - Encourage timed voids every 4 hours while awake for independence and to promote continence during therapy.    Skin/Pressure Injury:   - Skin assessment on admission: no pressure injuries noted   - Monitor Incisions: Thoraco-lumbar incision and left flank incision - intact  - Turn every 2 hours while in bed, air mattress  - Soft heel protectors  - Skin barrier cream as needed  - Nursing to monitor skin Qshift    Diet:  - Diet Consistency/Modifications: regular/DASH    DVT ppx:  - Lovenox  - SCDs

## 2020-10-26 NOTE — PROGRESS NOTE ADULT - ASSESSMENT
59 y/o F with PMHx HTN, HLD, osteoarthritis, anxiety/depression disorders, GERD, eczema, sinusitis, scoliosis/spinal stenosis- lumbar region, presented to [facility] 9/29/20 for scheduled stage 1, L1-5 lumbar lateral interbody fusion with Dr. Valdez. Stage 2 T9-pelvis arthrodesis, L5-S1 TLIF done on 9/30/20 with closure of muscle flap done by plastics, Dr. Alegre. Post-op course complicated by hemorrhagic shock requiring transfer to ICU, fluid resuscitation with IV hydration and pressors. EBL 1500 cc, s/p 2 u prbc. Patient was weaned off IV pressors 10/1/20 and started on midodrine. Surgical drains removed on 10/7/20. Plastic surgery following for incision management recommended Aquacel dressing changed on 10/3 and 10/8. Patient was transferred to [facility] on 10/10/20 for comprehensive rehab.     #S/p L1-5 lumbar fusion, T9-pelvis arthrodesis, L5-S1 TLIF (9/30/20)  -comprehensive rehab per primary team  -Pain management and wound care per primary team    #Fall  -Mechanical fall  -No head injury or LOC  -No concern for any acute fractures  -Xray of spine and right knee negative for acute pathology  -Fall precautions    #UTI  -Completed 4 day course of Keflex    #Post-op hypotension  #HTN  -Hypotension resolved, now off midodrine   -Continue Amlodipine  -Continue Metoprolol tart 12.5mg BID  -Home meds Norvasc 5mg daily, Bystolic 10mg QAM, Edarbi 40mg QHS    #Pre-DM, a1c 6.4  -Consistent Carbohydrate Diet  -Fingersticks well controlled    #HLD  -Cont fenofibrate/Statin    #Postop anemia  -H/H stable  -transfuse for Hb < 7.0  -Hold ASA, NSAIDs    #Leukocytosis  -Resolved    #Depression, Anxiety  -Mood stable   -Cont cymbalta   -Neuropsych consult reviewed    #Migraines  -Cont sumatriptan     #DVT ppx   - Lovenox

## 2020-10-27 ENCOUNTER — TRANSCRIPTION ENCOUNTER (OUTPATIENT)
Age: 60
End: 2020-10-27

## 2020-10-27 LAB — GLUCOSE BLDC GLUCOMTR-MCNC: 135 MG/DL — HIGH (ref 70–99)

## 2020-10-27 PROCEDURE — 99232 SBSQ HOSP IP/OBS MODERATE 35: CPT

## 2020-10-27 RX ADMIN — Medication 25 MILLIGRAM(S): at 05:34

## 2020-10-27 RX ADMIN — Medication 25 MILLIGRAM(S): at 17:29

## 2020-10-27 RX ADMIN — OXYCODONE HYDROCHLORIDE 5 MILLIGRAM(S): 5 TABLET ORAL at 00:03

## 2020-10-27 RX ADMIN — PANTOPRAZOLE SODIUM 40 MILLIGRAM(S): 20 TABLET, DELAYED RELEASE ORAL at 05:34

## 2020-10-27 RX ADMIN — LIDOCAINE 1 PATCH: 4 CREAM TOPICAL at 19:51

## 2020-10-27 RX ADMIN — OXYCODONE HYDROCHLORIDE 10 MILLIGRAM(S): 5 TABLET ORAL at 13:05

## 2020-10-27 RX ADMIN — Medication 50 MILLIGRAM(S): at 21:24

## 2020-10-27 RX ADMIN — Medication 250 MILLIGRAM(S): at 05:34

## 2020-10-27 RX ADMIN — OXYCODONE HYDROCHLORIDE 10 MILLIGRAM(S): 5 TABLET ORAL at 08:06

## 2020-10-27 RX ADMIN — ATORVASTATIN CALCIUM 10 MILLIGRAM(S): 80 TABLET, FILM COATED ORAL at 21:24

## 2020-10-27 RX ADMIN — LIDOCAINE 1 PATCH: 4 CREAM TOPICAL at 12:11

## 2020-10-27 RX ADMIN — Medication 250 MILLIGRAM(S): at 17:29

## 2020-10-27 RX ADMIN — OXYCODONE HYDROCHLORIDE 5 MILLIGRAM(S): 5 TABLET ORAL at 21:24

## 2020-10-27 RX ADMIN — OXYCODONE HYDROCHLORIDE 10 MILLIGRAM(S): 5 TABLET ORAL at 09:00

## 2020-10-27 RX ADMIN — AMLODIPINE BESYLATE 5 MILLIGRAM(S): 2.5 TABLET ORAL at 05:34

## 2020-10-27 RX ADMIN — Medication 145 MILLIGRAM(S): at 12:08

## 2020-10-27 RX ADMIN — OXYCODONE HYDROCHLORIDE 10 MILLIGRAM(S): 5 TABLET ORAL at 12:08

## 2020-10-27 RX ADMIN — OXYCODONE HYDROCHLORIDE 5 MILLIGRAM(S): 5 TABLET ORAL at 00:35

## 2020-10-27 RX ADMIN — MAGNESIUM OXIDE 400 MG ORAL TABLET 400 MILLIGRAM(S): 241.3 TABLET ORAL at 12:08

## 2020-10-27 RX ADMIN — OXYCODONE HYDROCHLORIDE 5 MILLIGRAM(S): 5 TABLET ORAL at 21:55

## 2020-10-27 RX ADMIN — ENOXAPARIN SODIUM 40 MILLIGRAM(S): 100 INJECTION SUBCUTANEOUS at 21:24

## 2020-10-27 RX ADMIN — DULOXETINE HYDROCHLORIDE 120 MILLIGRAM(S): 30 CAPSULE, DELAYED RELEASE ORAL at 21:24

## 2020-10-27 RX ADMIN — Medication 1000 UNIT(S): at 12:06

## 2020-10-27 RX ADMIN — Medication 1 TABLET(S): at 12:08

## 2020-10-27 RX ADMIN — POLYETHYLENE GLYCOL 3350 17 GRAM(S): 17 POWDER, FOR SOLUTION ORAL at 12:06

## 2020-10-27 NOTE — DISCHARGE NOTE PROVIDER - HOSPITAL COURSE
60 year old female with PMHx HTN, hyperlipidemia, osteoarthritis, anxiety/depression disorders, GERD, eczema, sinusitis, scoliosis/spinal stenosis- lumbar region, presented to [facility] 9/29/20 for scheduled stage 1, L1-5 lumbar lateral interbody fusion with Dr. Valdez. Stage 2 T9-pelvis arthrodesis, L5-S1 TLIF done on 9/30/20 with closure of muscle flap done by plastics, Dr. Alegre. Admitted to NSICU post operatively due to hemorrhagic shock requiring fluid resuscitation with IV hydration and pressors. EBL 1500 cc, s/p 2 u prbc. Patient was weaned off IV pressors 10/1/20 and started on midodrine. Transferred to the floor when stable. Surgical drains removed on 10/7/20. Plastic surgery following for incision management. Aquacel dressing changed on 10/3 and 10/8. Hgb remains stable. Pt currently off antihypertensives, now on midodrine- wean to off as indicated.     Patient is medically and neurologically stable for discharge to [place] for multidisciplinary acute rehab 10/10/20.  Patient arrived hemodynamically stable condition. Seen at bedside with her  present. She reports bilateral shoulder pain due to history of OA (had injections prior to being admitted for spine surgery).  Currently reports 7/10 with movement.  She endorses 2-3 episodes of diarrhea the last two days. Denies abdominal pain, fevers, chills.       #Elective L1-5 lumbar fusion, T9-pelvis arthrodesis, L5-S1 TLIF 9/29-9/30  - comprehensive multidisciplinary rehab program, PT/OT 3 hours a day, 5 days a week.  - P&O as needed  - Wound Care:  Staples/sutures dcd   dressing Dcd -Open to air     Patient may shower  - no celebrex per NS  - F/u with neurosurgery, Dr. Valdez, outpatient  - F/u with plastics in 1-2 weeks with Dr. Alegre in clinic  - No weight bearing restrictions  - Precautions: falls, spine, neuro  finishing steroids  Xray TL spine as per NS- reviewed with hardware intact  lidoderm to Right ribs        #Hypotension postop - improved  -Will Dc midodrine- SYST BPs 130-140  -Norvasc 5mg daily restarted by hospitalist, lopressor 25mg Q12h added,Edarbi 40mg QHSstill on hold  - BP well controlled    #Pre-DM  - HbA1c 6.4 9/29/20  - FS  10/9  change fingersticks to fasting  - Monitor FS.    #SP Fall  fall precautions  OT to address toileting while seated    #Depression/Anxiety:  - Continue Cymbalta 60mg BID  neuropsychology consult noted    #Migraines  - Sumatriptan 25mg Q4hr PRN    # UTI  urineC&S proteus 10-49k  finished antibiotics  clinically asymptomatic    #Allergies  - Continue Benadryl 25mg Q4hr PRN    Pain Management:  - Flexeril 10mg TID PRN  timed oxycodone 10mg with breakfast and lunch daily before Rehab    GI/Bowel:  - At risk for constipation due to neurologic diagnosis, immobility and/or medication use  - daily miralax restarted at patient request  - GI ppx: Protonix daily, Maalox PRN    /Bladder:   - At risk for incontinence and retention due to neurologic diagnosis and limited mobility  - Currently patient voids: independent  - Encourage timed voids every 4 hours while awake for independence and to promote continence during therapy.    Skin/Pressure Injury:   - Skin assessment on admission: no pressure injuries noted   - Monitor Incisions: Thoraco-lumbar incision and left flank incision - intact  - Turn every 2 hours while in bed, air mattress  - Soft heel protectors  - Skin barrier cream as needed  - Nursing to monitor skin Qshift    Diet:  - Diet Consistency/Modifications: regular/DASH    DVT ppx:  - Lovenox  - SCDs      Electronic Signatures:   This is a 60 year old female with PMHx HTN, hyperlipidemia, osteoarthritis, anxiety/depression disorders, GERD, eczema, sinusitis, scoliosis/spinal stenosis- lumbar region, presented to [facility] 9/29/20 for scheduled stage 1, L1-5 lumbar lateral interbody fusion with Dr. Valdez. Stage 2 T9-pelvis arthrodesis, L5-S1 TLIF done on 9/30/20 with closure of muscle flap done by plastics, Dr. Alegre. Admitted to NSICU post operatively due to hemorrhagic shock requiring fluid resuscitation with IV hydration and pressors. EBL 1500 cc, s/p 2 u prbc. Patient was weaned off IV pressors 10/1/20 and started on midodrine. Transferred to the floor when stable. Surgical drains removed on 10/7/20. Plastic surgery following for incision management. Aquacel dressing changed on 10/3 and 10/8. Hgb remains stable. Pt currently off antihypertensives, now on midodrine- wean to off as indicated.     Patient admitted to [place] for multidisciplinary acute rehab on 10/10/20.    Patient remained medically stable during admission.   Patient did have fall in the restroom on 10/18. Imaging without changes from imaging done only 2 days prior. Imaging reviewed by Dr Valdez on the 16th to r/o no hardware loosening and by his NP on the 18th after fall. Imaging had been ordered on the 16th when patient reported continued pain and changes in numbness in LEs. Xrays without any hardware loosening/displacement.     After surgery, patient had been hypotensive and taken off of her BP medications. Started on midodrine. As SBPs improved, midodrine stopped and patient needed to be started on lopressor and Norvasc to keep SBPs in an acceptable range.     Patient did have symptoms of UTI at beginning of stay. Antibiotics started after collection of urine cx. Symptoms improved however cx only grew proteus 10-49k. Antibiotic course completed and patient without further urinary issues.     Patient is medically stable for dc to home with  and home therapies. This is a 60 year old female with PMHx HTN, hyperlipidemia, osteoarthritis, anxiety/depression disorders, GERD, eczema, sinusitis, scoliosis/spinal stenosis- lumbar region, presented to [facility] 9/29/20 for scheduled stage 1, L1-5 lumbar lateral interbody fusion with Dr. Valdez. Stage 2 T9-pelvis arthrodesis, L5-S1 TLIF done on 9/30/20 with closure of muscle flap done by plastics, Dr. Alegre. Admitted to NSICU post operatively due to hemorrhagic shock requiring fluid resuscitation with IV hydration and pressors. EBL 1500 cc, s/p 2 u prbc. Patient was weaned off IV pressors 10/1/20 and started on midodrine. Transferred to the floor when stable. Surgical drains removed on 10/7/20. Plastic surgery following for incision management. Aquacel dressing changed on 10/3 and 10/8. Hgb remains stable. Pt currently off antihypertensives, now on midodrine- wean to off as indicated.     Patient admitted to [place] for multidisciplinary acute rehab on 10/10/20.    Patient remained medically stable during admission.   Patient did have fall in the restroom on 10/18. Imaging without changes from imaging done only 2 days prior. Imaging reviewed by Dr Valdez on the 16th to r/o no hardware loosening and by his NP on the 18th after fall. Imaging had been ordered on the 16th when patient reported continued pain and changes in numbness in LEs. Xrays without any hardware loosening/displacement.     After surgery, patient had been hypotensive and taken off of her BP medications. Started on midodrine. As SBPs improved, midodrine stopped and patient needed to be started on lopressor and Norvasc to keep SBPs in an acceptable range.     Patient did have symptoms of UTI at beginning of stay. Antibiotics started after collection of urine cx. Symptoms improved however cx only grew proteus 10-49k. Antibiotic course completed and patient without further urinary issues.     Patient exposed to COVID + patient. On 10/28 patient tested negative but on 11/1 patient tested positive. Patient on airborne precautions. Patient with good saturations, not requiring oxygen. Cough is intermittent. Hospitalist sent patient for CT chest-pending results. Patient being moved to med/surg floor for further monitoring.

## 2020-10-27 NOTE — DISCHARGE NOTE PROVIDER - NSDCCPCAREPLAN_GEN_ALL_CORE_FT
PRINCIPAL DISCHARGE DIAGNOSIS  Diagnosis: Lumbar spinal stenosis  Assessment and Plan of Treatment: s/p Stage 1 L1-L5 Lateral lumbar interbody fusion on 9/29 and ,  Stage 2 T9-pelvis fusion, L5-S1 with plastics closure on 9/30  Continue oxycodone and tylenol as needed for pain control   Follow up with Dr Valdez and Dr Alegre in 1-2 weeks         SECONDARY DISCHARGE DIAGNOSES  Diagnosis: Hypomagnesemia  Assessment and Plan of Treatment: Continue magnesium 400mg daily    Diagnosis: Anemia due to blood loss  Assessment and Plan of Treatment: Follow up with your PCP for routine blood work    Diagnosis: Osteoarthritis  Assessment and Plan of Treatment: Remain off of celebrex until cleared by Dr Valdez to resume    Diagnosis: HTN (hypertension)  Assessment and Plan of Treatment: Continue metoprolol 25mg twice a day and norvasc 5mg daily   Follow up with your PCP within 7 days     PRINCIPAL DISCHARGE DIAGNOSIS  Diagnosis: Lumbar spinal stenosis  Assessment and Plan of Treatment: s/p Stage 1 L1-L5 Lateral lumbar interbody fusion on 9/29 and ,  Stage 2 T9-pelvis fusion, L5-S1 with plastics closure on 9/30  Continue oxycodone and tylenol as needed for pain control   Follow up with Dr Valdez and Dr Alegre in 1-2 weeks         SECONDARY DISCHARGE DIAGNOSES  Diagnosis: COVID-19 virus infection  Assessment and Plan of Treatment: Continue to monitor symptoms.  Dexamethasone IV daily per hospitalist.   Transfer to med/surg for care.    Diagnosis: Hypomagnesemia  Assessment and Plan of Treatment: Continue magnesium 400mg daily    Diagnosis: Anemia due to blood loss  Assessment and Plan of Treatment: Follow up with your PCP for routine blood work    Diagnosis: Osteoarthritis  Assessment and Plan of Treatment: Remain off of celebrex until cleared by Dr Valdez to resume    Diagnosis: HTN (hypertension)  Assessment and Plan of Treatment: Continue metoprolol 25mg twice a day and norvasc 5mg daily   Follow up with your PCP within 7 days

## 2020-10-27 NOTE — DISCHARGE NOTE PROVIDER - CARE PROVIDER_API CALL
Shine Valdez  NEUROSURGERY  [address] [place] [address], [address]  [place] [address]  Phone: [phone]  Fax: [phone]  Follow Up Time:     Marcel Alegre)  Plastic Surgery; Surgery; Surgical Critical Care  [address] [place] [address]  Phone: [phone]  Fax: [phone]  Follow Up Time:

## 2020-10-27 NOTE — PROGRESS NOTE ADULT - ASSESSMENT
CRISTEL WATTS is a HTN, hyperlipidemia, osteoarthritis, anxiety/depression disorders, GERD, eczema, sinusitis, scoliosis/spinal stenosis- lumbar region, presented to [facility] 9/29/20 for scheduled stage 1, L1-5 lumbar lateral interbody fusion with Dr. Valdez. Stage 2 T9-pelvis arthrodesis, L5-S1 TLIF done on 9/30/20 with closure of muscle flap done by plastics, Dr. Alegre. Hospital course complicated by hemorrhagic shock postop requiring pressors and blood transfusions, now stable on Midodrine. Now admitted to [facility] after for initiation of a multidisciplinary rehab program consisting focused on functional mobility, transfers and ADLs (activities of daily living).    #Elective L1-5 lumbar fusion, T9-pelvis arthrodesis, L5-S1 TLIF 9/29-9/30  - comprehensive multidisciplinary rehab program, PT/OT 3 hours a day, 5 days a week.  - P&O as needed  - Wound Care:  Staples/sutures dcd   dressing Dcd -Open to air     Patient may shower  - no celebrex per NS  - F/u with neurosurgery, Dr. Valdez, outpatient  - F/u with plastics in 1-2 weeks with Dr. Alegre in clinic  - No weight bearing restrictions  - Precautions: falls, spine, neuro  finishing steroids  Xray TL spine as per NS- reviewed with hardware intact  lidoderm to Right ribs        #Hypotension postop - improved  -Will Dc midodrine- SYST BPs 130-140  -Norvasc 5mg daily restarted by hospitalist, lopressor 25mg Q12h added,Edarbi 40mg QHSstill on hold  - BP controlled    #Pre-DM  - HbA1c 6.4 9/29/20  - FS  10/9  change fingersticks to fasting  - Monitor FS.    #SP Fall  fall precautions  OT to address toileting while seated    #Depression/Anxiety:  - Continue Cymbalta 60mg BID  neuropsychology consult noted    #Migraines  - Sumatriptan 25mg Q4hr PRN    # UTI  urineC&S proteus 10-49k  finished antibiotics  clinically asymptomatic    #Allergies  - Continue Benadryl 25mg Q4hr PRN    Pain Management:  - Flexeril 10mg TID PRN  timed oxycodone 10mg with breakfast and lunch daily before Rehab    GI/Bowel:  - At risk for constipation due to neurologic diagnosis, immobility and/or medication use  - daily miralax restarted at patient request  - GI ppx: Protonix daily, Maalox PRN    /Bladder:   - At risk for incontinence and retention due to neurologic diagnosis and limited mobility  - Currently patient voids: independent  - Encourage timed voids every 4 hours while awake for independence and to promote continence during therapy.    Skin/Pressure Injury:   - Skin assessment on admission: no pressure injuries noted   - Monitor Incisions: Thoraco-lumbar incision and left flank incision - intact  - Turn every 2 hours while in bed, air mattress  - Soft heel protectors  - Skin barrier cream as needed  - Nursing to monitor skin Qshift    Diet:  - Diet Consistency/Modifications: regular/DASH    DVT ppx:  - Lovenox  - SCDs

## 2020-10-27 NOTE — DISCHARGE NOTE PROVIDER - NSDCFUADDAPPT_GEN_ALL_CORE_FT
-Follow up with Dr Valdez and Dr Alegre in 1-2 weeks.     -Follow up with your PCP within 7 days.     -For any concerns or questions, feel free to contact Lavern LITTLEJOHN at [phone] or [phone].

## 2020-10-27 NOTE — PROGRESS NOTE ADULT - SUBJECTIVE AND OBJECTIVE BOX
60 year old female with PMHx HTN, hyperlipidemia, osteoarthritis, anxiety/depression disorders, GERD, eczema, sinusitis, scoliosis/spinal stenosis- lumbar region, presented to [facility] 9/29/20 for scheduled stage 1, L1-5 lumbar lateral interbody fusion with Dr. Valdez. Stage 2 T9-pelvis arthrodesis, L5-S1 TLIF done on 9/30/20 with closure of muscle flap done by plastics, Dr. Alegre. Admitted to NSICU post operatively due to hemorrhagic shock requiring fluid resuscitation with IV hydration and pressors. EBL 1500 cc, s/p 2 u prbc. Patient was weaned off IV pressors 10/1/20 and started on midodrine. Transferred to the floor when stable. Surgical drains removed on 10/7/20. Plastic surgery following for incision management. Aquacel dressing changed on 10/3 and 10/8. Hgb remains stable. Pt currently off antihypertensives, now on midodrine- wean to off as indicated.     Patient is medically and neurologically stable for discharge to [place] for multidisciplinary acute rehab 10/10/20.  Patient arrived hemodynamically stable condition. Seen at bedside with her  present. She reports bilateral shoulder pain due to history of OA (had injections prior to being admitted for spine surgery).  Currently reports 7/10 with movement.  She endorses 2-3 episodes of diarrhea the last two days. Denies abdominal pain, fevers, chills.       ROS - patient states bowel and bladder control is back to normal  no incontinence  no further urinary incontinence  numbness RLE from knee down  Some numbness anteriorly around waist  now feels muscle/back soreness  increased motor function Left foot but still weak  no N,V  No dizziness, no headaches  no Chest pain, no SOB  spoke with NS- no celebrex X 6-12 months      MEDICATIONS  (STANDING):  amLODIPine   Tablet 5 milliGRAM(s) Oral daily  atorvastatin 10 milliGRAM(s) Oral at bedtime  cholecalciferol 1000 Unit(s) Oral daily  dextrose 5%. 1000 milliLiter(s) (50 mL/Hr) IV Continuous <Continuous>  dextrose 50% Injectable 12.5 Gram(s) IV Push once  dextrose 50% Injectable 25 Gram(s) IV Push once  dextrose 50% Injectable 25 Gram(s) IV Push once  DULoxetine 120 milliGRAM(s) Oral at bedtime  enoxaparin Injectable 40 milliGRAM(s) SubCutaneous <User Schedule>  fenofibrate Tablet 145 milliGRAM(s) Oral daily  hydrOXYzine hydrochloride 50 milliGRAM(s) Oral at bedtime  lidocaine   Patch 1 Patch Transdermal every 24 hours  magnesium oxide 400 milliGRAM(s) Oral daily  metoprolol tartrate 25 milliGRAM(s) Oral two times a day  multivitamin 1 Tablet(s) Oral daily  oxyCODONE    IR 10 milliGRAM(s) Oral <User Schedule>  pantoprazole    Tablet 40 milliGRAM(s) Oral before breakfast  polyethylene glycol 3350 17 Gram(s) Oral daily  saccharomyces boulardii 250 milliGRAM(s) Oral two times a day    MEDICATIONS  (PRN):  aluminum hydroxide/magnesium hydroxide/simethicone Suspension 30 milliLiter(s) Oral every 4 hours PRN Dyspepsia  cyclobenzaprine 10 milliGRAM(s) Oral three times a day PRN Muscle Spasm  dextrose 40% Gel 15 Gram(s) Oral once PRN Blood Glucose LESS THAN 70 milliGRAM(s)/deciliter  diphenhydrAMINE 25 milliGRAM(s) Oral every 4 hours PRN Rash and/or Itching  glucagon  Injectable 1 milliGRAM(s) IntraMuscular once PRN Glucose LESS THAN 70 milligrams/deciliter  oxyCODONE    IR 5 milliGRAM(s) Oral every 6 hours PRN Moderate Pain (4 - 6)  oxyCODONE    IR 10 milliGRAM(s) Oral every 6 hours PRN Severe Pain (7 - 10)  senna 2 Tablet(s) Oral at bedtime PRN Constipation  SUMAtriptan 25 milliGRAM(s) Oral four times a day PRN Migraine                            11.7   5.06  )-----------( 418      ( 26 Oct 2020 06:15 )             37.3     10-26    144  |  106  |  15  ----------------------------<  124<H>  3.6   |  30  |  0.79    Ca    9.3      26 Oct 2020 06:15          CAPILLARY BLOOD GLUCOSE      POCT Blood Glucose.: 135 mg/dL (27 Oct 2020 07:31)      Vital Signs Last 24 Hrs  T(C): 36.3 (27 Oct 2020 07:43), Max: 36.9 (26 Oct 2020 20:44)  T(F): 97.4 (27 Oct 2020 07:43), Max: 98.4 (26 Oct 2020 20:44)  HR: 93 (27 Oct 2020 17:31) (78 - 93)  BP: 128/75 (27 Oct 2020 17:31) (115/65 - 150/80)  BP(mean): --  RR: 15 (27 Oct 2020 07:43) (15 - 15)  SpO2: 98% (27 Oct 2020 07:43) (94% - 98%)              Constitutional - NAD, Comfortable, lying in bed  HEENT - NCAT, EOMI  Chest - Breathing comfortably  Cardiovascular - RRR  Abdomen - Soft, mildly distended, hyperactive bowel sounds  Extremities - No C/C/E, No calf tenderness  Right ribs Sl tender to palpation  Neurologic Exam -                    Cognitive - Awake, Alert, AAO to self, place, date, year, situation     Communication - Fluent, No dysarthria     Cranial Nerves - no facial asymmetry, shoulder shrug intact,      Motor -                     LEFT    UE - ShAB 5/5, EF 5/5, EE 5/5, WE 5/5,  5/5                    RIGHT UE - ShAB 5/5, EF 5/5, EE 5/5, WE 5/5,  5/5                    LEFT    LE - HF 4/5, KE 4/5, DF 3+/5, PF 5/5 EHL 5/5                    RIGHT LE - HF 3/5, KE 4/5, DF 4/5, PF 5/5        Sensory - decreased to light touch medial leg on RIGHT and dorsum of foot on LEFT but better     Reflexes - DTR Intact, No primitive reflexes     Psychiatric - Mood stable, Affect WNL     Skin:         thoraco-lumbar surgical incision intact- sutures out         Left flank incision with staples dcd         Healed drain incisions lower lumbar          No pressure injuries noted on sacrum, coccyx, or heels      IDT meeting 10/21    OT: independent eating   set up for grooming   mod I UBD   LBD CG   toileting CG to min for balance   toilet transfers CG   shower transfers min A   Goals mod I for ADLs except supervision for showering/bathing    PT: 120ft yesterday, 60ft today (self limiting)   stairs with min A, patricia on stairs (right knee)    Tentative dc to home with  and home services on 10/29     Family education next week.

## 2020-10-27 NOTE — CHART NOTE - NSCHARTNOTEFT_GEN_A_CORE
Nutrition Follow Up Note  Hospital Course   (Per Electronic Medical Record)    Source:  Patient [X]  Medical Record [X]      Diet:   Consistent Carbohydrate, DASH-TLC Diet w/ Thin Liquids   Tolerates Diet Well  No Chewing/Swallowing Difficulties  No Recent Nausea, Vomiting, Diarrhea or Constipation  Consumes % of Meals (as Per Documentation) - States Good PO Intake/Appetite   Obtained Food Preferences from Patient    Enteral/Parenteral Nutrition: Not Applicable    Current Weight: 210.1lb on 10/10  Obtain New Weight  Obtain Weights Weekly     Pertinent Medications: MEDICATIONS  (STANDING):  amLODIPine   Tablet 5 milliGRAM(s) Oral daily  atorvastatin 10 milliGRAM(s) Oral at bedtime  cholecalciferol 1000 Unit(s) Oral daily  dextrose 5%. 1000 milliLiter(s) (50 mL/Hr) IV Continuous <Continuous>  dextrose 50% Injectable 12.5 Gram(s) IV Push once  dextrose 50% Injectable 25 Gram(s) IV Push once  dextrose 50% Injectable 25 Gram(s) IV Push once  DULoxetine 120 milliGRAM(s) Oral at bedtime  enoxaparin Injectable 40 milliGRAM(s) SubCutaneous <User Schedule>  fenofibrate Tablet 145 milliGRAM(s) Oral daily  hydrOXYzine hydrochloride 50 milliGRAM(s) Oral at bedtime  lidocaine   Patch 1 Patch Transdermal every 24 hours  magnesium oxide 400 milliGRAM(s) Oral daily  metoprolol tartrate 25 milliGRAM(s) Oral two times a day  multivitamin 1 Tablet(s) Oral daily  oxyCODONE    IR 10 milliGRAM(s) Oral <User Schedule>  pantoprazole    Tablet 40 milliGRAM(s) Oral before breakfast  polyethylene glycol 3350 17 Gram(s) Oral daily  saccharomyces boulardii 250 milliGRAM(s) Oral two times a day    MEDICATIONS  (PRN):  aluminum hydroxide/magnesium hydroxide/simethicone Suspension 30 milliLiter(s) Oral every 4 hours PRN Dyspepsia  cyclobenzaprine 10 milliGRAM(s) Oral three times a day PRN Muscle Spasm  dextrose 40% Gel 15 Gram(s) Oral once PRN Blood Glucose LESS THAN 70 milliGRAM(s)/deciliter  diphenhydrAMINE 25 milliGRAM(s) Oral every 4 hours PRN Rash and/or Itching  glucagon  Injectable 1 milliGRAM(s) IntraMuscular once PRN Glucose LESS THAN 70 milligrams/deciliter  oxyCODONE    IR 5 milliGRAM(s) Oral every 6 hours PRN Moderate Pain (4 - 6)  oxyCODONE    IR 10 milliGRAM(s) Oral every 6 hours PRN Severe Pain (7 - 10)  senna 2 Tablet(s) Oral at bedtime PRN Constipation  SUMAtriptan 25 milliGRAM(s) Oral four times a day PRN Migraine    Pertinent Labs:  10-26 Na144 mmol/L Glu 124 mg/dL<H> K+ 3.6 mmol/L Cr  0.79 mg/dL BUN 15 mg/dL    Skin: No Pressure Ulcers   Multiple Surgical Incisions  (as Per Nursing Flow Sheet)    Edema: None Noted     Last Bowel Movement: on 10/26    Estimated Needs:   [X] No Change Since Previous Assessment    Previous Nutrition Diagnosis:   Obese    Nutrition Diagnosis is [X] Ongoing - Continue Nutrition Plan of Care     New Nutrition Diagnosis: [X] Not Applicable    Interventions:   1. Recommend Continue Nutrition Plan of Care     Monitoring & Evaluation:   [X] Weights   [X] PO Intake   [X] Skin Integrity   [X] Follow Up (Per Protocol)  [X] Tolerance to Diet Prescription   [X] Other: Labs & POCT    Registered Dietitian/Nutritionist Remains Available.  Terrence Gamez RDN    Pager # [phone]  Phone# [phone]

## 2020-10-27 NOTE — PROGRESS NOTE ADULT - ASSESSMENT
59 y/o F with PMHx HTN, HLD, osteoarthritis, anxiety/depression disorders, GERD, eczema, sinusitis, scoliosis/spinal stenosis- lumbar region, presented to [facility] 9/29/20 for scheduled stage 1, L1-5 lumbar lateral interbody fusion with Dr. Valdez. Stage 2 T9-pelvis arthrodesis, L5-S1 TLIF done on 9/30/20 with closure of muscle flap done by plastics, Dr. Alegre. Post-op course complicated by hemorrhagic shock requiring transfer to ICU, fluid resuscitation with IV hydration and pressors. EBL 1500 cc, s/p 2 u prbc. Patient was weaned off IV pressors 10/1/20 and started on midodrine. Surgical drains removed on 10/7/20. Plastic surgery following for incision management recommended Aquacel dressing changed on 10/3 and 10/8. Patient was transferred to [facility] on 10/10/20 for comprehensive rehab.     #S/p L1-5 lumbar fusion, T9-pelvis arthrodesis, L5-S1 TLIF (9/30/20)  -comprehensive rehab per primary team  -Pain management and wound care per primary team    #Fall  -Mechanical fall  -No head injury or LOC  -No concern for any acute fractures  -Xray of spine and right knee negative for acute pathology  -Fall precautions    #UTI  -Completed 4 day course of Keflex    #Post-op hypotension  #HTN  -Above goal today, will monitor and   -Continue Amlodipine 5mg daily  -Continue Lopressor 25mg BID  -Home meds Norvasc 5mg daily, Bystolic 10mg QAM, Edarbi 40mg QHS    #Pre-DM, a1c 6.4  -Consistent Carbohydrate Diet  -Fingersticks well controlled    #HLD  -Cont fenofibrate/Statin    #Postop anemia  -H/H stable  -transfuse for Hb < 7.0  -Hold ASA, NSAIDs    #Leukocytosis  -Resolved    #Depression, Anxiety  -Mood stable   -Cont cymbalta   -Neuropsych consult reviewed    #Migraines  -Cont sumatriptan     #DVT ppx   - Lovenox

## 2020-10-27 NOTE — DISCHARGE NOTE PROVIDER - NSDCMRMEDTOKEN_GEN_ALL_CORE_FT
amLODIPine 5 mg oral tablet: 1 tab(s) orally once a day  atorvastatin 10 mg oral tablet: 1 tab(s) orally once a day, evening   Benadryl 25 mg oral capsule: 1 cap(s) orally in the morning x allergies  Bystolic 10 mg oral tablet: 1 tab(s) orally once a day, in the morning   Centrum oral tablet: 1 tab(s) orally once a day  CoQ10 300 mg oral capsule: 1 cap(s) orally once a day  cyclobenzaprine 10 mg oral tablet: 1 tab(s) orally 3 times a day, PRN works for muscle spasm secondary to and back pain   Cymbalta 60 mg oral delayed release capsule: 2 cap(s) orally once a day x anxiety and body pain  Edarbi 40 mg oral tablet: 1 tab(s) orally once a day, evening x HTN  enoxaparin: 40 milligram(s) subcutaneous once a day (at bedtime)  Frova 2.5 mg oral tablet: 1 tab(s) orally once a day, PRN migrains  magnesium oxide 400 mg (241.3 mg elemental magnesium) oral tablet: 1 tab(s) orally once a day, evening  x heart health  Melatonin 10 mg oral capsule: 1 cap(s) orally once a day (at bedtime)  MiraLax oral powder for reconstitution:   Multiple Vitamins oral tablet: 1 tab(s) orally once a day  pantoprazole 40 mg oral delayed release tablet: 1 tab(s) orally once a day x acid reflux  vitamin E 400 intl units oral capsule: 1 cap(s) orally once a day  Zetia 10 mg oral tablet: 1 tab(s) orally once a day   atorvastatin 10 mg oral tablet: 1 tab(s) orally once a day, evening   Cymbalta 60 mg oral delayed release capsule: 2 cap(s) orally once a day x anxiety and body pain  fenofibrate 145 mg oral tablet: 1 tab(s) orally once a day  Multiple Vitamins oral tablet: 1 tab(s) orally once a day  SUMAtriptan 25 mg oral tablet: 1 tab(s) orally 4 times a day, As needed, Migraine   amLODIPine 5 mg oral tablet: 1 tab(s) orally once a day  atorvastatin 10 mg oral tablet: 1 tab(s) orally once a day, evening   cholecalciferol 1000 intl units (25 mcg) oral tablet: 1  orally once a day   Cymbalta 60 mg oral delayed release capsule: 2 cap(s) orally once a day x anxiety and body pain  dexamethasone: 6 milligram(s) intracavernously once a day   fenofibrate 145 mg oral tablet: 1 tab(s) orally once a day  guaiFENesin 100 mg/5 mL oral liquid: 5 milliliter(s) orally every 6 hours, As needed, Cough  hydrOXYzine hydrochloride 50 mg oral tablet: 1 tab(s) orally once a day (at bedtime)  Lovenox 40 mg/0.4 mL injectable solution: 40 milligram(s) subcutaneously once a day   magnesium oxide 400 mg (241.3 mg elemental magnesium) oral tablet: 1 tab(s) orally once a day  metoprolol tartrate 25 mg oral tablet: 1 tab(s) orally 2 times a day  Multiple Vitamins oral tablet: 1 tab(s) orally once a day  oxyCODONE 10 mg oral tablet: 1 tab(s) orally every 6 hours, As needed, Severe Pain (7 - 10) MDD:4 tablets  pantoprazole 40 mg oral delayed release tablet: 1 tab(s) orally once a day (before a meal)  polyethylene glycol 3350 oral powder for reconstitution: 17 gram(s) orally once a day  senna oral tablet: 2 tab(s) orally once a day (at bedtime), As needed, Constipation  SUMAtriptan 25 mg oral tablet: 1 tab(s) orally 4 times a day, As needed, Migraine

## 2020-10-27 NOTE — PROGRESS NOTE ADULT - SUBJECTIVE AND OBJECTIVE BOX
Patient is a 60y old  Female who presents with a chief complaint of functional deficits due to L1-L5 lumbar fusion, T9-pelvis arthrodesis, L5-S1 TLIF  03.9 Other Neurologic (26 Oct 2020 14:47)    Patient seen and examined at bedside. No acute overnight events.     ALLERGIES:  penicillin (Other)    MEDICATIONS  (STANDING):  amLODIPine   Tablet 5 milliGRAM(s) Oral daily  atorvastatin 10 milliGRAM(s) Oral at bedtime  cholecalciferol 1000 Unit(s) Oral daily  dextrose 5%. 1000 milliLiter(s) (50 mL/Hr) IV Continuous <Continuous>  dextrose 50% Injectable 12.5 Gram(s) IV Push once  dextrose 50% Injectable 25 Gram(s) IV Push once  dextrose 50% Injectable 25 Gram(s) IV Push once  DULoxetine 120 milliGRAM(s) Oral at bedtime  enoxaparin Injectable 40 milliGRAM(s) SubCutaneous <User Schedule>  fenofibrate Tablet 145 milliGRAM(s) Oral daily  hydrOXYzine hydrochloride 50 milliGRAM(s) Oral at bedtime  lidocaine   Patch 1 Patch Transdermal every 24 hours  magnesium oxide 400 milliGRAM(s) Oral daily  metoprolol tartrate 25 milliGRAM(s) Oral two times a day  multivitamin 1 Tablet(s) Oral daily  oxyCODONE    IR 10 milliGRAM(s) Oral <User Schedule>  pantoprazole    Tablet 40 milliGRAM(s) Oral before breakfast  polyethylene glycol 3350 17 Gram(s) Oral daily  saccharomyces boulardii 250 milliGRAM(s) Oral two times a day    MEDICATIONS  (PRN):  aluminum hydroxide/magnesium hydroxide/simethicone Suspension 30 milliLiter(s) Oral every 4 hours PRN Dyspepsia  cyclobenzaprine 10 milliGRAM(s) Oral three times a day PRN Muscle Spasm  dextrose 40% Gel 15 Gram(s) Oral once PRN Blood Glucose LESS THAN 70 milliGRAM(s)/deciliter  diphenhydrAMINE 25 milliGRAM(s) Oral every 4 hours PRN Rash and/or Itching  glucagon  Injectable 1 milliGRAM(s) IntraMuscular once PRN Glucose LESS THAN 70 milligrams/deciliter  oxyCODONE    IR 5 milliGRAM(s) Oral every 6 hours PRN Moderate Pain (4 - 6)  oxyCODONE    IR 10 milliGRAM(s) Oral every 6 hours PRN Severe Pain (7 - 10)  senna 2 Tablet(s) Oral at bedtime PRN Constipation  SUMAtriptan 25 milliGRAM(s) Oral four times a day PRN Migraine    Vital Signs Last 24 Hrs  T(F): 97.4 (27 Oct 2020 07:43), Max: 98.4 (26 Oct 2020 20:44)  HR: 78 (27 Oct 2020 07:43) (78 - 82)  BP: 150/80 (27 Oct 2020 07:43) (115/65 - 150/80)  RR: 15 (27 Oct 2020 07:43) (15 - 15)  SpO2: 98% (27 Oct 2020 07:43) (94% - 98%)  I&O's Summary    PHYSICAL EXAM:  General: NAD, A/O x 3  ENT: MMM, no tonsilar exudate  Neck: Supple, No JVD  Lungs: Clear to auscultation bilaterally, no wheezes. Good air entry bilaterally   Cardio: RRR, S1/S2, No murmurs  Abdomen: Soft, Nontender, Nondistended; Bowel sounds present  Extremities: No calf tenderness, No pitting edema, +R knee abrasion    LABS:                        11.7   5.06  )-----------( 418      ( 26 Oct 2020 06:15 )             37.3       10-26    144  |  106  |  15  ----------------------------<  124  3.6   |  30  |  0.79    Ca    9.3      26 Oct 2020 06:15    eGFR if Non African American: 82 mL/min/1.73M2 (10-26-20 @ 06:15)  eGFR if African American: 95 mL/min/1.73M2 (10-26-20 @ 06:15)

## 2020-10-28 ENCOUNTER — TRANSCRIPTION ENCOUNTER (OUTPATIENT)
Age: 60
End: 2020-10-28

## 2020-10-28 LAB
GLUCOSE BLDC GLUCOMTR-MCNC: 126 MG/DL — HIGH (ref 70–99)
SARS-COV-2 RNA SPEC QL NAA+PROBE: SIGNIFICANT CHANGE UP

## 2020-10-28 PROCEDURE — 99232 SBSQ HOSP IP/OBS MODERATE 35: CPT

## 2020-10-28 RX ORDER — OXYCODONE HYDROCHLORIDE 5 MG/1
1 TABLET ORAL
Qty: 28 | Refills: 0
Start: 2020-10-28 | End: 2020-11-03

## 2020-10-28 RX ORDER — FENOFIBRATE,MICRONIZED 130 MG
1 CAPSULE ORAL
Qty: 0 | Refills: 0 | DISCHARGE
Start: 2020-10-28

## 2020-10-28 RX ORDER — PANTOPRAZOLE SODIUM 20 MG/1
1 TABLET, DELAYED RELEASE ORAL
Qty: 0 | Refills: 0 | DISCHARGE

## 2020-10-28 RX ORDER — OXYCODONE HYDROCHLORIDE 5 MG/1
5 TABLET ORAL EVERY 6 HOURS
Refills: 0 | Status: DISCONTINUED | OUTPATIENT
Start: 2020-10-28 | End: 2020-11-02

## 2020-10-28 RX ORDER — VITAMIN E 100 UNIT
1 CAPSULE ORAL
Qty: 0 | Refills: 0 | DISCHARGE

## 2020-10-28 RX ORDER — FROVATRIPTAN SUCCINATE 2.5 MG/1
1 TABLET, FILM COATED ORAL
Qty: 0 | Refills: 0 | DISCHARGE

## 2020-10-28 RX ORDER — AMLODIPINE BESYLATE 2.5 MG/1
1 TABLET ORAL
Qty: 30 | Refills: 0
Start: 2020-10-28 | End: 2020-11-26

## 2020-10-28 RX ORDER — AZILSARTAN KAMEDOXOMIL 40 MG/1
1 TABLET ORAL
Qty: 0 | Refills: 0 | DISCHARGE

## 2020-10-28 RX ORDER — UBIDECARENONE 100 MG
1 CAPSULE ORAL
Qty: 0 | Refills: 0 | DISCHARGE

## 2020-10-28 RX ORDER — MULTIVIT-MIN/FERROUS GLUCONATE 9 MG/15 ML
1 LIQUID (ML) ORAL
Qty: 0 | Refills: 0 | DISCHARGE

## 2020-10-28 RX ORDER — NEBIVOLOL HYDROCHLORIDE 5 MG/1
1 TABLET ORAL
Qty: 0 | Refills: 0 | DISCHARGE

## 2020-10-28 RX ORDER — LANOLIN ALCOHOL/MO/W.PET/CERES
1 CREAM (GRAM) TOPICAL
Qty: 0 | Refills: 0 | DISCHARGE

## 2020-10-28 RX ORDER — CYCLOBENZAPRINE HYDROCHLORIDE 10 MG/1
1 TABLET, FILM COATED ORAL
Qty: 0 | Refills: 0 | DISCHARGE

## 2020-10-28 RX ORDER — POLYETHYLENE GLYCOL 3350 17 G/17G
0 POWDER, FOR SOLUTION ORAL
Qty: 0 | Refills: 0 | DISCHARGE

## 2020-10-28 RX ORDER — OXYCODONE HYDROCHLORIDE 5 MG/1
10 TABLET ORAL EVERY 6 HOURS
Refills: 0 | Status: DISCONTINUED | OUTPATIENT
Start: 2020-10-28 | End: 2020-11-02

## 2020-10-28 RX ORDER — PANTOPRAZOLE SODIUM 20 MG/1
1 TABLET, DELAYED RELEASE ORAL
Qty: 30 | Refills: 0
Start: 2020-10-28 | End: 2020-11-26

## 2020-10-28 RX ORDER — CYCLOBENZAPRINE HYDROCHLORIDE 10 MG/1
1 TABLET, FILM COATED ORAL
Qty: 90 | Refills: 0
Start: 2020-10-28 | End: 2020-11-26

## 2020-10-28 RX ORDER — MAGNESIUM OXIDE 400 MG ORAL TABLET 241.3 MG
1 TABLET ORAL
Qty: 0 | Refills: 0 | DISCHARGE

## 2020-10-28 RX ORDER — HYDROXYZINE HCL 10 MG
1 TABLET ORAL
Qty: 30 | Refills: 0
Start: 2020-10-28 | End: 2020-11-26

## 2020-10-28 RX ORDER — METOPROLOL TARTRATE 50 MG
1 TABLET ORAL
Qty: 60 | Refills: 0
Start: 2020-10-28 | End: 2020-11-26

## 2020-10-28 RX ORDER — EZETIMIBE 10 MG/1
1 TABLET ORAL
Qty: 0 | Refills: 0 | DISCHARGE

## 2020-10-28 RX ORDER — MAGNESIUM OXIDE 400 MG ORAL TABLET 241.3 MG
1 TABLET ORAL
Qty: 30 | Refills: 0
Start: 2020-10-28 | End: 2020-11-26

## 2020-10-28 RX ORDER — DIPHENHYDRAMINE HCL 50 MG
1 CAPSULE ORAL
Qty: 0 | Refills: 0 | DISCHARGE

## 2020-10-28 RX ORDER — SUMATRIPTAN SUCCINATE 4 MG/.5ML
1 INJECTION, SOLUTION SUBCUTANEOUS
Qty: 0 | Refills: 0 | DISCHARGE
Start: 2020-10-28

## 2020-10-28 RX ORDER — AMLODIPINE BESYLATE 2.5 MG/1
1 TABLET ORAL
Qty: 0 | Refills: 0 | DISCHARGE

## 2020-10-28 RX ADMIN — ENOXAPARIN SODIUM 40 MILLIGRAM(S): 100 INJECTION SUBCUTANEOUS at 21:15

## 2020-10-28 RX ADMIN — DULOXETINE HYDROCHLORIDE 120 MILLIGRAM(S): 30 CAPSULE, DELAYED RELEASE ORAL at 21:14

## 2020-10-28 RX ADMIN — Medication 1000 UNIT(S): at 11:28

## 2020-10-28 RX ADMIN — OXYCODONE HYDROCHLORIDE 10 MILLIGRAM(S): 5 TABLET ORAL at 20:21

## 2020-10-28 RX ADMIN — MAGNESIUM OXIDE 400 MG ORAL TABLET 400 MILLIGRAM(S): 241.3 TABLET ORAL at 11:28

## 2020-10-28 RX ADMIN — CYCLOBENZAPRINE HYDROCHLORIDE 10 MILLIGRAM(S): 10 TABLET, FILM COATED ORAL at 20:23

## 2020-10-28 RX ADMIN — PANTOPRAZOLE SODIUM 40 MILLIGRAM(S): 20 TABLET, DELAYED RELEASE ORAL at 05:36

## 2020-10-28 RX ADMIN — LIDOCAINE 1 PATCH: 4 CREAM TOPICAL at 11:28

## 2020-10-28 RX ADMIN — LIDOCAINE 1 PATCH: 4 CREAM TOPICAL at 23:29

## 2020-10-28 RX ADMIN — Medication 1 TABLET(S): at 11:28

## 2020-10-28 RX ADMIN — Medication 250 MILLIGRAM(S): at 17:37

## 2020-10-28 RX ADMIN — Medication 250 MILLIGRAM(S): at 05:36

## 2020-10-28 RX ADMIN — LIDOCAINE 1 PATCH: 4 CREAM TOPICAL at 00:55

## 2020-10-28 RX ADMIN — ATORVASTATIN CALCIUM 10 MILLIGRAM(S): 80 TABLET, FILM COATED ORAL at 21:14

## 2020-10-28 RX ADMIN — OXYCODONE HYDROCHLORIDE 10 MILLIGRAM(S): 5 TABLET ORAL at 12:36

## 2020-10-28 RX ADMIN — POLYETHYLENE GLYCOL 3350 17 GRAM(S): 17 POWDER, FOR SOLUTION ORAL at 11:28

## 2020-10-28 RX ADMIN — Medication 145 MILLIGRAM(S): at 11:28

## 2020-10-28 RX ADMIN — Medication 25 MILLIGRAM(S): at 17:36

## 2020-10-28 RX ADMIN — Medication 25 MILLIGRAM(S): at 05:35

## 2020-10-28 RX ADMIN — AMLODIPINE BESYLATE 5 MILLIGRAM(S): 2.5 TABLET ORAL at 05:35

## 2020-10-28 RX ADMIN — Medication 50 MILLIGRAM(S): at 21:14

## 2020-10-28 RX ADMIN — OXYCODONE HYDROCHLORIDE 10 MILLIGRAM(S): 5 TABLET ORAL at 09:01

## 2020-10-28 RX ADMIN — OXYCODONE HYDROCHLORIDE 10 MILLIGRAM(S): 5 TABLET ORAL at 08:08

## 2020-10-28 RX ADMIN — LIDOCAINE 1 PATCH: 4 CREAM TOPICAL at 21:17

## 2020-10-28 RX ADMIN — OXYCODONE HYDROCHLORIDE 10 MILLIGRAM(S): 5 TABLET ORAL at 20:58

## 2020-10-28 RX ADMIN — OXYCODONE HYDROCHLORIDE 10 MILLIGRAM(S): 5 TABLET ORAL at 13:21

## 2020-10-28 NOTE — PROGRESS NOTE ADULT - ASSESSMENT
CRISTEL WATTS is a HTN, hyperlipidemia, osteoarthritis, anxiety/depression disorders, GERD, eczema, sinusitis, scoliosis/spinal stenosis- lumbar region, presented to [facility] 9/29/20 for scheduled stage 1, L1-5 lumbar lateral interbody fusion with Dr. Valdez. Stage 2 T9-pelvis arthrodesis, L5-S1 TLIF done on 9/30/20 with closure of muscle flap done by plastics, Dr. Alegre. Hospital course complicated by hemorrhagic shock postop requiring pressors and blood transfusions, now stable on Midodrine. Now admitted to [facility] after for initiation of a multidisciplinary rehab program consisting focused on functional mobility, transfers and ADLs (activities of daily living).    #Elective L1-5 lumbar fusion, T9-pelvis arthrodesis, L5-S1 TLIF 9/29-9/30  - comprehensive multidisciplinary rehab program, PT/OT 3 hours a day, 5 days a week.  ANTICIPATE DC IN AM  - Wound Care:  Staples/sutures dcd   dressing Dcd -Open to air     Patient may shower  - no celebrex per NS  - F/u with neurosurgery, Dr. Valdez, outpatient  - F/u with plastics in 1-2 weeks with Dr. Alegre in clinic  - No weight bearing restrictions  - Precautions: falls, spine, neuro  Xray TL spine as per NS- reviewed with hardware intact  lidoderm to Right ribs        #Hypotension postop - improved  -Will Dc midodrine- SYST BPs 130-140  -Norvasc 5mg daily restarted by hospitalist, lopressor 25mg Q12h added,Edarbi 40mg QHSstill on hold  - BP elevated today- consider resuming edarbi on DC    #Pre-DM  - HbA1c 6.4 9/29/20  - FS  10/9   fingersticks  fasting  - Monitor FS.    #SP Fall  fall precautions  OT did address toileting while seated    #Depression/Anxiety:  - Continue Cymbalta 60mg BID  neuropsychology consult noted    #Migraines  - Sumatriptan 25mg Q4hr PRN    # UTI  urineC&S proteus 10-49k  finished antibiotics  clinically asymptomatic    #Allergies  - Continue Benadryl 25mg Q4hr PRN    Pain Management:  - Flexeril 10mg TID PRN  timed oxycodone 10mg with breakfast and lunch daily before Rehab    GI/Bowel:  - At risk for constipation due to neurologic diagnosis, immobility and/or medication use  - daily miralax restarted at patient request  - GI ppx: Protonix daily, Maalox PRN    /Bladder:   - At risk for incontinence and retention due to neurologic diagnosis and limited mobility  - Currently patient voids: independent  - Encourage timed voids every 4 hours while awake for independence and to promote continence during therapy.    Skin/Pressure Injury:   - Skin assessment on admission: no pressure injuries noted   - Monitor Incisions: Thoraco-lumbar incision and left flank incision - intact  - Turn every 2 hours while in bed, air mattress  - Soft heel protectors  - Skin barrier cream as needed  - Nursing to monitor skin Qshift    Diet:  - Diet Consistency/Modifications: regular/DASH    DVT ppx:  - Lovenox  - SCDs

## 2020-10-28 NOTE — PROGRESS NOTE ADULT - SUBJECTIVE AND OBJECTIVE BOX
60 year old female with PMHx HTN, hyperlipidemia, osteoarthritis, anxiety/depression disorders, GERD, eczema, sinusitis, scoliosis/spinal stenosis- lumbar region, presented to [facility] 9/29/20 for scheduled stage 1, L1-5 lumbar lateral interbody fusion with Dr. Valdez. Stage 2 T9-pelvis arthrodesis, L5-S1 TLIF done on 9/30/20 with closure of muscle flap done by plastics, Dr. Alegre. Admitted to NSICU post operatively due to hemorrhagic shock requiring fluid resuscitation with IV hydration and pressors. EBL 1500 cc, s/p 2 u prbc. Patient was weaned off IV pressors 10/1/20 and started on midodrine. Transferred to the floor when stable. Surgical drains removed on 10/7/20. Plastic surgery following for incision management. Aquacel dressing changed on 10/3 and 10/8. Hgb remains stable. Pt currently off antihypertensives, now on midodrine- wean to off as indicated.     Patient is medically and neurologically stable for discharge to [place] for multidisciplinary acute rehab 10/10/20.  Patient arrived hemodynamically stable condition. Seen at bedside with her  present. She reports bilateral shoulder pain due to history of OA (had injections prior to being admitted for spine surgery).  Currently reports 7/10 with movement.  She endorses 2-3 episodes of diarrhea the last two days. Denies abdominal pain, fevers, chills.       ROS - patient states bowel and bladder control is back to normal  no incontinence  today CO soreness in abdominal musculature  can take flexeril but uses Oxycodone instead  numbness RLE from knee down persists  Some numbness anteriorly around waist  muscle/back soreness  increased motor function Left foot but still weak  no N,V  No dizziness, no headaches  no Chest pain, no SOB  spoke with NS- no celebrex X 6-12 months      MEDICATIONS  (STANDING):  amLODIPine   Tablet 5 milliGRAM(s) Oral daily  atorvastatin 10 milliGRAM(s) Oral at bedtime  cholecalciferol 1000 Unit(s) Oral daily  dextrose 5%. 1000 milliLiter(s) (50 mL/Hr) IV Continuous <Continuous>  dextrose 50% Injectable 12.5 Gram(s) IV Push once  dextrose 50% Injectable 25 Gram(s) IV Push once  dextrose 50% Injectable 25 Gram(s) IV Push once  DULoxetine 120 milliGRAM(s) Oral at bedtime  enoxaparin Injectable 40 milliGRAM(s) SubCutaneous <User Schedule>  fenofibrate Tablet 145 milliGRAM(s) Oral daily  hydrOXYzine hydrochloride 50 milliGRAM(s) Oral at bedtime  lidocaine   Patch 1 Patch Transdermal every 24 hours  magnesium oxide 400 milliGRAM(s) Oral daily  metoprolol tartrate 25 milliGRAM(s) Oral two times a day  multivitamin 1 Tablet(s) Oral daily  oxyCODONE    IR 10 milliGRAM(s) Oral <User Schedule>  pantoprazole    Tablet 40 milliGRAM(s) Oral before breakfast  polyethylene glycol 3350 17 Gram(s) Oral daily  saccharomyces boulardii 250 milliGRAM(s) Oral two times a day    MEDICATIONS  (PRN):  aluminum hydroxide/magnesium hydroxide/simethicone Suspension 30 milliLiter(s) Oral every 4 hours PRN Dyspepsia  cyclobenzaprine 10 milliGRAM(s) Oral three times a day PRN Muscle Spasm  dextrose 40% Gel 15 Gram(s) Oral once PRN Blood Glucose LESS THAN 70 milliGRAM(s)/deciliter  diphenhydrAMINE 25 milliGRAM(s) Oral every 4 hours PRN Rash and/or Itching  glucagon  Injectable 1 milliGRAM(s) IntraMuscular once PRN Glucose LESS THAN 70 milligrams/deciliter  oxyCODONE    IR 5 milliGRAM(s) Oral every 6 hours PRN Moderate Pain (4 - 6)  oxyCODONE    IR 10 milliGRAM(s) Oral every 6 hours PRN Severe Pain (7 - 10)  senna 2 Tablet(s) Oral at bedtime PRN Constipation  SUMAtriptan 25 milliGRAM(s) Oral four times a day PRN Migraine                  CAPILLARY BLOOD GLUCOSE      POCT Blood Glucose.: 126 mg/dL (28 Oct 2020 08:04)      Vital Signs Last 24 Hrs  T(C): 36.5 (28 Oct 2020 07:32), Max: 36.8 (27 Oct 2020 21:03)  T(F): 97.7 (28 Oct 2020 07:32), Max: 98.2 (27 Oct 2020 21:03)  HR: 82 (28 Oct 2020 07:32) (82 - 92)  BP: 161/96 (28 Oct 2020 07:32) (145/73 - 165/85)  BP(mean): --  RR: 14 (28 Oct 2020 07:32) (14 - 15)  SpO2: 97% (27 Oct 2020 21:03) (97% - 97%)                         11.7   5.06  )-----------( 418      ( 26 Oct 2020 06:15 )             37.3     10-26    144  |  106  |  15  ----------------------------<  124<H>  3.6   |  30  |  0.79    Ca    9.3      26 Oct 2020 06:15    Constitutional - NAD, Comfortable, lying in bed  HEENT - NCAT, EOMI  Chest - Breathing comfortably  Cardiovascular - RRR  Abdomen - Soft, mildly distended, hyperactive bowel sounds  Extremities - No C/C/E, No calf tenderness  Right ribs Sl tender to palpation  Neurologic Exam -                    Cognitive - Awake, Alert, AAO to self, place, date, year, situation     Communication - Fluent, No dysarthria     Cranial Nerves - no facial asymmetry, shoulder shrug intact,      Motor -                     LEFT    UE - ShAB 5/5, EF 5/5, EE 5/5, WE 5/5,  5/5                    RIGHT UE - ShAB 5/5, EF 5/5, EE 5/5, WE 5/5,  5/5                    LEFT    LE - HF 4/5, KE 4/5, DF 3+/5, PF 5/5 EHL 5/5                    RIGHT LE - HF 3/5, KE 4/5, DF 4/5, PF 5/5        Sensory - decreased to light touch medial leg on RIGHT and dorsum of foot on LEFT but better     Reflexes - DTR Intact, No primitive reflexes     Psychiatric - Mood stable, Affect WNL     Skin:         thoraco-lumbar surgical incision intact- sutures out         Left flank incision with staples dcd         Healed drain incisions lower lumbar          No pressure injuries noted on sacrum, coccyx, or heels      IDT meeting 10/28    OT  independent eating/grooming  sup bathing  UB dressing I  LB dressing Sup  toileting sup  toilet transfers sup/CG  shower min A  PT  sup transfers  sup '  4 steps one rail CG/min A  Occ buckling Right knee but patient corrects      Tentative dc to home with  and home services on 10/29

## 2020-10-28 NOTE — DISCHARGE NOTE NURSING/CASE MANAGEMENT/SOCIAL WORK - NSDCFUADDAPPT_GEN_ALL_CORE_FT
-Follow up with Dr Valdez and Dr Alegre in 1-2 weeks.     -Follow up with your PCP within 7 days.     -For any concerns or questions, feel free to contact Lavern LITTLEJOHN at [phone] or [phone].

## 2020-10-28 NOTE — PROGRESS NOTE ADULT - SUBJECTIVE AND OBJECTIVE BOX
Patient is a 60y old  Female who presents with a chief complaint of functional deficits due to L1-L5 lumbar fusion, T9-pelvis arthrodesis, L5-S1 TLIF  03.9 Other Neurologic (27 Oct 2020 17:41)    Patient seen and examined at bedside. No acute overnight events.    ALLERGIES:  penicillin (Other)    MEDICATIONS  (STANDING):  amLODIPine   Tablet 5 milliGRAM(s) Oral daily  atorvastatin 10 milliGRAM(s) Oral at bedtime  cholecalciferol 1000 Unit(s) Oral daily  dextrose 5%. 1000 milliLiter(s) (50 mL/Hr) IV Continuous <Continuous>  dextrose 50% Injectable 12.5 Gram(s) IV Push once  dextrose 50% Injectable 25 Gram(s) IV Push once  dextrose 50% Injectable 25 Gram(s) IV Push once  DULoxetine 120 milliGRAM(s) Oral at bedtime  enoxaparin Injectable 40 milliGRAM(s) SubCutaneous <User Schedule>  fenofibrate Tablet 145 milliGRAM(s) Oral daily  hydrOXYzine hydrochloride 50 milliGRAM(s) Oral at bedtime  lidocaine   Patch 1 Patch Transdermal every 24 hours  magnesium oxide 400 milliGRAM(s) Oral daily  metoprolol tartrate 25 milliGRAM(s) Oral two times a day  multivitamin 1 Tablet(s) Oral daily  oxyCODONE    IR 10 milliGRAM(s) Oral <User Schedule>  pantoprazole    Tablet 40 milliGRAM(s) Oral before breakfast  polyethylene glycol 3350 17 Gram(s) Oral daily  saccharomyces boulardii 250 milliGRAM(s) Oral two times a day    MEDICATIONS  (PRN):  aluminum hydroxide/magnesium hydroxide/simethicone Suspension 30 milliLiter(s) Oral every 4 hours PRN Dyspepsia  cyclobenzaprine 10 milliGRAM(s) Oral three times a day PRN Muscle Spasm  dextrose 40% Gel 15 Gram(s) Oral once PRN Blood Glucose LESS THAN 70 milliGRAM(s)/deciliter  diphenhydrAMINE 25 milliGRAM(s) Oral every 4 hours PRN Rash and/or Itching  glucagon  Injectable 1 milliGRAM(s) IntraMuscular once PRN Glucose LESS THAN 70 milligrams/deciliter  oxyCODONE    IR 5 milliGRAM(s) Oral every 6 hours PRN Moderate Pain (4 - 6)  oxyCODONE    IR 10 milliGRAM(s) Oral every 6 hours PRN Severe Pain (7 - 10)  senna 2 Tablet(s) Oral at bedtime PRN Constipation  SUMAtriptan 25 milliGRAM(s) Oral four times a day PRN Migraine    Vital Signs Last 24 Hrs  T(F): 97.7 (28 Oct 2020 07:32), Max: 98.2 (27 Oct 2020 21:03)  HR: 82 (28 Oct 2020 07:32) (82 - 93)  BP: 161/96 (28 Oct 2020 07:32) (128/75 - 165/85)  RR: 14 (28 Oct 2020 07:32) (14 - 15)  SpO2: 97% (27 Oct 2020 21:03) (97% - 97%)  I&O's Summary    PHYSICAL EXAM:  General: NAD, A/O x 3  ENT: MMM, no tonsilar exudate  Neck: Supple, No JVD  Lungs: Clear to auscultation bilaterally, no wheezes. Good air entry bilaterally   Cardio: RRR, S1/S2, No murmurs  Abdomen: Soft, Nontender, Nondistended; Bowel sounds present  Extremities: No calf tenderness, No pitting edema    LABS:                        11.7   5.06  )-----------( 418      ( 26 Oct 2020 06:15 )             37.3       10-26    144  |  106  |  15  ----------------------------<  124  3.6   |  30  |  0.79    Ca    9.3      26 Oct 2020 06:15       eGFR if Non African American: 82 mL/min/1.73M2 (10-26-20 @ 06:15)  eGFR if African American: 95 mL/min/1.73M2 (10-26-20 @ 06:15)    POCT Blood Glucose.: 126 mg/dL (28 Oct 2020 08:04)

## 2020-10-28 NOTE — PROGRESS NOTE ADULT - ASSESSMENT
59 y/o F with PMHx HTN, HLD, osteoarthritis, anxiety/depression disorders, GERD, eczema, sinusitis, scoliosis/spinal stenosis- lumbar region, presented to [facility] 9/29/20 for scheduled stage 1, L1-5 lumbar lateral interbody fusion with Dr. Valdez. Stage 2 T9-pelvis arthrodesis, L5-S1 TLIF done on 9/30/20 with closure of muscle flap done by plastics, Dr. Alegre. Post-op course complicated by hemorrhagic shock requiring transfer to ICU, fluid resuscitation with IV hydration and pressors. EBL 1500 cc, s/p 2 u prbc. Patient was weaned off IV pressors 10/1/20 and started on midodrine. Surgical drains removed on 10/7/20. Plastic surgery following for incision management recommended Aquacel dressing changed on 10/3 and 10/8. Patient was transferred to [facility] on 10/10/20 for comprehensive rehab.     #S/p L1-5 lumbar fusion, T9-pelvis arthrodesis, L5-S1 TLIF (9/30/20)  -comprehensive rehab per primary team  -Pain management and wound care per primary team    #Fall  -Mechanical fall  -No head injury or LOC  -No concern for any acute fractures  -Xray of spine and right knee negative for acute pathology  -Fall precautions    #UTI  -Completed 4 day course of Keflex    #Post-op hypotension  #HTN  -Above goal today, will monitor and adjust  -Continue Amlodipine 5mg daily  -Continue Lopressor 25mg BID  -Home meds Norvasc 5mg daily, Bystolic 10mg QAM, Edarbi 40mg QHS    #Pre-DM, a1c 6.4  -Consistent Carbohydrate Diet  -Fingersticks well controlled    #HLD  -Cont fenofibrate/Statin    #Postop anemia  -H/H stable  -transfuse for Hb < 7.0  -Hold ASA, NSAIDs    #Leukocytosis  -Resolved    #Depression, Anxiety  -Mood stable   -Cont cymbalta   -Neuropsych consult reviewed    #Migraines  -Cont sumatriptan     #DVT ppx   - Lovenox

## 2020-10-28 NOTE — DISCHARGE NOTE NURSING/CASE MANAGEMENT/SOCIAL WORK - PATIENT PORTAL LINK FT
You can access the FollowMyHealth Patient Portal offered by [facility] by registering at the following website: [email or web address]. By joining [facility]’s FollowMyHealth portal, you will also be able to view your health information using other applications (apps) compatible with our system.

## 2020-10-29 LAB
ANION GAP SERPL CALC-SCNC: 8 MMOL/L — SIGNIFICANT CHANGE UP (ref 5–17)
BUN SERPL-MCNC: 19 MG/DL — SIGNIFICANT CHANGE UP (ref 7–23)
CALCIUM SERPL-MCNC: 9.5 MG/DL — SIGNIFICANT CHANGE UP (ref 8.4–10.5)
CHLORIDE SERPL-SCNC: 107 MMOL/L — SIGNIFICANT CHANGE UP (ref 96–108)
CO2 SERPL-SCNC: 29 MMOL/L — SIGNIFICANT CHANGE UP (ref 22–31)
CREAT SERPL-MCNC: 0.73 MG/DL — SIGNIFICANT CHANGE UP (ref 0.5–1.3)
GLUCOSE BLDC GLUCOMTR-MCNC: 114 MG/DL — HIGH (ref 70–99)
GLUCOSE SERPL-MCNC: 117 MG/DL — HIGH (ref 70–99)
HCT VFR BLD CALC: 36.8 % — SIGNIFICANT CHANGE UP (ref 34.5–45)
HGB BLD-MCNC: 11.3 G/DL — LOW (ref 11.5–15.5)
MAGNESIUM SERPL-MCNC: 2 MG/DL — SIGNIFICANT CHANGE UP (ref 1.6–2.6)
MCHC RBC-ENTMCNC: 27.9 PG — SIGNIFICANT CHANGE UP (ref 27–34)
MCHC RBC-ENTMCNC: 30.7 GM/DL — LOW (ref 32–36)
MCV RBC AUTO: 90.9 FL — SIGNIFICANT CHANGE UP (ref 80–100)
NRBC # BLD: 0 /100 WBCS — SIGNIFICANT CHANGE UP (ref 0–0)
PLATELET # BLD AUTO: 432 K/UL — HIGH (ref 150–400)
POTASSIUM SERPL-MCNC: 3.6 MMOL/L — SIGNIFICANT CHANGE UP (ref 3.5–5.3)
POTASSIUM SERPL-SCNC: 3.6 MMOL/L — SIGNIFICANT CHANGE UP (ref 3.5–5.3)
RBC # BLD: 4.05 M/UL — SIGNIFICANT CHANGE UP (ref 3.8–5.2)
RBC # FLD: 13.8 % — SIGNIFICANT CHANGE UP (ref 10.3–14.5)
SODIUM SERPL-SCNC: 144 MMOL/L — SIGNIFICANT CHANGE UP (ref 135–145)
WBC # BLD: 4.72 K/UL — SIGNIFICANT CHANGE UP (ref 3.8–10.5)
WBC # FLD AUTO: 4.72 K/UL — SIGNIFICANT CHANGE UP (ref 3.8–10.5)

## 2020-10-29 PROCEDURE — 99232 SBSQ HOSP IP/OBS MODERATE 35: CPT

## 2020-10-29 RX ADMIN — Medication 25 MILLIGRAM(S): at 05:43

## 2020-10-29 RX ADMIN — DULOXETINE HYDROCHLORIDE 120 MILLIGRAM(S): 30 CAPSULE, DELAYED RELEASE ORAL at 21:56

## 2020-10-29 RX ADMIN — PANTOPRAZOLE SODIUM 40 MILLIGRAM(S): 20 TABLET, DELAYED RELEASE ORAL at 05:43

## 2020-10-29 RX ADMIN — MAGNESIUM OXIDE 400 MG ORAL TABLET 400 MILLIGRAM(S): 241.3 TABLET ORAL at 13:01

## 2020-10-29 RX ADMIN — LIDOCAINE 1 PATCH: 4 CREAM TOPICAL at 21:18

## 2020-10-29 RX ADMIN — Medication 145 MILLIGRAM(S): at 13:00

## 2020-10-29 RX ADMIN — OXYCODONE HYDROCHLORIDE 10 MILLIGRAM(S): 5 TABLET ORAL at 22:30

## 2020-10-29 RX ADMIN — Medication 25 MILLIGRAM(S): at 17:30

## 2020-10-29 RX ADMIN — LIDOCAINE 1 PATCH: 4 CREAM TOPICAL at 13:01

## 2020-10-29 RX ADMIN — AMLODIPINE BESYLATE 5 MILLIGRAM(S): 2.5 TABLET ORAL at 05:42

## 2020-10-29 RX ADMIN — Medication 250 MILLIGRAM(S): at 17:31

## 2020-10-29 RX ADMIN — OXYCODONE HYDROCHLORIDE 10 MILLIGRAM(S): 5 TABLET ORAL at 22:02

## 2020-10-29 RX ADMIN — Medication 50 MILLIGRAM(S): at 21:56

## 2020-10-29 RX ADMIN — ATORVASTATIN CALCIUM 10 MILLIGRAM(S): 80 TABLET, FILM COATED ORAL at 21:55

## 2020-10-29 RX ADMIN — OXYCODONE HYDROCHLORIDE 10 MILLIGRAM(S): 5 TABLET ORAL at 10:52

## 2020-10-29 RX ADMIN — POLYETHYLENE GLYCOL 3350 17 GRAM(S): 17 POWDER, FOR SOLUTION ORAL at 13:02

## 2020-10-29 RX ADMIN — Medication 1000 UNIT(S): at 13:00

## 2020-10-29 RX ADMIN — OXYCODONE HYDROCHLORIDE 10 MILLIGRAM(S): 5 TABLET ORAL at 10:18

## 2020-10-29 RX ADMIN — ENOXAPARIN SODIUM 40 MILLIGRAM(S): 100 INJECTION SUBCUTANEOUS at 21:56

## 2020-10-29 RX ADMIN — Medication 1 TABLET(S): at 13:01

## 2020-10-29 RX ADMIN — Medication 250 MILLIGRAM(S): at 05:43

## 2020-10-29 NOTE — CHART NOTE - NSCHARTNOTEFT_GEN_A_CORE
surveillance covid-19 pcr negative, however patients roommate is positive and symptomatic with a cough and belly pain.   Patient was notified of exposure, all questions and concerns were addressed.   roommate was moved to an isolation room and patients current room was thoroughly cleaned. surveillance covid-19 pcr negative, however patients roommate is positive and symptomatic with a cough and belly pain.   Patient was notified of exposure, all questions and concerns were addressed.   roommate was moved to an isolation room and patients current room was thoroughly cleaned.  She will be placed on contact/droplet isolation and quarantined.

## 2020-10-29 NOTE — PROGRESS NOTE ADULT - ASSESSMENT
CRISTEL WATTS is a HTN, hyperlipidemia, osteoarthritis, anxiety/depression disorders, GERD, eczema, sinusitis, scoliosis/spinal stenosis- lumbar region, presented to [facility] 9/29/20 for scheduled stage 1, L1-5 lumbar lateral interbody fusion with Dr. Valdez. Stage 2 T9-pelvis arthrodesis, L5-S1 TLIF done on 9/30/20 with closure of muscle flap done by plastics, Dr. Alegre. Hospital course complicated by hemorrhagic shock postop requiring pressors and blood transfusions, now stable on Midodrine. Now admitted to [facility] after for initiation of a multidisciplinary rehab program consisting focused on functional mobility, transfers and ADLs (activities of daily living).    #Elective L1-5 lumbar fusion, T9-pelvis arthrodesis, L5-S1 TLIF 9/29-9/30  - comprehensive multidisciplinary rehab program, PT/OT 3 hours a day, 5 days a week  WILL HOLD DC DUE TO COVID EXPOSURE WITH HIGH RISK SPOUSE AT HOME  - no celebrex per NS  - F/u with neurosurgery, Dr. Valdez, outpatient  - F/u with plastics in 1-2 weeks with Dr. Alegre in clinic  - No weight bearing restrictions  - Precautions: falls, spine, neuro  Xray TL spine as per NS- reviewed with hardware intact  lidoderm to Right ribs        #Hypotension postop - improved  -Will Dc midodrine- SYST BPs 130-140  -Norvasc 5mg daily restarted by hospitalist, lopressor 25mg Q12h added,Edarbi 40mg QHSstill on hold  - BP occ elevated- consider resuming edarbi on DC    #Pre-DM  - HbA1c 6.4 9/29/20  - FS  10/9   fingersticks  fasting  - Monitor FS.    #SP Fall  fall precautions  OT did address toileting while seated    #Depression/Anxiety:  - Continue Cymbalta 60mg BID  neuropsychology consult noted    #Migraines  - Sumatriptan 25mg Q4hr PRN    # UTI  urineC&S proteus 10-49k  finished antibiotics  clinically asymptomatic    #Allergies  - Continue Benadryl 25mg Q4hr PRN    Pain Management:  - Flexeril 10mg TID PRN  timed oxycodone 10mg with breakfast and lunch daily before Rehab    GI/Bowel:  - At risk for constipation due to neurologic diagnosis, immobility and/or medication use  - daily miralax restarted at patient request  - GI ppx: Protonix daily, Maalox PRN    /Bladder:   - At risk for incontinence and retention due to neurologic diagnosis and limited mobility  - Currently patient voids: independent  - Encourage timed voids every 4 hours while awake for independence and to promote continence during therapy.    Skin/Pressure Injury:   - Skin assessment on admission: no pressure injuries noted   - Monitor Incisions: Thoraco-lumbar incision and left flank incision - intact  - Turn every 2 hours while in bed, air mattress  - Soft heel protectors  - Skin barrier cream as needed  - Nursing to monitor skin Qshift    Diet:  - Diet Consistency/Modifications: regular/DASH    DVT ppx:  - Lovenox  - SCDs

## 2020-10-29 NOTE — PROGRESS NOTE ADULT - SUBJECTIVE AND OBJECTIVE BOX
60 year old female with PMHx HTN, hyperlipidemia, osteoarthritis, anxiety/depression disorders, GERD, eczema, sinusitis, scoliosis/spinal stenosis- lumbar region, presented to [facility] 9/29/20 for scheduled stage 1, L1-5 lumbar lateral interbody fusion with Dr. Valdez. Stage 2 T9-pelvis arthrodesis, L5-S1 TLIF done on 9/30/20 with closure of muscle flap done by plastics, Dr. Alegre. Admitted to NSICU post operatively due to hemorrhagic shock requiring fluid resuscitation with IV hydration and pressors. EBL 1500 cc, s/p 2 u prbc. Patient was weaned off IV pressors 10/1/20 and started on midodrine. Transferred to the floor when stable. Surgical drains removed on 10/7/20. Plastic surgery following for incision management. Aquacel dressing changed on 10/3 and 10/8. Hgb remains stable. Pt currently off antihypertensives, now on midodrine- wean to off as indicated.     Patient is medically and neurologically stable for discharge to [facility] for multidisciplinary acute rehab 10/10/20.  Patient arrived hemodynamically stable condition. Seen at bedside with her  present. She reports bilateral shoulder pain due to history of OA (had injections prior to being admitted for spine surgery).  Currently reports 7/10 with movement.  She endorses 2-3 episodes of diarrhea the last two days. Denies abdominal pain, fevers, chills.       ROS -patient exposed to COVID+ yesterday COVID PCR done yesterday Non reactive  patient nervous about going home to spouse who is high risk  she was cleared to remain in hospital for now  patient states bowel and bladder control is back to normal  no incontinence  similar symptoms of numbness RLE from knee down, numbness anteriorly around waist and  muscle/back soreness  increased motor function Left foot   no N,V  No dizziness, no headaches  no Chest pain, no SOB  spoke with NS- no celebrex X 6-12 months      MEDICATIONS  (STANDING):  amLODIPine   Tablet 5 milliGRAM(s) Oral daily  atorvastatin 10 milliGRAM(s) Oral at bedtime  cholecalciferol 1000 Unit(s) Oral daily  dextrose 5%. 1000 milliLiter(s) (50 mL/Hr) IV Continuous <Continuous>  dextrose 50% Injectable 12.5 Gram(s) IV Push once  dextrose 50% Injectable 25 Gram(s) IV Push once  dextrose 50% Injectable 25 Gram(s) IV Push once  DULoxetine 120 milliGRAM(s) Oral at bedtime  enoxaparin Injectable 40 milliGRAM(s) SubCutaneous <User Schedule>  fenofibrate Tablet 145 milliGRAM(s) Oral daily  hydrOXYzine hydrochloride 50 milliGRAM(s) Oral at bedtime  lidocaine   Patch 1 Patch Transdermal every 24 hours  magnesium oxide 400 milliGRAM(s) Oral daily  metoprolol tartrate 25 milliGRAM(s) Oral two times a day  multivitamin 1 Tablet(s) Oral daily  oxyCODONE    IR 10 milliGRAM(s) Oral <User Schedule>  pantoprazole    Tablet 40 milliGRAM(s) Oral before breakfast  polyethylene glycol 3350 17 Gram(s) Oral daily  saccharomyces boulardii 250 milliGRAM(s) Oral two times a day    MEDICATIONS  (PRN):  aluminum hydroxide/magnesium hydroxide/simethicone Suspension 30 milliLiter(s) Oral every 4 hours PRN Dyspepsia  cyclobenzaprine 10 milliGRAM(s) Oral three times a day PRN Muscle Spasm  dextrose 40% Gel 15 Gram(s) Oral once PRN Blood Glucose LESS THAN 70 milliGRAM(s)/deciliter  diphenhydrAMINE 25 milliGRAM(s) Oral every 4 hours PRN Rash and/or Itching  glucagon  Injectable 1 milliGRAM(s) IntraMuscular once PRN Glucose LESS THAN 70 milligrams/deciliter  oxyCODONE    IR 5 milliGRAM(s) Oral every 6 hours PRN Moderate Pain (4 - 6)  oxyCODONE    IR 10 milliGRAM(s) Oral every 6 hours PRN Severe Pain (7 - 10)  senna 2 Tablet(s) Oral at bedtime PRN Constipation  SUMAtriptan 25 milliGRAM(s) Oral four times a day PRN Migraine                            11.3   4.72  )-----------( 432      ( 29 Oct 2020 06:30 )             36.8     10-29    144  |  107  |  19  ----------------------------<  117<H>  3.6   |  29  |  0.73    Ca    9.5      29 Oct 2020 06:30  Mg     2.0     10-29          CAPILLARY BLOOD GLUCOSE      POCT Blood Glucose.: 114 mg/dL (29 Oct 2020 07:37)      Vital Signs Last 24 Hrs  T(C): 37.1 (29 Oct 2020 08:20), Max: 37.1 (29 Oct 2020 08:20)  T(F): 98.8 (29 Oct 2020 08:20), Max: 98.8 (29 Oct 2020 08:20)  HR: 89 (29 Oct 2020 08:20) (84 - 94)  BP: 151/81 (29 Oct 2020 08:20) (127/74 - 157/82)  BP(mean): --  RR: 14 (29 Oct 2020 08:20) (14 - 16)  SpO2: 98% (29 Oct 2020 08:20) (98% - 98%)      Constitutional - NAD, Comfortable, lying in bed  HEENT - NCAT, EOMI  Chest - Breathing comfortably  Cardiovascular - RRR  Abdomen - Soft, mildly distended, hyperactive bowel sounds  Extremities - No C/C/E, No calf tenderness  Right ribs Sl tender to palpation  Neurologic Exam -                    Cognitive - Awake, Alert, AAO to self, place, date, year, situation     Communication - Fluent, No dysarthria     Cranial Nerves - no facial asymmetry, shoulder shrug intact,      Motor -                     LEFT    UE - ShAB 5/5, EF 5/5, EE 5/5, WE 5/5,  5/5                    RIGHT UE - ShAB 5/5, EF 5/5, EE 5/5, WE 5/5,  5/5                    LEFT    LE - HF 4/5, KE 4/5, DF 3+/5, PF 5/5 EHL 5/5                    RIGHT LE - HF 3/5, KE 4/5, DF 4/5, PF 5/5        Sensory - decreased to light touch medial leg on RIGHT and dorsum of foot on LEFT but better     Reflexes - DTR Intact, No primitive reflexes     Psychiatric - Mood stable, Affect WNL     Skin:         thoraco-lumbar surgical incision intact- sutures out         Left flank incision with staples dcd         Healed drain incisions lower lumbar          No pressure injuries noted on sacrum, coccyx, or heels      IDT meeting 10/28    OT  independent eating/grooming  sup bathing  UB dressing I  LB dressing Sup  toileting sup  toilet transfers sup/CG  shower min A  PT  sup transfers  sup '  4 steps one rail CG/min A  Occ buckling Right knee but patient corrects      Tentative dc to home with  and home services on 10/29

## 2020-10-29 NOTE — PROGRESS NOTE ADULT - ASSESSMENT
61 y/o F with PMHx HTN, HLD, osteoarthritis, anxiety/depression disorders, GERD, eczema, sinusitis, scoliosis/spinal stenosis- lumbar region, presented to [facility] 9/29/20 for scheduled stage 1, L1-5 lumbar lateral interbody fusion with Dr. Valdez. Stage 2 T9-pelvis arthrodesis, L5-S1 TLIF done on 9/30/20 with closure of muscle flap done by plastics, Dr. Alegre. Post-op course complicated by hemorrhagic shock requiring transfer to ICU, fluid resuscitation with IV hydration and pressors. EBL 1500 cc, s/p 2 u prbc. Patient was weaned off IV pressors 10/1/20 and started on midodrine. Surgical drains removed on 10/7/20. Plastic surgery following for incision management recommended Aquacel dressing changed on 10/3 and 10/8. Patient was transferred to [facility] on 10/10/20 for comprehensive rehab.     #S/p L1-5 lumbar fusion, T9-pelvis arthrodesis, L5-S1 TLIF (9/30/20)  -comprehensive rehab per primary team  -Pain management and wound care per primary team    #COVID-19 Exposure  - PCR 10/28 negative  - Pt asymptomatic  - Droplet precautions per infection control    #Fall  -Mechanical fall  -No head injury or LOC  -No concern for any acute fractures  -Xray of spine and right knee negative for acute pathology  -Fall precautions    #UTI  -Completed 4 day course of Keflex    #Post-op hypotension  #HTN  -Above goal today, will monitor and adjust  -Continue Amlodipine 5mg daily  -Continue Lopressor 25mg BID  -Home meds Norvasc 5mg daily, Bystolic 10mg QAM, Edarbi 40mg QHS    #Pre-DM, a1c 6.4  -Consistent Carbohydrate Diet  -Fingersticks well controlled    #HLD  -Cont fenofibrate/Statin    #Postop anemia  -H/H stable  -transfuse for Hb < 7.0  -Hold ASA, NSAIDs    #Leukocytosis  -Resolved    #Depression, Anxiety  -Mood stable   -Cont cymbalta   -Neuropsych consult reviewed    #Migraines  -Cont sumatriptan     #DVT ppx   - Lovenox

## 2020-10-29 NOTE — PROGRESS NOTE ADULT - SUBJECTIVE AND OBJECTIVE BOX
Patient is a 60y old  Female who presents with a chief complaint of functional deficits due to L1-L5 lumbar fusion, T9-pelvis arthrodesis, L5-S1 TLIF  03.9 Other Neurologic (28 Oct 2020 17:36)    Patient seen and examined at bedside. Pt had COVID-19 exposure, was swabbed, PCR resulted negative. No complaints at time of evaluation     ALLERGIES:  penicillin (Other)    MEDICATIONS  (STANDING):  amLODIPine   Tablet 5 milliGRAM(s) Oral daily  atorvastatin 10 milliGRAM(s) Oral at bedtime  cholecalciferol 1000 Unit(s) Oral daily  dextrose 5%. 1000 milliLiter(s) (50 mL/Hr) IV Continuous <Continuous>  dextrose 50% Injectable 12.5 Gram(s) IV Push once  dextrose 50% Injectable 25 Gram(s) IV Push once  dextrose 50% Injectable 25 Gram(s) IV Push once  DULoxetine 120 milliGRAM(s) Oral at bedtime  enoxaparin Injectable 40 milliGRAM(s) SubCutaneous <User Schedule>  fenofibrate Tablet 145 milliGRAM(s) Oral daily  hydrOXYzine hydrochloride 50 milliGRAM(s) Oral at bedtime  lidocaine   Patch 1 Patch Transdermal every 24 hours  magnesium oxide 400 milliGRAM(s) Oral daily  metoprolol tartrate 25 milliGRAM(s) Oral two times a day  multivitamin 1 Tablet(s) Oral daily  oxyCODONE    IR 10 milliGRAM(s) Oral <User Schedule>  pantoprazole    Tablet 40 milliGRAM(s) Oral before breakfast  polyethylene glycol 3350 17 Gram(s) Oral daily  saccharomyces boulardii 250 milliGRAM(s) Oral two times a day    MEDICATIONS  (PRN):  aluminum hydroxide/magnesium hydroxide/simethicone Suspension 30 milliLiter(s) Oral every 4 hours PRN Dyspepsia  cyclobenzaprine 10 milliGRAM(s) Oral three times a day PRN Muscle Spasm  dextrose 40% Gel 15 Gram(s) Oral once PRN Blood Glucose LESS THAN 70 milliGRAM(s)/deciliter  diphenhydrAMINE 25 milliGRAM(s) Oral every 4 hours PRN Rash and/or Itching  glucagon  Injectable 1 milliGRAM(s) IntraMuscular once PRN Glucose LESS THAN 70 milligrams/deciliter  oxyCODONE    IR 10 milliGRAM(s) Oral every 6 hours PRN Severe Pain (7 - 10)  oxyCODONE    IR 5 milliGRAM(s) Oral every 6 hours PRN Moderate Pain (4 - 6)  senna 2 Tablet(s) Oral at bedtime PRN Constipation  SUMAtriptan 25 milliGRAM(s) Oral four times a day PRN Migraine    Vital Signs Last 24 Hrs  T(F): 98.8 (29 Oct 2020 08:20), Max: 98.8 (29 Oct 2020 08:20)  HR: 89 (29 Oct 2020 08:20) (84 - 94)  BP: 151/81 (29 Oct 2020 08:20) (127/74 - 157/82)  RR: 14 (29 Oct 2020 08:20) (14 - 16)  SpO2: 98% (29 Oct 2020 08:20) (98% - 98%)  I&O's Summary    PHYSICAL EXAM:  General: NAD, A/O x 3  ENT: MMM, no tonsilar exudate  Neck: Supple, No JVD  Lungs: Clear to auscultation bilaterally, no wheezes. Good air entry bilaterally   Cardio: RRR, S1/S2, No murmurs  Abdomen: Soft, Nontender, Nondistended; Bowel sounds present  Extremities: No calf tenderness, No pitting edema    LABS:                        11.3   4.72  )-----------( 432      ( 29 Oct 2020 06:30 )             36.8       10-29    144  |  107  |  19  ----------------------------<  117  3.6   |  29  |  0.73    Ca    9.5      29 Oct 2020 06:30  Mg     2.0     10-29     eGFR if Non African American: 90 mL/min/1.73M2 (10-29-20 @ 06:30)  eGFR if : 104 mL/min/1.73M2 (10-29-20 @ 06:30)    POCT Blood Glucose.: 114 mg/dL (29 Oct 2020 07:37)

## 2020-10-30 LAB — GLUCOSE BLDC GLUCOMTR-MCNC: 127 MG/DL — HIGH (ref 70–99)

## 2020-10-30 PROCEDURE — 99232 SBSQ HOSP IP/OBS MODERATE 35: CPT

## 2020-10-30 RX ORDER — ACETAMINOPHEN 500 MG
650 TABLET ORAL EVERY 6 HOURS
Refills: 0 | Status: DISCONTINUED | OUTPATIENT
Start: 2020-10-30 | End: 2020-11-03

## 2020-10-30 RX ADMIN — POLYETHYLENE GLYCOL 3350 17 GRAM(S): 17 POWDER, FOR SOLUTION ORAL at 12:12

## 2020-10-30 RX ADMIN — DULOXETINE HYDROCHLORIDE 120 MILLIGRAM(S): 30 CAPSULE, DELAYED RELEASE ORAL at 20:45

## 2020-10-30 RX ADMIN — Medication 145 MILLIGRAM(S): at 12:10

## 2020-10-30 RX ADMIN — Medication 250 MILLIGRAM(S): at 06:09

## 2020-10-30 RX ADMIN — Medication 1 TABLET(S): at 12:11

## 2020-10-30 RX ADMIN — OXYCODONE HYDROCHLORIDE 10 MILLIGRAM(S): 5 TABLET ORAL at 20:45

## 2020-10-30 RX ADMIN — Medication 25 MILLIGRAM(S): at 06:09

## 2020-10-30 RX ADMIN — MAGNESIUM OXIDE 400 MG ORAL TABLET 400 MILLIGRAM(S): 241.3 TABLET ORAL at 12:11

## 2020-10-30 RX ADMIN — PANTOPRAZOLE SODIUM 40 MILLIGRAM(S): 20 TABLET, DELAYED RELEASE ORAL at 06:09

## 2020-10-30 RX ADMIN — OXYCODONE HYDROCHLORIDE 10 MILLIGRAM(S): 5 TABLET ORAL at 12:12

## 2020-10-30 RX ADMIN — ATORVASTATIN CALCIUM 10 MILLIGRAM(S): 80 TABLET, FILM COATED ORAL at 20:45

## 2020-10-30 RX ADMIN — AMLODIPINE BESYLATE 5 MILLIGRAM(S): 2.5 TABLET ORAL at 06:11

## 2020-10-30 RX ADMIN — LIDOCAINE 1 PATCH: 4 CREAM TOPICAL at 19:30

## 2020-10-30 RX ADMIN — OXYCODONE HYDROCHLORIDE 10 MILLIGRAM(S): 5 TABLET ORAL at 08:49

## 2020-10-30 RX ADMIN — LIDOCAINE 1 PATCH: 4 CREAM TOPICAL at 00:15

## 2020-10-30 RX ADMIN — ENOXAPARIN SODIUM 40 MILLIGRAM(S): 100 INJECTION SUBCUTANEOUS at 20:45

## 2020-10-30 RX ADMIN — OXYCODONE HYDROCHLORIDE 10 MILLIGRAM(S): 5 TABLET ORAL at 13:28

## 2020-10-30 RX ADMIN — OXYCODONE HYDROCHLORIDE 10 MILLIGRAM(S): 5 TABLET ORAL at 09:15

## 2020-10-30 RX ADMIN — LIDOCAINE 1 PATCH: 4 CREAM TOPICAL at 12:11

## 2020-10-30 RX ADMIN — OXYCODONE HYDROCHLORIDE 10 MILLIGRAM(S): 5 TABLET ORAL at 21:30

## 2020-10-30 RX ADMIN — Medication 50 MILLIGRAM(S): at 20:45

## 2020-10-30 RX ADMIN — Medication 1000 UNIT(S): at 12:10

## 2020-10-30 NOTE — PROGRESS NOTE ADULT - ASSESSMENT
CRISTEL WATTS is a HTN, hyperlipidemia, osteoarthritis, anxiety/depression disorders, GERD, eczema, sinusitis, scoliosis/spinal stenosis- lumbar region, presented to [facility] 9/29/20 for scheduled stage 1, L1-5 lumbar lateral interbody fusion with Dr. Valdez. Stage 2 T9-pelvis arthrodesis, L5-S1 TLIF done on 9/30/20 with closure of muscle flap done by plastics, Dr. Alegre. Hospital course complicated by hemorrhagic shock postop requiring pressors and blood transfusions, now stable on Midodrine. Now admitted to [facility] after for initiation of a multidisciplinary rehab program consisting focused on functional mobility, transfers and ADLs (activities of daily living)- pt/ot/dvt ppx, pain meds      #Hypotension postop - improved  -Will Dc midodrine- SYST BPs 130-140    #HTN- Norvasc, lopressor    #Pre DM  - HbA1c 6.4 9/29/20   fingersticks  fasting  - Monitor FS.    #Depression/Anxiety:  - Cymbalta     #Migraines  - Sumatriptan     # UTI  urine C&S proteus 10-49k  finished antibiotics  clinically asymptomatic    #Allergies  - Continue Benadryl       DVT ppx- Lovenox      Will follow  d/w dr. frank

## 2020-10-30 NOTE — PROGRESS NOTE ADULT - ASSESSMENT
CRISTEL WATTS is a HTN, hyperlipidemia, osteoarthritis, anxiety/depression disorders, GERD, eczema, sinusitis, scoliosis/spinal stenosis- lumbar region, presented to [facility] 9/29/20 for scheduled stage 1, L1-5 lumbar lateral interbody fusion with Dr. Valdez. Stage 2 T9-pelvis arthrodesis, L5-S1 TLIF done on 9/30/20 with closure of muscle flap done by plastics, Dr. Alegre. Hospital course complicated by hemorrhagic shock postop requiring pressors and blood transfusions, now stable on Midodrine. Now admitted to [facility] after for initiation of a multidisciplinary rehab program consisting focused on functional mobility, transfers and ADLs (activities of daily living).    #Elective L1-5 lumbar fusion, T9-pelvis arthrodesis, L5-S1 TLIF 9/29-9/30  - comprehensive multidisciplinary rehab program, PT/OT 3 hours a day, 5 days a week  WILL HOLD DC DUE TO COVID EXPOSURE WITH HIGH RISK SPOUSE AT HOME  - no celebrex per NS  - F/u with neurosurgery, Dr. Valdez, outpatient  - F/u with plastics in 1-2 weeks with Dr. Alegre in clinic  - No weight bearing restrictions  - Precautions: falls, spine, neuro  Xray TL spine as per NS- reviewed with hardware intact  lidoderm to Right ribs  Repeat COVID PCR on Sunday        #Hypotension postop - improved  -Will Dc midodrine- SYST BPs 130-140  -Norvasc 5mg daily restarted by hospitalist, lopressor 25mg Q12h added,Edarbi 40mg QHSstill on hold  - BP occ elevated-crrently Ok- consider resuming edarbi on DC if needed    #Pre-DM  - HbA1c 6.4 9/29/20  - FS  10/9   fingersticks  fasting  - Monitor FS.    #SP Fall  fall precautions  OT did address toileting while seated- Patient is safe    #Depression/Anxiety:  - Continue Cymbalta 60mg BID  neuropsychology consult noted    #Migraines  - Sumatriptan 25mg Q4hr PRN    # UTI  urineC&S proteus 10-49k  finished antibiotics  clinically asymptomatic    #Allergies  - Continue Benadryl 25mg Q4hr PRN    Pain Management:  - Flexeril 10mg TID PRN  timed oxycodone 10mg with breakfast and lunch daily before Rehab    GI/Bowel:  - At risk for constipation due to neurologic diagnosis, immobility and/or medication use  - daily miralax restarted at patient request  - GI ppx: Protonix daily, Maalox PRN    /Bladder:   - At risk for incontinence and retention due to neurologic diagnosis and limited mobility  - Currently patient voids: independent  - Encourage timed voids every 4 hours while awake for independence and to promote continence during therapy.    Skin/Pressure Injury:   - Skin assessment on admission: no pressure injuries noted   - Monitor Incisions: Thoraco-lumbar incision and left flank incision - intact  - Turn every 2 hours while in bed, air mattress  - Soft heel protectors  - Skin barrier cream as needed  - Nursing to monitor skin Qshift    Diet:  - Diet Consistency/Modifications: regular/DASH    DVT ppx:  - Lovenox  - SCDs

## 2020-10-30 NOTE — PROGRESS NOTE ADULT - SUBJECTIVE AND OBJECTIVE BOX
60 year old female with PMHx HTN, hyperlipidemia, osteoarthritis, anxiety/depression disorders, GERD, eczema, sinusitis, scoliosis/spinal stenosis- lumbar region, presented to [facility] 9/29/20 for scheduled stage 1, L1-5 lumbar lateral interbody fusion with Dr. Valdez. Stage 2 T9-pelvis arthrodesis, L5-S1 TLIF done on 9/30/20 with closure of muscle flap done by plastics, Dr. Alegre. Admitted to NSICU post operatively due to hemorrhagic shock requiring fluid resuscitation with IV hydration and pressors. EBL 1500 cc, s/p 2 u prbc. Patient was weaned off IV pressors 10/1/20 and started on midodrine. Transferred to the floor when stable. Surgical drains removed on 10/7/20. Plastic surgery following for incision management. Aquacel dressing changed on 10/3 and 10/8. Hgb remains stable. Pt currently off antihypertensives, now on midodrine- wean to off as indicated.     Patient is medically and neurologically stable for discharge to [facility] for multidisciplinary acute rehab 10/10/20.  Patient arrived hemodynamically stable condition. Seen at bedside with her  present. She reports bilateral shoulder pain due to history of OA (had injections prior to being admitted for spine surgery).  Currently reports 7/10 with movement.  She endorses 2-3 episodes of diarrhea the last two days. Denies abdominal pain, fevers, chills.       ROS -patient exposed to COVID+ yesterday COVID PCR done yesterday Non reactive  patient nervous about going home to spouse who is high risk  she was cleared to remain in hospital for now  patient ageeable to reswab on Sunday and if negative agrees to go home on Monday  patient states bowel and bladder control is back to normal  no incontinence  similar symptoms of numbness RLE from knee down, numbness anteriorly around waist and  muscle/back soreness  increased motor function Left foot   no N,V  No dizziness, no headaches  no Chest pain, no SOB  spoke with NS- no celebrex X 6-12 months        MEDICATIONS  (STANDING):  amLODIPine   Tablet 5 milliGRAM(s) Oral daily  atorvastatin 10 milliGRAM(s) Oral at bedtime  cholecalciferol 1000 Unit(s) Oral daily  dextrose 5%. 1000 milliLiter(s) (50 mL/Hr) IV Continuous <Continuous>  dextrose 50% Injectable 12.5 Gram(s) IV Push once  dextrose 50% Injectable 25 Gram(s) IV Push once  dextrose 50% Injectable 25 Gram(s) IV Push once  DULoxetine 120 milliGRAM(s) Oral at bedtime  enoxaparin Injectable 40 milliGRAM(s) SubCutaneous <User Schedule>  fenofibrate Tablet 145 milliGRAM(s) Oral daily  hydrOXYzine hydrochloride 50 milliGRAM(s) Oral at bedtime  lidocaine   Patch 1 Patch Transdermal every 24 hours  magnesium oxide 400 milliGRAM(s) Oral daily  metoprolol tartrate 25 milliGRAM(s) Oral two times a day  multivitamin 1 Tablet(s) Oral daily  oxyCODONE    IR 10 milliGRAM(s) Oral <User Schedule>  pantoprazole    Tablet 40 milliGRAM(s) Oral before breakfast  polyethylene glycol 3350 17 Gram(s) Oral daily  saccharomyces boulardii 250 milliGRAM(s) Oral two times a day    MEDICATIONS  (PRN):  aluminum hydroxide/magnesium hydroxide/simethicone Suspension 30 milliLiter(s) Oral every 4 hours PRN Dyspepsia  cyclobenzaprine 10 milliGRAM(s) Oral three times a day PRN Muscle Spasm  dextrose 40% Gel 15 Gram(s) Oral once PRN Blood Glucose LESS THAN 70 milliGRAM(s)/deciliter  diphenhydrAMINE 25 milliGRAM(s) Oral every 4 hours PRN Rash and/or Itching  glucagon  Injectable 1 milliGRAM(s) IntraMuscular once PRN Glucose LESS THAN 70 milligrams/deciliter  oxyCODONE    IR 5 milliGRAM(s) Oral every 6 hours PRN Moderate Pain (4 - 6)  oxyCODONE    IR 10 milliGRAM(s) Oral every 6 hours PRN Severe Pain (7 - 10)  senna 2 Tablet(s) Oral at bedtime PRN Constipation  SUMAtriptan 25 milliGRAM(s) Oral four times a day PRN Migraine                            11.3   4.72  )-----------( 432      ( 29 Oct 2020 06:30 )             36.8     10-29    144  |  107  |  19  ----------------------------<  117<H>  3.6   |  29  |  0.73    Ca    9.5      29 Oct 2020 06:30  Mg     2.0     10-29          CAPILLARY BLOOD GLUCOSE      POCT Blood Glucose.: 127 mg/dL (30 Oct 2020 07:34)      Vital Signs Last 24 Hrs  T(C): 36.7 (30 Oct 2020 08:48), Max: 37 (29 Oct 2020 20:47)  T(F): 98 (30 Oct 2020 08:48), Max: 98.6 (29 Oct 2020 20:47)  HR: 85 (30 Oct 2020 08:48) (85 - 92)  BP: 123/65 (30 Oct 2020 08:48) (115/68 - 148/81)  BP(mean): --  RR: 16 (30 Oct 2020 08:48) (14 - 16)  SpO2: 97% (30 Oct 2020 06:10) (95% - 97%)      Constitutional - NAD, Comfortable, lying in bed  HEENT - NCAT, EOMI  Chest - Breathing comfortably  Cardiovascular - RRR  Abdomen - Soft, mildly distended, hyperactive bowel sounds  Extremities - No C/C/E, No calf tenderness  Right ribs Sl tender to palpation  Neurologic Exam -                    Cognitive - Awake, Alert, AAO to self, place, date, year, situation     Communication - Fluent, No dysarthria     Cranial Nerves - no facial asymmetry, shoulder shrug intact,      Motor -                     LEFT    UE - ShAB 5/5, EF 5/5, EE 5/5, WE 5/5,  5/5                    RIGHT UE - ShAB 5/5, EF 5/5, EE 5/5, WE 5/5,  5/5                    LEFT    LE - HF 4/5, KE 4/5, DF 3+/5, PF 5/5 EHL 5/5                    RIGHT LE - HF 3/5, KE 4/5, DF 4/5, PF 5/5        Sensory - decreased to light touch medial leg on RIGHT and dorsum of foot on LEFT but better     Reflexes - DTR Intact, No primitive reflexes     Psychiatric - Mood stable, Affect WNL     Skin:         thoraco-lumbar surgical incision intact- sutures out         Left flank incision with staples dcd         Healed drain incisions lower lumbar          No pressure injuries noted on sacrum, coccyx, or heels      IDT meeting 10/28    OT  independent eating/grooming  sup bathing  UB dressing I  LB dressing Sup  toileting sup  toilet transfers sup/CG  shower min A  PT  sup transfers  sup '  4 steps one rail CG/min A  Occ buckling Right knee but patient corrects      Tentative dc to home with  and home services on 10/29

## 2020-10-30 NOTE — PROGRESS NOTE ADULT - SUBJECTIVE AND OBJECTIVE BOX
60 year old female with PMHx HTN, hyperlipidemia, osteoarthritis, anxiety/depression disorders, GERD, eczema, sinusitis, scoliosis/spinal stenosis- lumbar region, presented to [facility] 9/29/20 for scheduled stage 1, L1-5 lumbar lateral interbody fusion with Dr. Valdez. Stage 2 T9-pelvis arthrodesis, L5-S1 TLIF done on 9/30/20 with closure of muscle flap done by plastics, Dr. Alegre.     seen at the bedside, c/o pain in the back 7/10, this am, no n/v, no sob,   patient exposed to COVID+ yesterday COVID PCR done yesterday Non reactive  patient nervous about going home to spouse who is high risk      Vital Signs Last 24 Hrs  T(C): 36.7 (30 Oct 2020 08:48), Max: 37 (29 Oct 2020 20:47)  T(F): 98 (30 Oct 2020 08:48), Max: 98.6 (29 Oct 2020 20:47)  HR: 85 (30 Oct 2020 08:48) (85 - 92)  BP: 123/65 (30 Oct 2020 08:48) (115/68 - 148/81)  BP(mean): --  RR: 16 (30 Oct 2020 08:48) (14 - 16)  SpO2: 97% (30 Oct 2020 06:10) (95% - 97%)    GENERAL- NAD  EAR/NOSE/MOUTH/THROAT - no pharyngeal exudates, no oral leisions,  MMM  EYES- MOE, conjunctiva and Sclera clear  NECK- supple  RESPIRATORY-  clear to auscultation bilaterally  CARDIOVASCULAR - SIS2, RRR  GI - soft NT BS present  EXTREMITIES- no pedal edema  NEUROLOGY- no gross focal deficits  PSYCHIATRY- AAO X 3  MUSCULOSKELETAL- ROM normal                   11.3                 144  | 29   | 19           4.72  >-----------< 432     ------------------------< 117                   36.8                 3.6  | 107  | 0.73                                         Ca 9.5   Mg 2.0   Ph x        CAPILLARY BLOOD GLUCOSE      POCT Blood Glucose.: 127 mg/dL (30 Oct 2020 07:34)    MEDICATIONS  (STANDING):  amLODIPine   Tablet 5 milliGRAM(s) Oral daily  atorvastatin 10 milliGRAM(s) Oral at bedtime  cholecalciferol 1000 Unit(s) Oral daily  DULoxetine 120 milliGRAM(s) Oral at bedtime  enoxaparin Injectable 40 milliGRAM(s) SubCutaneous <User Schedule>  fenofibrate Tablet 145 milliGRAM(s) Oral daily  hydrOXYzine hydrochloride 50 milliGRAM(s) Oral at bedtime  lidocaine   Patch 1 Patch Transdermal every 24 hours  magnesium oxide 400 milliGRAM(s) Oral daily  metoprolol tartrate 25 milliGRAM(s) Oral two times a day  multivitamin 1 Tablet(s) Oral daily  oxyCODONE    IR 10 milliGRAM(s) Oral <User Schedule>  pantoprazole    Tablet 40 milliGRAM(s) Oral before breakfast  polyethylene glycol 3350 17 Gram(s) Oral daily  saccharomyces boulardii 250 milliGRAM(s) Oral two times a day    MEDICATIONS  (PRN):  aluminum hydroxide/magnesium hydroxide/simethicone Suspension 30 milliLiter(s) Oral every 4 hours PRN Dyspepsia  cyclobenzaprine 10 milliGRAM(s) Oral three times a day PRN Muscle Spasm  dextrose 40% Gel 15 Gram(s) Oral once PRN Blood Glucose LESS THAN 70 milliGRAM(s)/deciliter  diphenhydrAMINE 25 milliGRAM(s) Oral every 4 hours PRN Rash and/or Itching  glucagon  Injectable 1 milliGRAM(s) IntraMuscular once PRN Glucose LESS THAN 70 milligrams/deciliter  oxyCODONE    IR 5 milliGRAM(s) Oral every 6 hours PRN Moderate Pain (4 - 6)  oxyCODONE    IR 10 milliGRAM(s) Oral every 6 hours PRN Severe Pain (7 - 10)  senna 2 Tablet(s) Oral at bedtime PRN Constipation  SUMAtriptan 25 milliGRAM(s) Oral four times a day PRN Migraine

## 2020-10-31 LAB — GLUCOSE BLDC GLUCOMTR-MCNC: 106 MG/DL — HIGH (ref 70–99)

## 2020-10-31 PROCEDURE — 99232 SBSQ HOSP IP/OBS MODERATE 35: CPT

## 2020-10-31 RX ADMIN — Medication 650 MILLIGRAM(S): at 06:27

## 2020-10-31 RX ADMIN — SENNA PLUS 2 TABLET(S): 8.6 TABLET ORAL at 21:44

## 2020-10-31 RX ADMIN — OXYCODONE HYDROCHLORIDE 10 MILLIGRAM(S): 5 TABLET ORAL at 21:45

## 2020-10-31 RX ADMIN — Medication 250 MILLIGRAM(S): at 05:28

## 2020-10-31 RX ADMIN — ENOXAPARIN SODIUM 40 MILLIGRAM(S): 100 INJECTION SUBCUTANEOUS at 21:44

## 2020-10-31 RX ADMIN — Medication 50 MILLIGRAM(S): at 21:44

## 2020-10-31 RX ADMIN — Medication 650 MILLIGRAM(S): at 07:07

## 2020-10-31 RX ADMIN — LIDOCAINE 1 PATCH: 4 CREAM TOPICAL at 12:32

## 2020-10-31 RX ADMIN — OXYCODONE HYDROCHLORIDE 10 MILLIGRAM(S): 5 TABLET ORAL at 22:15

## 2020-10-31 RX ADMIN — OXYCODONE HYDROCHLORIDE 5 MILLIGRAM(S): 5 TABLET ORAL at 07:28

## 2020-10-31 RX ADMIN — ATORVASTATIN CALCIUM 10 MILLIGRAM(S): 80 TABLET, FILM COATED ORAL at 21:44

## 2020-10-31 RX ADMIN — PANTOPRAZOLE SODIUM 40 MILLIGRAM(S): 20 TABLET, DELAYED RELEASE ORAL at 05:28

## 2020-10-31 RX ADMIN — Medication 650 MILLIGRAM(S): at 13:02

## 2020-10-31 RX ADMIN — OXYCODONE HYDROCHLORIDE 10 MILLIGRAM(S): 5 TABLET ORAL at 13:07

## 2020-10-31 RX ADMIN — Medication 1000 UNIT(S): at 12:32

## 2020-10-31 RX ADMIN — OXYCODONE HYDROCHLORIDE 10 MILLIGRAM(S): 5 TABLET ORAL at 07:28

## 2020-10-31 RX ADMIN — Medication 250 MILLIGRAM(S): at 18:07

## 2020-10-31 RX ADMIN — OXYCODONE HYDROCHLORIDE 10 MILLIGRAM(S): 5 TABLET ORAL at 09:33

## 2020-10-31 RX ADMIN — Medication 145 MILLIGRAM(S): at 12:32

## 2020-10-31 RX ADMIN — Medication 650 MILLIGRAM(S): at 12:32

## 2020-10-31 RX ADMIN — DULOXETINE HYDROCHLORIDE 120 MILLIGRAM(S): 30 CAPSULE, DELAYED RELEASE ORAL at 21:44

## 2020-10-31 RX ADMIN — OXYCODONE HYDROCHLORIDE 10 MILLIGRAM(S): 5 TABLET ORAL at 13:37

## 2020-10-31 RX ADMIN — Medication 1 TABLET(S): at 12:32

## 2020-10-31 RX ADMIN — MAGNESIUM OXIDE 400 MG ORAL TABLET 400 MILLIGRAM(S): 241.3 TABLET ORAL at 12:32

## 2020-10-31 RX ADMIN — Medication 25 MILLIGRAM(S): at 05:29

## 2020-10-31 RX ADMIN — LIDOCAINE 1 PATCH: 4 CREAM TOPICAL at 00:01

## 2020-10-31 RX ADMIN — OXYCODONE HYDROCHLORIDE 10 MILLIGRAM(S): 5 TABLET ORAL at 09:03

## 2020-10-31 RX ADMIN — Medication 25 MILLIGRAM(S): at 18:07

## 2020-10-31 RX ADMIN — LIDOCAINE 1 PATCH: 4 CREAM TOPICAL at 20:00

## 2020-10-31 RX ADMIN — AMLODIPINE BESYLATE 5 MILLIGRAM(S): 2.5 TABLET ORAL at 05:29

## 2020-10-31 NOTE — PROGRESS NOTE ADULT - ASSESSMENT
CRISTEL WATTS is a HTN, hyperlipidemia, osteoarthritis, anxiety/depression disorders, GERD, eczema, sinusitis, scoliosis/spinal stenosis- lumbar region, presented to [facility] 9/29/20 for scheduled stage 1, L1-5 lumbar lateral interbody fusion with Dr. Valdez. Stage 2 T9-pelvis arthrodesis, L5-S1 TLIF done on 9/30/20 with closure of muscle flap done by plastics, Dr. Alegre. Hospital course complicated by hemorrhagic shock postop requiring pressors and blood transfusions, now stable on Midodrine. Now admitted to [facility] after for initiation of a multidisciplinary rehab program consisting focused on functional mobility, transfers and ADLs (activities of daily living).    #Elective L1-5 lumbar fusion, T9-pelvis arthrodesis, L5-S1 TLIF 9/29-9/30  - comprehensive multidisciplinary rehab program, PT/OT 3 hours a day, 5 days a week  WILL HOLD DC DUE TO COVID EXPOSURE WITH HIGH RISK SPOUSE AT HOME  - no celebrex per NS  - F/u with neurosurgery, Dr. Valdez, outpatient  - F/u with plastics in 1-2 weeks with Dr. Alegre in clinic  - No weight bearing restrictions  - Precautions: falls, spine, neuro  Xray TL spine as per NS- reviewed with hardware intact  lidoderm to Right ribs  Repeat COVID PCR on Sunday        #Hypotension postop - improved  -Will Dc midodrine- SYST BPs 130-140  -Norvasc 5mg daily restarted by hospitalist, lopressor 25mg Q12h added,Edarbi 40mg QHSstill on hold  - BP occ elevated-crrently Ok- consider resuming edarbi on DC if needed    #Pre-DM  - HbA1c 6.4 9/29/20  - FS  10/9   fingersticks  fasting  - Monitor FS.    #SP Fall  fall precautions  OT did address toileting while seated- Patient is safe    #Depression/Anxiety:  - Continue Cymbalta 60mg BID  neuropsychology consult noted    #Migraines  - Sumatriptan 25mg Q4hr PRN    # UTI  urineC&S proteus 10-49k  finished antibiotics  clinically asymptomatic    #Allergies  - Continue Benadryl 25mg Q4hr PRN    Pain Management:  - Flexeril 10mg TID PRN  timed oxycodone 10mg with breakfast and lunch daily before Rehab    GI/Bowel:  - At risk for constipation due to neurologic diagnosis, immobility and/or medication use  - daily miralax restarted at patient request  - GI ppx: Protonix daily, Maalox PRN    /Bladder:   - At risk for incontinence and retention due to neurologic diagnosis and limited mobility  - Currently patient voids: independent  - Encourage timed voids every 4 hours while awake for independence and to promote continence during therapy.    Skin/Pressure Injury:   - Skin assessment on admission: no pressure injuries noted   - Monitor Incisions: Thoraco-lumbar incision and left flank incision - intact  - Turn every 2 hours while in bed, air mattress  - Soft heel protectors  - Skin barrier cream as needed  - Nursing to monitor skin Qshift    Diet:  - Diet Consistency/Modifications: regular/DASH    DVT ppx:  - Lovenox  - SCDs

## 2020-10-31 NOTE — PROGRESS NOTE ADULT - SUBJECTIVE AND OBJECTIVE BOX
60 year old female with PMHx HTN, hyperlipidemia, osteoarthritis, anxiety/depression disorders, GERD, eczema, sinusitis, scoliosis/spinal stenosis- lumbar region, presented to [facility] 9/29/20 for scheduled stage 1, L1-5 lumbar lateral interbody fusion with Dr. Valdez. Stage 2 T9-pelvis arthrodesis, L5-S1 TLIF done on 9/30/20 with closure of muscle flap done by plastics, Dr. Alegre. Admitted to NSICU post operatively due to hemorrhagic shock requiring fluid resuscitation with IV hydration and pressors. EBL 1500 cc, s/p 2 u prbc. Patient was weaned off IV pressors 10/1/20 and started on midodrine. Transferred to the floor when stable. Surgical drains removed on 10/7/20. Plastic surgery following for incision management. Aquacel dressing changed on 10/3 and 10/8. Hgb remains stable. Pt currently off antihypertensives, now on midodrine- wean to off as indicated.     Patient is medically and neurologically stable for discharge to [facility] for multidisciplinary acute rehab 10/10/20.  Patient arrived hemodynamically stable condition. Seen at bedside with her  present. She reports bilateral shoulder pain due to history of OA (had injections prior to being admitted for spine surgery).  Currently reports 7/10 with movement.  She endorses 2-3 episodes of diarrhea the last two days. Denies abdominal pain, fevers, chills.       ROS -patient exposed to COVID+ yesterday COVID PCR done yesterday Non reactive  patient nervous about going home to spouse who is high risk  she was cleared to remain in hospital for now  patient ageeable to reswab on Sunday and if negative agrees to go home on Monday  patient states bowel and bladder control is back to normal  no incontinence  similar symptoms of numbness RLE from knee down, numbness anteriorly around waist and  muscle/back soreness  increased motor function Left foot   no N,V  No dizziness, no headaches  no Chest pain, no SOB  spoke with NS- no celebrex X 6-12 months        MEDICATIONS  (STANDING):  amLODIPine   Tablet 5 milliGRAM(s) Oral daily  atorvastatin 10 milliGRAM(s) Oral at bedtime  cholecalciferol 1000 Unit(s) Oral daily  dextrose 5%. 1000 milliLiter(s) (50 mL/Hr) IV Continuous <Continuous>  dextrose 50% Injectable 12.5 Gram(s) IV Push once  dextrose 50% Injectable 25 Gram(s) IV Push once  dextrose 50% Injectable 25 Gram(s) IV Push once  DULoxetine 120 milliGRAM(s) Oral at bedtime  enoxaparin Injectable 40 milliGRAM(s) SubCutaneous <User Schedule>  fenofibrate Tablet 145 milliGRAM(s) Oral daily  hydrOXYzine hydrochloride 50 milliGRAM(s) Oral at bedtime  lidocaine   Patch 1 Patch Transdermal every 24 hours  magnesium oxide 400 milliGRAM(s) Oral daily  metoprolol tartrate 25 milliGRAM(s) Oral two times a day  multivitamin 1 Tablet(s) Oral daily  oxyCODONE    IR 10 milliGRAM(s) Oral <User Schedule>  pantoprazole    Tablet 40 milliGRAM(s) Oral before breakfast  polyethylene glycol 3350 17 Gram(s) Oral daily  saccharomyces boulardii 250 milliGRAM(s) Oral two times a day    MEDICATIONS  (PRN):  acetaminophen   Tablet .. 650 milliGRAM(s) Oral every 6 hours PRN Temp greater or equal to 38C (100.4F), Mild Pain (1 - 3)  aluminum hydroxide/magnesium hydroxide/simethicone Suspension 30 milliLiter(s) Oral every 4 hours PRN Dyspepsia  cyclobenzaprine 10 milliGRAM(s) Oral three times a day PRN Muscle Spasm  dextrose 40% Gel 15 Gram(s) Oral once PRN Blood Glucose LESS THAN 70 milliGRAM(s)/deciliter  diphenhydrAMINE 25 milliGRAM(s) Oral every 4 hours PRN Rash and/or Itching  glucagon  Injectable 1 milliGRAM(s) IntraMuscular once PRN Glucose LESS THAN 70 milligrams/deciliter  oxyCODONE    IR 5 milliGRAM(s) Oral every 6 hours PRN Moderate Pain (4 - 6)  oxyCODONE    IR 10 milliGRAM(s) Oral every 6 hours PRN Severe Pain (7 - 10)  senna 2 Tablet(s) Oral at bedtime PRN Constipation  SUMAtriptan 25 milliGRAM(s) Oral four times a day PRN Migraine                  CAPILLARY BLOOD GLUCOSE      POCT Blood Glucose.: 106 mg/dL (31 Oct 2020 07:35)      Vital Signs Last 24 Hrs  T(C): 36.7 (30 Oct 2020 20:43), Max: 36.7 (30 Oct 2020 20:43)  T(F): 98.1 (30 Oct 2020 20:43), Max: 98.1 (30 Oct 2020 20:43)  HR: 78 (31 Oct 2020 07:39) (78 - 105)  BP: 126/75 (31 Oct 2020 07:39) (126/75 - 156/79)  BP(mean): --  RR: 16 (31 Oct 2020 07:39) (16 - 17)  SpO2: 94% (31 Oct 2020 07:39) (94% - 97%)              Vital Signs Last 24 Hrs  T(C): 36.7 (30 Oct 2020 08:48), Max: 37 (29 Oct 2020 20:47)  T(F): 98 (30 Oct 2020 08:48), Max: 98.6 (29 Oct 2020 20:47)  HR: 85 (30 Oct 2020 08:48) (85 - 92)  BP: 123/65 (30 Oct 2020 08:48) (115/68 - 148/81)  BP(mean): --  RR: 16 (30 Oct 2020 08:48) (14 - 16)  SpO2: 97% (30 Oct 2020 06:10) (95% - 97%)      Constitutional - NAD, Comfortable, lying in bed  HEENT - NCAT, EOMI  Chest - Breathing comfortably  Cardiovascular - RRR  Abdomen - Soft, mildly distended, hyperactive bowel sounds  Extremities - No C/C/E, No calf tenderness  Right ribs Sl tender to palpation  Neurologic Exam -                    Cognitive - Awake, Alert, AAO to self, place, date, year, situation     Communication - Fluent, No dysarthria     Cranial Nerves - no facial asymmetry, shoulder shrug intact,      Motor -                     LEFT    UE - ShAB 5/5, EF 5/5, EE 5/5, WE 5/5,  5/5                    RIGHT UE - ShAB 5/5, EF 5/5, EE 5/5, WE 5/5,  5/5                    LEFT    LE - HF 4/5, KE 4/5, DF 3+/5, PF 5/5 EHL 5/5                    RIGHT LE - HF 3/5, KE 4/5, DF 4/5, PF 5/5        Sensory - decreased to light touch medial leg on RIGHT and dorsum of foot on LEFT but better     Reflexes - DTR Intact, No primitive reflexes     Psychiatric - Mood stable, Affect WNL     Skin:         thoraco-lumbar surgical incision intact- sutures out         Left flank incision with staples dcd         Healed drain incisions lower lumbar          No pressure injuries noted on sacrum, coccyx, or heels      IDT meeting 10/28    OT  independent eating/grooming  sup bathing  UB dressing I  LB dressing Sup  toileting sup  toilet transfers sup/CG  shower min A  PT  sup transfers  sup '  4 steps one rail CG/min A  Occ buckling Right knee but patient corrects      Tentative dc to home with  and home services on 10/29

## 2020-10-31 NOTE — PROGRESS NOTE ADULT - ASSESSMENT
CRISTEL WATTS is a HTN, hyperlipidemia, osteoarthritis, anxiety/depression disorders, GERD, eczema, sinusitis, scoliosis/spinal stenosis- lumbar region, presented to [facility] 9/29/20 for scheduled stage 1, L1-5 lumbar lateral interbody fusion with Dr. Valdez. Stage 2 T9-pelvis arthrodesis, L5-S1 TLIF done on 9/30/20 with closure of muscle flap done by plastics, Dr. Alegre. Hospital course complicated by hemorrhagic shock postop requiring pressors and blood transfusions, now stable on Midodrine. Now admitted to [facility] after for initiation of a multidisciplinary rehab program consisting focused on functional mobility, transfers and ADLs (activities of daily living)- pt/ot/dvt ppx, pain meds      #Hypotension postop   - improved  - discontinued midodrine, BP reviewed    #HTN- Norvasc, lopressor    #Pre DM  - HbA1c 6.4 9/29/20  - fingersticks  fasting  - Monitor FS    #Depression/Anxiety  - Cymbalta     #Migraines  - Sumatriptan     # Asymptomatic pyuria  -urine C&S proteus 10-49k  -completed antibiotics  -clinically asymptomatic    #Allergies  - Continue Benadryl       DVT ppx- Lovenox SQ

## 2020-11-01 LAB
GLUCOSE BLDC GLUCOMTR-MCNC: 104 MG/DL — HIGH (ref 70–99)
SARS-COV-2 RNA SPEC QL NAA+PROBE: DETECTED

## 2020-11-01 PROCEDURE — 99232 SBSQ HOSP IP/OBS MODERATE 35: CPT | Mod: GC

## 2020-11-01 PROCEDURE — 99232 SBSQ HOSP IP/OBS MODERATE 35: CPT

## 2020-11-01 RX ADMIN — Medication 25 MILLIGRAM(S): at 14:53

## 2020-11-01 RX ADMIN — Medication 145 MILLIGRAM(S): at 12:14

## 2020-11-01 RX ADMIN — Medication 1000 UNIT(S): at 12:14

## 2020-11-01 RX ADMIN — Medication 650 MILLIGRAM(S): at 21:52

## 2020-11-01 RX ADMIN — PANTOPRAZOLE SODIUM 40 MILLIGRAM(S): 20 TABLET, DELAYED RELEASE ORAL at 06:02

## 2020-11-01 RX ADMIN — Medication 50 MILLIGRAM(S): at 21:49

## 2020-11-01 RX ADMIN — Medication 25 MILLIGRAM(S): at 17:51

## 2020-11-01 RX ADMIN — Medication 1 TABLET(S): at 12:16

## 2020-11-01 RX ADMIN — OXYCODONE HYDROCHLORIDE 10 MILLIGRAM(S): 5 TABLET ORAL at 17:52

## 2020-11-01 RX ADMIN — OXYCODONE HYDROCHLORIDE 10 MILLIGRAM(S): 5 TABLET ORAL at 08:07

## 2020-11-01 RX ADMIN — Medication 100 MILLIGRAM(S): at 21:48

## 2020-11-01 RX ADMIN — OXYCODONE HYDROCHLORIDE 10 MILLIGRAM(S): 5 TABLET ORAL at 07:37

## 2020-11-01 RX ADMIN — Medication 25 MILLIGRAM(S): at 06:02

## 2020-11-01 RX ADMIN — ENOXAPARIN SODIUM 40 MILLIGRAM(S): 100 INJECTION SUBCUTANEOUS at 21:52

## 2020-11-01 RX ADMIN — ATORVASTATIN CALCIUM 10 MILLIGRAM(S): 80 TABLET, FILM COATED ORAL at 21:49

## 2020-11-01 RX ADMIN — AMLODIPINE BESYLATE 5 MILLIGRAM(S): 2.5 TABLET ORAL at 06:02

## 2020-11-01 RX ADMIN — Medication 650 MILLIGRAM(S): at 22:45

## 2020-11-01 RX ADMIN — MAGNESIUM OXIDE 400 MG ORAL TABLET 400 MILLIGRAM(S): 241.3 TABLET ORAL at 12:16

## 2020-11-01 RX ADMIN — DULOXETINE HYDROCHLORIDE 120 MILLIGRAM(S): 30 CAPSULE, DELAYED RELEASE ORAL at 21:49

## 2020-11-01 RX ADMIN — Medication 250 MILLIGRAM(S): at 17:53

## 2020-11-01 RX ADMIN — LIDOCAINE 1 PATCH: 4 CREAM TOPICAL at 00:15

## 2020-11-01 RX ADMIN — OXYCODONE HYDROCHLORIDE 10 MILLIGRAM(S): 5 TABLET ORAL at 18:22

## 2020-11-01 RX ADMIN — Medication 250 MILLIGRAM(S): at 06:02

## 2020-11-01 NOTE — PROGRESS NOTE ADULT - SUBJECTIVE AND OBJECTIVE BOX
60 year old female with PMHx HTN, hyperlipidemia, osteoarthritis, anxiety/depression disorders, GERD, eczema, sinusitis, scoliosis/spinal stenosis- lumbar region, presented to [facility] 9/29/20 for scheduled stage 1, L1-5 lumbar lateral interbody fusion with Dr. Valdez. Stage 2 T9-pelvis arthrodesis, L5-S1 TLIF done on 9/30/20 with closure of muscle flap done by plastics, Dr. Alegre. Admitted to NSICU post operatively due to hemorrhagic shock requiring fluid resuscitation with IV hydration and pressors. EBL 1500 cc, s/p 2 u prbc. Patient was weaned off IV pressors 10/1/20 and started on midodrine. Transferred to the floor when stable. Surgical drains removed on 10/7/20. Plastic surgery following for incision management. Aquacel dressing changed on 10/3 and 10/8. Hgb remains stable. Pt currently off antihypertensives, now on midodrine- wean to off as indicated.     Patient is medically and neurologically stable for discharge to [facility] for multidisciplinary acute rehab 10/10/20.  Patient arrived hemodynamically stable condition. Seen at bedside with her  present. She reports bilateral shoulder pain due to history of OA (had injections prior to being admitted for spine surgery).  Currently reports 7/10 with movement.  She endorses 2-3 episodes of diarrhea the last two days. Denies abdominal pain, fevers, chills.       ROS -patient exposed to COVID+ yesterday COVID PCR done  Non reactive  After repeat Swab today developed post nasal drip/cough  patient states bowel and bladder control is back to normal  no incontinence  similar symptoms of numbness RLE from knee down, numbness anteriorly around waist and  muscle/back soreness  increased motor function Left foot   no N,V  No dizziness, no headaches  no Chest pain, no SOB  spoke with NS- no celebrex X 6-12 months            william  MEDICATIONS  (STANDING):  amLODIPine   Tablet 5 milliGRAM(s) Oral daily  atorvastatin 10 milliGRAM(s) Oral at bedtime  cholecalciferol 1000 Unit(s) Oral daily  dextrose 5%. 1000 milliLiter(s) (50 mL/Hr) IV Continuous <Continuous>  dextrose 50% Injectable 12.5 Gram(s) IV Push once  dextrose 50% Injectable 25 Gram(s) IV Push once  dextrose 50% Injectable 25 Gram(s) IV Push once  DULoxetine 120 milliGRAM(s) Oral at bedtime  enoxaparin Injectable 40 milliGRAM(s) SubCutaneous <User Schedule>  fenofibrate Tablet 145 milliGRAM(s) Oral daily  hydrOXYzine hydrochloride 50 milliGRAM(s) Oral at bedtime  lidocaine   Patch 1 Patch Transdermal every 24 hours  magnesium oxide 400 milliGRAM(s) Oral daily  metoprolol tartrate 25 milliGRAM(s) Oral two times a day  multivitamin 1 Tablet(s) Oral daily  oxyCODONE    IR 10 milliGRAM(s) Oral <User Schedule>  pantoprazole    Tablet 40 milliGRAM(s) Oral before breakfast  polyethylene glycol 3350 17 Gram(s) Oral daily  saccharomyces boulardii 250 milliGRAM(s) Oral two times a day    MEDICATIONS  (PRN):  acetaminophen   Tablet .. 650 milliGRAM(s) Oral every 6 hours PRN Temp greater or equal to 38C (100.4F), Mild Pain (1 - 3)  aluminum hydroxide/magnesium hydroxide/simethicone Suspension 30 milliLiter(s) Oral every 4 hours PRN Dyspepsia  cyclobenzaprine 10 milliGRAM(s) Oral three times a day PRN Muscle Spasm  dextrose 40% Gel 15 Gram(s) Oral once PRN Blood Glucose LESS THAN 70 milliGRAM(s)/deciliter  diphenhydrAMINE 25 milliGRAM(s) Oral every 4 hours PRN Rash and/or Itching  glucagon  Injectable 1 milliGRAM(s) IntraMuscular once PRN Glucose LESS THAN 70 milligrams/deciliter  guaiFENesin   Syrup  (Sugar-Free) 100 milliGRAM(s) Oral every 6 hours PRN Cough  oxyCODONE    IR 5 milliGRAM(s) Oral every 6 hours PRN Moderate Pain (4 - 6)  oxyCODONE    IR 10 milliGRAM(s) Oral every 6 hours PRN Severe Pain (7 - 10)  senna 2 Tablet(s) Oral at bedtime PRN Constipation  SUMAtriptan 25 milliGRAM(s) Oral four times a day PRN Migraine                  CAPILLARY BLOOD GLUCOSE      POCT Blood Glucose.: 104 mg/dL (01 Nov 2020 07:50)      Vital Signs Last 24 Hrs  T(C): 37.2 (01 Nov 2020 07:54), Max: 37.2 (01 Nov 2020 07:54)  T(F): 98.9 (01 Nov 2020 07:54), Max: 98.9 (01 Nov 2020 07:54)  HR: 75 (01 Nov 2020 07:54) (70 - 102)  BP: 124/78 (01 Nov 2020 07:54) (124/78 - 159/79)  BP(mean): --  RR: 15 (01 Nov 2020 07:54) (15 - 15)  SpO2: 97% (01 Nov 2020 07:54) (95% - 97%)      Constitutional - NAD, Comfortable, lying in bed  HEENT - NCAT, EOMI  Chest - Breathing comfortably  Cardiovascular - RRR  Abdomen - Soft, mildly distended, hyperactive bowel sounds  Extremities - No C/C/E, No calf tenderness    Neurologic Exam -                    Cognitive - Awake, Alert, AAO to self, place, date, year, situation     Communication - Fluent, No dysarth     Cranial Nerves - no facial asymmetry, shoulder shrug intact,      Motor -                     LEFT    UE - ShAB 5/5, EF 5/5, EE 5/5, WE 5/5,  5/5                    RIGHT UE - ShAB 5/5, EF 5/5, EE 5/5, WE 5/5,  5/5                    LEFT    LE - HF 4/5, KE 4/5, DF 3+/5, PF 5/5 EHL 5/5                    RIGHT LE - HF 3/5, KE 4/5, DF 4/5, PF 5/5        Sensory - decreased to light touch medial leg on RIGHT and dorsum of foot on LEFT but better     Reflexes - DTR Intact, No primitive reflexes     Psychiatric - Mood stable, Affect WNL     Skin:         thoraco-lumbar surgical incision intact- sutures out         Left flank incision with staples dcd         Healed drain incisions lower lumbar          No pressure injuries noted on sacrum, coccyx, or heels      IDT meeting 10/28    OT  independent eating/grooming  sup bathing  UB dressing I  LB dressing Sup  toileting sup  toilet transfers sup/CG  shower min A  PT  sup transfers  sup '  4 steps one rail CG/min A  Occ buckling Right knee but patient corrects      Tentative dc to home with  and home services on 10/29

## 2020-11-01 NOTE — PROGRESS NOTE ADULT - SUBJECTIVE AND OBJECTIVE BOX
Patient is a 60y old  Female who presents with a chief complaint of functional deficits due to L1-L5 lumbar fusion, T9-pelvis arthrodesis, L5-S1 TLIF.      No overnight events noted.  Patient seen and examined at bedside.    ALLERGIES:  penicillin (Other)    MEDICATIONS  (STANDING):  amLODIPine   Tablet 5 milliGRAM(s) Oral daily  atorvastatin 10 milliGRAM(s) Oral at bedtime  cholecalciferol 1000 Unit(s) Oral daily  dextrose 5%. 1000 milliLiter(s) (50 mL/Hr) IV Continuous <Continuous>  dextrose 50% Injectable 25 Gram(s) IV Push once  dextrose 50% Injectable 25 Gram(s) IV Push once  dextrose 50% Injectable 12.5 Gram(s) IV Push once  DULoxetine 120 milliGRAM(s) Oral at bedtime  enoxaparin Injectable 40 milliGRAM(s) SubCutaneous <User Schedule>  fenofibrate Tablet 145 milliGRAM(s) Oral daily  hydrOXYzine hydrochloride 50 milliGRAM(s) Oral at bedtime  lidocaine   Patch 1 Patch Transdermal every 24 hours  magnesium oxide 400 milliGRAM(s) Oral daily  metoprolol tartrate 25 milliGRAM(s) Oral two times a day  multivitamin 1 Tablet(s) Oral daily  oxyCODONE    IR 10 milliGRAM(s) Oral <User Schedule>  pantoprazole    Tablet 40 milliGRAM(s) Oral before breakfast  polyethylene glycol 3350 17 Gram(s) Oral daily  saccharomyces boulardii 250 milliGRAM(s) Oral two times a day    MEDICATIONS  (PRN):  acetaminophen   Tablet .. 650 milliGRAM(s) Oral every 6 hours PRN Temp greater or equal to 38C (100.4F), Mild Pain (1 - 3)  aluminum hydroxide/magnesium hydroxide/simethicone Suspension 30 milliLiter(s) Oral every 4 hours PRN Dyspepsia  cyclobenzaprine 10 milliGRAM(s) Oral three times a day PRN Muscle Spasm  dextrose 40% Gel 15 Gram(s) Oral once PRN Blood Glucose LESS THAN 70 milliGRAM(s)/deciliter  diphenhydrAMINE 25 milliGRAM(s) Oral every 4 hours PRN Rash and/or Itching  glucagon  Injectable 1 milliGRAM(s) IntraMuscular once PRN Glucose LESS THAN 70 milligrams/deciliter  oxyCODONE    IR 5 milliGRAM(s) Oral every 6 hours PRN Moderate Pain (4 - 6)  oxyCODONE    IR 10 milliGRAM(s) Oral every 6 hours PRN Severe Pain (7 - 10)  senna 2 Tablet(s) Oral at bedtime PRN Constipation  SUMAtriptan 25 milliGRAM(s) Oral four times a day PRN Migraine    Vital Signs Last 24 Hrs  T(F): 98.9 (01 Nov 2020 07:54), Max: 98.9 (01 Nov 2020 07:54)  HR: 75 (01 Nov 2020 07:54) (70 - 102)  BP: 124/78 (01 Nov 2020 07:54) (124/78 - 159/79)  RR: 15 (01 Nov 2020 07:54) (15 - 15)  SpO2: 97% (01 Nov 2020 07:54) (95% - 97%)    GENERAL- NAD  EYES- MOE, conjunctiva and Sclera clear  NECK- supple  RESPIRATORY-  clear to auscultation bilaterally  CARDIOVASCULAR - SIS2, RRR  GI - soft NT BS present  EXTREMITIES- no pedal edema  NEUROLOGY- no gross focal deficits  PSYCHIATRY- AAO X 3  MUSCULOSKELETAL- ROM normal    LABS:                        11.3   4.72  )-----------( 432      ( 29 Oct 2020 06:30 )             36.8       10-29    144  |  107  |  19  ----------------------------<  117  3.6   |  29  |  0.73    Ca    9.5      29 Oct 2020 06:30  Mg     2.0     10-29       eGFR if Non African American: 90 mL/min/1.73M2 (10-29-20 @ 06:30)  eGFR if : 104 mL/min/1.73M2 (10-29-20 @ 06:30)                                  POCT Blood Glucose.: 104 mg/dL (01 Nov 2020 07:50)

## 2020-11-01 NOTE — PROGRESS NOTE ADULT - ASSESSMENT
CRISTEL WATTS is a HTN, hyperlipidemia, osteoarthritis, anxiety/depression disorders, GERD, eczema, sinusitis, scoliosis/spinal stenosis- lumbar region, presented to [facility] 9/29/20 for scheduled stage 1, L1-5 lumbar lateral interbody fusion with Dr. Valdez. Stage 2 T9-pelvis arthrodesis, L5-S1 TLIF done on 9/30/20 with closure of muscle flap done by plastics, Dr. Alegre. Hospital course complicated by hemorrhagic shock postop requiring pressors and blood transfusions, now stable on Midodrine. Now admitted to [facility] after for initiation of a multidisciplinary rehab program consisting focused on functional mobility, transfers and ADLs (activities of daily living).    #Elective L1-5 lumbar fusion, T9-pelvis arthrodesis, L5-S1 TLIF 9/29-9/30  - comprehensive multidisciplinary rehab program, PT/OT 3 hours a day, 5 days a week  WILL HOLD DC DUE TO COVID EXPOSURE WITH HIGH RISK SPOUSE AT HOME  - no celebrex per NS  - F/u with neurosurgery, Dr. Valdez, outpatient  - F/u with plastics in 1-2 weeks with Dr. Alegre in clinic  - No weight bearing restrictions  - Precautions: falls, spine, neuro  Xray TL spine as per NS- reviewed with hardware intact  lidoderm to Right ribs  Repeat COVID PCR on Sunday pending results        #Hypotension postop - improved  -Will Dc midodrine- SYST BPs 130-140  -Norvasc 5mg daily restarted by hospitalist, lopressor 25mg Q12h added,Edarbi 40mg QHSstill on hold  - BP occ elevated-crrently Ok- consider resuming edarbi on DC if needed    #Pre-DM  - HbA1c 6.4 9/29/20  - FS  10/9   fingersticks  fasting  - Monitor FS.    #SP Fall  fall precautions  OT did address toileting while seated- Patient is safe    #Depression/Anxiety:  - Continue Cymbalta 60mg BID  neuropsychology consult noted    #Migraines  - Sumatriptan 25mg Q4hr PRN    # UTI  urineC&S proteus 10-49k  finished antibiotics  clinically asymptomatic    #Allergies/ post nasal drip/cough  - Continue Benadryl 25mg Q4hr PRN  add guafenescin    Pain Management:  - Flexeril 10mg TID PRN  timed oxycodone 10mg with breakfast and lunch daily before Rehab    GI/Bowel:  - At risk for constipation due to neurologic diagnosis, immobility and/or medication use  - daily miralax restarted at patient request  - GI ppx: Protonix daily, Maalox PRN    /Bladder:   - At risk for incontinence and retention due to neurologic diagnosis and limited mobility  - Currently patient voids: independent  - Encourage timed voids every 4 hours while awake for independence and to promote continence during therapy.    Skin/Pressure Injury:   - Skin assessment on admission: no pressure injuries noted   - Monitor Incisions: Thoraco-lumbar incision and left flank incision - intact  - Turn every 2 hours while in bed, air mattress  - Soft heel protectors  - Skin barrier cream as needed  - Nursing to monitor skin Qshift    Diet:  - Diet Consistency/Modifications: regular/DASH    DVT ppx:  - Lovenox  - SCDs

## 2020-11-01 NOTE — PROGRESS NOTE ADULT - ASSESSMENT
CRISTEL WATTS is a HTN, hyperlipidemia, osteoarthritis, anxiety/depression disorders, GERD, eczema, sinusitis, scoliosis/spinal stenosis- lumbar region, presented to [facility] 9/29/20 for scheduled stage 1, L1-5 lumbar lateral interbody fusion with Dr. Valdez. Stage 2 T9-pelvis arthrodesis, L5-S1 TLIF done on 9/30/20 with closure of muscle flap done by plastics, Dr. Alegre. Hospital course complicated by hemorrhagic shock postop requiring pressors and blood transfusions, now stable on Midodrine. Now admitted to [facility] after for initiation of a multidisciplinary rehab program consisting focused on functional mobility, transfers and ADLs (activities of daily living)- pt/ot/dvt ppx, pain meds      #Hypotension postop   - improved  - discontinued midodrine, BP reviewed    #HTN- Norvasc, lopressor    #Pre DM  - HbA1c 6.4 9/29/20  - fingersticks  fasting  - Monitor FS    #Depression/Anxiety  - Cymbalta     #Migraines  - Sumatriptan     # Asymptomatic pyuria  -urine C&S proteus 10-49k  -completed antibiotics  -clinically asymptomatic    #Allergies  - Continue Benadryl       DVT ppx- Lovenox SQ

## 2020-11-02 LAB
ANION GAP SERPL CALC-SCNC: 9 MMOL/L — SIGNIFICANT CHANGE UP (ref 5–17)
BUN SERPL-MCNC: 12 MG/DL — SIGNIFICANT CHANGE UP (ref 7–23)
CALCIUM SERPL-MCNC: 9.4 MG/DL — SIGNIFICANT CHANGE UP (ref 8.4–10.5)
CHLORIDE SERPL-SCNC: 101 MMOL/L — SIGNIFICANT CHANGE UP (ref 96–108)
CO2 SERPL-SCNC: 29 MMOL/L — SIGNIFICANT CHANGE UP (ref 22–31)
CREAT SERPL-MCNC: 0.87 MG/DL — SIGNIFICANT CHANGE UP (ref 0.5–1.3)
GLUCOSE BLDC GLUCOMTR-MCNC: 102 MG/DL — HIGH (ref 70–99)
GLUCOSE SERPL-MCNC: 114 MG/DL — HIGH (ref 70–99)
HCT VFR BLD CALC: 39.4 % — SIGNIFICANT CHANGE UP (ref 34.5–45)
HGB BLD-MCNC: 12.4 G/DL — SIGNIFICANT CHANGE UP (ref 11.5–15.5)
MCHC RBC-ENTMCNC: 28.6 PG — SIGNIFICANT CHANGE UP (ref 27–34)
MCHC RBC-ENTMCNC: 31.5 GM/DL — LOW (ref 32–36)
MCV RBC AUTO: 90.8 FL — SIGNIFICANT CHANGE UP (ref 80–100)
NRBC # BLD: 0 /100 WBCS — SIGNIFICANT CHANGE UP (ref 0–0)
PLATELET # BLD AUTO: 412 K/UL — HIGH (ref 150–400)
POTASSIUM SERPL-MCNC: 3.4 MMOL/L — LOW (ref 3.5–5.3)
POTASSIUM SERPL-SCNC: 3.4 MMOL/L — LOW (ref 3.5–5.3)
RBC # BLD: 4.34 M/UL — SIGNIFICANT CHANGE UP (ref 3.8–5.2)
RBC # FLD: 13.8 % — SIGNIFICANT CHANGE UP (ref 10.3–14.5)
SARS-COV-2 RNA SPEC QL NAA+PROBE: DETECTED
SODIUM SERPL-SCNC: 139 MMOL/L — SIGNIFICANT CHANGE UP (ref 135–145)
WBC # BLD: 3.89 K/UL — SIGNIFICANT CHANGE UP (ref 3.8–10.5)
WBC # FLD AUTO: 3.89 K/UL — SIGNIFICANT CHANGE UP (ref 3.8–10.5)

## 2020-11-02 PROCEDURE — 99232 SBSQ HOSP IP/OBS MODERATE 35: CPT | Mod: GC

## 2020-11-02 PROCEDURE — 99232 SBSQ HOSP IP/OBS MODERATE 35: CPT

## 2020-11-02 RX ORDER — POTASSIUM CHLORIDE 20 MEQ
40 PACKET (EA) ORAL ONCE
Refills: 0 | Status: DISCONTINUED | OUTPATIENT
Start: 2020-11-02 | End: 2020-11-02

## 2020-11-02 RX ORDER — POTASSIUM CHLORIDE 20 MEQ
40 PACKET (EA) ORAL ONCE
Refills: 0 | Status: COMPLETED | OUTPATIENT
Start: 2020-11-02 | End: 2020-11-02

## 2020-11-02 RX ORDER — OXYCODONE HYDROCHLORIDE 5 MG/1
10 TABLET ORAL
Refills: 0 | Status: DISCONTINUED | OUTPATIENT
Start: 2020-11-02 | End: 2020-11-03

## 2020-11-02 RX ORDER — OXYCODONE HYDROCHLORIDE 5 MG/1
5 TABLET ORAL EVERY 6 HOURS
Refills: 0 | Status: DISCONTINUED | OUTPATIENT
Start: 2020-11-02 | End: 2020-11-03

## 2020-11-02 RX ORDER — OXYCODONE HYDROCHLORIDE 5 MG/1
10 TABLET ORAL EVERY 6 HOURS
Refills: 0 | Status: DISCONTINUED | OUTPATIENT
Start: 2020-11-02 | End: 2020-11-03

## 2020-11-02 RX ADMIN — PANTOPRAZOLE SODIUM 40 MILLIGRAM(S): 20 TABLET, DELAYED RELEASE ORAL at 05:30

## 2020-11-02 RX ADMIN — Medication 650 MILLIGRAM(S): at 20:32

## 2020-11-02 RX ADMIN — LIDOCAINE 1 PATCH: 4 CREAM TOPICAL at 12:52

## 2020-11-02 RX ADMIN — OXYCODONE HYDROCHLORIDE 10 MILLIGRAM(S): 5 TABLET ORAL at 01:57

## 2020-11-02 RX ADMIN — Medication 25 MILLIGRAM(S): at 17:22

## 2020-11-02 RX ADMIN — ATORVASTATIN CALCIUM 10 MILLIGRAM(S): 80 TABLET, FILM COATED ORAL at 20:32

## 2020-11-02 RX ADMIN — AMLODIPINE BESYLATE 5 MILLIGRAM(S): 2.5 TABLET ORAL at 05:30

## 2020-11-02 RX ADMIN — Medication 40 MILLIEQUIVALENT(S): at 12:50

## 2020-11-02 RX ADMIN — OXYCODONE HYDROCHLORIDE 10 MILLIGRAM(S): 5 TABLET ORAL at 09:44

## 2020-11-02 RX ADMIN — POLYETHYLENE GLYCOL 3350 17 GRAM(S): 17 POWDER, FOR SOLUTION ORAL at 12:52

## 2020-11-02 RX ADMIN — ENOXAPARIN SODIUM 40 MILLIGRAM(S): 100 INJECTION SUBCUTANEOUS at 20:32

## 2020-11-02 RX ADMIN — Medication 250 MILLIGRAM(S): at 05:30

## 2020-11-02 RX ADMIN — Medication 145 MILLIGRAM(S): at 12:51

## 2020-11-02 RX ADMIN — Medication 650 MILLIGRAM(S): at 21:25

## 2020-11-02 RX ADMIN — Medication 1000 UNIT(S): at 12:51

## 2020-11-02 RX ADMIN — OXYCODONE HYDROCHLORIDE 10 MILLIGRAM(S): 5 TABLET ORAL at 02:30

## 2020-11-02 RX ADMIN — Medication 1 TABLET(S): at 12:51

## 2020-11-02 RX ADMIN — DULOXETINE HYDROCHLORIDE 120 MILLIGRAM(S): 30 CAPSULE, DELAYED RELEASE ORAL at 20:32

## 2020-11-02 RX ADMIN — Medication 250 MILLIGRAM(S): at 17:22

## 2020-11-02 RX ADMIN — LIDOCAINE 1 PATCH: 4 CREAM TOPICAL at 20:00

## 2020-11-02 RX ADMIN — OXYCODONE HYDROCHLORIDE 10 MILLIGRAM(S): 5 TABLET ORAL at 08:44

## 2020-11-02 RX ADMIN — MAGNESIUM OXIDE 400 MG ORAL TABLET 400 MILLIGRAM(S): 241.3 TABLET ORAL at 12:51

## 2020-11-02 RX ADMIN — Medication 50 MILLIGRAM(S): at 20:32

## 2020-11-02 RX ADMIN — Medication 25 MILLIGRAM(S): at 05:31

## 2020-11-02 NOTE — CHART NOTE - NSCHARTNOTEFT_GEN_A_CORE
Nutrition Follow Up Note  Hospital Course   (Per Electronic Medical Record)    Source:  Patient [X]  Medical Record [X]      Diet:   Consistent Carbohydrate, DASH-TLC Diet w/ Thin Liquids  Tolerates Diet Well  No Chewing/Swallowing Difficulties  No Recent Nausea, Vomiting, Diarrhea or Constipation  Consumes % of Meals (as Per Documentation) - States Fair Intake     Enteral/Parenteral Nutrition: Not Applicable    Current Weight: 210.1lb on 10/10  Obtain New Weight to Confirm Change  Obtain Weights Weekly     Pertinent Medications: MEDICATIONS  (STANDING):  amLODIPine   Tablet 5 milliGRAM(s) Oral daily  atorvastatin 10 milliGRAM(s) Oral at bedtime  cholecalciferol 1000 Unit(s) Oral daily  dextrose 5%. 1000 milliLiter(s) (50 mL/Hr) IV Continuous <Continuous>  dextrose 50% Injectable 12.5 Gram(s) IV Push once  dextrose 50% Injectable 25 Gram(s) IV Push once  dextrose 50% Injectable 25 Gram(s) IV Push once  DULoxetine 120 milliGRAM(s) Oral at bedtime  enoxaparin Injectable 40 milliGRAM(s) SubCutaneous <User Schedule>  fenofibrate Tablet 145 milliGRAM(s) Oral daily  hydrOXYzine hydrochloride 50 milliGRAM(s) Oral at bedtime  lidocaine   Patch 1 Patch Transdermal every 24 hours  magnesium oxide 400 milliGRAM(s) Oral daily  metoprolol tartrate 25 milliGRAM(s) Oral two times a day  multivitamin 1 Tablet(s) Oral daily  oxyCODONE    IR 10 milliGRAM(s) Oral <User Schedule>  pantoprazole    Tablet 40 milliGRAM(s) Oral before breakfast  polyethylene glycol 3350 17 Gram(s) Oral daily  potassium chloride    Tablet ER 40 milliEquivalent(s) Oral once  saccharomyces boulardii 250 milliGRAM(s) Oral two times a day    MEDICATIONS  (PRN):  acetaminophen   Tablet .. 650 milliGRAM(s) Oral every 6 hours PRN Temp greater or equal to 38C (100.4F), Mild Pain (1 - 3)  aluminum hydroxide/magnesium hydroxide/simethicone Suspension 30 milliLiter(s) Oral every 4 hours PRN Dyspepsia  cyclobenzaprine 10 milliGRAM(s) Oral three times a day PRN Muscle Spasm  dextrose 40% Gel 15 Gram(s) Oral once PRN Blood Glucose LESS THAN 70 milliGRAM(s)/deciliter  diphenhydrAMINE 25 milliGRAM(s) Oral every 4 hours PRN Rash and/or Itching  glucagon  Injectable 1 milliGRAM(s) IntraMuscular once PRN Glucose LESS THAN 70 milligrams/deciliter  guaiFENesin   Syrup  (Sugar-Free) 100 milliGRAM(s) Oral every 6 hours PRN Cough  oxyCODONE    IR 5 milliGRAM(s) Oral every 6 hours PRN Moderate Pain (4 - 6)  oxyCODONE    IR 10 milliGRAM(s) Oral every 6 hours PRN Severe Pain (7 - 10)  senna 2 Tablet(s) Oral at bedtime PRN Constipation  SUMAtriptan 25 milliGRAM(s) Oral four times a day PRN Migraine    Pertinent Labs:  11-02 Na139 mmol/L Glu 114 mg/dL<H> K+ 3.4 mmol/L<L> Cr  0.87 mg/dL BUN 12 mg/dL    Skin: No Pressure Ulcers  Multiple Surgical Incisions  (as Per Nursing Flow Sheet)     Edema: None Noted     Last Bowel Movement: on 10/31    Estimated Needs:   [X] No Change Since Previous Assessment    Previous Nutrition Diagnosis:   Obese    Nutrition Diagnosis is [X] Ongoing - Continue Nutrition Plan of Care     New Nutrition Diagnosis: [X] Not Applicable    Interventions:   1. Recommend Continue Nutrition Plan of Care     Monitoring & Evaluation:   [X] Weights   [X] PO Intake   [X] Skin Integrity   [X] Follow Up (Per Protocol)  [X] Tolerance to Diet Prescription   [X] Other: Labs     Registered Dietitian/Nutritionist Remains Available.  Terrence Gamez RDN    Pager # [phone]  Phone# [phone]

## 2020-11-02 NOTE — PROGRESS NOTE ADULT - SUBJECTIVE AND OBJECTIVE BOX
seen and examined at bedside     in bed   no co   no back pain     no sob   no chest pain    no URI no LRI symptoms   no FC     no nv     ros all others are neg     Vital Signs Last 24 Hrs  T(C): 37.7 (02 Nov 2020 09:15), Max: 37.8 (01 Nov 2020 20:07)  T(F): 99.8 (02 Nov 2020 09:15), Max: 100 (01 Nov 2020 20:07)  HR: 77 (02 Nov 2020 09:15) (77 - 95)  BP: 136/80 (02 Nov 2020 09:15) (131/73 - 136/80)  RR: 15 (02 Nov 2020 09:15) (15 - 15)  SpO2: 94% (02 Nov 2020 09:15) (94% - 95%)                          12.4   3.89  )-----------( 412      ( 02 Nov 2020 05:30 )             39.4     11-02    139  |  101  |  12  ----------------------------<  114<H>  3.4<L>   |  29  |  0.87    Ca    9.4      02 Nov 2020 05:30    Home Medications:  atorvastatin 10 mg oral tablet: 1 tab(s) orally once a day, evening  (30 Sep 2020 11:31)  Cymbalta 60 mg oral delayed release capsule: 2 cap(s) orally once a day x anxiety and body pain (30 Sep 2020 11:31)  fenofibrate 145 mg oral tablet: 1 tab(s) orally once a day (28 Oct 2020 15:39)  Multiple Vitamins oral tablet: 1 tab(s) orally once a day (10 Oct 2020 08:38)  SUMAtriptan 25 mg oral tablet: 1 tab(s) orally 4 times a day, As needed, Migraine (28 Oct 2020 15:39)    MEDICATIONS  (STANDING):  amLODIPine   Tablet 5 milliGRAM(s) Oral daily  atorvastatin 10 milliGRAM(s) Oral at bedtime  cholecalciferol 1000 Unit(s) Oral daily  dextrose 5%. 1000 milliLiter(s) (50 mL/Hr) IV Continuous <Continuous>  dextrose 50% Injectable 12.5 Gram(s) IV Push once  dextrose 50% Injectable 25 Gram(s) IV Push once  dextrose 50% Injectable 25 Gram(s) IV Push once  DULoxetine 120 milliGRAM(s) Oral at bedtime  enoxaparin Injectable 40 milliGRAM(s) SubCutaneous <User Schedule>  fenofibrate Tablet 145 milliGRAM(s) Oral daily  hydrOXYzine hydrochloride 50 milliGRAM(s) Oral at bedtime  lidocaine   Patch 1 Patch Transdermal every 24 hours  magnesium oxide 400 milliGRAM(s) Oral daily  metoprolol tartrate 25 milliGRAM(s) Oral two times a day  multivitamin 1 Tablet(s) Oral daily  oxyCODONE    IR 10 milliGRAM(s) Oral <User Schedule>  pantoprazole    Tablet 40 milliGRAM(s) Oral before breakfast  polyethylene glycol 3350 17 Gram(s) Oral daily  saccharomyces boulardii 250 milliGRAM(s) Oral two times a day    MEDICATIONS  (PRN):  acetaminophen   Tablet .. 650 milliGRAM(s) Oral every 6 hours PRN Temp greater or equal to 38C (100.4F), Mild Pain (1 - 3)  aluminum hydroxide/magnesium hydroxide/simethicone Suspension 30 milliLiter(s) Oral every 4 hours PRN Dyspepsia  cyclobenzaprine 10 milliGRAM(s) Oral three times a day PRN Muscle Spasm  dextrose 40% Gel 15 Gram(s) Oral once PRN Blood Glucose LESS THAN 70 milliGRAM(s)/deciliter  diphenhydrAMINE 25 milliGRAM(s) Oral every 4 hours PRN Rash and/or Itching  glucagon  Injectable 1 milliGRAM(s) IntraMuscular once PRN Glucose LESS THAN 70 milligrams/deciliter  guaiFENesin   Syrup  (Sugar-Free) 100 milliGRAM(s) Oral every 6 hours PRN Cough  oxyCODONE    IR 5 milliGRAM(s) Oral every 6 hours PRN Moderate Pain (4 - 6)  oxyCODONE    IR 10 milliGRAM(s) Oral every 6 hours PRN Severe Pain (7 - 10)  senna 2 Tablet(s) Oral at bedtime PRN Constipation  SUMAtriptan 25 milliGRAM(s) Oral four times a day PRN Migraine

## 2020-11-02 NOTE — PROGRESS NOTE ADULT - ASSESSMENT
55 F Admitted to acute  for     9/29/20 for scheduled stage 1, L1-5 lumbar lateral interbody fusion  T9-pelvis arthrodesis, L5-S1 TLIF done on 9/30/20 with closure of muscle flap done by plastics, Dr. Alegre.  Hemorrhagic shock postop requiring pressors and blood transfusions, resolved. keep MAP > 65, trend hh over time     COVID-19 PCR positive asymptomatic at this time. per  protocol     Other medical conditions:     ·	Essential HTN: controlled, continue current regimen with norvasc, metoprolol target -130/80   ·	Hyperlipidemia low intensity statin   ·	Osteoarthritis: no pain   ·	Anxiety/depression disorders  ·	GERD: controlled   ·	Eczema: resolved  ·	Sinusitis: resolved  ·	Scoliosis/spinal stenosis- lumbar region  ·	Pre-DM II hba1 6.4, ada, iss monitor diet control/carb count   ·	Depression/Anxiety: controlled, no SI continue, Cymbalta   ·	Migraine hx, no recurrence, continue the Sumatriptan prn, monitor   ·	Asymptomatic proteus 10-49k in urine, treated given hx HW: clinically asymptomatic at this time     Reviewed with patient, rehab team plan of care   VTE proph with lovenox

## 2020-11-02 NOTE — PROGRESS NOTE ADULT - SUBJECTIVE AND OBJECTIVE BOX
60 year old female with PMHx HTN, hyperlipidemia, osteoarthritis, anxiety/depression disorders, GERD, eczema, sinusitis, scoliosis/spinal stenosis- lumbar region, presented to [facility] 9/29/20 for scheduled stage 1, L1-5 lumbar lateral interbody fusion with Dr. Valdez. Stage 2 T9-pelvis arthrodesis, L5-S1 TLIF done on 9/30/20 with closure of muscle flap done by plastics, Dr. Alegre. Admitted to NSICU post operatively due to hemorrhagic shock requiring fluid resuscitation with IV hydration and pressors. EBL 1500 cc, s/p 2 u prbc. Patient was weaned off IV pressors 10/1/20 and started on midodrine. Transferred to the floor when stable. Surgical drains removed on 10/7/20. Plastic surgery following for incision management. Aquacel dressing changed on 10/3 and 10/8. Hgb remains stable. Pt currently off antihypertensives, now on midodrine- wean to off as indicated.     Patient is medically and neurologically stable for discharge to [facility] for multidisciplinary acute rehab 10/10/20.  Patient arrived hemodynamically stable condition. Seen at bedside with her  present. She reports bilateral shoulder pain due to history of OA (had injections prior to being admitted for spine surgery).  Currently reports 7/10 with movement.  She endorses 2-3 episodes of diarrhea the last two days. Denies abdominal pain, fevers, chills.       ROS -feels tired mild muscle aches  still with post nasal drip/ non productive cough  patient states bowel and bladder control is back to normal  no incontinence  similar symptoms of numbness RLE from knee down, numbness anteriorly around waist and  muscle/back soreness  increased motor function Left foot   no N,V  No dizziness, no headaches  no Chest pain, no SOB  spoke with NS- no celebrex X 6-12 months  Repeat COVID on 11/1 and 11/2 reactive      MEDICATIONS  (STANDING):  amLODIPine   Tablet 5 milliGRAM(s) Oral daily  atorvastatin 10 milliGRAM(s) Oral at bedtime  cholecalciferol 1000 Unit(s) Oral daily  dextrose 5%. 1000 milliLiter(s) (50 mL/Hr) IV Continuous <Continuous>  dextrose 50% Injectable 12.5 Gram(s) IV Push once  dextrose 50% Injectable 25 Gram(s) IV Push once  dextrose 50% Injectable 25 Gram(s) IV Push once  DULoxetine 120 milliGRAM(s) Oral at bedtime  enoxaparin Injectable 40 milliGRAM(s) SubCutaneous <User Schedule>  fenofibrate Tablet 145 milliGRAM(s) Oral daily  hydrOXYzine hydrochloride 50 milliGRAM(s) Oral at bedtime  lidocaine   Patch 1 Patch Transdermal every 24 hours  magnesium oxide 400 milliGRAM(s) Oral daily  metoprolol tartrate 25 milliGRAM(s) Oral two times a day  multivitamin 1 Tablet(s) Oral daily  oxyCODONE    IR 10 milliGRAM(s) Oral <User Schedule>  pantoprazole    Tablet 40 milliGRAM(s) Oral before breakfast  polyethylene glycol 3350 17 Gram(s) Oral daily  saccharomyces boulardii 250 milliGRAM(s) Oral two times a day    MEDICATIONS  (PRN):  acetaminophen   Tablet .. 650 milliGRAM(s) Oral every 6 hours PRN Temp greater or equal to 38C (100.4F), Mild Pain (1 - 3)  aluminum hydroxide/magnesium hydroxide/simethicone Suspension 30 milliLiter(s) Oral every 4 hours PRN Dyspepsia  cyclobenzaprine 10 milliGRAM(s) Oral three times a day PRN Muscle Spasm  dextrose 40% Gel 15 Gram(s) Oral once PRN Blood Glucose LESS THAN 70 milliGRAM(s)/deciliter  diphenhydrAMINE 25 milliGRAM(s) Oral every 4 hours PRN Rash and/or Itching  glucagon  Injectable 1 milliGRAM(s) IntraMuscular once PRN Glucose LESS THAN 70 milligrams/deciliter  guaiFENesin   Syrup  (Sugar-Free) 100 milliGRAM(s) Oral every 6 hours PRN Cough  oxyCODONE    IR 5 milliGRAM(s) Oral every 6 hours PRN Moderate Pain (4 - 6)  oxyCODONE    IR 10 milliGRAM(s) Oral every 6 hours PRN Severe Pain (7 - 10)  senna 2 Tablet(s) Oral at bedtime PRN Constipation  SUMAtriptan 25 milliGRAM(s) Oral four times a day PRN Migraine                            12.4   3.89  )-----------( 412      ( 02 Nov 2020 05:30 )             39.4     11-02    139  |  101  |  12  ----------------------------<  114<H>  3.4<L>   |  29  |  0.87    Ca    9.4      02 Nov 2020 05:30          CAPILLARY BLOOD GLUCOSE      POCT Blood Glucose.: 102 mg/dL (02 Nov 2020 07:54)      Vital Signs Last 24 Hrs  T(C): 37.7 (02 Nov 2020 09:15), Max: 37.8 (01 Nov 2020 20:07)  T(F): 99.8 (02 Nov 2020 09:15), Max: 100 (01 Nov 2020 20:07)  HR: 77 (02 Nov 2020 09:15) (77 - 95)  BP: 136/80 (02 Nov 2020 09:15) (131/73 - 136/80)  BP(mean): --  RR: 15 (02 Nov 2020 09:15) (15 - 15)  SpO2: 94% (02 Nov 2020 09:15) (94% - 95%)      Constitutional - NAD, Comfortable, lying in bed  HEENT - NCAT, EOMI  Chest - Breathing comfortably  Cardiovascular - RRR  Abdomen - Soft, mildly distended, hyperactive bowel sounds  Extremities - No C/C/E, No calf tenderness    Neurologic Exam -                    Cognitive - Awake, Alert, AAO to self, place, date, year, situation     Communication - Fluent, No dysarthia     Cranial Nerves - no facial asymmetry, shoulder shrug intact,      Motor -                     LEFT    UE - ShAB 5/5, EF 5/5, EE 5/5, WE 5/5,  5/5                    RIGHT UE - ShAB 5/5, EF 5/5, EE 5/5, WE 5/5,  5/5                    LEFT    LE - HF 4/5, KE 4/5, DF 3+/5, PF 5/5 EHL 5/5                    RIGHT LE - HF 3/5, KE 4/5, DF 4/5, PF 5/5        Sensory - decreased to light touch medial leg on RIGHT and dorsum of foot on LEFT but better     Reflexes - DTR Intact, No primitive reflexes     Psychiatric - Mood stable, Affect WNL     Skin:         thoraco-lumbar surgical incision intact- sutures out         Left flank incision with staples dcd         Healed drain incisions lower lumbar          No pressure injuries noted on sacrum, coccyx, or heels      IDT meeting 10/28    OT  independent eating/grooming  sup bathing  UB dressing I  LB dressing Sup  toileting sup  toilet transfers sup/CG  shower min A  PT  sup transfers  sup '  4 steps one rail CG/min A  Occ buckling Right knee but patient corrects

## 2020-11-02 NOTE — PROGRESS NOTE ADULT - ASSESSMENT
CRISTEL WATTS is a HTN, hyperlipidemia, osteoarthritis, anxiety/depression disorders, GERD, eczema, sinusitis, scoliosis/spinal stenosis- lumbar region, presented to [facility] 9/29/20 for scheduled stage 1, L1-5 lumbar lateral interbody fusion with Dr. Valdez. Stage 2 T9-pelvis arthrodesis, L5-S1 TLIF done on 9/30/20 with closure of muscle flap done by plastics, Dr. Alegre. Hospital course complicated by hemorrhagic shock postop requiring pressors and blood transfusions, now stable on Midodrine. Now admitted to [facility] after for initiation of a multidisciplinary rehab program consisting focused on functional mobility, transfers and ADLs (activities of daily living).    #Elective L1-5 lumbar fusion, T9-pelvis arthrodesis, L5-S1 TLIF 9/29-9/30  - comprehensive multidisciplinary rehab program, PT/OT 3 hours a day, 5 days a week  - no celebrex per NS  - F/u with neurosurgery, Dr. Valdez, outpatient  - F/u with plastics in 1-2 weeks with Dr. Alegre in clinic  - No weight bearing restrictions  - Precautions: falls, spine, neuro  Xray TL spine as per NS- reviewed with hardware intact  lidoderm to Right ribs      COVID +  mild symptomatology at present  to follow clinically  OOB- cont PT/Ot once symptoms better      low k  to supplement        #Hypotension postop - improved  -Will Dc midodrine- SYST BPs 130-140  -Norvasc 5mg daily restarted by hospitalist, lopressor 25mg Q12h added,Edarbi 40mg QHSstill on hold  - BP occ elevated-currently Ok- consider resuming edarbi on DC if needed    #Pre-DM  - HbA1c 6.4 9/29/20  - FS  10/9   fingersticks  fasting  - Monitor FS.    #SP Fall  fall precautions  OT did address toileting while seated- Patient is safe    #Depression/Anxiety:  - Continue Cymbalta 60mg BID  neuropsychology consult noted    #Migraines  - Sumatriptan 25mg Q4hr PRN    # UTI  urineC&S proteus 10-49k  finished antibiotics  clinically asymptomatic    #Allergies/ post nasal drip/cough  - Continue Benadryl 25mg Q4hr PRN  add guiafenesin    Pain Management:  - Flexeril 10mg TID PRN  timed oxycodone 10mg with breakfast and lunch daily before Rehab    GI/Bowel:  - At risk for constipation due to neurologic diagnosis, immobility and/or medication use  - daily miralax restarted at patient request  - GI ppx: Protonix daily, Maalox PRN    /Bladder:   - At risk for incontinence and retention due to neurologic diagnosis and limited mobility  - Currently patient voids: independent  - Encourage timed voids every 4 hours while awake for independence and to promote continence during therapy.    Skin/Pressure Injury:   - Skin assessment on admission: no pressure injuries noted   - Monitor Incisions: Thoraco-lumbar incision and left flank incision - intact  - Turn every 2 hours while in bed, air mattress  - Soft heel protectors  - Skin barrier cream as needed  - Nursing to monitor skin Qshift    Diet:  - Diet Consistency/Modifications: regular/DASH    DVT ppx:  - Lovenox  - SCDs

## 2020-11-03 ENCOUNTER — INPATIENT (INPATIENT)
Facility: HOSPITAL | Age: 60
LOS: 10 days | Discharge: HOME | DRG: 177 | End: 2020-11-14
Attending: HOSPITALIST | Admitting: HOSPITALIST
Payer: COMMERCIAL

## 2020-11-03 VITALS
TEMPERATURE: 98 F | SYSTOLIC BLOOD PRESSURE: 127 MMHG | DIASTOLIC BLOOD PRESSURE: 75 MMHG | RESPIRATION RATE: 16 BRPM | OXYGEN SATURATION: 94 % | HEART RATE: 75 BPM

## 2020-11-03 VITALS
TEMPERATURE: 99 F | HEART RATE: 90 BPM | HEIGHT: 66 IN | SYSTOLIC BLOOD PRESSURE: 117 MMHG | OXYGEN SATURATION: 92 % | RESPIRATION RATE: 16 BRPM | DIASTOLIC BLOOD PRESSURE: 72 MMHG | WEIGHT: 188.05 LBS

## 2020-11-03 DIAGNOSIS — Z98.890 OTHER SPECIFIED POSTPROCEDURAL STATES: Chronic | ICD-10-CM

## 2020-11-03 DIAGNOSIS — Z98.89 OTHER SPECIFIED POSTPROCEDURAL STATES: Chronic | ICD-10-CM

## 2020-11-03 DIAGNOSIS — U07.1 COVID-19: ICD-10-CM

## 2020-11-03 LAB
ANION GAP SERPL CALC-SCNC: 9 MMOL/L — SIGNIFICANT CHANGE UP (ref 5–17)
APPEARANCE UR: CLEAR — SIGNIFICANT CHANGE UP
BACTERIA # UR AUTO: ABNORMAL /HPF
BILIRUB UR-MCNC: NEGATIVE — SIGNIFICANT CHANGE UP
BUN SERPL-MCNC: 12 MG/DL — SIGNIFICANT CHANGE UP (ref 7–23)
CALCIUM SERPL-MCNC: 9 MG/DL — SIGNIFICANT CHANGE UP (ref 8.4–10.5)
CHLORIDE SERPL-SCNC: 101 MMOL/L — SIGNIFICANT CHANGE UP (ref 96–108)
CK SERPL-CCNC: 51 U/L — SIGNIFICANT CHANGE UP (ref 25–170)
CO2 SERPL-SCNC: 28 MMOL/L — SIGNIFICANT CHANGE UP (ref 22–31)
COLOR SPEC: YELLOW — SIGNIFICANT CHANGE UP
CREAT SERPL-MCNC: 0.72 MG/DL — SIGNIFICANT CHANGE UP (ref 0.5–1.3)
DIFF PNL FLD: NEGATIVE — SIGNIFICANT CHANGE UP
EPI CELLS # UR: ABNORMAL
FERRITIN SERPL-MCNC: 146 NG/ML — SIGNIFICANT CHANGE UP (ref 15–150)
GLUCOSE BLDC GLUCOMTR-MCNC: 107 MG/DL — HIGH (ref 70–99)
GLUCOSE BLDC GLUCOMTR-MCNC: 144 MG/DL — HIGH (ref 70–99)
GLUCOSE SERPL-MCNC: 106 MG/DL — HIGH (ref 70–99)
GLUCOSE UR QL: NEGATIVE — SIGNIFICANT CHANGE UP
HCT VFR BLD CALC: 40.9 % — SIGNIFICANT CHANGE UP (ref 34.5–45)
HGB BLD-MCNC: 13.2 G/DL — SIGNIFICANT CHANGE UP (ref 11.5–15.5)
KETONES UR-MCNC: NEGATIVE — SIGNIFICANT CHANGE UP
LDH SERPL L TO P-CCNC: 178 U/L — SIGNIFICANT CHANGE UP (ref 50–242)
LEUKOCYTE ESTERASE UR-ACNC: ABNORMAL
MAGNESIUM SERPL-MCNC: 1.9 MG/DL — SIGNIFICANT CHANGE UP (ref 1.6–2.6)
MCHC RBC-ENTMCNC: 28.7 PG — SIGNIFICANT CHANGE UP (ref 27–34)
MCHC RBC-ENTMCNC: 32.3 GM/DL — SIGNIFICANT CHANGE UP (ref 32–36)
MCV RBC AUTO: 88.9 FL — SIGNIFICANT CHANGE UP (ref 80–100)
NITRITE UR-MCNC: NEGATIVE — SIGNIFICANT CHANGE UP
NRBC # BLD: 0 /100 WBCS — SIGNIFICANT CHANGE UP (ref 0–0)
PH UR: 6 — SIGNIFICANT CHANGE UP (ref 5–8)
PLATELET # BLD AUTO: 401 K/UL — HIGH (ref 150–400)
POTASSIUM SERPL-MCNC: 3.7 MMOL/L — SIGNIFICANT CHANGE UP (ref 3.5–5.3)
POTASSIUM SERPL-SCNC: 3.7 MMOL/L — SIGNIFICANT CHANGE UP (ref 3.5–5.3)
PROCALCITONIN SERPL-MCNC: 0.09 NG/ML — HIGH
PROT UR-MCNC: NEGATIVE — SIGNIFICANT CHANGE UP
RBC # BLD: 4.6 M/UL — SIGNIFICANT CHANGE UP (ref 3.8–5.2)
RBC # FLD: 13.7 % — SIGNIFICANT CHANGE UP (ref 10.3–14.5)
RBC CASTS # UR COMP ASSIST: SIGNIFICANT CHANGE UP /HPF (ref 0–4)
SODIUM SERPL-SCNC: 138 MMOL/L — SIGNIFICANT CHANGE UP (ref 135–145)
SP GR SPEC: 1.02 — SIGNIFICANT CHANGE UP (ref 1.01–1.02)
TROPONIN I SERPL-MCNC: <.017 NG/ML — LOW (ref 0.02–0.06)
UROBILINOGEN FLD QL: 1
WBC # BLD: 4.08 K/UL — SIGNIFICANT CHANGE UP (ref 3.8–10.5)
WBC # FLD AUTO: 4.08 K/UL — SIGNIFICANT CHANGE UP (ref 3.8–10.5)
WBC UR QL: ABNORMAL /HPF (ref 0–5)

## 2020-11-03 PROCEDURE — 71250 CT THORAX DX C-: CPT | Mod: 26

## 2020-11-03 PROCEDURE — 99232 SBSQ HOSP IP/OBS MODERATE 35: CPT | Mod: GC

## 2020-11-03 PROCEDURE — 90832 PSYTX W PT 30 MINUTES: CPT

## 2020-11-03 PROCEDURE — 99233 SBSQ HOSP IP/OBS HIGH 50: CPT

## 2020-11-03 RX ORDER — DEXAMETHASONE 0.5 MG/5ML
6 ELIXIR ORAL DAILY
Refills: 0 | Status: COMPLETED | OUTPATIENT
Start: 2020-11-04 | End: 2020-11-13

## 2020-11-03 RX ORDER — DEXAMETHASONE 0.5 MG/5ML
6 ELIXIR ORAL DAILY
Refills: 0 | Status: DISCONTINUED | OUTPATIENT
Start: 2020-11-03 | End: 2020-11-03

## 2020-11-03 RX ORDER — INSULIN LISPRO 100/ML
VIAL (ML) SUBCUTANEOUS
Refills: 0 | Status: DISCONTINUED | OUTPATIENT
Start: 2020-11-03 | End: 2020-11-14

## 2020-11-03 RX ORDER — DEXTROSE 50 % IN WATER 50 %
25 SYRINGE (ML) INTRAVENOUS ONCE
Refills: 0 | Status: DISCONTINUED | OUTPATIENT
Start: 2020-11-03 | End: 2020-11-14

## 2020-11-03 RX ORDER — CHOLECALCIFEROL (VITAMIN D3) 125 MCG
1 CAPSULE ORAL
Qty: 30 | Refills: 0
Start: 2020-11-03 | End: 2020-12-02

## 2020-11-03 RX ORDER — SENNA PLUS 8.6 MG/1
2 TABLET ORAL
Qty: 60 | Refills: 0
Start: 2020-11-03 | End: 2020-12-02

## 2020-11-03 RX ORDER — DEXAMETHASONE 0.5 MG/5ML
6 ELIXIR ORAL
Qty: 180 | Refills: 0
Start: 2020-11-03 | End: 2020-12-02

## 2020-11-03 RX ORDER — PANTOPRAZOLE SODIUM 20 MG/1
40 TABLET, DELAYED RELEASE ORAL
Refills: 0 | Status: DISCONTINUED | OUTPATIENT
Start: 2020-11-03 | End: 2020-11-14

## 2020-11-03 RX ORDER — CHOLECALCIFEROL (VITAMIN D3) 125 MCG
1000 CAPSULE ORAL DAILY
Refills: 0 | Status: DISCONTINUED | OUTPATIENT
Start: 2020-11-03 | End: 2020-11-14

## 2020-11-03 RX ORDER — AMLODIPINE BESYLATE 2.5 MG/1
5 TABLET ORAL DAILY
Refills: 0 | Status: DISCONTINUED | OUTPATIENT
Start: 2020-11-03 | End: 2020-11-14

## 2020-11-03 RX ORDER — ENOXAPARIN SODIUM 100 MG/ML
40 INJECTION SUBCUTANEOUS DAILY
Refills: 0 | Status: DISCONTINUED | OUTPATIENT
Start: 2020-11-03 | End: 2020-11-14

## 2020-11-03 RX ORDER — DEXTROSE 50 % IN WATER 50 %
12.5 SYRINGE (ML) INTRAVENOUS ONCE
Refills: 0 | Status: DISCONTINUED | OUTPATIENT
Start: 2020-11-03 | End: 2020-11-14

## 2020-11-03 RX ORDER — GLUCAGON INJECTION, SOLUTION 0.5 MG/.1ML
1 INJECTION, SOLUTION SUBCUTANEOUS ONCE
Refills: 0 | Status: DISCONTINUED | OUTPATIENT
Start: 2020-11-03 | End: 2020-11-14

## 2020-11-03 RX ORDER — POLYETHYLENE GLYCOL 3350 17 G/17G
17 POWDER, FOR SOLUTION ORAL DAILY
Refills: 0 | Status: DISCONTINUED | OUTPATIENT
Start: 2020-11-03 | End: 2020-11-14

## 2020-11-03 RX ORDER — OXYCODONE HYDROCHLORIDE 5 MG/1
10 TABLET ORAL EVERY 6 HOURS
Refills: 0 | Status: DISCONTINUED | OUTPATIENT
Start: 2020-11-03 | End: 2020-11-04

## 2020-11-03 RX ORDER — METOPROLOL TARTRATE 50 MG
25 TABLET ORAL
Refills: 0 | Status: DISCONTINUED | OUTPATIENT
Start: 2020-11-03 | End: 2020-11-14

## 2020-11-03 RX ORDER — ENOXAPARIN SODIUM 100 MG/ML
40 INJECTION SUBCUTANEOUS
Qty: 1200 | Refills: 0
Start: 2020-11-03 | End: 2020-12-02

## 2020-11-03 RX ORDER — POLYETHYLENE GLYCOL 3350 17 G/17G
17 POWDER, FOR SOLUTION ORAL
Qty: 0 | Refills: 0 | DISCHARGE
Start: 2020-11-03

## 2020-11-03 RX ORDER — FENOFIBRATE,MICRONIZED 130 MG
145 CAPSULE ORAL DAILY
Refills: 0 | Status: DISCONTINUED | OUTPATIENT
Start: 2020-11-03 | End: 2020-11-14

## 2020-11-03 RX ORDER — ATORVASTATIN CALCIUM 80 MG/1
10 TABLET, FILM COATED ORAL AT BEDTIME
Refills: 0 | Status: DISCONTINUED | OUTPATIENT
Start: 2020-11-03 | End: 2020-11-14

## 2020-11-03 RX ORDER — SODIUM CHLORIDE 9 MG/ML
1000 INJECTION, SOLUTION INTRAVENOUS
Refills: 0 | Status: DISCONTINUED | OUTPATIENT
Start: 2020-11-03 | End: 2020-11-14

## 2020-11-03 RX ORDER — DEXTROSE 50 % IN WATER 50 %
15 SYRINGE (ML) INTRAVENOUS ONCE
Refills: 0 | Status: DISCONTINUED | OUTPATIENT
Start: 2020-11-03 | End: 2020-11-14

## 2020-11-03 RX ORDER — SENNA PLUS 8.6 MG/1
2 TABLET ORAL AT BEDTIME
Refills: 0 | Status: DISCONTINUED | OUTPATIENT
Start: 2020-11-03 | End: 2020-11-14

## 2020-11-03 RX ORDER — DULOXETINE HYDROCHLORIDE 30 MG/1
60 CAPSULE, DELAYED RELEASE ORAL DAILY
Refills: 0 | Status: DISCONTINUED | OUTPATIENT
Start: 2020-11-03 | End: 2020-11-06

## 2020-11-03 RX ADMIN — Medication 1000 UNIT(S): at 12:59

## 2020-11-03 RX ADMIN — LIDOCAINE 1 PATCH: 4 CREAM TOPICAL at 00:30

## 2020-11-03 RX ADMIN — Medication 145 MILLIGRAM(S): at 12:48

## 2020-11-03 RX ADMIN — PANTOPRAZOLE SODIUM 40 MILLIGRAM(S): 20 TABLET, DELAYED RELEASE ORAL at 06:13

## 2020-11-03 RX ADMIN — OXYCODONE HYDROCHLORIDE 10 MILLIGRAM(S): 5 TABLET ORAL at 07:33

## 2020-11-03 RX ADMIN — Medication 250 MILLIGRAM(S): at 06:13

## 2020-11-03 RX ADMIN — MAGNESIUM OXIDE 400 MG ORAL TABLET 400 MILLIGRAM(S): 241.3 TABLET ORAL at 12:48

## 2020-11-03 RX ADMIN — OXYCODONE HYDROCHLORIDE 10 MILLIGRAM(S): 5 TABLET ORAL at 12:48

## 2020-11-03 RX ADMIN — LIDOCAINE 1 PATCH: 4 CREAM TOPICAL at 12:59

## 2020-11-03 RX ADMIN — ATORVASTATIN CALCIUM 10 MILLIGRAM(S): 80 TABLET, FILM COATED ORAL at 22:12

## 2020-11-03 RX ADMIN — Medication 25 MILLIGRAM(S): at 06:13

## 2020-11-03 RX ADMIN — AMLODIPINE BESYLATE 5 MILLIGRAM(S): 2.5 TABLET ORAL at 06:13

## 2020-11-03 RX ADMIN — Medication 6 MILLIGRAM(S): at 12:49

## 2020-11-03 RX ADMIN — Medication 1 TABLET(S): at 12:48

## 2020-11-03 NOTE — PROGRESS NOTE ADULT - ASSESSMENT
This is a 59 y/o female with a PMHx of HTN, hyperlipidemia, osteoarthritis, anxiety/depression disorders, GERD, eczema, sinusitis, scoliosis/spinal stenosis- lumbar region, presented to [facility] 9/29/20 for scheduled stage 1, L1-5 lumbar lateral interbody fusion with Dr. Valdez. Stage 2 T9-pelvis arthrodesis, L5-S1 TLIF done on 9/30/20 with closure of muscle flap done by plastics, Dr. Alegre. Hospital course complicated by hemorrhagic shock postop requiring pressors and blood transfusions, now stable on Midodrine. Admitted to [facility] after for initiation of a multidisciplinary rehab program consisting focused on functional mobility, transfers and ADLs.    #Elective L1-5 lumbar fusion, T9-pelvis arthrodesis, L5-S1 TLIF 9/29-9/30  - Completed comprehensive multidisciplinary rehab program.  - No celebrex per NS  - F/u with neurosurgery, Dr. Valdez, outpatient  - F/u with plastics in 1-2 weeks with Dr. Alegre in clinic  - No weight bearing restrictions  - Precautions: falls, spine, neuro  Xray TL spine as per NS- reviewed with hardware intact        COVID +  mild symptomatology at present  CT chest ordered per hospitalist-per the CT results hospitalist has placed patient on dexamethasone 6mg IV for 10 days.    Patient being moved to med/surg floor for close monitoring.       low k  supplemented 11/2, K 3.7 today, continue to monitor.         #Hypotension postop - improved  -off of midodrine   -c/w Norvasc 5mg daily and lopressor 25mg Q12h       #Pre-DM  - HbA1c 6.4 9/29/20  - FS  10/9   fingersticks  fasting  - Monitor FS.    #SP Fall  fall precautions  OT did address toileting while seated- Patient is safe    #Depression/Anxiety:  - Continue Cymbalta 120mg qHS  neuropsychology consult noted    #Migraines  - Sumatriptan 25mg Q4hr PRN    # UTI  urineC&S proteus 10-49k  finished antibiotics  clinically asymptomatic    #Allergies/ post nasal drip/cough  - Continue Benadryl 25mg Q4hr PRN, guaifenesin prn    Pain Management:  - Flexeril 10mg TID PRN  - Oxycodone 5 or 10 mg PRN mod/severe pain     GI/Bowel:  - At risk for constipation due to neurologic diagnosis, immobility and/or medication use  - daily miralax restarted at patient request  - GI ppx: Protonix daily, Maalox PRN    /Bladder:   - At risk for incontinence and retention due to neurologic diagnosis and limited mobility  - Currently patient voids: independent  - Encourage timed voids every 4 hours while awake for independence and to promote continence during therapy.    Skin/Pressure Injury:   - Skin assessment on admission: no pressure injuries noted   - Monitor Incisions: Thoraco-lumbar incision and left flank incision - intact  - Turn every 2 hours while in bed, air mattress  - Skin barrier cream as needed  - Nursing to monitor skin Qshift    Diet:  - Diet Consistency/Modifications: regular/DASH    DVT ppx:  - Lovenox  - SCDs

## 2020-11-03 NOTE — H&P ADULT - NSICDXFAMILYHX_GEN_ALL_CORE_FT
Patient INR has been high FAMILY HISTORY:  Family history of atrial fibrillation, mother  age 94  Family hx of hypertension, brother age 65  FHx: type 2 diabetes mellitus, brother age 65 alive

## 2020-11-03 NOTE — PROGRESS NOTE ADULT - ATTENDING COMMENTS
Rehab attending-  Patient noted with increased cough -low grade temps                          13.2   4.08  )-----------( 401      ( 03 Nov 2020 11:10 )             40.9   11-03    138  |  101  |  12  ----------------------------<  106<H>  3.7   |  28  |  0.72    Ca    9.0      03 Nov 2020 07:37  Mg     1.9     11-03    CT chest 11/3-Multifocal viral/atypical pneumonia. Appearance is very suggestive of infection with Covid 19    Agree with above Assessment/plan  For Dc to medicine for COVID management

## 2020-11-03 NOTE — PROGRESS NOTE ADULT - SUBJECTIVE AND OBJECTIVE BOX
Pt was seen for 20 min. Pt c/o fatigue, anxiety. Pt reported doing well since last seen. She had been fully participating in tx and ready to be d/c home last Thursday, when she was found to be positive for COVID. Pt reported having mild symptoms including low grade fever, fatigue, and cough. She has been sleeping a lot, and not feeling like answering the phone to stay in touch with her  and other relatives and friends. Pt expressed concern about her  who was tested for COVID a few days ago but has not received yet the results from the test. Pt admitted some concern with her own situation but is optimistic about being able to recover, and is aware that she is in a  place where she will have acces to good medical care during her illness. She reported having poor appetite in part due to the periodical menu, but trying to at least have soups; encouraged Pt to express her food preferences to the nutritionist, and also to order when possible. Also, discussed the need for Pt to prioritize her rest while she is with active symptoms of COVID. Agreed to remain available while Pt is in [facility], given her initial reported levels of anxiety and depression. Support and encouragement were provided.

## 2020-11-03 NOTE — PROGRESS NOTE ADULT - SUBJECTIVE AND OBJECTIVE BOX
This is a 60 year old female with PMHx HTN, hyperlipidemia, osteoarthritis, anxiety/depression disorders, GERD, eczema, sinusitis, scoliosis/spinal stenosis- lumbar region, presented to [facility] 9/29/20 for scheduled stage 1, L1-5 lumbar lateral interbody fusion with Dr. Valdez. Stage 2 T9-pelvis arthrodesis, L5-S1 TLIF done on 9/30/20 with closure of muscle flap done by plastics, Dr. Alegre. Admitted to NSICU post operatively due to hemorrhagic shock requiring fluid resuscitation with IV hydration and pressors. EBL 1500 cc, s/p 2 u prbc. Patient was weaned off IV pressors 10/1/20 and started on midodrine. Transferred to the floor when stable. Surgical drains removed on 10/7/20. Plastic surgery following for incision management. Aquacel dressing changed on 10/3 and 10/8. Hgb remains stable. Pt currently off antihypertensives, now on midodrine- wean to off as indicated.     Patient admitted to [facility] for acute IPR on 10/10/20.    During stay, patient exposed to COVID + patient and now has a + COVID swab from 11/1.   Patient being moved to med/surg for monitoring and care.     ROS: Patient states that she is feeling ok today. Cough persists but has not worsened. At times she brings up thin yellow sputum.   Denies shortness of breath, CP, abdominal pain, diarrhea, nausea, vomiting. Headaches are off and on, however not the same as her migraines. HAs are tolerable. States that she is pushing PO fluids. Doesn't like the food but eating most of her trays.     MEDICATIONS  (STANDING):  amLODIPine   Tablet 5 milliGRAM(s) Oral daily  atorvastatin 10 milliGRAM(s) Oral at bedtime  cholecalciferol 1000 Unit(s) Oral daily  dexAMETHasone  Injectable 6 milliGRAM(s) IV Push daily  dextrose 5%. 1000 milliLiter(s) (50 mL/Hr) IV Continuous <Continuous>  dextrose 50% Injectable 12.5 Gram(s) IV Push once  dextrose 50% Injectable 25 Gram(s) IV Push once  dextrose 50% Injectable 25 Gram(s) IV Push once  DULoxetine 120 milliGRAM(s) Oral at bedtime  enoxaparin Injectable 40 milliGRAM(s) SubCutaneous <User Schedule>  fenofibrate Tablet 145 milliGRAM(s) Oral daily  hydrOXYzine hydrochloride 50 milliGRAM(s) Oral at bedtime  lidocaine   Patch 1 Patch Transdermal every 24 hours  magnesium oxide 400 milliGRAM(s) Oral daily  metoprolol tartrate 25 milliGRAM(s) Oral two times a day  multivitamin 1 Tablet(s) Oral daily  oxyCODONE    IR 10 milliGRAM(s) Oral <User Schedule>  pantoprazole    Tablet 40 milliGRAM(s) Oral before breakfast  polyethylene glycol 3350 17 Gram(s) Oral daily  saccharomyces boulardii 250 milliGRAM(s) Oral two times a day    MEDICATIONS  (PRN):  acetaminophen   Tablet .. 650 milliGRAM(s) Oral every 6 hours PRN Temp greater or equal to 38C (100.4F), Mild Pain (1 - 3)  aluminum hydroxide/magnesium hydroxide/simethicone Suspension 30 milliLiter(s) Oral every 4 hours PRN Dyspepsia  cyclobenzaprine 10 milliGRAM(s) Oral three times a day PRN Muscle Spasm  dextrose 40% Gel 15 Gram(s) Oral once PRN Blood Glucose LESS THAN 70 milliGRAM(s)/deciliter  diphenhydrAMINE 25 milliGRAM(s) Oral every 4 hours PRN Rash and/or Itching  glucagon  Injectable 1 milliGRAM(s) IntraMuscular once PRN Glucose LESS THAN 70 milligrams/deciliter  guaiFENesin   Syrup  (Sugar-Free) 100 milliGRAM(s) Oral every 6 hours PRN Cough  oxyCODONE    IR 5 milliGRAM(s) Oral every 6 hours PRN Moderate Pain (4 - 6)  oxyCODONE    IR 10 milliGRAM(s) Oral every 6 hours PRN Severe Pain (7 - 10)  senna 2 Tablet(s) Oral at bedtime PRN Constipation  SUMAtriptan 25 milliGRAM(s) Oral four times a day PRN Migraine                          11-03    138  |  101  |  12  ----------------------------<  106<H>  3.7   |  28  |  0.72    Ca    9.0      03 Nov 2020 07:37  Mg     1.9     11-03                          12.4   3.89  )-----------( 412      ( 02 Nov 2020 05:30 )             39.4     Blood cultures pending.   COVID testing pending: LDH, troponin, Ddimer, ferritin, CK    Vital Signs Last 24 Hrs  T(C): 36.9 (03 Nov 2020 07:41), Max: 38.2 (02 Nov 2020 20:36)  T(F): 98.5 (03 Nov 2020 07:41), Max: 100.8 (02 Nov 2020 20:36)  HR: 75 (03 Nov 2020 07:41) (75 - 89)  BP: 127/75 (03 Nov 2020 07:41) (127/73 - 141/71)  RR: 16 (03 Nov 2020 07:41) (15 - 16)  SpO2: 94% (03 Nov 2020 07:41) (94% - 95%)      Constitutional - NAD, Comfortable, lying in bed  HEENT - NCAT, EOMI  Chest - Breathing comfortably, CTA bilat  Cardiovascular - RRR  Abdomen - Soft, mildly distended, +BS  Extremities - No C/C/E, No calf tenderness    Neurologic Exam -                    Cognitive - Awake, Alert, AAOx4     Communication - Fluent, No dysarthia     Cranial Nerves - no facial asymmetry     Motor -                     LEFT    UE - ShAB 5/5, EF 5/5, EE 5/5, WE 5/5,  5/5                    RIGHT UE - ShAB 5/5, EF 5/5, EE 5/5, WE 5/5,  5/5                    LEFT    LE - HF 4/5, KE 4/5, DF 3+/5, PF 5/5 EHL 5/5                    RIGHT LE - HF 3/5, KE 4/5, DF 4/5, PF 5/5        Sensory - decreased to light touch medial leg on RIGHT and dorsum of foot on LEFT but better     Psychiatric - Mood stable, Affect WNL     Skin:         thoraco-lumbar surgical incision intact- sutures out, healing well         Left flank incision, staples have been removed, healing well         Healed drain incisions lower lumbar          No pressure injuries noted       IDT meeting 10/28    OT  independent eating/grooming  sup bathing  UB dressing I  LB dressing Sup  toileting sup  toilet transfers sup/CG  shower min A  PT  sup transfers  sup '  4 steps one rail CG/min A  Occ buckling Right knee but patient corrects

## 2020-11-03 NOTE — PROGRESS NOTE ADULT - REASON FOR ADMISSION
Functional deficits due to L1-L5 lumbar fusion, T9-pelvis arthrodesis, L5-S1 TLIF  03.9 Other Neurologic
functional deficits due to L1-L5 lumbar fusion, T9-pelvis arthrodesis, L5-S1 TLIF
functional deficits due to L1-L5 lumbar fusion, T9-pelvis arthrodesis, L5-S1 TLIF  03.9 Other Neurologic
functional deficits due to L1-L5 lumbar fusion, T9-pelvis arthrodesis, L5-S1 TLIF  Other Neurologic
functional deficits due to L1-L5 lumbar fusion, T9-pelvis arthrodesis, L5-S1 TLIF  Other Neurologic
functional deficits due to L1-L5 lumbar fusion, T9-pelvis arthrodesis, L5-S1 TLIF  03.9 Other Neurologic
functional deficits due to L1-L5 lumbar fusion, T9-pelvis arthrodesis, L5-S1 TLIF  03.9 Other Neurologic

## 2020-11-03 NOTE — H&P ADULT - HISTORY OF PRESENT ILLNESS
60 F undergoing acute rehab for L fusion     Developed Fever, cough, (+) COVID-19 PCR, CT chest diffuse multifocal nodular-GGO highly suggestive of COVID-19 radiographic pattern. Reviewed above clinical data w/ICU team, rec decadron IV started clinical picture.      Transferred to inpatient GC setting     currently   sating ok 94% RA   no sob   no chest pain     has productive yellow tinge cough     ros all others are neg     Social History:  no tobacco no illicits, not etoh use, lives with         PAST MEDICAL & SURGICAL HISTORY:  Lumbar spinal stenosis  History of sciatica  mostly right side  H/O sinusitis  Eczema  extremities  Scoliosis  Meniscus tear  left knee  Spinal stenosis in cervical region  Anxiety and depression  Migraine  GERD (gastroesophageal reflux disease)  Seasonal allergies  Hyperlipidemia  HTN (hypertension)    H/O cervical spine surgery  C3-6   11/2015  History of tonsillectomy    History of appendectomy    ICU Vital Signs Last 24 Hrs  T(C): 36.9 (03 Nov 2020 07:41), Max: 38.2 (02 Nov 2020 20:36)  T(F): 98.5 (03 Nov 2020 07:41), Max: 100.8 (02 Nov 2020 20:36)  HR: 75 (03 Nov 2020 07:41) (75 - 89)  BP: 127/75 (03 Nov 2020 07:41) (127/73 - 141/71)  RR: 16 (03 Nov 2020 07:41) (15 - 16)  SpO2: 94% (03 Nov 2020 07:41) (94% - 95%)                          13.2   4.08  )-----------( 401      ( 03 Nov 2020 11:10 )             40.9   11-03    138  |  101  |  12  ----------------------------<  106<H>  3.7   |  28  |  0.72    Ca    9.0      03 Nov 2020 07:37  Mg     1.9     11-03    Home Medications:  atorvastatin 10 mg oral tablet: 1 tab(s) orally once a day, evening  (30 Sep 2020 11:31)  Cymbalta 60 mg oral delayed release capsule: 2 cap(s) orally once a day x anxiety and body pain (30 Sep 2020 11:31)  fenofibrate 145 mg oral tablet: 1 tab(s) orally once a day (28 Oct 2020 15:39)  guaiFENesin 100 mg/5 mL oral liquid: 5 milliliter(s) orally every 6 hours, As needed, Cough (03 Nov 2020 10:21)  Multiple Vitamins oral tablet: 1 tab(s) orally once a day (10 Oct 2020 08:38)  polyethylene glycol 3350 oral powder for reconstitution: 17 gram(s) orally once a day (03 Nov 2020 10:21)  SUMAtriptan 25 mg oral tablet: 1 tab(s) orally 4 times a day, As needed, Migraine (28 Oct 2020 15:39)      MEDICATIONS  (STANDING):    MEDICATIONS  (PRN):       60 F undergoing acute rehab for L fusion     Developed Fever, cough, (+) COVID-19 PCR, CT chest diffuse multifocal nodular-GGO highly suggestive of COVID-19 radiographic pattern. Reviewed above clinical data w/ICU team, recommended IV decadron IV given clinical picture/risk factor profile.      Transferred to inpatient GC setting     currently   sating ok 94% RA   no sob   no chest pain     has productive yellow tinge cough     ros all others are neg     Social History:  no tobacco no illicits, not etoh use, lives with         PAST MEDICAL & SURGICAL HISTORY:  Lumbar spinal stenosis  History of sciatica  mostly right side  H/O sinusitis  Eczema  extremities  Scoliosis  Meniscus tear  left knee  Spinal stenosis in cervical region  Anxiety and depression  Migraine  GERD (gastroesophageal reflux disease)  Seasonal allergies  Hyperlipidemia  HTN (hypertension)    H/O cervical spine surgery  C3-6   11/2015  History of tonsillectomy    History of appendectomy    ICU Vital Signs Last 24 Hrs  T(C): 36.9 (03 Nov 2020 07:41), Max: 38.2 (02 Nov 2020 20:36)  T(F): 98.5 (03 Nov 2020 07:41), Max: 100.8 (02 Nov 2020 20:36)  HR: 75 (03 Nov 2020 07:41) (75 - 89)  BP: 127/75 (03 Nov 2020 07:41) (127/73 - 141/71)  RR: 16 (03 Nov 2020 07:41) (15 - 16)  SpO2: 94% (03 Nov 2020 07:41) (94% - 95%)                          13.2   4.08  )-----------( 401      ( 03 Nov 2020 11:10 )             40.9   11-03    138  |  101  |  12  ----------------------------<  106<H>  3.7   |  28  |  0.72    Ca    9.0      03 Nov 2020 07:37  Mg     1.9     11-03    Home Medications:  atorvastatin 10 mg oral tablet: 1 tab(s) orally once a day, evening  (30 Sep 2020 11:31)  Cymbalta 60 mg oral delayed release capsule: 2 cap(s) orally once a day x anxiety and body pain (30 Sep 2020 11:31)  fenofibrate 145 mg oral tablet: 1 tab(s) orally once a day (28 Oct 2020 15:39)  guaiFENesin 100 mg/5 mL oral liquid: 5 milliliter(s) orally every 6 hours, As needed, Cough (03 Nov 2020 10:21)  Multiple Vitamins oral tablet: 1 tab(s) orally once a day (10 Oct 2020 08:38)  polyethylene glycol 3350 oral powder for reconstitution: 17 gram(s) orally once a day (03 Nov 2020 10:21)  SUMAtriptan 25 mg oral tablet: 1 tab(s) orally 4 times a day, As needed, Migraine (28 Oct 2020 15:39)      MEDICATIONS  (STANDING):    MEDICATIONS  (PRN):       60 F undergoing acute rehab for L fusion     Developed Fever, cough, (+) COVID-19 PCR, CT chest diffuse multifocal nodular-GGO highly suggestive of COVID-19 radiographic pattern. Reviewed above clinical data w/ICU team, recommended IV decadron given clinical picture/risk factor profile.      Transferred to inpatient GC setting     currently   sating ok 94% RA   no sob   no chest pain     has productive yellow tinge cough     ros all others are neg     Social History:  no tobacco no illicits, not etoh use, lives with         PAST MEDICAL & SURGICAL HISTORY:  Lumbar spinal stenosis  History of sciatica  mostly right side  H/O sinusitis  Eczema  extremities  Scoliosis  Meniscus tear  left knee  Spinal stenosis in cervical region  Anxiety and depression  Migraine  GERD (gastroesophageal reflux disease)  Seasonal allergies  Hyperlipidemia  HTN (hypertension)    H/O cervical spine surgery  C3-6   11/2015  History of tonsillectomy    History of appendectomy    ICU Vital Signs Last 24 Hrs  T(C): 36.9 (03 Nov 2020 07:41), Max: 38.2 (02 Nov 2020 20:36)  T(F): 98.5 (03 Nov 2020 07:41), Max: 100.8 (02 Nov 2020 20:36)  HR: 75 (03 Nov 2020 07:41) (75 - 89)  BP: 127/75 (03 Nov 2020 07:41) (127/73 - 141/71)  RR: 16 (03 Nov 2020 07:41) (15 - 16)  SpO2: 94% (03 Nov 2020 07:41) (94% - 95%)                          13.2   4.08  )-----------( 401      ( 03 Nov 2020 11:10 )             40.9   11-03    138  |  101  |  12  ----------------------------<  106<H>  3.7   |  28  |  0.72    Ca    9.0      03 Nov 2020 07:37  Mg     1.9     11-03    Home Medications:  atorvastatin 10 mg oral tablet: 1 tab(s) orally once a day, evening  (30 Sep 2020 11:31)  Cymbalta 60 mg oral delayed release capsule: 2 cap(s) orally once a day x anxiety and body pain (30 Sep 2020 11:31)  fenofibrate 145 mg oral tablet: 1 tab(s) orally once a day (28 Oct 2020 15:39)  guaiFENesin 100 mg/5 mL oral liquid: 5 milliliter(s) orally every 6 hours, As needed, Cough (03 Nov 2020 10:21)  Multiple Vitamins oral tablet: 1 tab(s) orally once a day (10 Oct 2020 08:38)  polyethylene glycol 3350 oral powder for reconstitution: 17 gram(s) orally once a day (03 Nov 2020 10:21)  SUMAtriptan 25 mg oral tablet: 1 tab(s) orally 4 times a day, As needed, Migraine (28 Oct 2020 15:39)      MEDICATIONS  (STANDING):    MEDICATIONS  (PRN):       60 F undergoing acute rehab for L fusion     Developed Fever, cough, (+) COVID-19 PCR, CT chest diffuse multifocal nodular-GGO highly suggestive of COVID-19 radiographic pattern. Reviewed above clinical data w/ICU team, recommended IV decadron given clinical picture/risk factor profile.      Transferred to inpatient GC setting     currently   sating ok 94% RA   no sob   no chest pain     has productive yellow tinge cough      is getting tested for COVID-19     ros all others are neg     Social History:  no tobacco no illicits, not etoh use, lives with     PAST MEDICAL & SURGICAL HISTORY:  Lumbar spinal stenosis  History of sciatica  mostly right side  H/O sinusitis  Eczema  extremities  Scoliosis  Meniscus tear  left knee  Spinal stenosis in cervical region  Anxiety and depression  Migraine  GERD (gastroesophageal reflux disease)  Seasonal allergies  Hyperlipidemia  Essential HTN (hypertension)    H/O cervical spine surgery  C3-6   11/2015  History of tonsillectomy    History of appendectomy    Fam hx: no respiratory lung issues     ICU Vital Signs Last 24 Hrs  T(C): 36.9 (03 Nov 2020 07:41), Max: 38.2 (02 Nov 2020 20:36)  T(F): 98.5 (03 Nov 2020 07:41), Max: 100.8 (02 Nov 2020 20:36)  HR: 75 (03 Nov 2020 07:41) (75 - 89)  BP: 127/75 (03 Nov 2020 07:41) (127/73 - 141/71)  RR: 16 (03 Nov 2020 07:41) (15 - 16)  SpO2: 94% (03 Nov 2020 07:41) (94% - 95%)                          13.2   4.08  )-----------( 401      ( 03 Nov 2020 11:10 )             40.9   11-03    138  |  101  |  12  ----------------------------<  106<H>  3.7   |  28  |  0.72    Ca    9.0      03 Nov 2020 07:37  Mg     1.9     11-03    Home Medications:  atorvastatin 10 mg oral tablet: 1 tab(s) orally once a day, evening  (30 Sep 2020 11:31)  Cymbalta 60 mg oral delayed release capsule: 2 cap(s) orally once a day x anxiety and body pain (30 Sep 2020 11:31)  fenofibrate 145 mg oral tablet: 1 tab(s) orally once a day (28 Oct 2020 15:39)  guaiFENesin 100 mg/5 mL oral liquid: 5 milliliter(s) orally every 6 hours, As needed, Cough (03 Nov 2020 10:21)  Multiple Vitamins oral tablet: 1 tab(s) orally once a day (10 Oct 2020 08:38)  polyethylene glycol 3350 oral powder for reconstitution: 17 gram(s) orally once a day (03 Nov 2020 10:21)  SUMAtriptan 25 mg oral tablet: 1 tab(s) orally 4 times a day, As needed, Migraine (28 Oct 2020 15:39)      MEDICATIONS  (STANDING):    MEDICATIONS  (PRN):

## 2020-11-03 NOTE — PROGRESS NOTE ADULT - ASSESSMENT
Pt alert, attentive, constricted affect, fatigued mood, coping by realistic means. Plan: Neuropsychologist remains available.

## 2020-11-03 NOTE — PROGRESS NOTE ADULT - SUBJECTIVE AND OBJECTIVE BOX
seen and examined at bedside     fever BP ok   cough, yellowish sputum   COVID-19 positive   Sating  94% RA     no sob   no loose stools   no NV     ROS all others are neg     ICU Vital Signs Last 24 Hrs  T(C): 36.9 (03 Nov 2020 07:41), Max: 38.2 (02 Nov 2020 20:36)  T(F): 98.5 (03 Nov 2020 07:41), Max: 100.8 (02 Nov 2020 20:36)  HR: 75 (03 Nov 2020 07:41) (75 - 89)  BP: 127/75 (03 Nov 2020 07:41) (127/73 - 141/71)  RR: 16 (03 Nov 2020 07:41) (15 - 16)  SpO2: 94% (03 Nov 2020 07:41) (94% - 95%)                          12.4   3.89  )-----------( 412      ( 02 Nov 2020 05:30 )             39.4   11-03    138  |  101  |  12  ----------------------------<  106<H>  3.7   |  28  |  0.72    Ca    9.0      03 Nov 2020 07:37  Mg     1.9     11-03    Home Medications:  atorvastatin 10 mg oral tablet: 1 tab(s) orally once a day, evening  (30 Sep 2020 11:31)  Cymbalta 60 mg oral delayed release capsule: 2 cap(s) orally once a day x anxiety and body pain (30 Sep 2020 11:31)  fenofibrate 145 mg oral tablet: 1 tab(s) orally once a day (28 Oct 2020 15:39)  Multiple Vitamins oral tablet: 1 tab(s) orally once a day (10 Oct 2020 08:38)  SUMAtriptan 25 mg oral tablet: 1 tab(s) orally 4 times a day, As needed, Migraine (28 Oct 2020 15:39)    MEDICATIONS  (STANDING):  amLODIPine   Tablet 5 milliGRAM(s) Oral daily  atorvastatin 10 milliGRAM(s) Oral at bedtime  cholecalciferol 1000 Unit(s) Oral daily  dextrose 5%. 1000 milliLiter(s) (50 mL/Hr) IV Continuous <Continuous>  dextrose 50% Injectable 12.5 Gram(s) IV Push once  dextrose 50% Injectable 25 Gram(s) IV Push once  dextrose 50% Injectable 25 Gram(s) IV Push once  DULoxetine 120 milliGRAM(s) Oral at bedtime  enoxaparin Injectable 40 milliGRAM(s) SubCutaneous <User Schedule>  fenofibrate Tablet 145 milliGRAM(s) Oral daily  hydrOXYzine hydrochloride 50 milliGRAM(s) Oral at bedtime  lidocaine   Patch 1 Patch Transdermal every 24 hours  magnesium oxide 400 milliGRAM(s) Oral daily  metoprolol tartrate 25 milliGRAM(s) Oral two times a day  multivitamin 1 Tablet(s) Oral daily  oxyCODONE    IR 10 milliGRAM(s) Oral <User Schedule>  pantoprazole    Tablet 40 milliGRAM(s) Oral before breakfast  polyethylene glycol 3350 17 Gram(s) Oral daily  saccharomyces boulardii 250 milliGRAM(s) Oral two times a day    MEDICATIONS  (PRN):  acetaminophen   Tablet .. 650 milliGRAM(s) Oral every 6 hours PRN Temp greater or equal to 38C (100.4F), Mild Pain (1 - 3)  aluminum hydroxide/magnesium hydroxide/simethicone Suspension 30 milliLiter(s) Oral every 4 hours PRN Dyspepsia  cyclobenzaprine 10 milliGRAM(s) Oral three times a day PRN Muscle Spasm  dextrose 40% Gel 15 Gram(s) Oral once PRN Blood Glucose LESS THAN 70 milliGRAM(s)/deciliter  diphenhydrAMINE 25 milliGRAM(s) Oral every 4 hours PRN Rash and/or Itching  glucagon  Injectable 1 milliGRAM(s) IntraMuscular once PRN Glucose LESS THAN 70 milligrams/deciliter  guaiFENesin   Syrup  (Sugar-Free) 100 milliGRAM(s) Oral every 6 hours PRN Cough  oxyCODONE    IR 5 milliGRAM(s) Oral every 6 hours PRN Moderate Pain (4 - 6)  oxyCODONE    IR 10 milliGRAM(s) Oral every 6 hours PRN Severe Pain (7 - 10)  senna 2 Tablet(s) Oral at bedtime PRN Constipation  SUMAtriptan 25 milliGRAM(s) Oral four times a day PRN Migraine

## 2020-11-03 NOTE — H&P ADULT - ASSESSMENT
55 F Admitted to acute GC for     COVID-19 Multifocal bi PNA ddx co-existing bacterial component cant not be excluded at this time, less likely HF  At risk for acute hypoxic respiratory failure   keep o2 > 94%  continue decadron IV x 10 d  nebs prn   blood cx pending   lovenox for vte proph  COVID-19 panel pending    TTE for completeness  pulm team assist appreciated   aspiration risk precautions   PT OT rehab team to see      9/29/20 for scheduled stage 1, L1-5 lumbar lateral interbody fusion  T9-pelvis arthrodesis, L5-S1 TLIF done on 9/30/20 with closure of muscle flap done by plastics, Dr. Alegre.  Hemorrhagic shock postop requiring pressors and blood transfusions, resolved. keep MAP > 65, trend hh over time       Other medical conditions:     ·	Essential HTN: controlled, continue current regimen with norvasc, metoprolol target -130/80   ·	Hyperlipidemia low intensity statin   ·	Osteoarthritis: no pain   ·	Anxiety/depression disorders continue current regimen   ·	GERD: controlled, PPI while on decadron    ·	Eczema: resolved  ·	Sinusitis: resolved  ·	Scoliosis/spinal stenosis- lumbar region  ·	Pre-DM II hba1 6.4, ada, iss diet control/carb count: monitor glucose while on steroids   ·	Depression/Anxiety: controlled, no SI continue, Cymbalta   ·	Migraine hx, no recurrence, continue the Sumatriptan prn, monitor   ·	Asymptomatic proteus 10-49k in urine, treated given hx HW: clinically asymptomatic at this time     Reviewed with patient plan of care   VTE proph with lovenox      55 F Admitted to acute GC for     COVID-19 Multifocal bi PNA ddx co-existing bacterial component cant not be excluded at this time, less likely HF  At risk for acute hypoxic respiratory failure   Execute COVID-19 NW Algorithm   keep o2 > 94%  continue decadron IV x 10 d  PPI   monitor glucose while in steroids   nebs prn   blood cx pending   lovenox for vte proph  COVID-19 panel pending    TTE for completeness  pulm team assist appreciated   aspiration risk precautions   PT OT rehab team to see      9/29/20 for scheduled stage 1, L1-5 lumbar lateral interbody fusion  T9-pelvis arthrodesis, L5-S1 TLIF done on 9/30/20 with closure of muscle flap done by plastics, Dr. Alegre.  Hemorrhagic shock postop requiring pressors and blood transfusions, resolved. keep MAP > 65, trend hh over time       Other medical conditions:     ·	Essential HTN: controlled, continue current regimen with norvasc, metoprolol target -130/80   ·	Hyperlipidemia low intensity statin   ·	Osteoarthritis: no pain   ·	Anxiety/depression disorders continue current regimen   ·	GERD: controlled, PPI while on decadron    ·	Eczema: resolved  ·	Sinusitis: resolved  ·	Scoliosis/spinal stenosis- lumbar region  ·	Pre-DM II hba1 6.4, ada, iss diet control/carb count: monitor glucose while on steroids   ·	Depression/Anxiety: controlled, no SI continue, Cymbalta   ·	Migraine hx, no recurrence, continue the Sumatriptan prn, monitor   ·	Asymptomatic proteus 10-49k in urine, treated given hx HW: clinically asymptomatic at this time     Reviewed with patient plan of care   VTE proph with lovenox      55 F Admitted to acute GC for     COVID-19 Multifocal bi PNA ddx co-existing bacterial component cant not be excluded at this time, less likely HF  At risk for acute hypoxic respiratory failure   Execute COVID-19 NW Algorithm   keep o2 > 94%  continue decadron IV x 10 d  monitor clinically/time off abx  PPI   monitor glucose while in steroids   nebs prn   blood cx pending   lovenox for vte proph  COVID-19 panel pending    TTE for completeness  pulm team assist appreciated   aspiration risk precautions   PT OT rehab team to see      9/29/20 for scheduled stage 1, L1-5 lumbar lateral interbody fusion  T9-pelvis arthrodesis, L5-S1 TLIF done on 9/30/20 with closure of muscle flap done by plastics, Dr. Alegre.  Hemorrhagic shock postop requiring pressors and blood transfusions, resolved. keep MAP > 65, trend hh over time       Other medical conditions:     ·	Essential HTN: controlled, continue current regimen with norvasc, metoprolol target -130/80   ·	Hyperlipidemia low intensity statin   ·	Osteoarthritis: no pain   ·	Anxiety/depression disorders continue current regimen   ·	GERD: controlled, PPI while on decadron    ·	Eczema: resolved  ·	Sinusitis: resolved  ·	Scoliosis/spinal stenosis- lumbar region  ·	Pre-DM II hba1 6.4, ada, iss diet control/carb count: monitor glucose while on steroids   ·	Depression/Anxiety: controlled, no SI continue, Cymbalta   ·	Migraine hx, no recurrence, continue the Sumatriptan prn, monitor   ·	Asymptomatic proteus 10-49k in urine, treated given hx HW: clinically asymptomatic at this time     Reviewed with patient plan of care   VTE proph with lovenox

## 2020-11-03 NOTE — PROGRESS NOTE ADULT - ASSESSMENT
55 F Admitted to acute GC for     9/29/20 for scheduled stage 1, L1-5 lumbar lateral interbody fusion  T9-pelvis arthrodesis, L5-S1 TLIF done on 9/30/20 with closure of muscle flap done by plastics, Dr. Alegre.  Hemorrhagic shock postop requiring pressors and blood transfusions, resolved. keep MAP > 65, trend hh over time     Febrile cough   COVID-19 PCR positive sating 94%   CT chest to define pulmonary disease, COVID-19 GC protocol   Check blood cx, ua, cbc, bmp   monitor off abx for now, treatment post dx imaging, labs to guide therapy       Other medical conditions:     ·	Essential HTN: controlled, continue current regimen with norvasc, metoprolol target -130/80   ·	Hyperlipidemia low intensity statin   ·	Osteoarthritis: no pain   ·	Anxiety/depression disorders  ·	GERD: controlled   ·	Eczema: resolved  ·	Sinusitis: resolved  ·	Scoliosis/spinal stenosis- lumbar region  ·	Pre-DM II hba1 6.4, ada, iss monitor diet control/carb count   ·	Depression/Anxiety: controlled, no SI continue, Cymbalta   ·	Migraine hx, no recurrence, continue the Sumatriptan prn, monitor   ·	Asymptomatic proteus 10-49k in urine, treated given hx HW: clinically asymptomatic at this time     Reviewed with patient, rehab team plan of care   VTE proph with lovenox      55 F Admitted to acute GC for     9/29/20 for scheduled stage 1, L1-5 lumbar lateral interbody fusion  T9-pelvis arthrodesis, L5-S1 TLIF done on 9/30/20 with closure of muscle flap done by plastics, Dr. Alegre.  Hemorrhagic shock postop requiring pressors and blood transfusions, resolved. keep MAP > 65, trend hh over time     Febrile cough   COVID-19 PCR positive sating 94%   CT chest to define pulmonary disease, COVID-19 GC protocol   Check blood cx, ua, cbc, bmp   monitor off abx for now, treatment post dx imaging, labs to guide therapy   Reviewed with rehab team above       Other medical conditions:     ·	Essential HTN: controlled, continue current regimen with norvasc, metoprolol target -130/80   ·	Hyperlipidemia low intensity statin   ·	Osteoarthritis: no pain   ·	Anxiety/depression disorders  ·	GERD: controlled   ·	Eczema: resolved  ·	Sinusitis: resolved  ·	Scoliosis/spinal stenosis- lumbar region  ·	Pre-DM II hba1 6.4, ada, iss monitor diet control/carb count   ·	Depression/Anxiety: controlled, no SI continue, Cymbalta   ·	Migraine hx, no recurrence, continue the Sumatriptan prn, monitor   ·	Asymptomatic proteus 10-49k in urine, treated given hx HW: clinically asymptomatic at this time     Reviewed with patient, rehab team plan of care   VTE proph with lovenox      55 F Admitted to acute GC for     9/29/20 for scheduled stage 1, L1-5 lumbar lateral interbody fusion  T9-pelvis arthrodesis, L5-S1 TLIF done on 9/30/20 with closure of muscle flap done by plastics, Dr. Alegre.  Hemorrhagic shock postop requiring pressors and blood transfusions, resolved. keep MAP > 65, trend hh over time     Febrile cough   COVID-19 PCR positive sating 94% RA    CT chest to define pulmonary disease, COVID-19 GC protocol   Check blood cx, ua, cbc, bmp   monitor off abx for now, treatment post dx imaging, labs to guide therapy   Reviewed with rehab team above       Other medical conditions:     ·	Essential HTN: controlled, continue current regimen with norvasc, metoprolol target -130/80   ·	Hyperlipidemia low intensity statin   ·	Osteoarthritis: no pain   ·	Anxiety/depression disorders  ·	GERD: controlled   ·	Eczema: resolved  ·	Sinusitis: resolved  ·	Scoliosis/spinal stenosis- lumbar region  ·	Pre-DM II hba1 6.4, ada, iss monitor diet control/carb count   ·	Depression/Anxiety: controlled, no SI continue, Cymbalta   ·	Migraine hx, no recurrence, continue the Sumatriptan prn, monitor   ·	Asymptomatic proteus 10-49k in urine, treated given hx HW: clinically asymptomatic at this time     Reviewed with patient, rehab team plan of care   VTE proph with lovenox

## 2020-11-04 LAB
ANION GAP SERPL CALC-SCNC: 7 MMOL/L — SIGNIFICANT CHANGE UP (ref 5–17)
BUN SERPL-MCNC: 14 MG/DL — SIGNIFICANT CHANGE UP (ref 7–23)
CALCIUM SERPL-MCNC: 9.3 MG/DL — SIGNIFICANT CHANGE UP (ref 8.4–10.5)
CHLORIDE SERPL-SCNC: 98 MMOL/L — SIGNIFICANT CHANGE UP (ref 96–108)
CO2 SERPL-SCNC: 29 MMOL/L — SIGNIFICANT CHANGE UP (ref 22–31)
CREAT SERPL-MCNC: 0.76 MG/DL — SIGNIFICANT CHANGE UP (ref 0.5–1.3)
GLUCOSE BLDC GLUCOMTR-MCNC: 112 MG/DL — HIGH (ref 70–99)
GLUCOSE BLDC GLUCOMTR-MCNC: 113 MG/DL — HIGH (ref 70–99)
GLUCOSE BLDC GLUCOMTR-MCNC: 134 MG/DL — HIGH (ref 70–99)
GLUCOSE BLDC GLUCOMTR-MCNC: 143 MG/DL — HIGH (ref 70–99)
GLUCOSE SERPL-MCNC: 107 MG/DL — HIGH (ref 70–99)
HCT VFR BLD CALC: 38.7 % — SIGNIFICANT CHANGE UP (ref 34.5–45)
HGB BLD-MCNC: 12.2 G/DL — SIGNIFICANT CHANGE UP (ref 11.5–15.5)
MCHC RBC-ENTMCNC: 27.7 PG — SIGNIFICANT CHANGE UP (ref 27–34)
MCHC RBC-ENTMCNC: 31.5 GM/DL — LOW (ref 32–36)
MCV RBC AUTO: 88 FL — SIGNIFICANT CHANGE UP (ref 80–100)
NRBC # BLD: 0 /100 WBCS — SIGNIFICANT CHANGE UP (ref 0–0)
PLATELET # BLD AUTO: 366 K/UL — SIGNIFICANT CHANGE UP (ref 150–400)
POTASSIUM SERPL-MCNC: 4 MMOL/L — SIGNIFICANT CHANGE UP (ref 3.5–5.3)
POTASSIUM SERPL-SCNC: 4 MMOL/L — SIGNIFICANT CHANGE UP (ref 3.5–5.3)
RBC # BLD: 4.4 M/UL — SIGNIFICANT CHANGE UP (ref 3.8–5.2)
RBC # FLD: 13.5 % — SIGNIFICANT CHANGE UP (ref 10.3–14.5)
SODIUM SERPL-SCNC: 134 MMOL/L — LOW (ref 135–145)
WBC # BLD: 5.88 K/UL — SIGNIFICANT CHANGE UP (ref 3.8–10.5)
WBC # FLD AUTO: 5.88 K/UL — SIGNIFICANT CHANGE UP (ref 3.8–10.5)

## 2020-11-04 PROCEDURE — 99233 SBSQ HOSP IP/OBS HIGH 50: CPT

## 2020-11-04 RX ORDER — OXYCODONE HYDROCHLORIDE 5 MG/1
5 TABLET ORAL EVERY 6 HOURS
Refills: 0 | Status: DISCONTINUED | OUTPATIENT
Start: 2020-11-04 | End: 2020-11-11

## 2020-11-04 RX ADMIN — Medication 25 MILLIGRAM(S): at 05:40

## 2020-11-04 RX ADMIN — OXYCODONE HYDROCHLORIDE 5 MILLIGRAM(S): 5 TABLET ORAL at 12:06

## 2020-11-04 RX ADMIN — ENOXAPARIN SODIUM 40 MILLIGRAM(S): 100 INJECTION SUBCUTANEOUS at 12:06

## 2020-11-04 RX ADMIN — OXYCODONE HYDROCHLORIDE 5 MILLIGRAM(S): 5 TABLET ORAL at 18:12

## 2020-11-04 RX ADMIN — Medication 6 MILLIGRAM(S): at 05:40

## 2020-11-04 RX ADMIN — OXYCODONE HYDROCHLORIDE 5 MILLIGRAM(S): 5 TABLET ORAL at 18:47

## 2020-11-04 RX ADMIN — Medication 145 MILLIGRAM(S): at 12:17

## 2020-11-04 RX ADMIN — Medication 1 TABLET(S): at 12:09

## 2020-11-04 RX ADMIN — Medication 1000 UNIT(S): at 12:15

## 2020-11-04 RX ADMIN — Medication 25 MILLIGRAM(S): at 17:12

## 2020-11-04 RX ADMIN — PANTOPRAZOLE SODIUM 40 MILLIGRAM(S): 20 TABLET, DELAYED RELEASE ORAL at 05:40

## 2020-11-04 RX ADMIN — AMLODIPINE BESYLATE 5 MILLIGRAM(S): 2.5 TABLET ORAL at 05:40

## 2020-11-04 RX ADMIN — DULOXETINE HYDROCHLORIDE 60 MILLIGRAM(S): 30 CAPSULE, DELAYED RELEASE ORAL at 12:09

## 2020-11-04 RX ADMIN — ATORVASTATIN CALCIUM 10 MILLIGRAM(S): 80 TABLET, FILM COATED ORAL at 21:26

## 2020-11-04 RX ADMIN — OXYCODONE HYDROCHLORIDE 5 MILLIGRAM(S): 5 TABLET ORAL at 12:50

## 2020-11-04 NOTE — OCCUPATIONAL THERAPY INITIAL EVALUATION ADULT - PERTINENT HX OF CURRENT PROBLEM, REHAB EVAL
Pt is 60 year old female undergoing acute rehab for Lumbar fusion. Developed Fever, cough, (+) COVID-19 PCR transferred to unit for monitoring.

## 2020-11-04 NOTE — PROGRESS NOTE ADULT - ASSESSMENT
60F with PMH HTN, HLD, osteoarthritis, anxiety/depression disorders, GERD, eczema, sinusitis, scoliosis/spinal stenosis- lumbar region, presented to [facility] 9/29/20 for scheduled stage 1, L1-5 lumbar lateral interbody fusion with Dr. Valdez. Stage 2 T9-pelvis arthrodesis, L5-S1 TLIF done on 9/30/20 with closure of muscle flap done by plastics, Dr. Alegre. Post-op course complicated by hemorrhagic shock requiring transfer to ICU, fluid resuscitation with IV hydration and pressors. Patient was transferred to [facility] on 10/10/20 for comprehensive rehab.     Multifocal PNA likely 2/2 to COVID-19   Ddx co-existing bacterial component cant not be excluded at this time, less likely HF  -Admit to medicine  -May use O2 NC, Venturi Mask, NRB as needed depending on oxygen demand.   -Monitor O2; monitor respiratory status closely  -Maintain strict isolation precautions, use proper PPE.  -Provide Acetaminophen 650 mg PO q6  -May use Albuterol inhaler PRN  -Continue decadron IV x 10 days  -Monitor glucose while in steroids     HTN  -Chronic, controlled  -Continue home medication Norvasc, Metoprolol    HLD  -Chronic, controlled  -Continue statin, fenofibrate    Anxiety/Depression  -Chronic  -Continue home medication Cymbalta    Pre-diabetes  A1C 6.4  -Continue low dose insulin sliding scale  -Hypoglycemia protocol, accu-cheks      DVT prophylaxis: Lovenox  PT/ OT evaluation   Patient lives with  who is high-risk. Unable to quarantine.     Discussed with , Jatin Pang, aware and in agreement with above. 11/4 60F with PMH HTN, HLD, osteoarthritis, anxiety/depression disorders, GERD, eczema, sinusitis, scoliosis/spinal stenosis- lumbar region, presented to [facility] 9/29/20 for scheduled stage 1, L1-5 lumbar lateral interbody fusion with Dr. Valdez. Stage 2 T9-pelvis arthrodesis, L5-S1 TLIF done on 9/30/20 with closure of muscle flap done by plastics, Dr. Alegre. Post-op course complicated by hemorrhagic shock requiring transfer to ICU, fluid resuscitation with IV hydration and pressors. Patient was transferred to [facility] on 10/10/20 for comprehensive rehab.     Viral PNA likely 2/2 to COVID-19   -Admit to medicine  -May use O2 NC, Venturi Mask, NRB as needed depending on oxygen demand.   -Monitor O2; monitor respiratory status closely  -Maintain strict isolation precautions, use proper PPE.  -Provide Acetaminophen 650 mg PO q6  -May use Albuterol inhaler PRN  -Continue decadron IV x 10 days  -Monitor glucose while in steroids     HTN  -Chronic, controlled  -Continue home medication Norvasc, Metoprolol    HLD  -Chronic, controlled  -Continue statin, fenofibrate    Anxiety/Depression  -Chronic  -Continue home medication Cymbalta    Pre-diabetes  A1C 6.4  -Continue low dose insulin sliding scale  -Hypoglycemia protocol, accu-cheks      DVT prophylaxis: Lovenox  PT/ OT evaluation   Patient lives with  who is high-risk. Unable to quarantine.     Discussed with , Jatin Pang, aware and in agreement with above. 11/4

## 2020-11-04 NOTE — OCCUPATIONAL THERAPY INITIAL EVALUATION ADULT - ADDITIONAL COMMENTS
Pt lives in  with  +3 LUIS CARLOS outside and +14 LUIS CARLOS inside to reach 2nd floor. Pt has bedroom/bathroom on 2nd floor +tub/shower with curtain and +tub/shower with sliding doors. Pt reports ordering and obtain DME within the home including RW, commode, shower chair, grab bars, hand held shower, and transport w/c. As per pt reports, PTA pt was independent with functional ambulation and with ADL performance.

## 2020-11-04 NOTE — PHYSICAL THERAPY INITIAL EVALUATION ADULT - PERTINENT HX OF CURRENT PROBLEM, REHAB EVAL
presented with fever and cough, CXR + for diffuse multifocal nodular-GGO highly suggestive of COVID-19 radiographic pattern. Patient diagnosed with COVID 19. Now presents to PT.

## 2020-11-04 NOTE — PROGRESS NOTE ADULT - SUBJECTIVE AND OBJECTIVE BOX
Patient is a 60y old  Female who presents with a chief complaint of COVID-19 PNA (2020 14:53)      INTERVAL HPI/OVERNIGHT EVENTS: Patient seen and examined at bedside. No overnight events.    MEDICATIONS  (STANDING):  amLODIPine   Tablet 5 milliGRAM(s) Oral daily  atorvastatin 10 milliGRAM(s) Oral at bedtime  cholecalciferol 1000 Unit(s) Oral daily  dexAMETHasone  Injectable 6 milliGRAM(s) IV Push daily  dextrose 5%. 1000 milliLiter(s) (50 mL/Hr) IV Continuous <Continuous>  dextrose 50% Injectable 12.5 Gram(s) IV Push once  dextrose 50% Injectable 25 Gram(s) IV Push once  dextrose 50% Injectable 25 Gram(s) IV Push once  DULoxetine 60 milliGRAM(s) Oral daily  enoxaparin Injectable 40 milliGRAM(s) SubCutaneous daily  fenofibrate Tablet 145 milliGRAM(s) Oral daily  insulin lispro (ADMELOG) corrective regimen sliding scale   SubCutaneous three times a day before meals  metoprolol tartrate 25 milliGRAM(s) Oral two times a day  multivitamin 1 Tablet(s) Oral daily  pantoprazole    Tablet 40 milliGRAM(s) Oral before breakfast  polyethylene glycol 3350 17 Gram(s) Oral daily    MEDICATIONS  (PRN):  dextrose 40% Gel 15 Gram(s) Oral once PRN Blood Glucose LESS THAN 70 milliGRAM(s)/deciliter  glucagon  Injectable 1 milliGRAM(s) IntraMuscular once PRN Glucose LESS THAN 70 milligrams/deciliter  oxyCODONE    IR 10 milliGRAM(s) Oral every 6 hours PRN Severe Pain (7 - 10)  senna 2 Tablet(s) Oral at bedtime PRN Constipation      Allergies    penicillin (Other)    Intolerances        REVIEW OF SYSTEMS:  CONSTITUTIONAL: No fever or chills  HEENT:  No headache, no sore throat  RESPIRATORY: No cough, wheezing, or shortness of breath  CARDIOVASCULAR: No chest pain, palpitations  GASTROINTESTINAL: No abd pain, nausea, vomiting, or diarrhea  GENITOURINARY: No dysuria, frequency, or hematuria  NEUROLOGICAL: no focal weakness or dizziness  MUSCULOSKELETAL: no myalgias     Vital Signs Last 24 Hrs  T(C): 37.1 (2020 09:06), Max: 37.2 (2020 17:45)  T(F): 98.7 (2020 09:06), Max: 99 (2020 20:08)  HR: 86 (2020 09:45) (86 - 90)  BP: 124/73 (2020 09:45) (117/72 - 132/80)  BP(mean): --  RR: 14 (2020 09:06) (14 - 16)  SpO2: 94% (2020 09:45) (90% - 94%)    PHYSICAL EXAM:  GENERAL: NAD, well-developed, well-groomed  HEENT:  anicteric, moist mucous membranes  CHEST/LUNG:  CTA b/l, no rales, wheezes, or rhonchi  HEART:  RRR, S1, S2  ABDOMEN:  BS+, soft, nontender, nondistended  EXTREMITIES: no edema, cyanosis, or calf tenderness  NERVOUS SYSTEM: answers questions and follows commands appropriately    LABS:                        12.2   5.88  )-----------( 366      ( 2020 06:00 )             38.7         134  |  98  |  14  ----------------------------<  107  4.0   |  29  |  0.76    Ca    9.3      2020 06:00  Mg     1.9     -          eGFR if Non African American: 85 mL/min/1.73M2 (20 @ 06:00)  eGFR if African American: 98 mL/min/1.73M2 (20 @ 06:00)        Procalcitonin, Serum: 0.09 ng/mL (20 @ 11:10)    CARDIAC MARKERS ( 2020 11:10 )  <.017 ng/mL / x     / 51 U/L / x     / x                        POCT Blood Glucose.: 134 mg/dL (2020 08:45)  POCT Blood Glucose.: 144 mg/dL (2020 22:11)      Urinalysis Basic - ( 2020 15:00 )    Color: Yellow / Appearance: Clear / S.025 / pH: x  Gluc: x / Ketone: Negative  / Bili: Negative / Urobili: 1   Blood: x / Protein: Negative / Nitrite: Negative   Leuk Esterase: Small / RBC: 0-4 /HPF / WBC 6-10 /HPF   Sq Epi: x / Non Sq Epi: Moderate / Bacteria: Moderate /HPF              RADIOLOGY & ADDITIONAL TESTS: Personally reviewed.   < from: CT Chest No Cont (20 @ 09:52) >    EXAM:  CT CHEST      PROCEDURE DATE:  2020        INTERPRETATION:  CLINICAL INFORMATION: Fever, concern for Covid pneumonia    COMPARISON: None.    PROCEDURE:  CT of the Chest was performed without intravenous contrast.  Sagittal and coronal reformats were performed.    FINDINGS:    LUNGS AND AIRWAYS: Patent central airways.  Widespread bilateral multifocal groundglass opacities consistent with multifocal viral/atypical pneumonia. Appearance is very suggestive of infection with Covid 19  PLEURA: No pleural effusion.  MEDIASTINUM AND FABRICIO: No lymphadenopathy.  VESSELS: Nonaneurysmal.  HEART: Heart size is normal. Coronary artery calcination. No pericardial effusion.  CHEST WALL AND LOWER NECK: Within normal limits.  VISUALIZED UPPER ABDOMEN: Within normal limits.  BONES: Status post cervical and thoracolumbar fusion    IMPRESSION:  Multifocal viral/atypical pneumonia. Appearance is very suggestive of infection with Covid 19                ARAVIND ORTIZ M.D., ATTENDING RADIOLOGIST  This document has been electronically signed. Nov  3 2020 10:26AM    < end of copied text >      Consultant(s) Notes Reviewed:  [x] YES  [ ] NO

## 2020-11-04 NOTE — OCCUPATIONAL THERAPY INITIAL EVALUATION ADULT - GENERAL OBSERVATIONS, REHAB EVAL
MD orders recieved. Chart reviewed, contents noted, conferred with RN (Edmund). Pt recieved seated bedside in chair +Left PICC, +chair alarm. Pt left seated bedside with all lines intact, NAD, VSS, chair alarm activated and call bell in reach.

## 2020-11-05 LAB
ALBUMIN SERPL ELPH-MCNC: 2.8 G/DL — LOW (ref 3.3–5)
ALP SERPL-CCNC: 85 U/L — SIGNIFICANT CHANGE UP (ref 40–120)
ALT FLD-CCNC: 23 U/L — SIGNIFICANT CHANGE UP (ref 10–45)
ANION GAP SERPL CALC-SCNC: 9 MMOL/L — SIGNIFICANT CHANGE UP (ref 5–17)
AST SERPL-CCNC: 31 U/L — SIGNIFICANT CHANGE UP (ref 10–40)
BASOPHILS # BLD AUTO: 0.01 K/UL — SIGNIFICANT CHANGE UP (ref 0–0.2)
BASOPHILS NFR BLD AUTO: 0.2 % — SIGNIFICANT CHANGE UP (ref 0–2)
BILIRUB SERPL-MCNC: 0.4 MG/DL — SIGNIFICANT CHANGE UP (ref 0.2–1.2)
BUN SERPL-MCNC: 17 MG/DL — SIGNIFICANT CHANGE UP (ref 7–23)
CALCIUM SERPL-MCNC: 8.5 MG/DL — SIGNIFICANT CHANGE UP (ref 8.4–10.5)
CHLORIDE SERPL-SCNC: 98 MMOL/L — SIGNIFICANT CHANGE UP (ref 96–108)
CO2 SERPL-SCNC: 27 MMOL/L — SIGNIFICANT CHANGE UP (ref 22–31)
CREAT SERPL-MCNC: 0.77 MG/DL — SIGNIFICANT CHANGE UP (ref 0.5–1.3)
EOSINOPHIL # BLD AUTO: 0.11 K/UL — SIGNIFICANT CHANGE UP (ref 0–0.5)
EOSINOPHIL NFR BLD AUTO: 2.1 % — SIGNIFICANT CHANGE UP (ref 0–6)
GLUCOSE BLDC GLUCOMTR-MCNC: 127 MG/DL — HIGH (ref 70–99)
GLUCOSE BLDC GLUCOMTR-MCNC: 129 MG/DL — HIGH (ref 70–99)
GLUCOSE BLDC GLUCOMTR-MCNC: 139 MG/DL — HIGH (ref 70–99)
GLUCOSE BLDC GLUCOMTR-MCNC: 96 MG/DL — SIGNIFICANT CHANGE UP (ref 70–99)
GLUCOSE SERPL-MCNC: 123 MG/DL — HIGH (ref 70–99)
HCT VFR BLD CALC: 38.4 % — SIGNIFICANT CHANGE UP (ref 34.5–45)
HGB BLD-MCNC: 12.5 G/DL — SIGNIFICANT CHANGE UP (ref 11.5–15.5)
IMM GRANULOCYTES NFR BLD AUTO: 0.2 % — SIGNIFICANT CHANGE UP (ref 0–1.5)
LYMPHOCYTES # BLD AUTO: 0.49 K/UL — LOW (ref 1–3.3)
LYMPHOCYTES # BLD AUTO: 9.5 % — LOW (ref 13–44)
MCHC RBC-ENTMCNC: 28.3 PG — SIGNIFICANT CHANGE UP (ref 27–34)
MCHC RBC-ENTMCNC: 32.6 GM/DL — SIGNIFICANT CHANGE UP (ref 32–36)
MCV RBC AUTO: 86.9 FL — SIGNIFICANT CHANGE UP (ref 80–100)
MONOCYTES # BLD AUTO: 0.35 K/UL — SIGNIFICANT CHANGE UP (ref 0–0.9)
MONOCYTES NFR BLD AUTO: 6.8 % — SIGNIFICANT CHANGE UP (ref 2–14)
NEUTROPHILS # BLD AUTO: 4.19 K/UL — SIGNIFICANT CHANGE UP (ref 1.8–7.4)
NEUTROPHILS NFR BLD AUTO: 81.2 % — HIGH (ref 43–77)
NRBC # BLD: 0 /100 WBCS — SIGNIFICANT CHANGE UP (ref 0–0)
PLATELET # BLD AUTO: 361 K/UL — SIGNIFICANT CHANGE UP (ref 150–400)
POTASSIUM SERPL-MCNC: 3.4 MMOL/L — LOW (ref 3.5–5.3)
POTASSIUM SERPL-SCNC: 3.4 MMOL/L — LOW (ref 3.5–5.3)
PROT SERPL-MCNC: 6.5 G/DL — SIGNIFICANT CHANGE UP (ref 6–8.3)
RBC # BLD: 4.42 M/UL — SIGNIFICANT CHANGE UP (ref 3.8–5.2)
RBC # FLD: 13.5 % — SIGNIFICANT CHANGE UP (ref 10.3–14.5)
SODIUM SERPL-SCNC: 134 MMOL/L — LOW (ref 135–145)
WBC # BLD: 5.16 K/UL — SIGNIFICANT CHANGE UP (ref 3.8–10.5)
WBC # FLD AUTO: 5.16 K/UL — SIGNIFICANT CHANGE UP (ref 3.8–10.5)

## 2020-11-05 PROCEDURE — 99232 SBSQ HOSP IP/OBS MODERATE 35: CPT

## 2020-11-05 RX ORDER — ACETAMINOPHEN 500 MG
650 TABLET ORAL EVERY 6 HOURS
Refills: 0 | Status: DISCONTINUED | OUTPATIENT
Start: 2020-11-05 | End: 2020-11-14

## 2020-11-05 RX ORDER — REMDESIVIR 5 MG/ML
100 INJECTION INTRAVENOUS EVERY 24 HOURS
Refills: 0 | Status: DISCONTINUED | OUTPATIENT
Start: 2020-11-05 | End: 2020-11-05

## 2020-11-05 RX ORDER — ALBUTEROL 90 UG/1
2 AEROSOL, METERED ORAL EVERY 6 HOURS
Refills: 0 | Status: DISCONTINUED | OUTPATIENT
Start: 2020-11-05 | End: 2020-11-14

## 2020-11-05 RX ORDER — REMDESIVIR 5 MG/ML
100 INJECTION INTRAVENOUS EVERY 24 HOURS
Refills: 0 | Status: COMPLETED | OUTPATIENT
Start: 2020-11-06 | End: 2020-11-09

## 2020-11-05 RX ORDER — REMDESIVIR 5 MG/ML
INJECTION INTRAVENOUS
Refills: 0 | Status: COMPLETED | OUTPATIENT
Start: 2020-11-05 | End: 2020-11-09

## 2020-11-05 RX ORDER — INFLUENZA VIRUS VACCINE 15; 15; 15; 15 UG/.5ML; UG/.5ML; UG/.5ML; UG/.5ML
0.5 SUSPENSION INTRAMUSCULAR ONCE
Refills: 0 | Status: DISCONTINUED | OUTPATIENT
Start: 2020-11-05 | End: 2020-11-14

## 2020-11-05 RX ORDER — REMDESIVIR 5 MG/ML
200 INJECTION INTRAVENOUS EVERY 24 HOURS
Refills: 0 | Status: COMPLETED | OUTPATIENT
Start: 2020-11-05 | End: 2020-11-05

## 2020-11-05 RX ORDER — POTASSIUM CHLORIDE 20 MEQ
40 PACKET (EA) ORAL EVERY 4 HOURS
Refills: 0 | Status: COMPLETED | OUTPATIENT
Start: 2020-11-05 | End: 2020-11-05

## 2020-11-05 RX ADMIN — Medication 25 MILLIGRAM(S): at 05:18

## 2020-11-05 RX ADMIN — Medication 40 MILLIEQUIVALENT(S): at 15:35

## 2020-11-05 RX ADMIN — DULOXETINE HYDROCHLORIDE 60 MILLIGRAM(S): 30 CAPSULE, DELAYED RELEASE ORAL at 12:11

## 2020-11-05 RX ADMIN — Medication 25 MILLIGRAM(S): at 17:11

## 2020-11-05 RX ADMIN — Medication 6 MILLIGRAM(S): at 05:17

## 2020-11-05 RX ADMIN — REMDESIVIR 500 MILLIGRAM(S): 5 INJECTION INTRAVENOUS at 13:55

## 2020-11-05 RX ADMIN — Medication 650 MILLIGRAM(S): at 11:00

## 2020-11-05 RX ADMIN — OXYCODONE HYDROCHLORIDE 5 MILLIGRAM(S): 5 TABLET ORAL at 20:57

## 2020-11-05 RX ADMIN — Medication 1 TABLET(S): at 12:12

## 2020-11-05 RX ADMIN — Medication 1000 UNIT(S): at 12:13

## 2020-11-05 RX ADMIN — ENOXAPARIN SODIUM 40 MILLIGRAM(S): 100 INJECTION SUBCUTANEOUS at 12:11

## 2020-11-05 RX ADMIN — Medication 40 MILLIEQUIVALENT(S): at 17:08

## 2020-11-05 RX ADMIN — Medication 650 MILLIGRAM(S): at 10:16

## 2020-11-05 RX ADMIN — AMLODIPINE BESYLATE 5 MILLIGRAM(S): 2.5 TABLET ORAL at 05:18

## 2020-11-05 RX ADMIN — ATORVASTATIN CALCIUM 10 MILLIGRAM(S): 80 TABLET, FILM COATED ORAL at 21:59

## 2020-11-05 RX ADMIN — PANTOPRAZOLE SODIUM 40 MILLIGRAM(S): 20 TABLET, DELAYED RELEASE ORAL at 05:18

## 2020-11-05 RX ADMIN — OXYCODONE HYDROCHLORIDE 5 MILLIGRAM(S): 5 TABLET ORAL at 20:27

## 2020-11-05 RX ADMIN — Medication 145 MILLIGRAM(S): at 12:11

## 2020-11-05 NOTE — PROGRESS NOTE ADULT - ASSESSMENT
60F with PMH HTN, HLD, osteoarthritis, anxiety/depression disorders, GERD, eczema, sinusitis, scoliosis/spinal stenosis- lumbar region, presented to [facility] 9/29/20 for scheduled stage 1, L1-5 lumbar lateral interbody fusion with Dr. Valdez. Stage 2 T9-pelvis arthrodesis, L5-S1 TLIF done on 9/30/20 with closure of muscle flap done by plastics, Dr. Alegre. Post-op course complicated by hemorrhagic shock requiring transfer to ICU, fluid resuscitation with IV hydration and pressors. Patient was transferred to [facility] on 10/10/20 for comprehensive rehab.     Viral PNA likely 2/2 to COVID-19   Acute hypoxic respiratory failure  -May use O2 NC, Venturi Mask, NRB as needed depending on oxygen demand.   -Monitor O2; monitor respiratory status closely  -Maintain strict isolation precautions, use proper PPE.  -Provide Acetaminophen 650 mg PO q6  -May use Albuterol inhaler PRN  -Continue decadron IV x 10 days  - Start remdesivir today  -Monitor glucose while in steroids     HTN  -Chronic, controlled  -Continue home medication Norvasc, Metoprolol    HLD  -Chronic, controlled  -Continue statin, fenofibrate    Anxiety/Depression  -Chronic  -Continue home medication Cymbalta    Pre-diabetes  A1C 6.4  -Continue low dose insulin sliding scale  -Hypoglycemia protocol, accu-cheks      DVT prophylaxis: Lovenox  PT - possible home PT  / OT evaluation   Patient lives with  who is high-risk. Unable to quarantine.     Discussed with , Jatin Pang, aware and in agreement with above. 11/4   60F with PMH HTN, HLD, osteoarthritis, anxiety/depression disorders, GERD, eczema, sinusitis, scoliosis/spinal stenosis- lumbar region, presented to [facility] 9/29/20 for scheduled stage 1, L1-5 lumbar lateral interbody fusion with Dr. Valdez. Stage 2 T9-pelvis arthrodesis, L5-S1 TLIF done on 9/30/20 with closure of muscle flap done by plastics, Dr. Alegre. Post-op course complicated by hemorrhagic shock requiring transfer to ICU, fluid resuscitation with IV hydration and pressors. Patient was transferred to [facility] on 10/10/20 for comprehensive rehab.     Viral PNA likely 2/2 to COVID-19   Acute hypoxic respiratory failure  -May use O2 NC, Venturi Mask, NRB as needed depending on oxygen demand.   -Monitor O2; monitor respiratory status closely  -Maintain strict isolation precautions, use proper PPE.  -Provide Acetaminophen 650 mg PO q6  -May use Albuterol inhaler PRN  -Continue decadron IV x 10 days  - Start remdesivir today  -Monitor glucose while in steroids     HTN  -Chronic, controlled  -Continue home medication Norvasc, Metoprolol    HLD  -Chronic, controlled  -Continue statin, fenofibrate    Anxiety/Depression  -Chronic  -Continue home medication Cymbalta    Pre-diabetes  A1C 6.4  -Continue low dose insulin sliding scale  -Hypoglycemia protocol, accu-cheks      DVT prophylaxis: Lovenox  PT - possible home PT  / OT evaluation   Patient lives with  who is high-risk. Unable to quarantine.     Discussed with , Jatin Pang 11/5   60F with PMH HTN, HLD, osteoarthritis, anxiety/depression disorders, GERD, eczema, sinusitis, scoliosis/spinal stenosis- lumbar region, presented to [facility] 9/29/20 for scheduled stage 1, L1-5 lumbar lateral interbody fusion with Dr. Valdez. Stage 2 T9-pelvis arthrodesis, L5-S1 TLIF done on 9/30/20 with closure of muscle flap done by plastics, Dr. Alegre. Post-op course complicated by hemorrhagic shock requiring transfer to ICU, fluid resuscitation with IV hydration and pressors. Patient was transferred to [facility] on 10/10/20 for comprehensive rehab.     Viral PNA likely 2/2 to COVID-19   Acute hypoxic respiratory failure  -May use O2 NC, Venturi Mask, NRB as needed depending on oxygen demand.   -Monitor O2; monitor respiratory status closely  -Maintain strict isolation precautions, use proper PPE.  -Provide Acetaminophen 650 mg PO q6  -May use Albuterol inhaler PRN  -Continue decadron IV x 10 days  - Start remdesivir today  -Monitor glucose while in steroids       Hypokalemia  -3.4 today  - repleting f/u in AM    HTN  -Chronic, controlled  -Continue home medication Norvasc, Metoprolol    HLD  -Chronic, controlled  -Continue statin, fenofibrate    Anxiety/Depression  -Chronic  -Continue home medication Cymbalta    Pre-diabetes  A1C 6.4  -Continue low dose insulin sliding scale  -Hypoglycemia protocol, accu-cheks      DVT prophylaxis: Lovenox  PT - possible home PT  / OT evaluation   Patient lives with  who is high-risk. Unable to quarantine.     Discussed with , Nawafalexis Pang 11/5

## 2020-11-05 NOTE — PROGRESS NOTE ADULT - SUBJECTIVE AND OBJECTIVE BOX
Patient is a 60y old  Female who presents with a chief complaint of COVID-19 PNA (2020 08:52)      Patient seen and examined at bedside.  No overnight events.  Noted to be hypoxic to 88%on RA  c/o night sweats and food not being good    ALLERGIES:  penicillin (Other)    MEDICATIONS  (STANDING):  amLODIPine   Tablet 5 milliGRAM(s) Oral daily  atorvastatin 10 milliGRAM(s) Oral at bedtime  cholecalciferol 1000 Unit(s) Oral daily  dexAMETHasone  Injectable 6 milliGRAM(s) IV Push daily  dextrose 5%. 1000 milliLiter(s) (50 mL/Hr) IV Continuous <Continuous>  dextrose 50% Injectable 12.5 Gram(s) IV Push once  dextrose 50% Injectable 25 Gram(s) IV Push once  dextrose 50% Injectable 25 Gram(s) IV Push once  DULoxetine 60 milliGRAM(s) Oral daily  enoxaparin Injectable 40 milliGRAM(s) SubCutaneous daily  fenofibrate Tablet 145 milliGRAM(s) Oral daily  insulin lispro (ADMELOG) corrective regimen sliding scale   SubCutaneous three times a day before meals  metoprolol tartrate 25 milliGRAM(s) Oral two times a day  multivitamin 1 Tablet(s) Oral daily  pantoprazole    Tablet 40 milliGRAM(s) Oral before breakfast  polyethylene glycol 3350 17 Gram(s) Oral daily    MEDICATIONS  (PRN):  acetaminophen   Tablet .. 650 milliGRAM(s) Oral every 6 hours PRN Moderate Pain (4 - 6)  dextrose 40% Gel 15 Gram(s) Oral once PRN Blood Glucose LESS THAN 70 milliGRAM(s)/deciliter  glucagon  Injectable 1 milliGRAM(s) IntraMuscular once PRN Glucose LESS THAN 70 milligrams/deciliter  oxyCODONE    IR 5 milliGRAM(s) Oral every 6 hours PRN Severe Pain (7 - 10)  senna 2 Tablet(s) Oral at bedtime PRN Constipation    Vital Signs Last 24 Hrs  T(F): 99.8 (2020 08:18), Max: 99.8 (2020 21:21)  HR: 83 (2020 08:18) (83 - 99)  BP: 118/73 (2020 08:18) (118/73 - 137/78)  RR: 17 (2020 08:18) (16 - 17)  SpO2: 98% (2020 09:08) (88% - 98%)  I&O's Summary    BMI (kg/m2): 30.4 (20 @ 17:45)  PHYSICAL EXAM:  General: NAD, A/O x 3  ENT: MMM, no scleral icterus  Neck: Supple, No JVD  Lungs: Clear to auscultation bilaterally, non labored breathing  Cardio: RRR, S1/S2, No murmurs  Abdomen: Soft, Nontender, Nondistended; Bowel sounds present  Extremities: No calf tenderness, No pitting edema    LABS:                        12.5   5.16  )-----------( 361      ( 2020 05:00 )             38.4           134  |  98  |  17  ----------------------------<  123  3.4   |  27  |  0.77    Ca    8.5      2020 05:00  Mg     1.9         TPro  6.5  /  Alb  2.8  /  TBili  0.4  /  DBili  x   /  AST  31  /  ALT  23  /  AlkPhos  85       eGFR if Non African American: 84 mL/min/1.73M2 (20 @ 05:00)  eGFR if African American: 98 mL/min/1.73M2 (20 @ 05:00)       CARDIAC MARKERS ( 2020 11:10 )  <.017 ng/mL / x     / 51 U/L / x     / x          POCT Blood Glucose.: 139 mg/dL (2020 12:09)  POCT Blood Glucose.: 129 mg/dL (2020 08:00)  POCT Blood Glucose.: 113 mg/dL (2020 21:30)  POCT Blood Glucose.: 112 mg/dL (2020 17:11)      Urinalysis Basic - ( 2020 15:00 )    Color: Yellow / Appearance: Clear / S.025 / pH: x  Gluc: x / Ketone: Negative  / Bili: Negative / Urobili: 1   Blood: x / Protein: Negative / Nitrite: Negative   Leuk Esterase: Small / RBC: 0-4 /HPF / WBC 6-10 /HPF   Sq Epi: x / Non Sq Epi: Moderate / Bacteria: Moderate /HPF    Culture - Blood (collected 2020 10:00)  Source: .Blood Blood  Preliminary Report (2020 18:01):    No growth to date.      RADIOLOGY & ADDITIONAL TESTS:  CT Chest No Cont (20) Multifocal viral/atypical pneumonia. Appearance is very suggestive of infection with Covid 19    Care Discussed with Consultants/Other Providers:

## 2020-11-06 ENCOUNTER — APPOINTMENT (OUTPATIENT)
Dept: SPINE | Facility: CLINIC | Age: 60
End: 2020-11-06
Payer: COMMERCIAL

## 2020-11-06 LAB
ALBUMIN SERPL ELPH-MCNC: 2.8 G/DL — LOW (ref 3.3–5)
ALP SERPL-CCNC: 80 U/L — SIGNIFICANT CHANGE UP (ref 40–120)
ALT FLD-CCNC: 21 U/L — SIGNIFICANT CHANGE UP (ref 10–45)
ANION GAP SERPL CALC-SCNC: 8 MMOL/L — SIGNIFICANT CHANGE UP (ref 5–17)
AST SERPL-CCNC: 29 U/L — SIGNIFICANT CHANGE UP (ref 10–40)
BILIRUB SERPL-MCNC: 0.4 MG/DL — SIGNIFICANT CHANGE UP (ref 0.2–1.2)
BUN SERPL-MCNC: 15 MG/DL — SIGNIFICANT CHANGE UP (ref 7–23)
CALCIUM SERPL-MCNC: 8.8 MG/DL — SIGNIFICANT CHANGE UP (ref 8.4–10.5)
CHLORIDE SERPL-SCNC: 100 MMOL/L — SIGNIFICANT CHANGE UP (ref 96–108)
CO2 SERPL-SCNC: 27 MMOL/L — SIGNIFICANT CHANGE UP (ref 22–31)
CREAT SERPL-MCNC: 0.86 MG/DL — SIGNIFICANT CHANGE UP (ref 0.5–1.3)
GLUCOSE BLDC GLUCOMTR-MCNC: 115 MG/DL — HIGH (ref 70–99)
GLUCOSE BLDC GLUCOMTR-MCNC: 123 MG/DL — HIGH (ref 70–99)
GLUCOSE BLDC GLUCOMTR-MCNC: 136 MG/DL — HIGH (ref 70–99)
GLUCOSE SERPL-MCNC: 116 MG/DL — HIGH (ref 70–99)
HCT VFR BLD CALC: 39.4 % — SIGNIFICANT CHANGE UP (ref 34.5–45)
HGB BLD-MCNC: 12.2 G/DL — SIGNIFICANT CHANGE UP (ref 11.5–15.5)
MAGNESIUM SERPL-MCNC: 1.7 MG/DL — SIGNIFICANT CHANGE UP (ref 1.6–2.6)
MCHC RBC-ENTMCNC: 26.9 PG — LOW (ref 27–34)
MCHC RBC-ENTMCNC: 31 GM/DL — LOW (ref 32–36)
MCV RBC AUTO: 87 FL — SIGNIFICANT CHANGE UP (ref 80–100)
NRBC # BLD: 0 /100 WBCS — SIGNIFICANT CHANGE UP (ref 0–0)
PHOSPHATE SERPL-MCNC: 2.7 MG/DL — SIGNIFICANT CHANGE UP (ref 2.5–4.5)
PLATELET # BLD AUTO: 370 K/UL — SIGNIFICANT CHANGE UP (ref 150–400)
POTASSIUM SERPL-MCNC: 3.6 MMOL/L — SIGNIFICANT CHANGE UP (ref 3.5–5.3)
POTASSIUM SERPL-SCNC: 3.6 MMOL/L — SIGNIFICANT CHANGE UP (ref 3.5–5.3)
PROT SERPL-MCNC: 6.4 G/DL — SIGNIFICANT CHANGE UP (ref 6–8.3)
RBC # BLD: 4.53 M/UL — SIGNIFICANT CHANGE UP (ref 3.8–5.2)
RBC # FLD: 13.6 % — SIGNIFICANT CHANGE UP (ref 10.3–14.5)
SODIUM SERPL-SCNC: 135 MMOL/L — SIGNIFICANT CHANGE UP (ref 135–145)
WBC # BLD: 3.79 K/UL — LOW (ref 3.8–10.5)
WBC # FLD AUTO: 3.79 K/UL — LOW (ref 3.8–10.5)

## 2020-11-06 PROCEDURE — 99024 POSTOP FOLLOW-UP VISIT: CPT

## 2020-11-06 PROCEDURE — 99232 SBSQ HOSP IP/OBS MODERATE 35: CPT

## 2020-11-06 RX ORDER — DULOXETINE HYDROCHLORIDE 30 MG/1
60 CAPSULE, DELAYED RELEASE ORAL ONCE
Refills: 0 | Status: COMPLETED | OUTPATIENT
Start: 2020-11-06 | End: 2020-11-06

## 2020-11-06 RX ORDER — DULOXETINE HYDROCHLORIDE 30 MG/1
120 CAPSULE, DELAYED RELEASE ORAL DAILY
Refills: 0 | Status: DISCONTINUED | OUTPATIENT
Start: 2020-11-07 | End: 2020-11-14

## 2020-11-06 RX ADMIN — Medication 25 MILLIGRAM(S): at 06:37

## 2020-11-06 RX ADMIN — AMLODIPINE BESYLATE 5 MILLIGRAM(S): 2.5 TABLET ORAL at 06:37

## 2020-11-06 RX ADMIN — Medication 25 MILLIGRAM(S): at 17:37

## 2020-11-06 RX ADMIN — ATORVASTATIN CALCIUM 10 MILLIGRAM(S): 80 TABLET, FILM COATED ORAL at 21:17

## 2020-11-06 RX ADMIN — DULOXETINE HYDROCHLORIDE 60 MILLIGRAM(S): 30 CAPSULE, DELAYED RELEASE ORAL at 13:20

## 2020-11-06 RX ADMIN — Medication 1 TABLET(S): at 13:19

## 2020-11-06 RX ADMIN — PANTOPRAZOLE SODIUM 40 MILLIGRAM(S): 20 TABLET, DELAYED RELEASE ORAL at 06:37

## 2020-11-06 RX ADMIN — OXYCODONE HYDROCHLORIDE 5 MILLIGRAM(S): 5 TABLET ORAL at 21:16

## 2020-11-06 RX ADMIN — OXYCODONE HYDROCHLORIDE 5 MILLIGRAM(S): 5 TABLET ORAL at 14:47

## 2020-11-06 RX ADMIN — Medication 6 MILLIGRAM(S): at 06:37

## 2020-11-06 RX ADMIN — OXYCODONE HYDROCHLORIDE 5 MILLIGRAM(S): 5 TABLET ORAL at 15:47

## 2020-11-06 RX ADMIN — REMDESIVIR 500 MILLIGRAM(S): 5 INJECTION INTRAVENOUS at 13:20

## 2020-11-06 RX ADMIN — OXYCODONE HYDROCHLORIDE 5 MILLIGRAM(S): 5 TABLET ORAL at 21:46

## 2020-11-06 RX ADMIN — ENOXAPARIN SODIUM 40 MILLIGRAM(S): 100 INJECTION SUBCUTANEOUS at 13:20

## 2020-11-06 RX ADMIN — Medication 1000 UNIT(S): at 13:21

## 2020-11-06 RX ADMIN — DULOXETINE HYDROCHLORIDE 60 MILLIGRAM(S): 30 CAPSULE, DELAYED RELEASE ORAL at 17:38

## 2020-11-06 RX ADMIN — Medication 145 MILLIGRAM(S): at 13:20

## 2020-11-06 NOTE — REASON FOR VISIT
[de-identified] : 9/30/2020 [de-identified] : T 11 , T 12 , L 1 , L2 , L 3 L 4 L5  decompressive laminectomy and medial facetectomy and foraminotomy.    \par T 9 thru S 1 pelvic segmental screw tammy fixation and T 9 to pelvis arthrodesis

## 2020-11-06 NOTE — CONSULT NOTE ADULT - SUBJECTIVE AND OBJECTIVE BOX
PULMONARY CONSULT  Location of Patient : [facility] ([facility])  Attending requesting Consult:Fannie Martin  Chief Complaint : COVID  Reason For consult : Abnormal CT      Initial HPI on admission:  HPI:  60 year old female with h/o Spinal stenosis with multiple interventions, HTN, High chol, Anxiety who recently underwent acute rehab for L fusion. Developed COVID 19 now transferred to medical floor.  After patient developed Fever, cough, (+) COVID-19 PCR, CT chest diffuse multifocal nodular-GGO highly suggestive of COVID-19 radiographic pattern.   Pulmonary called for evaluation of abnormal CT chest / COVID  patient feels well, today, on RA not short of breath  states she wants her Cymbalta increased to back to baseline.  she si complaining of muscle aches and sore back    PAST MEDICAL & SURGICAL HISTORY:  Lumbar spinal stenosis    History of sciatica  mostly right side    H/O sinusitis    Eczema  extremities    Scoliosis    Meniscus tear  left knee    Spinal stenosis in cervical region    Anxiety and depression    Migraine    GERD (gastroesophageal reflux disease)    Seasonal allergies    Hyperlipidemia    HTN (hypertension)    H/O cervical spine surgery  C3-6   2015    History of tonsillectomy    History of appendectomy      Allergies    penicillin (Other)    Intolerances      FAMILY HISTORY:  FHx: type 2 diabetes mellitus  brother age 65 alive    Family hx of hypertension  brother age 65    Family history of atrial fibrillation  mother  age 94      Social history: works in [employer]      Smoking: denies     Drinking: denies      Drug use: denies     Review of Systems: as stated above    CONSTITUTIONAL: No fever, No chills, +fatigue  EYES: No eye pain, No visual disturbances, No discharge  ENMT:  No difficulty hearing, No tinnitus, No vertigo; No sinus or throat pain  NECK: No pain, No stiffness  RESPIRATORY: No Cough, No SOB, No Secretions  CARDIOVASCULAR: No chest pain, No palpitations, No dizziness, or No leg swelling  GASTROINTESTINAL: No abdominal or epigastric pain. No nausea, No vomiting, No hematemesis; No diarrhea, No constipation. No melena, No hematochezia.  GENITOURINARY: No dysuria, No frequency, No hematuria, No incontinence  NEUROLOGICAL: No headaches, No memory loss, No loss of strength, No numbness, No tremors  SKIN: No itching, No burning, No rashes, No lesions   MUSCULOSKELETAL: No joint pain or swelling; No muscle, back, No extremity pain  PSYCHIATRIC: No depression, No anxiety, No mood swings, No difficulty sleeping      Medications:  MEDICATIONS  (STANDING):  amLODIPine   Tablet 5 milliGRAM(s) Oral daily  atorvastatin 10 milliGRAM(s) Oral at bedtime  cholecalciferol 1000 Unit(s) Oral daily  dexAMETHasone  Injectable 6 milliGRAM(s) IV Push daily  dextrose 5%. 1000 milliLiter(s) (50 mL/Hr) IV Continuous <Continuous>  dextrose 50% Injectable 12.5 Gram(s) IV Push once  dextrose 50% Injectable 25 Gram(s) IV Push once  dextrose 50% Injectable 25 Gram(s) IV Push once  DULoxetine 60 milliGRAM(s) Oral daily  enoxaparin Injectable 40 milliGRAM(s) SubCutaneous daily  fenofibrate Tablet 145 milliGRAM(s) Oral daily  influenza   Vaccine 0.5 milliLiter(s) IntraMuscular once  insulin lispro (ADMELOG) corrective regimen sliding scale   SubCutaneous three times a day before meals  metoprolol tartrate 25 milliGRAM(s) Oral two times a day  multivitamin 1 Tablet(s) Oral daily  pantoprazole    Tablet 40 milliGRAM(s) Oral before breakfast  polyethylene glycol 3350 17 Gram(s) Oral daily  remdesivir  IVPB 100 milliGRAM(s) IV Intermittent every 24 hours  remdesivir  IVPB   IV Intermittent     MEDICATIONS  (PRN):  acetaminophen   Tablet .. 650 milliGRAM(s) Oral every 6 hours PRN Moderate Pain (4 - 6)  ALBUTerol    90 MICROgram(s) HFA Inhaler 2 Puff(s) Inhalation every 6 hours PRN Shortness of Breath and/or Wheezing  dextrose 40% Gel 15 Gram(s) Oral once PRN Blood Glucose LESS THAN 70 milliGRAM(s)/deciliter  glucagon  Injectable 1 milliGRAM(s) IntraMuscular once PRN Glucose LESS THAN 70 milligrams/deciliter  oxyCODONE    IR 5 milliGRAM(s) Oral every 6 hours PRN Severe Pain (7 - 10)  senna 2 Tablet(s) Oral at bedtime PRN Constipation      Home Medications:  Last Order Reconciliation Date: 20 @ 15:23 (Admission Reconciliation)  amLODIPine 5 mg oral tablet: 1 tab(s) orally once a day (20 @ 10:21)  atorvastatin 10 mg oral tablet: 1 tab(s) orally once a day, evening  (20 @ 11:31)  cholecalciferol 1000 intl units (25 mcg) oral tablet: 1  orally once a day  (20 @ 10:21)  Cymbalta 60 mg oral delayed release capsule: 2 cap(s) orally once a day x anxiety and body pain (20 @ 11:31)  dexamethasone: 6 milligram(s) intracavernously once a day  (20 @ 10:23)  fenofibrate 145 mg oral tablet: 1 tab(s) orally once a day (10-28-20 @ 15:39)  guaiFENesin 100 mg/5 mL oral liquid: 5 milliliter(s) orally every 6 hours, As needed, Cough (20 @ 10:21)  hydrOXYzine hydrochloride 50 mg oral tablet: 1 tab(s) orally once a day (at bedtime) (20 @ 10:21)  Lovenox 40 mg/0.4 mL injectable solution: 40 milligram(s) subcutaneously once a day  (20 @ 10:21)  magnesium oxide 400 mg (241.3 mg elemental magnesium) oral tablet: 1 tab(s) orally once a day (20 @ 10:21)  metoprolol tartrate 25 mg oral tablet: 1 tab(s) orally 2 times a day (20 @ 10:21)  Multiple Vitamins oral tablet: 1 tab(s) orally once a day (10-10-20 @ 08:38)  oxyCODONE 10 mg oral tablet: 1 tab(s) orally every 6 hours, As needed, Severe Pain (7 - 10) MDD:4 tablets (20 @ 10:21)  pantoprazole 40 mg oral delayed release tablet: 1 tab(s) orally once a day (before a meal) (20 @ 10:21)  polyethylene glycol 3350 oral powder for reconstitution: 17 gram(s) orally once a day (20 @ 10:21)  senna oral tablet: 2 tab(s) orally once a day (at bedtime), As needed, Constipation (20 @ 10:21)  SUMAtriptan 25 mg oral tablet: 1 tab(s) orally 4 times a day, As needed, Migraine (10-28-20 @ 15:39)      LABS:                        12.2   3.79  )-----------( 370      ( 2020 05:30 )             39.4         135  |  100  |  15  ----------------------------<  116<H>  3.6   |  27  |  0.86    Ca    8.8      2020 05:30  Phos  2.7       Mg     1.7         TPro  6.4  /  Alb  2.8<L>  /  TBili  0.4  /  DBili  x   /  AST  29  /  ALT  21  /  AlkPhos  80      COVID  20 @ 00:45  COVID -   Detected<!!>  20 @ 09:04  COVID -   Detected<!!>  10-28-20 @ 15:30  COVID -   NotDetec  10-06-20 @ 12:45  COVID -   NotDetec  20 @ 00:20  COVID -   NotDetec      COVID Biomarkers    20 @ 11:10 ESR --  ---  CRP --  ---  DDimer  --   ---      ---   Ferritin 146              < from: CT Chest No Cont (20 @ 09:52) >    EXAM:  CT CHEST      PROCEDURE DATE:  2020        INTERPRETATION:  CLINICAL INFORMATION: Fever, concern for Covid pneumonia    COMPARISON: None.    PROCEDURE:  CT of the Chest was performed without intravenous contrast.  Sagittal and coronal reformats were performed.    FINDINGS:    LUNGS AND AIRWAYS: Patent central airways.  Widespread bilateral multifocal groundglass opacities consistent with multifocal viral/atypical pneumonia. Appearance is very suggestive of infection with Covid 19  PLEURA: No pleural effusion.  MEDIASTINUM AND FABRICIO: No lymphadenopathy.  VESSELS: Nonaneurysmal.  HEART: Heart size is normal. Coronary artery calcination. No pericardial effusion.  CHEST WALL AND LOWER NECK: Within normal limits.  VISUALIZED UPPER ABDOMEN: Within normal limits.  BONES: Status post cervical and thoracolumbar fusion    IMPRESSION:  Multifocal viral/atypical pneumonia. Appearance is very suggestive of infection with Covid 19    < end of copied text >      VITALS:  T(C): 37.8 (20 @ 06:07), Max: 37.8 (20 @ 06:07)  T(F): 100.1 (20 @ 06:07), Max: 100.1 (20 @ 06:07)  HR: 93 (20 @ 06:07) (76 - 93)  BP: 134/79 (20 @ 06:07) (115/69 - 134/79)  BP(mean): --  ABP: --  ABP(mean): --  RR: 16 (20 @ 06:07) (16 - 16)  SpO2: 97% (20 @ 06:36) (93% - 98%)  CVP(mm Hg): --  CVP(cm H2O): --    Ins and Outs     20 @ 07:01  -  20 @ 07:00  --------------------------------------------------------  IN: 250 mL / OUT: 200 mL / NET: 50 mL        Height (cm): 167.6 (20 @ 17:45)  Weight (kg): 85.3 (20 @ 17:45)  BMI (kg/m2): 30.4 (20 @ 17:45)        I&O's Detail    2020 07:01  -  2020 07:00  --------------------------------------------------------  IN:    IV PiggyBack: 250 mL  Total IN: 250 mL    OUT:    Voided (mL): 200 mL  Total OUT: 200 mL    Total NET: 50 mL          Physical Examination:  GENERAL:               Alert, Oriented, No acute distress.    HEENT:                   No JVD, Moist MM  PULM:                     Bilateral air entry, Clear to auscultation bilaterally, no significant sputum production, No Rales, No Rhonchi, No Wheezing  CVS:                         S1, S2,  No Murmur  ABD:                        Soft, nondistended, nontender, normoactive bowel sounds,   EXT:                         No edema, nontender, No Cyanosis or Clubbing   Vascular:                Warm Extremities, Normal Capillary refill, Normal Distal Pulses  NEURO:                  Alert, oriented, interactive, nonfocal, follows commands  PSYC:                      Calm, + Insight.

## 2020-11-06 NOTE — PROGRESS NOTE ADULT - SUBJECTIVE AND OBJECTIVE BOX
Patient is a 60y old  Female who presents with a chief complaint of COVID-19 PNA (2020 08:41)      Patient seen and examined at bedside.  No overnight events  Tmax 100.2, has no complaints  On Decadron and Remdesivir    ALLERGIES:  penicillin (Other)    MEDICATIONS  (STANDING):  amLODIPine   Tablet 5 milliGRAM(s) Oral daily  atorvastatin 10 milliGRAM(s) Oral at bedtime  cholecalciferol 1000 Unit(s) Oral daily  dexAMETHasone  Injectable 6 milliGRAM(s) IV Push daily  dextrose 5%. 1000 milliLiter(s) (50 mL/Hr) IV Continuous <Continuous>  dextrose 50% Injectable 12.5 Gram(s) IV Push once  dextrose 50% Injectable 25 Gram(s) IV Push once  dextrose 50% Injectable 25 Gram(s) IV Push once  DULoxetine 60 milliGRAM(s) Oral daily  enoxaparin Injectable 40 milliGRAM(s) SubCutaneous daily  fenofibrate Tablet 145 milliGRAM(s) Oral daily  influenza   Vaccine 0.5 milliLiter(s) IntraMuscular once  insulin lispro (ADMELOG) corrective regimen sliding scale   SubCutaneous three times a day before meals  metoprolol tartrate 25 milliGRAM(s) Oral two times a day  multivitamin 1 Tablet(s) Oral daily  pantoprazole    Tablet 40 milliGRAM(s) Oral before breakfast  polyethylene glycol 3350 17 Gram(s) Oral daily  remdesivir  IVPB 100 milliGRAM(s) IV Intermittent every 24 hours  remdesivir  IVPB   IV Intermittent     MEDICATIONS  (PRN):  acetaminophen   Tablet .. 650 milliGRAM(s) Oral every 6 hours PRN Moderate Pain (4 - 6)  ALBUTerol    90 MICROgram(s) HFA Inhaler 2 Puff(s) Inhalation every 6 hours PRN Shortness of Breath and/or Wheezing  dextrose 40% Gel 15 Gram(s) Oral once PRN Blood Glucose LESS THAN 70 milliGRAM(s)/deciliter  glucagon  Injectable 1 milliGRAM(s) IntraMuscular once PRN Glucose LESS THAN 70 milligrams/deciliter  oxyCODONE    IR 5 milliGRAM(s) Oral every 6 hours PRN Severe Pain (7 - 10)  senna 2 Tablet(s) Oral at bedtime PRN Constipation    Vital Signs Last 24 Hrs  T(F): 100.1 (2020 06:07), Max: 100.1 (2020 06:07)  HR: 93 (2020 06:07) (76 - 93)  BP: 134/79 (2020 06:07) (115/69 - 134/79)  RR: 16 (2020 06:07) (14 - 16)  SpO2: 97% (2020 06:36) (93% - 98%)  I&O's Summary    2020 07:01  -  2020 07:00  --------------------------------------------------------  IN: 250 mL / OUT: 200 mL / NET: 50 mL      BMI (kg/m2): 30.4 (20 @ 17:45)  PHYSICAL EXAM:  General: NAD, A/O x 3, on NC 2L  ENT: MMM, no scleral icterus  Neck: Supple, No JVD  Lungs: Clear to auscultation bilaterally, non labored breathing  Cardio: RRR, S1/S2, No murmurs  Abdomen: Soft, Nontender, Nondistended; Bowel sounds present  Extremities: No calf tenderness, No pitting edema    LABS:                        12.2   3.79  )-----------( 370      ( 2020 05:30 )             39.4           135  |  100  |  15  ----------------------------<  116  3.6   |  27  |  0.86    Ca    8.8      2020 05:30  Phos  2.7       Mg     1.7         TPro  6.4  /  Alb  2.8  /  TBili  0.4  /  DBili  x   /  AST  29  /  ALT  21  /  AlkPhos  80       eGFR if African American: 85 mL/min/1.73M2 (20 @ 05:30)  eGFR if Non African American: 73 mL/min/1.73M2 (20 @ 05:30)       POCT Blood Glucose.: 115 mg/dL (2020 07:52)  POCT Blood Glucose.: 96 mg/dL (2020 21:59)  POCT Blood Glucose.: 127 mg/dL (2020 17:06)  POCT Blood Glucose.: 139 mg/dL (2020 12:09)      Urinalysis Basic - ( 2020 15:00 )    Color: Yellow / Appearance: Clear / S.025 / pH: x  Gluc: x / Ketone: Negative  / Bili: Negative / Urobili: 1   Blood: x / Protein: Negative / Nitrite: Negative   Leuk Esterase: Small / RBC: 0-4 /HPF / WBC 6-10 /HPF   Sq Epi: x / Non Sq Epi: Moderate / Bacteria: Moderate /HPF        Culture - Blood (collected 2020 10:00)  Source: .Blood Blood  Preliminary Report (2020 18:01):    No growth to date.        RADIOLOGY & ADDITIONAL TESTS:    Care Discussed with Consultants/Other Providers:

## 2020-11-06 NOTE — HISTORY OF PRESENT ILLNESS
[Other Location: e.g. School (Enter Location, City,State)___] : at [unfilled], at the time of the visit. [Medical Office: ([facility])___] : at the medical office located in  [Other:____] : [unfilled] [Verbal consent obtained from patient] : the patient, [unfilled] [FreeTextEntry4] : Kim Lombardo [FreeTextEntry1] : Ms. Pang is residing in [facility] in an isolation unit.  She is now six weeks postop after undergoing an extensive spinal fusion from T 9 to the pelvic region for stenosis.  She was admitted and had tolerated  the procedure well but had  an episode of hemorrhagic shock in the ICU postop.  She was treated accordingly and recovered.  She was eventually sent to [facility] rehab.  In rehab she was in contact with COVID and is now positive.  \par \par Today I had the pleasure in conducting a  telehealth appt.  while she is in isolation.  She feels well and she has little to no back pain and occasional groin pain.  Her leg pain is gone.  She does suffer from numbness of her LE. She is walking very little since she was taken from rehab to recover form [facility].  She can transfer and make a few a steps.  She takes one tablet of Oxycodone a day for stiffness.   She reports her incision is clean and dry,well healed.

## 2020-11-06 NOTE — ASSESSMENT
[FreeTextEntry1] : 60 year old female s/p thoraco-lumbar fusion s/p six weeks ago.  She is doing well despite having  a hemorrhagic shock event postop and now positive for COVID while in rehab.  She will continue to increase her walking. I have left a message to speak to the attending taken care of Ms Pang in [address] [place] [address] at [phone] in regards to starting her Cymbalta 120 mg daily and to increase her PT routine. She will return to the office when she is home. She will require a scoliosis xray when she returns.

## 2020-11-06 NOTE — CONSULT NOTE ADULT - ASSESSMENT
Assessment  1. COVID PNA    2. Abnormal CT chest likely due ot COVID 19 doubt bacterial PNA  3. S/P Lumbar Surgery for spinal stenosis   4. HTN, High chol, Anxiety       Plan  Continue decadron 6mg x 10 days and Remdesivir x 5 days  monitor on RA  PT , OT as tolerated  pt slowly improving with above therapy  d/w Dr. Martin to increase cymbalta Assessment  1. COVID PNA    2. Abnormal CT chest likely due ot COVID 19 doubt bacterial PNA  3. S/P Lumbar Surgery for spinal stenosis   4. HTN, High chol, Anxiety       Plan  Continue decadron 6mg x 10 days and Remdesivir x 5 days  monitor on RA  PT , OT as tolerated  pt slowly improving with above therapy  DVT ppx  Tylenol for fever  Pain control   COVID 19 Isolation protocol   d/w Dr. Martin to increase cymbalta

## 2020-11-06 NOTE — PROGRESS NOTE ADULT - ASSESSMENT
60F with PMH HTN, HLD, osteoarthritis, anxiety/depression disorders, GERD, eczema, sinusitis, scoliosis/spinal stenosis- lumbar region, presented to [facility] 9/29/20 for scheduled stage 1, L1-5 lumbar lateral interbody fusion with Dr. Valdez. Stage 2 T9-pelvis arthrodesis, L5-S1 TLIF done on 9/30/20 with closure of muscle flap done by plastics, Dr. Alegre. Post-op course complicated by hemorrhagic shock requiring transfer to ICU, fluid resuscitation with IV hydration and pressors. Patient was transferred to [facility] on 10/10/20 for comprehensive rehab.     Viral PNA likely 2/2 to COVID-19   Acute hypoxic respiratory failure  -May use O2 NC, Venturi Mask, NRB as needed depending on oxygen demand.   -Monitor O2; monitor respiratory status closely  -Maintain strict isolation precautions, use proper PPE.  -Provide Acetaminophen 650 mg PO q6  -May use Albuterol inhaler PRN  - Continue decadron IV x 10 days (Day 3)  - On Remdesivir today for day 2  - Monitor glucose while in steroids   - Pulm consulted      Hypokalemia - resolved  -3.6 today  - monitor BMP    HTN  -Chronic, controlled  -Continue home medication Norvasc, Metoprolol    HLD  -Chronic, controlled  -Continue statin, fenofibrate    Anxiety/Depression  -Chronic  -Continue home medication Cymbalta    Pre-diabetes  A1C 6.4  -Continue low dose insulin sliding scale  -Hypoglycemia protocol, accu-cheks      DVT prophylaxis: Lovenox  PT - possible home PT  / OT evaluation   Patient lives with  who is high-risk. Unable to quarantine.     , Jatin Pang 60F with PMH HTN, HLD, osteoarthritis, anxiety/depression disorders, GERD, eczema, sinusitis, scoliosis/spinal stenosis- lumbar region, presented to [facility] 9/29/20 for scheduled stage 1, L1-5 lumbar lateral interbody fusion with Dr. Valdez. Stage 2 T9-pelvis arthrodesis, L5-S1 TLIF done on 9/30/20 with closure of muscle flap done by plastics, Dr. Alegre. Post-op course complicated by hemorrhagic shock requiring transfer to ICU, fluid resuscitation with IV hydration and pressors. Patient was transferred to [facility] on 10/10/20 for comprehensive rehab.     Viral PNA likely 2/2 to COVID-19   Acute hypoxic respiratory failure  -May use O2 NC, Venturi Mask, NRB as needed depending on oxygen demand.   -Monitor O2; monitor respiratory status closely  -Maintain strict isolation precautions, use proper PPE.  -Provide Acetaminophen 650 mg PO q6  -May use Albuterol inhaler PRN  - Continue decadron IV x 10 days (Day 3)  - On Remdesivir today for day 2  - Monitor glucose while in steroids   - Pulm consulted      Hypokalemia - resolved  -3.6 today  - monitor BMP    HTN  -Chronic, controlled  -Continue home medication Norvasc, Metoprolol    HLD  -Chronic, controlled  -Continue statin, fenofibrate    Anxiety/Depression  -Chronic  -Continue home medication Cymbalta    Pre-diabetes  A1C 6.4  -Continue low dose insulin sliding scale  -Hypoglycemia protocol, accu-cheks      DVT prophylaxis: Lovenox  PT - possible home PT  / OT evaluation   Patient lives with  who is high-risk. Unable to quarantine.     Discussed with , Jatin Pang 60F with PMH HTN, HLD, osteoarthritis, anxiety/depression disorders, GERD, eczema, sinusitis, scoliosis/spinal stenosis- lumbar region, presented to [facility] 9/29/20 for scheduled stage 1, L1-5 lumbar lateral interbody fusion with Dr. Valdez. Stage 2 T9-pelvis arthrodesis, L5-S1 TLIF done on 9/30/20 with closure of muscle flap done by plastics, Dr. Alegre. Post-op course complicated by hemorrhagic shock requiring transfer to ICU, fluid resuscitation with IV hydration and pressors. Patient was transferred to [facility] on 10/10/20 for comprehensive rehab.     Viral PNA likely 2/2 to COVID-19   Acute hypoxic respiratory failure  -May use O2 NC, Venturi Mask, NRB as needed depending on oxygen demand.   -Monitor O2; monitor respiratory status closely  -Maintain strict isolation precautions, use proper PPE.  -Provide Acetaminophen 650 mg PO q6  -May use Albuterol inhaler PRN  - Continue decadron IV x 10 days (Day 3)  - On Remdesivir today for day 2  - Monitor glucose while in steroids   - Pulm consulted      Hypokalemia - resolved  -3.6 today  - monitor BMP    HTN  -Chronic, controlled  -Continue home medication Norvasc, Metoprolol    HLD  -Chronic, controlled  -Continue statin, fenofibrate    Anxiety/Depression  -Chronic  -Continue home medication Cymbalta 120mg    Pre-diabetes  A1C 6.4  -Continue low dose insulin sliding scale  -Hypoglycemia protocol, accu-cheks      DVT prophylaxis: Lovenox  PT - possible home PT  / OT evaluation   Patient lives with  who is high-risk. Unable to quarantine.     Discussed with , Jatin Pang

## 2020-11-07 LAB
ALBUMIN SERPL ELPH-MCNC: 2.8 G/DL — LOW (ref 3.3–5)
ALP SERPL-CCNC: 75 U/L — SIGNIFICANT CHANGE UP (ref 40–120)
ALT FLD-CCNC: 19 U/L — SIGNIFICANT CHANGE UP (ref 10–45)
ANION GAP SERPL CALC-SCNC: 9 MMOL/L — SIGNIFICANT CHANGE UP (ref 5–17)
AST SERPL-CCNC: 26 U/L — SIGNIFICANT CHANGE UP (ref 10–40)
BILIRUB SERPL-MCNC: 0.3 MG/DL — SIGNIFICANT CHANGE UP (ref 0.2–1.2)
BUN SERPL-MCNC: 20 MG/DL — SIGNIFICANT CHANGE UP (ref 7–23)
CALCIUM SERPL-MCNC: 9.1 MG/DL — SIGNIFICANT CHANGE UP (ref 8.4–10.5)
CHLORIDE SERPL-SCNC: 106 MMOL/L — SIGNIFICANT CHANGE UP (ref 96–108)
CO2 SERPL-SCNC: 30 MMOL/L — SIGNIFICANT CHANGE UP (ref 22–31)
CREAT SERPL-MCNC: 0.71 MG/DL — SIGNIFICANT CHANGE UP (ref 0.5–1.3)
GLUCOSE BLDC GLUCOMTR-MCNC: 104 MG/DL — HIGH (ref 70–99)
GLUCOSE BLDC GLUCOMTR-MCNC: 149 MG/DL — HIGH (ref 70–99)
GLUCOSE BLDC GLUCOMTR-MCNC: 166 MG/DL — HIGH (ref 70–99)
GLUCOSE BLDC GLUCOMTR-MCNC: 89 MG/DL — SIGNIFICANT CHANGE UP (ref 70–99)
GLUCOSE SERPL-MCNC: 92 MG/DL — SIGNIFICANT CHANGE UP (ref 70–99)
HCT VFR BLD CALC: 39.6 % — SIGNIFICANT CHANGE UP (ref 34.5–45)
HGB BLD-MCNC: 12.7 G/DL — SIGNIFICANT CHANGE UP (ref 11.5–15.5)
MAGNESIUM SERPL-MCNC: 1.8 MG/DL — SIGNIFICANT CHANGE UP (ref 1.6–2.6)
MCHC RBC-ENTMCNC: 28.4 PG — SIGNIFICANT CHANGE UP (ref 27–34)
MCHC RBC-ENTMCNC: 32.1 GM/DL — SIGNIFICANT CHANGE UP (ref 32–36)
MCV RBC AUTO: 88.6 FL — SIGNIFICANT CHANGE UP (ref 80–100)
NRBC # BLD: 0 /100 WBCS — SIGNIFICANT CHANGE UP (ref 0–0)
PHOSPHATE SERPL-MCNC: 3.7 MG/DL — SIGNIFICANT CHANGE UP (ref 2.5–4.5)
PLATELET # BLD AUTO: 385 K/UL — SIGNIFICANT CHANGE UP (ref 150–400)
POTASSIUM SERPL-MCNC: 4 MMOL/L — SIGNIFICANT CHANGE UP (ref 3.5–5.3)
POTASSIUM SERPL-SCNC: 4 MMOL/L — SIGNIFICANT CHANGE UP (ref 3.5–5.3)
PROT SERPL-MCNC: 6.5 G/DL — SIGNIFICANT CHANGE UP (ref 6–8.3)
RBC # BLD: 4.47 M/UL — SIGNIFICANT CHANGE UP (ref 3.8–5.2)
RBC # FLD: 13.6 % — SIGNIFICANT CHANGE UP (ref 10.3–14.5)
SODIUM SERPL-SCNC: 145 MMOL/L — SIGNIFICANT CHANGE UP (ref 135–145)
WBC # BLD: 3.95 K/UL — SIGNIFICANT CHANGE UP (ref 3.8–10.5)
WBC # FLD AUTO: 3.95 K/UL — SIGNIFICANT CHANGE UP (ref 3.8–10.5)

## 2020-11-07 PROCEDURE — 99232 SBSQ HOSP IP/OBS MODERATE 35: CPT

## 2020-11-07 RX ADMIN — ENOXAPARIN SODIUM 40 MILLIGRAM(S): 100 INJECTION SUBCUTANEOUS at 12:19

## 2020-11-07 RX ADMIN — Medication 1: at 12:21

## 2020-11-07 RX ADMIN — OXYCODONE HYDROCHLORIDE 5 MILLIGRAM(S): 5 TABLET ORAL at 17:47

## 2020-11-07 RX ADMIN — Medication 145 MILLIGRAM(S): at 12:19

## 2020-11-07 RX ADMIN — PANTOPRAZOLE SODIUM 40 MILLIGRAM(S): 20 TABLET, DELAYED RELEASE ORAL at 06:26

## 2020-11-07 RX ADMIN — ATORVASTATIN CALCIUM 10 MILLIGRAM(S): 80 TABLET, FILM COATED ORAL at 22:18

## 2020-11-07 RX ADMIN — Medication 6 MILLIGRAM(S): at 06:26

## 2020-11-07 RX ADMIN — REMDESIVIR 500 MILLIGRAM(S): 5 INJECTION INTRAVENOUS at 12:20

## 2020-11-07 RX ADMIN — Medication 25 MILLIGRAM(S): at 06:27

## 2020-11-07 RX ADMIN — Medication 1000 UNIT(S): at 12:23

## 2020-11-07 RX ADMIN — OXYCODONE HYDROCHLORIDE 5 MILLIGRAM(S): 5 TABLET ORAL at 19:00

## 2020-11-07 RX ADMIN — Medication 25 MILLIGRAM(S): at 17:47

## 2020-11-07 RX ADMIN — OXYCODONE HYDROCHLORIDE 5 MILLIGRAM(S): 5 TABLET ORAL at 06:25

## 2020-11-07 RX ADMIN — Medication 1 TABLET(S): at 12:19

## 2020-11-07 RX ADMIN — DULOXETINE HYDROCHLORIDE 120 MILLIGRAM(S): 30 CAPSULE, DELAYED RELEASE ORAL at 12:19

## 2020-11-07 RX ADMIN — OXYCODONE HYDROCHLORIDE 5 MILLIGRAM(S): 5 TABLET ORAL at 07:25

## 2020-11-07 NOTE — PROGRESS NOTE ADULT - ASSESSMENT
60F with PMH HTN, HLD, osteoarthritis, anxiety/depression disorders, GERD, eczema, sinusitis, scoliosis/spinal stenosis- lumbar region, presented to [facility] 9/29/20 for scheduled stage 1, L1-5 lumbar lateral interbody fusion with Dr. Valdez. Stage 2 T9-pelvis arthrodesis, L5-S1 TLIF done on 9/30/20 with closure of muscle flap done by plastics, Dr. Alegre. Post-op course complicated by hemorrhagic shock requiring transfer to ICU, fluid resuscitation with IV hydration and pressors. Patient was transferred to [facility] on 10/10/20 for comprehensive rehab.     Viral PNA likely 2/2 to COVID-19   Acute hypoxic respiratory failure  -May use O2 NC, Venturi Mask, NRB as needed depending on oxygen demand.   -Monitor O2; monitor respiratory status closely  -Maintain strict isolation precautions, use proper PPE.  -Provide Acetaminophen 650 mg PO q6  -May use Albuterol inhaler PRN  - Continue decadron IV x 10 days (Day 3)  - On Remdesivir today for day 2  - Monitor glucose while in steroids   - Pulm consult noted  - 92%on ra this AM      Hypokalemia - resolved  - monitor BMP    HTN  -Chronic, controlled  -Continue home medication Norvasc, Metoprolol    HLD  -Chronic, controlled  -Continue statin, fenofibrate    Anxiety/Depression  -Chronic  -Continue home medication Cymbalta 120mg    Pre-diabetes  A1C 6.4  -Continue low dose insulin sliding scale  -Hypoglycemia protocol, accu-cheks      DVT prophylaxis: Lovenox  PT - possible home PT  / OT evaluation   Patient lives with  who is high-risk. Unable to quarantine.     Discussed with , Jatin Pang 60F with PMH HTN, HLD, osteoarthritis, anxiety/depression disorders, GERD, eczema, sinusitis, scoliosis/spinal stenosis- lumbar region, presented to [facility] 9/29/20 for scheduled stage 1, L1-5 lumbar lateral interbody fusion with Dr. Valdez. Stage 2 T9-pelvis arthrodesis, L5-S1 TLIF done on 9/30/20 with closure of muscle flap done by plastics, Dr. Alegre. Post-op course complicated by hemorrhagic shock requiring transfer to ICU, fluid resuscitation with IV hydration and pressors. Patient was transferred to [facility] on 10/10/20 for comprehensive rehab.     Viral PNA likely 2/2 to COVID-19   Acute hypoxic respiratory failure  -May use O2 NC, Venturi Mask, NRB as needed depending on oxygen demand.   -Monitor O2; monitor respiratory status closely  -Maintain strict isolation precautions, use proper PPE.  -Provide Acetaminophen 650 mg PO q6  -May use Albuterol inhaler PRN  - Continue decadron IV x 10 days (Day 3)  - On Remdesivir today for day 2  - Monitor glucose while in steroids   - Pulm consult noted  - 92%on ra this AM      Hypokalemia - resolved  - monitor BMP    HTN  -Chronic, controlled  -Continue home medication Norvasc, Metoprolol    HLD  -Chronic, controlled  -Continue statin, fenofibrate    Anxiety/Depression  -Chronic  -Continue home medication Cymbalta 120mg    Pre-diabetes  A1C 6.4  -Continue low dose insulin sliding scale  -Hypoglycemia protocol, accu-cheks      DVT prophylaxis: Lovenox  PT - possible home PT  / OT evaluation   Patient lives with  who is high-risk. Unable to quarantine.     unable to reach , Jatin Pang today

## 2020-11-07 NOTE — PROGRESS NOTE ADULT - ASSESSMENT
Physical Examination:  GENERAL:               Alert, Oriented, No acute distress.    HEENT:                   No JVD, Moist MM  PULM:                     Bilateral air entry, Clear to auscultation bilaterally, no significant sputum production, No Rales, No Rhonchi, No Wheezing  CVS:                         S1, S2,  No Murmur  ABD:                        Soft, nondistended, nontender, normoactive bowel sounds,   NEURO:                  Alert, oriented, interactive, nonfocal, follows commands  PSYC:                      Calm, + Insight.      Assessment  1. COVID PNA    2. Abnormal CT chest likely due ot COVID 19 doubt bacterial PNA  3. S/P Lumbar Surgery for spinal stenosis   4. HTN, High chol, Anxiety       Plan  Continue decadron 6mg x 10 days and Remdesivir x 5 days  monitor on RA  PT , OT as tolerated    DVT ppx  Tylenol for fever  Pain control   COVID 19 Isolation protocol     OOB as tolerated

## 2020-11-07 NOTE — PROGRESS NOTE ADULT - SUBJECTIVE AND OBJECTIVE BOX
Patient is a 60y old  Female who presents with a chief complaint of COVID-19 PNA (07 Nov 2020 12:36)      Patient seen and examined at bedside.  No overnight events  No complaints. on RA at 91%    ALLERGIES:  penicillin (Other)    MEDICATIONS  (STANDING):  amLODIPine   Tablet 5 milliGRAM(s) Oral daily  atorvastatin 10 milliGRAM(s) Oral at bedtime  cholecalciferol 1000 Unit(s) Oral daily  dexAMETHasone  Injectable 6 milliGRAM(s) IV Push daily  dextrose 5%. 1000 milliLiter(s) (50 mL/Hr) IV Continuous <Continuous>  dextrose 50% Injectable 12.5 Gram(s) IV Push once  dextrose 50% Injectable 25 Gram(s) IV Push once  dextrose 50% Injectable 25 Gram(s) IV Push once  DULoxetine 120 milliGRAM(s) Oral daily  enoxaparin Injectable 40 milliGRAM(s) SubCutaneous daily  fenofibrate Tablet 145 milliGRAM(s) Oral daily  influenza   Vaccine 0.5 milliLiter(s) IntraMuscular once  insulin lispro (ADMELOG) corrective regimen sliding scale   SubCutaneous three times a day before meals  metoprolol tartrate 25 milliGRAM(s) Oral two times a day  multivitamin 1 Tablet(s) Oral daily  pantoprazole    Tablet 40 milliGRAM(s) Oral before breakfast  polyethylene glycol 3350 17 Gram(s) Oral daily  remdesivir  IVPB 100 milliGRAM(s) IV Intermittent every 24 hours  remdesivir  IVPB   IV Intermittent     MEDICATIONS  (PRN):  acetaminophen   Tablet .. 650 milliGRAM(s) Oral every 6 hours PRN Moderate Pain (4 - 6)  ALBUTerol    90 MICROgram(s) HFA Inhaler 2 Puff(s) Inhalation every 6 hours PRN Shortness of Breath and/or Wheezing  dextrose 40% Gel 15 Gram(s) Oral once PRN Blood Glucose LESS THAN 70 milliGRAM(s)/deciliter  glucagon  Injectable 1 milliGRAM(s) IntraMuscular once PRN Glucose LESS THAN 70 milligrams/deciliter  oxyCODONE    IR 5 milliGRAM(s) Oral every 6 hours PRN Severe Pain (7 - 10)  senna 2 Tablet(s) Oral at bedtime PRN Constipation    Vital Signs Last 24 Hrs  T(F): 98.4 (07 Nov 2020 08:01), Max: 98.9 (06 Nov 2020 16:37)  HR: 77 (07 Nov 2020 08:01) (70 - 89)  BP: 126/69 (07 Nov 2020 08:01) (112/64 - 126/69)  RR: 18 (07 Nov 2020 08:01) (16 - 18)  SpO2: 91% (07 Nov 2020 08:01) (91% - 95%)  I&O's Summary    BMI (kg/m2): 30.4 (11-03-20 @ 17:45)  PHYSICAL EXAM:  General: NAD, A/O x 3, lying in bed  ENT: MMM, no scleral icterus  Neck: Supple, No JVD  Lungs: Clear to auscultation bilaterally, non labored breathing  Cardio: RRR, S1/S2, No murmurs  Abdomen: Soft, Nontender, Nondistended; Bowel sounds present  Extremities: No calf tenderness, No pitting edema    LABS:                        12.7   3.95  )-----------( 385      ( 07 Nov 2020 07:35 )             39.6       11-07    145  |  106  |  20  ----------------------------<  92  4.0   |  30  |  0.71    Ca    9.1      07 Nov 2020 07:35  Phos  3.7     11-07  Mg     1.8     11-07    TPro  6.5  /  Alb  2.8  /  TBili  0.3  /  DBili  x   /  AST  26  /  ALT  19  /  AlkPhos  75  11-07     eGFR if Non African American: 92 mL/min/1.73M2 (11-07-20 @ 07:35)  eGFR if : 107 mL/min/1.73M2 (11-07-20 @ 07:35)       POCT Blood Glucose.: 166 mg/dL (07 Nov 2020 12:16)  POCT Blood Glucose.: 89 mg/dL (07 Nov 2020 06:23)  POCT Blood Glucose.: 123 mg/dL (06 Nov 2020 17:35)      Culture - Blood (collected 03 Nov 2020 10:00)  Source: .Blood Blood  Preliminary Report (04 Nov 2020 18:01):    No growth to date.        Care Discussed with Consultants/Other Providers: Pulm

## 2020-11-08 LAB
ANION GAP SERPL CALC-SCNC: 9 MMOL/L — SIGNIFICANT CHANGE UP (ref 5–17)
BUN SERPL-MCNC: 23 MG/DL — SIGNIFICANT CHANGE UP (ref 7–23)
CALCIUM SERPL-MCNC: 9.3 MG/DL — SIGNIFICANT CHANGE UP (ref 8.4–10.5)
CHLORIDE SERPL-SCNC: 104 MMOL/L — SIGNIFICANT CHANGE UP (ref 96–108)
CO2 SERPL-SCNC: 29 MMOL/L — SIGNIFICANT CHANGE UP (ref 22–31)
CREAT SERPL-MCNC: 0.79 MG/DL — SIGNIFICANT CHANGE UP (ref 0.5–1.3)
CULTURE RESULTS: SIGNIFICANT CHANGE UP
GLUCOSE BLDC GLUCOMTR-MCNC: 104 MG/DL — HIGH (ref 70–99)
GLUCOSE BLDC GLUCOMTR-MCNC: 110 MG/DL — HIGH (ref 70–99)
GLUCOSE BLDC GLUCOMTR-MCNC: 122 MG/DL — HIGH (ref 70–99)
GLUCOSE BLDC GLUCOMTR-MCNC: 161 MG/DL — HIGH (ref 70–99)
GLUCOSE SERPL-MCNC: 87 MG/DL — SIGNIFICANT CHANGE UP (ref 70–99)
HCT VFR BLD CALC: 38.9 % — SIGNIFICANT CHANGE UP (ref 34.5–45)
HGB BLD-MCNC: 12 G/DL — SIGNIFICANT CHANGE UP (ref 11.5–15.5)
MAGNESIUM SERPL-MCNC: 1.9 MG/DL — SIGNIFICANT CHANGE UP (ref 1.6–2.6)
MCHC RBC-ENTMCNC: 26.9 PG — LOW (ref 27–34)
MCHC RBC-ENTMCNC: 30.8 GM/DL — LOW (ref 32–36)
MCV RBC AUTO: 87.2 FL — SIGNIFICANT CHANGE UP (ref 80–100)
NRBC # BLD: 0 /100 WBCS — SIGNIFICANT CHANGE UP (ref 0–0)
PHOSPHATE SERPL-MCNC: 3.5 MG/DL — SIGNIFICANT CHANGE UP (ref 2.5–4.5)
PLATELET # BLD AUTO: 403 K/UL — HIGH (ref 150–400)
POTASSIUM SERPL-MCNC: 3.4 MMOL/L — LOW (ref 3.5–5.3)
POTASSIUM SERPL-SCNC: 3.4 MMOL/L — LOW (ref 3.5–5.3)
RBC # BLD: 4.46 M/UL — SIGNIFICANT CHANGE UP (ref 3.8–5.2)
RBC # FLD: 13.6 % — SIGNIFICANT CHANGE UP (ref 10.3–14.5)
SODIUM SERPL-SCNC: 142 MMOL/L — SIGNIFICANT CHANGE UP (ref 135–145)
SPECIMEN SOURCE: SIGNIFICANT CHANGE UP
WBC # BLD: 3.59 K/UL — LOW (ref 3.8–10.5)
WBC # FLD AUTO: 3.59 K/UL — LOW (ref 3.8–10.5)

## 2020-11-08 PROCEDURE — 99232 SBSQ HOSP IP/OBS MODERATE 35: CPT

## 2020-11-08 RX ORDER — MAGNESIUM SULFATE 500 MG/ML
1 VIAL (ML) INJECTION ONCE
Refills: 0 | Status: COMPLETED | OUTPATIENT
Start: 2020-11-08 | End: 2020-11-08

## 2020-11-08 RX ORDER — POTASSIUM CHLORIDE 20 MEQ
40 PACKET (EA) ORAL EVERY 4 HOURS
Refills: 0 | Status: COMPLETED | OUTPATIENT
Start: 2020-11-08 | End: 2020-11-08

## 2020-11-08 RX ADMIN — PANTOPRAZOLE SODIUM 40 MILLIGRAM(S): 20 TABLET, DELAYED RELEASE ORAL at 06:39

## 2020-11-08 RX ADMIN — Medication 40 MILLIEQUIVALENT(S): at 17:03

## 2020-11-08 RX ADMIN — ENOXAPARIN SODIUM 40 MILLIGRAM(S): 100 INJECTION SUBCUTANEOUS at 12:38

## 2020-11-08 RX ADMIN — REMDESIVIR 500 MILLIGRAM(S): 5 INJECTION INTRAVENOUS at 12:39

## 2020-11-08 RX ADMIN — Medication 25 MILLIGRAM(S): at 17:03

## 2020-11-08 RX ADMIN — Medication 25 MILLIGRAM(S): at 06:39

## 2020-11-08 RX ADMIN — DULOXETINE HYDROCHLORIDE 120 MILLIGRAM(S): 30 CAPSULE, DELAYED RELEASE ORAL at 12:38

## 2020-11-08 RX ADMIN — OXYCODONE HYDROCHLORIDE 5 MILLIGRAM(S): 5 TABLET ORAL at 17:03

## 2020-11-08 RX ADMIN — Medication 1000 UNIT(S): at 12:40

## 2020-11-08 RX ADMIN — AMLODIPINE BESYLATE 5 MILLIGRAM(S): 2.5 TABLET ORAL at 06:39

## 2020-11-08 RX ADMIN — Medication 1 TABLET(S): at 12:38

## 2020-11-08 RX ADMIN — POLYETHYLENE GLYCOL 3350 17 GRAM(S): 17 POWDER, FOR SOLUTION ORAL at 12:38

## 2020-11-08 RX ADMIN — Medication 145 MILLIGRAM(S): at 12:38

## 2020-11-08 RX ADMIN — OXYCODONE HYDROCHLORIDE 5 MILLIGRAM(S): 5 TABLET ORAL at 18:03

## 2020-11-08 RX ADMIN — ATORVASTATIN CALCIUM 10 MILLIGRAM(S): 80 TABLET, FILM COATED ORAL at 22:02

## 2020-11-08 RX ADMIN — OXYCODONE HYDROCHLORIDE 5 MILLIGRAM(S): 5 TABLET ORAL at 23:32

## 2020-11-08 RX ADMIN — Medication 100 GRAM(S): at 17:03

## 2020-11-08 RX ADMIN — Medication 1: at 12:39

## 2020-11-08 RX ADMIN — Medication 6 MILLIGRAM(S): at 06:39

## 2020-11-08 RX ADMIN — Medication 40 MILLIEQUIVALENT(S): at 12:41

## 2020-11-08 NOTE — PROGRESS NOTE ADULT - ASSESSMENT
Physical Examination:  GENERAL:               Alert, Oriented, No acute distress.    HEENT:                   No JVD, Moist MM  PULM:                     Bilateral air entry, Clear to auscultation bilaterally, no significant sputum production, No Rales, No Rhonchi, No Wheezing  CVS:                         S1, S2,  No Murmur  ABD:                        Soft, nondistended, nontender, normoactive bowel sounds,   NEURO:                  Alert, oriented, interactive, nonfocal, follows commands  PSYC:                      Calm, + Insight.      Assessment  1. COVID PNA    2. Abnormal CT chest likely due ot COVID 19 doubt bacterial PNA  3. S/P Lumbar Surgery for spinal stenosis   4. HTN, High chol, Anxiety       Plan  Continue decadron 6mg x 10 days and Remdesivir x 5 days  monitor on RA  PT , OT as tolerated  check biomarkers of inflammation    DVT ppx  Tylenol for fever  Pain control   COVID 19 Isolation protocol     OOB as tolerated  Case d/w Dr. Martin

## 2020-11-08 NOTE — PROGRESS NOTE ADULT - SUBJECTIVE AND OBJECTIVE BOX
Follow-up Pulmonary Progress Note  Chief Complaint : Infection due to severe acute respiratory syndrome coronavirus 2 (SARS-CoV-2)      patient feels well  no cp,sob, palp, n/v    wants to go home  concern about isolation at home and trying to avoid family members to get it at home.        Allergies :penicillin (Other)      PAST MEDICAL & SURGICAL HISTORY:  Lumbar spinal stenosis    History of sciatica  mostly right side    H/O sinusitis    Eczema  extremities    Scoliosis    Meniscus tear  left knee    Spinal stenosis in cervical region    Anxiety and depression    Migraine    GERD (gastroesophageal reflux disease)    Seasonal allergies    Hyperlipidemia    HTN (hypertension)    H/O cervical spine surgery  C3-6   11/2015    History of tonsillectomy    History of appendectomy        Medications:  MEDICATIONS  (STANDING):  amLODIPine   Tablet 5 milliGRAM(s) Oral daily  atorvastatin 10 milliGRAM(s) Oral at bedtime  cholecalciferol 1000 Unit(s) Oral daily  dexAMETHasone  Injectable 6 milliGRAM(s) IV Push daily  dextrose 5%. 1000 milliLiter(s) (50 mL/Hr) IV Continuous <Continuous>  dextrose 50% Injectable 12.5 Gram(s) IV Push once  dextrose 50% Injectable 25 Gram(s) IV Push once  dextrose 50% Injectable 25 Gram(s) IV Push once  DULoxetine 120 milliGRAM(s) Oral daily  enoxaparin Injectable 40 milliGRAM(s) SubCutaneous daily  fenofibrate Tablet 145 milliGRAM(s) Oral daily  influenza   Vaccine 0.5 milliLiter(s) IntraMuscular once  insulin lispro (ADMELOG) corrective regimen sliding scale   SubCutaneous three times a day before meals  magnesium sulfate  IVPB 1 Gram(s) IV Intermittent once  metoprolol tartrate 25 milliGRAM(s) Oral two times a day  multivitamin 1 Tablet(s) Oral daily  pantoprazole    Tablet 40 milliGRAM(s) Oral before breakfast  polyethylene glycol 3350 17 Gram(s) Oral daily  potassium chloride    Tablet ER 40 milliEquivalent(s) Oral every 4 hours  remdesivir  IVPB 100 milliGRAM(s) IV Intermittent every 24 hours  remdesivir  IVPB   IV Intermittent     MEDICATIONS  (PRN):  acetaminophen   Tablet .. 650 milliGRAM(s) Oral every 6 hours PRN Moderate Pain (4 - 6)  ALBUTerol    90 MICROgram(s) HFA Inhaler 2 Puff(s) Inhalation every 6 hours PRN Shortness of Breath and/or Wheezing  dextrose 40% Gel 15 Gram(s) Oral once PRN Blood Glucose LESS THAN 70 milliGRAM(s)/deciliter  glucagon  Injectable 1 milliGRAM(s) IntraMuscular once PRN Glucose LESS THAN 70 milligrams/deciliter  oxyCODONE    IR 5 milliGRAM(s) Oral every 6 hours PRN Severe Pain (7 - 10)  senna 2 Tablet(s) Oral at bedtime PRN Constipation      LABS:                        12.0   3.59  )-----------( 403      ( 08 Nov 2020 07:04 )             38.9     11-08    142  |  104  |  23  ----------------------------<  87  3.4<L>   |  29  |  0.79    Ca    9.3      08 Nov 2020 07:04  Phos  3.5     11-08  Mg     1.9     11-08    TPro  6.5  /  Alb  2.8<L>  /  TBili  0.3  /  DBili  x   /  AST  26  /  ALT  19  /  AlkPhos  75  11-07    COVID  11-02-20 @ 00:45  COVID -   Detected<!!>  11-01-20 @ 09:04  COVID -   Detected<!!>  10-28-20 @ 15:30  COVID -   NotDetec  10-06-20 @ 12:45  COVID -   NotDetec  09-30-20 @ 00:20  COVID -   NotDetec      COVID Biomarkers    11-03-20 @ 11:10 ESR --  ---  CRP --  ---  DDimer  --   ---      ---   Ferritin 146            VITALS:  T(C): 36.6 (11-08-20 @ 07:34), Max: 36.9 (11-08-20 @ 06:42)  T(F): 97.9 (11-08-20 @ 07:34), Max: 98.5 (11-08-20 @ 06:42)  HR: 75 (11-08-20 @ 09:15) (74 - 86)  BP: 123/76 (11-08-20 @ 09:15) (110/75 - 129/77)  BP(mean): --  ABP: --  ABP(mean): --  RR: 18 (11-08-20 @ 09:15) (16 - 18)  SpO2: 95% (11-08-20 @ 09:15) (90% - 95%)  CVP(mm Hg): --  CVP(cm H2O): --    Ins and Outs     11-07-20 @ 07:01  -  11-08-20 @ 07:00  --------------------------------------------------------  IN: 0 mL / OUT: 200 mL / NET: -200 mL                I&O's Detail    07 Nov 2020 07:01  -  08 Nov 2020 07:00  --------------------------------------------------------  IN:  Total IN: 0 mL    OUT:    Voided (mL): 200 mL  Total OUT: 200 mL    Total NET: -200 mL

## 2020-11-08 NOTE — PROGRESS NOTE ADULT - ASSESSMENT
60F with PMH HTN, HLD, osteoarthritis, anxiety/depression disorders, GERD, eczema, sinusitis, scoliosis/spinal stenosis- lumbar region, presented to [facility] 9/29/20 for scheduled stage 1, L1-5 lumbar lateral interbody fusion with Dr. Valdez. Stage 2 T9-pelvis arthrodesis, L5-S1 TLIF done on 9/30/20 with closure of muscle flap done by plastics, Dr. Alegre. Post-op course complicated by hemorrhagic shock requiring transfer to ICU, fluid resuscitation with IV hydration and pressors. Patient was transferred to [facility] on 10/10/20 for comprehensive rehab.     Viral PNA likely 2/2 to COVID-19   Acute hypoxic respiratory failure  -May use O2 NC, Venturi Mask, NRB as needed depending on oxygen demand.   -Monitor O2; monitor respiratory status closely  -Maintain strict isolation precautions, use proper PPE.  -Provide Acetaminophen 650 mg PO q6  -May use Albuterol inhaler PRN  - Continue decadron IV x 10 days (Day 4)  - On Remdesivir today for day 5  - Monitor glucose while in steroids   - Pulm on board  - 92%on ra this AM      Hypokalemia /Hypomagnesemia  - replete PRN  - monitor BMP    HTN  -Chronic, controlled  -Continue home medication Norvasc, Metoprolol    HLD  -Chronic, controlled  -Continue statin, fenofibrate    Anxiety/Depression  -Chronic  -Continue home medication Cymbalta 120mg    Pre-diabetes  A1C 6.4  -Continue low dose insulin sliding scale  -Hypoglycemia protocol, accu-cheks      DVT prophylaxis: Lovenox  PT - possible home PT  OT evaluation   Patient lives with  who is high-risk. Unable to quarantine.     Spoke to , Jatin Pang 60F with PMH HTN, HLD, osteoarthritis, anxiety/depression disorders, GERD, eczema, sinusitis, scoliosis/spinal stenosis- lumbar region, presented to [facility] 9/29/20 for scheduled stage 1, L1-5 lumbar lateral interbody fusion with Dr. Valdez. Stage 2 T9-pelvis arthrodesis, L5-S1 TLIF done on 9/30/20 with closure of muscle flap done by plastics, Dr. Alegre. Post-op course complicated by hemorrhagic shock requiring transfer to ICU, fluid resuscitation with IV hydration and pressors. Patient was transferred to [facility] on 10/10/20 for comprehensive rehab.     Viral PNA likely 2/2 to COVID-19   Acute hypoxic respiratory failure  -May use O2 NC, Venturi Mask, NRB as needed depending on oxygen demand.   -Monitor O2; monitor respiratory status closely  -Maintain strict isolation precautions, use proper PPE.  -Provide Acetaminophen 650 mg PO q6  -May use Albuterol inhaler PRN  - Continue decadron IV x 10 days (Day 4)  - On Remdesivir today for day 5  - Monitor glucose while in steroids   - Pulm on board  - 92%on ra this AM      Hypokalemia   - repleting Magnesium and Potassium PRN  - monitor BMP    HTN  -Chronic, controlled  -Continue home medication Norvasc, Metoprolol    HLD  -Chronic, controlled  -Continue statin, fenofibrate    Anxiety/Depression  -Chronic  -Continue home medication Cymbalta 120mg    Pre-diabetes  A1C 6.4  -Continue low dose insulin sliding scale  -Hypoglycemia protocol, accu-cheks      DVT prophylaxis: Lovenox  PT - possible home PT  OT evaluation   Patient lives with  who is high-risk. Unable to quarantine.     Spoke to , Jatin Pang

## 2020-11-08 NOTE — PROGRESS NOTE ADULT - SUBJECTIVE AND OBJECTIVE BOX
Patient is a 60y old  Female who presents with a chief complaint of COVID-19 PNA (07 Nov 2020 13:22)      Patient seen and examined at bedside.  No overnight events  Interested in going home  92% on RA      ALLERGIES:  penicillin (Other)    MEDICATIONS  (STANDING):  amLODIPine   Tablet 5 milliGRAM(s) Oral daily  atorvastatin 10 milliGRAM(s) Oral at bedtime  cholecalciferol 1000 Unit(s) Oral daily  dexAMETHasone  Injectable 6 milliGRAM(s) IV Push daily  dextrose 5%. 1000 milliLiter(s) (50 mL/Hr) IV Continuous <Continuous>  dextrose 50% Injectable 12.5 Gram(s) IV Push once  dextrose 50% Injectable 25 Gram(s) IV Push once  dextrose 50% Injectable 25 Gram(s) IV Push once  DULoxetine 120 milliGRAM(s) Oral daily  enoxaparin Injectable 40 milliGRAM(s) SubCutaneous daily  fenofibrate Tablet 145 milliGRAM(s) Oral daily  influenza   Vaccine 0.5 milliLiter(s) IntraMuscular once  insulin lispro (ADMELOG) corrective regimen sliding scale   SubCutaneous three times a day before meals  metoprolol tartrate 25 milliGRAM(s) Oral two times a day  multivitamin 1 Tablet(s) Oral daily  pantoprazole    Tablet 40 milliGRAM(s) Oral before breakfast  polyethylene glycol 3350 17 Gram(s) Oral daily  remdesivir  IVPB 100 milliGRAM(s) IV Intermittent every 24 hours  remdesivir  IVPB   IV Intermittent     MEDICATIONS  (PRN):  acetaminophen   Tablet .. 650 milliGRAM(s) Oral every 6 hours PRN Moderate Pain (4 - 6)  ALBUTerol    90 MICROgram(s) HFA Inhaler 2 Puff(s) Inhalation every 6 hours PRN Shortness of Breath and/or Wheezing  dextrose 40% Gel 15 Gram(s) Oral once PRN Blood Glucose LESS THAN 70 milliGRAM(s)/deciliter  glucagon  Injectable 1 milliGRAM(s) IntraMuscular once PRN Glucose LESS THAN 70 milligrams/deciliter  oxyCODONE    IR 5 milliGRAM(s) Oral every 6 hours PRN Severe Pain (7 - 10)  senna 2 Tablet(s) Oral at bedtime PRN Constipation    Vital Signs Last 24 Hrs  T(F): 97.9 (08 Nov 2020 07:34), Max: 98.5 (08 Nov 2020 06:42)  HR: 78 (08 Nov 2020 07:34) (74 - 86)  BP: 110/75 (08 Nov 2020 07:34) (110/75 - 129/77)  RR: 16 (08 Nov 2020 07:34) (16 - 18)  SpO2: 94% (08 Nov 2020 07:34) (90% - 94%)  I&O's Summary    07 Nov 2020 07:01  -  08 Nov 2020 07:00  --------------------------------------------------------  IN: 0 mL / OUT: 200 mL / NET: -200 mL      BMI (kg/m2): 30.4 (11-03-20 @ 17:45)  PHYSICAL EXAM:  General: NAD, A/O x 3, no NC today, looks diaphoretic  ENT: MMM, no scleral icterus  Neck: Supple, No JVD  Lungs: Clear to auscultation bilaterally, non labored breathing  Cardio: RRR, S1/S2, No murmurs  Abdomen: Soft, Nontender, Nondistended; Bowel sounds present  Extremities: No calf tenderness, No pitting edema    LABS:                        12.0   3.59  )-----------( 403      ( 08 Nov 2020 07:04 )             38.9       11-08    142  |  104  |  23  ----------------------------<  87  3.4   |  29  |  0.79    Ca    9.3      08 Nov 2020 07:04  Phos  3.5     11-08  Mg     1.9     11-08    TPro  6.5  /  Alb  2.8  /  TBili  0.3  /  DBili  x   /  AST  26  /  ALT  19  /  AlkPhos  75  11-07     eGFR if Non African American: 81 mL/min/1.73M2 (11-08-20 @ 07:04)  eGFR if African American: 94 mL/min/1.73M2 (11-08-20 @ 07:04)       POCT Blood Glucose.: 104 mg/dL (08 Nov 2020 08:00)  POCT Blood Glucose.: 104 mg/dL (07 Nov 2020 22:15)  POCT Blood Glucose.: 149 mg/dL (07 Nov 2020 17:46)  POCT Blood Glucose.: 166 mg/dL (07 Nov 2020 12:16)    Culture - Blood (collected 03 Nov 2020 10:00)  Source: .Blood Blood  Preliminary Report (04 Nov 2020 18:01):    No growth to date.        Care Discussed with Consultants/Other Providers: Pulm

## 2020-11-09 LAB
ANION GAP SERPL CALC-SCNC: 9 MMOL/L — SIGNIFICANT CHANGE UP (ref 5–17)
BUN SERPL-MCNC: 21 MG/DL — SIGNIFICANT CHANGE UP (ref 7–23)
CALCIUM SERPL-MCNC: 9.3 MG/DL — SIGNIFICANT CHANGE UP (ref 8.4–10.5)
CHLORIDE SERPL-SCNC: 103 MMOL/L — SIGNIFICANT CHANGE UP (ref 96–108)
CO2 SERPL-SCNC: 26 MMOL/L — SIGNIFICANT CHANGE UP (ref 22–31)
CREAT SERPL-MCNC: 0.74 MG/DL — SIGNIFICANT CHANGE UP (ref 0.5–1.3)
CRP SERPL-MCNC: 0.8 MG/DL — HIGH (ref 0–0.4)
FERRITIN SERPL-MCNC: 187 NG/ML — HIGH (ref 15–150)
GLUCOSE BLDC GLUCOMTR-MCNC: 107 MG/DL — HIGH (ref 70–99)
GLUCOSE BLDC GLUCOMTR-MCNC: 136 MG/DL — HIGH (ref 70–99)
GLUCOSE BLDC GLUCOMTR-MCNC: 152 MG/DL — HIGH (ref 70–99)
GLUCOSE BLDC GLUCOMTR-MCNC: 95 MG/DL — SIGNIFICANT CHANGE UP (ref 70–99)
GLUCOSE SERPL-MCNC: 81 MG/DL — SIGNIFICANT CHANGE UP (ref 70–99)
HCT VFR BLD CALC: 40.4 % — SIGNIFICANT CHANGE UP (ref 34.5–45)
HGB BLD-MCNC: 12.7 G/DL — SIGNIFICANT CHANGE UP (ref 11.5–15.5)
LDH SERPL L TO P-CCNC: 280 U/L — HIGH (ref 50–242)
MAGNESIUM SERPL-MCNC: 2 MG/DL — SIGNIFICANT CHANGE UP (ref 1.6–2.6)
MCHC RBC-ENTMCNC: 27.4 PG — SIGNIFICANT CHANGE UP (ref 27–34)
MCHC RBC-ENTMCNC: 31.4 GM/DL — LOW (ref 32–36)
MCV RBC AUTO: 87.3 FL — SIGNIFICANT CHANGE UP (ref 80–100)
NRBC # BLD: 0 /100 WBCS — SIGNIFICANT CHANGE UP (ref 0–0)
PHOSPHATE SERPL-MCNC: 3.9 MG/DL — SIGNIFICANT CHANGE UP (ref 2.5–4.5)
PLATELET # BLD AUTO: 438 K/UL — HIGH (ref 150–400)
POTASSIUM SERPL-MCNC: 4.3 MMOL/L — SIGNIFICANT CHANGE UP (ref 3.5–5.3)
POTASSIUM SERPL-SCNC: 4.3 MMOL/L — SIGNIFICANT CHANGE UP (ref 3.5–5.3)
PROCALCITONIN SERPL-MCNC: 0.08 NG/ML — SIGNIFICANT CHANGE UP
RBC # BLD: 4.63 M/UL — SIGNIFICANT CHANGE UP (ref 3.8–5.2)
RBC # FLD: 13.5 % — SIGNIFICANT CHANGE UP (ref 10.3–14.5)
SARS-COV-2 RNA SPEC QL NAA+PROBE: DETECTED
SODIUM SERPL-SCNC: 138 MMOL/L — SIGNIFICANT CHANGE UP (ref 135–145)
WBC # BLD: 3.56 K/UL — LOW (ref 3.8–10.5)
WBC # FLD AUTO: 3.56 K/UL — LOW (ref 3.8–10.5)

## 2020-11-09 PROCEDURE — 99232 SBSQ HOSP IP/OBS MODERATE 35: CPT

## 2020-11-09 RX ADMIN — AMLODIPINE BESYLATE 5 MILLIGRAM(S): 2.5 TABLET ORAL at 05:55

## 2020-11-09 RX ADMIN — Medication 6 MILLIGRAM(S): at 05:56

## 2020-11-09 RX ADMIN — ATORVASTATIN CALCIUM 10 MILLIGRAM(S): 80 TABLET, FILM COATED ORAL at 22:58

## 2020-11-09 RX ADMIN — OXYCODONE HYDROCHLORIDE 5 MILLIGRAM(S): 5 TABLET ORAL at 23:20

## 2020-11-09 RX ADMIN — Medication 25 MILLIGRAM(S): at 05:55

## 2020-11-09 RX ADMIN — REMDESIVIR 500 MILLIGRAM(S): 5 INJECTION INTRAVENOUS at 13:32

## 2020-11-09 RX ADMIN — OXYCODONE HYDROCHLORIDE 5 MILLIGRAM(S): 5 TABLET ORAL at 00:02

## 2020-11-09 RX ADMIN — Medication 1 TABLET(S): at 12:30

## 2020-11-09 RX ADMIN — PANTOPRAZOLE SODIUM 40 MILLIGRAM(S): 20 TABLET, DELAYED RELEASE ORAL at 05:55

## 2020-11-09 RX ADMIN — DULOXETINE HYDROCHLORIDE 120 MILLIGRAM(S): 30 CAPSULE, DELAYED RELEASE ORAL at 12:32

## 2020-11-09 RX ADMIN — Medication 145 MILLIGRAM(S): at 12:30

## 2020-11-09 RX ADMIN — Medication 1000 UNIT(S): at 12:47

## 2020-11-09 RX ADMIN — ENOXAPARIN SODIUM 40 MILLIGRAM(S): 100 INJECTION SUBCUTANEOUS at 12:32

## 2020-11-09 RX ADMIN — OXYCODONE HYDROCHLORIDE 5 MILLIGRAM(S): 5 TABLET ORAL at 11:00

## 2020-11-09 RX ADMIN — Medication 25 MILLIGRAM(S): at 17:07

## 2020-11-09 RX ADMIN — OXYCODONE HYDROCHLORIDE 5 MILLIGRAM(S): 5 TABLET ORAL at 10:14

## 2020-11-09 NOTE — PROGRESS NOTE ADULT - SUBJECTIVE AND OBJECTIVE BOX
Patient is a 60y old  Female who presents with a chief complaint of COVID-19 PNA (08 Nov 2020 13:03)      Patient seen and examined at bedside.  No overnight events  No complaints this AM  No need to O2 supplement  95%ra        ALLERGIES:  penicillin (Other)    MEDICATIONS  (STANDING):  amLODIPine   Tablet 5 milliGRAM(s) Oral daily  atorvastatin 10 milliGRAM(s) Oral at bedtime  cholecalciferol 1000 Unit(s) Oral daily  dexAMETHasone  Injectable 6 milliGRAM(s) IV Push daily  dextrose 5%. 1000 milliLiter(s) (50 mL/Hr) IV Continuous <Continuous>  dextrose 50% Injectable 12.5 Gram(s) IV Push once  dextrose 50% Injectable 25 Gram(s) IV Push once  dextrose 50% Injectable 25 Gram(s) IV Push once  DULoxetine 120 milliGRAM(s) Oral daily  enoxaparin Injectable 40 milliGRAM(s) SubCutaneous daily  fenofibrate Tablet 145 milliGRAM(s) Oral daily  influenza   Vaccine 0.5 milliLiter(s) IntraMuscular once  insulin lispro (ADMELOG) corrective regimen sliding scale   SubCutaneous three times a day before meals  metoprolol tartrate 25 milliGRAM(s) Oral two times a day  multivitamin 1 Tablet(s) Oral daily  pantoprazole    Tablet 40 milliGRAM(s) Oral before breakfast  polyethylene glycol 3350 17 Gram(s) Oral daily  remdesivir  IVPB 100 milliGRAM(s) IV Intermittent every 24 hours  remdesivir  IVPB   IV Intermittent     MEDICATIONS  (PRN):  acetaminophen   Tablet .. 650 milliGRAM(s) Oral every 6 hours PRN Moderate Pain (4 - 6)  ALBUTerol    90 MICROgram(s) HFA Inhaler 2 Puff(s) Inhalation every 6 hours PRN Shortness of Breath and/or Wheezing  dextrose 40% Gel 15 Gram(s) Oral once PRN Blood Glucose LESS THAN 70 milliGRAM(s)/deciliter  glucagon  Injectable 1 milliGRAM(s) IntraMuscular once PRN Glucose LESS THAN 70 milligrams/deciliter  oxyCODONE    IR 5 milliGRAM(s) Oral every 6 hours PRN Severe Pain (7 - 10)  senna 2 Tablet(s) Oral at bedtime PRN Constipation    Vital Signs Last 24 Hrs  T(F): 98 (09 Nov 2020 05:08), Max: 98.3 (08 Nov 2020 22:18)  HR: 78 (09 Nov 2020 05:08) (78 - 82)  BP: 127/72 (09 Nov 2020 05:08) (122/74 - 127/72)  RR: 18 (09 Nov 2020 05:08) (18 - 18)  SpO2: 95% (09 Nov 2020 05:08) (95% - 96%)  I&O's Summary    08 Nov 2020 07:01  -  09 Nov 2020 07:00  --------------------------------------------------------  IN: 1100 mL / OUT: 400 mL / NET: 700 mL    09 Nov 2020 07:01  -  09 Nov 2020 11:16  --------------------------------------------------------  IN: 120 mL / OUT: 0 mL / NET: 120 mL        PHYSICAL EXAM:  General: NAD, A/O x 3, sitting in bed  ENT: MMM, no scleral icterus  Neck: Supple, No JVD  Lungs: Clear to auscultation bilaterally, non labored breathing  Cardio: RRR, S1/S2, No murmurs  Abdomen: Soft, Nontender, Nondistended; Bowel sounds present  Extremities: No calf tenderness, No pitting edema    LABS:                        12.7   3.56  )-----------( 438      ( 09 Nov 2020 05:30 )             40.4       11-09    138  |  103  |  21  ----------------------------<  81  4.3   |  26  |  0.74    Ca    9.3      09 Nov 2020 05:30  Phos  3.9     11-09  Mg     2.0     11-09    TPro  6.5  /  Alb  2.8  /  TBili  0.3  /  DBili  x   /  AST  26  /  ALT  19  /  AlkPhos  75  11-07     eGFR if Non African American: 89 mL/min/1.73M2 (11-09-20 @ 05:30)  eGFR if : 103 mL/min/1.73M2 (11-09-20 @ 05:30)         POCT Blood Glucose.: 95 mg/dL (09 Nov 2020 07:52)  POCT Blood Glucose.: 110 mg/dL (08 Nov 2020 21:54)  POCT Blood Glucose.: 122 mg/dL (08 Nov 2020 17:00)  POCT Blood Glucose.: 161 mg/dL (08 Nov 2020 12:34)        Culture - Blood (collected 03 Nov 2020 10:00)  Source: .Blood Blood  Final Report (08 Nov 2020 18:01):    No Growth Final

## 2020-11-09 NOTE — PROGRESS NOTE ADULT - ASSESSMENT
Physical Examination:  GENERAL:               Alert, Oriented, No acute distress.    HEENT:                   No JVD, Moist MM  PULM:                     Bilateral air entry, Clear to auscultation bilaterally, no significant sputum production, No Rales, No Rhonchi, No Wheezing  CVS:                         S1, S2,  No Murmur  ABD:                        Soft, nondistended, nontender, normoactive bowel sounds,   NEURO:                  Alert, oriented, interactive, nonfocal, follows commands  PSYC:                      Calm, + Insight.      Assessment  1. COVID PNA    2. Abnormal CT chest likely due ot COVID 19 doubt bacterial PNA  3. S/P Lumbar Surgery for spinal stenosis   4. HTN, High chol, Anxiety       Plan  Continue decadron 6mg x 10 days and Remdesivir x 5 days  monitor on RA  PT , OT as tolerated  check biomarkers of inflammation    DVT ppx  Tylenol for fever  Pain control   COVID 19 Isolation protocol     OOB as tolerated  Case d/w Dr. Martin  Consider dispo planning when able to if home status conducive for safe discharge

## 2020-11-09 NOTE — PROGRESS NOTE ADULT - SUBJECTIVE AND OBJECTIVE BOX
Follow-up Pulmonary Progress Note  Chief Complaint : Infection due to severe acute respiratory syndrome coronavirus 2 (SARS-CoV-2)    pt seen and examined comfortable  no cp, sob, palp, n/v  comfortable on RA        Allergies :penicillin (Other)      PAST MEDICAL & SURGICAL HISTORY:  Lumbar spinal stenosis    History of sciatica  mostly right side    H/O sinusitis    Eczema  extremities    Scoliosis    Meniscus tear  left knee    Spinal stenosis in cervical region    Anxiety and depression    Migraine    GERD (gastroesophageal reflux disease)    Seasonal allergies    Hyperlipidemia    HTN (hypertension)    H/O cervical spine surgery  C3-6   11/2015    History of tonsillectomy    History of appendectomy        Medications:  MEDICATIONS  (STANDING):  amLODIPine   Tablet 5 milliGRAM(s) Oral daily  atorvastatin 10 milliGRAM(s) Oral at bedtime  cholecalciferol 1000 Unit(s) Oral daily  dexAMETHasone  Injectable 6 milliGRAM(s) IV Push daily  dextrose 5%. 1000 milliLiter(s) (50 mL/Hr) IV Continuous <Continuous>  dextrose 50% Injectable 12.5 Gram(s) IV Push once  dextrose 50% Injectable 25 Gram(s) IV Push once  dextrose 50% Injectable 25 Gram(s) IV Push once  DULoxetine 120 milliGRAM(s) Oral daily  enoxaparin Injectable 40 milliGRAM(s) SubCutaneous daily  fenofibrate Tablet 145 milliGRAM(s) Oral daily  influenza   Vaccine 0.5 milliLiter(s) IntraMuscular once  insulin lispro (ADMELOG) corrective regimen sliding scale   SubCutaneous three times a day before meals  metoprolol tartrate 25 milliGRAM(s) Oral two times a day  multivitamin 1 Tablet(s) Oral daily  pantoprazole    Tablet 40 milliGRAM(s) Oral before breakfast  polyethylene glycol 3350 17 Gram(s) Oral daily    MEDICATIONS  (PRN):  acetaminophen   Tablet .. 650 milliGRAM(s) Oral every 6 hours PRN Moderate Pain (4 - 6)  ALBUTerol    90 MICROgram(s) HFA Inhaler 2 Puff(s) Inhalation every 6 hours PRN Shortness of Breath and/or Wheezing  dextrose 40% Gel 15 Gram(s) Oral once PRN Blood Glucose LESS THAN 70 milliGRAM(s)/deciliter  glucagon  Injectable 1 milliGRAM(s) IntraMuscular once PRN Glucose LESS THAN 70 milligrams/deciliter  oxyCODONE    IR 5 milliGRAM(s) Oral every 6 hours PRN Severe Pain (7 - 10)  senna 2 Tablet(s) Oral at bedtime PRN Constipation      LABS:                        12.7   3.56  )-----------( 438      ( 09 Nov 2020 05:30 )             40.4     11-09    138  |  103  |  21  ----------------------------<  81  4.3   |  26  |  0.74    Ca    9.3      09 Nov 2020 05:30  Phos  3.9     11-09  Mg     2.0     11-09    COVID  11-02-20 @ 00:45  COVID -   Detected<!!>  11-01-20 @ 09:04  COVID -   Detected<!!>  10-28-20 @ 15:30  COVID -   NotDetec  10-06-20 @ 12:45  COVID -   NotDetec  09-30-20 @ 00:20  COVID -   NotDetec      COVID Biomarkers    11-09-20 @ 05:30 ESR --  ---  CRP 0.80<H>  ---  DDimer  --   ---   <H>   ---   Ferritin 187<H>    11-03-20 @ 11:10 ESR --  ---  CRP --  ---  DDimer  --   ---      ---   Ferritin 146                        Procalcitonin, Serum: 0.08 ng/mL (11-09-20 @ 05:30)          CULTURES: (if applicable)    Culture - Blood (collected 11-03-20 @ 10:00)  Source: .Blood Blood  Final Report (11-08-20 @ 18:01):    No Growth Final          VITALS:  T(C): 36.7 (11-09-20 @ 05:08), Max: 36.8 (11-08-20 @ 22:18)  T(F): 98 (11-09-20 @ 05:08), Max: 98.3 (11-08-20 @ 22:18)  HR: 90 (11-09-20 @ 13:15) (78 - 90)  BP: 110/60 (11-09-20 @ 13:15) (110/60 - 127/72)  BP(mean): --  ABP: --  ABP(mean): --  RR: 18 (11-09-20 @ 05:08) (18 - 18)  SpO2: 96% (11-09-20 @ 13:15) (95% - 96%)  CVP(mm Hg): --  CVP(cm H2O): --    Ins and Outs     11-08-20 @ 07:01  -  11-09-20 @ 07:00  --------------------------------------------------------  IN: 1100 mL / OUT: 400 mL / NET: 700 mL    11-09-20 @ 07:01  -  11-09-20 @ 15:14  --------------------------------------------------------  IN: 370 mL / OUT: 0 mL / NET: 370 mL                I&O's Detail    08 Nov 2020 07:01  -  09 Nov 2020 07:00  --------------------------------------------------------  IN:    Oral Fluid: 1100 mL  Total IN: 1100 mL    OUT:    Voided (mL): 400 mL  Total OUT: 400 mL    Total NET: 700 mL      09 Nov 2020 07:01  -  09 Nov 2020 15:14  --------------------------------------------------------  IN:    IV PiggyBack: 250 mL    Oral Fluid: 120 mL  Total IN: 370 mL    OUT:  Total OUT: 0 mL    Total NET: 370 mL

## 2020-11-09 NOTE — PROGRESS NOTE ADULT - ASSESSMENT
60F with PMH HTN, HLD, osteoarthritis, anxiety/depression disorders, GERD, eczema, sinusitis, scoliosis/spinal stenosis- lumbar region, presented to [facility] 9/29/20 for scheduled stage 1, L1-5 lumbar lateral interbody fusion with Dr. Vladez. Stage 2 T9-pelvis arthrodesis, L5-S1 TLIF done on 9/30/20 with closure of muscle flap done by plastics, Dr. Alegre. Post-op course complicated by hemorrhagic shock requiring transfer to ICU, fluid resuscitation with IV hydration and pressors. Patient was transferred to [facility] on 10/10/20 for comprehensive rehab.     Viral PNA likely 2/2 to COVID-19   Acute hypoxic respiratory failure  -May use O2 NC, Venturi Mask, NRB as needed depending on oxygen demand.   -Monitor O2; monitor respiratory status closely  -Maintain strict isolation precautions, use proper PPE.  -Provide Acetaminophen 650 mg PO q6  - May use Albuterol inhaler PRN  - Continue decadron IV x 10 days (Day 5)  - Remdesivir completed today  - Monitor glucose while in steroids   - Monitor inflammatory markers  - Pulm on board  - 92%on ra this AM  - will reswab COVID    Hypokalemia - resolved  - repleting Magnesium and Potassium PRN  - monitor BMP    HTN  -Chronic, controlled  -Continue home medication Norvasc, Metoprolol    HLD  -Chronic, controlled  -Continue statin, fenofibrate    Anxiety/Depression  -Chronic  -Continue home medication Cymbalta 120mg    Pre-diabetes  A1C 6.4  -Continue low dose insulin sliding scale  -Hypoglycemia protocol, accu-cheks      DVT prophylaxis: Lovenox  PT - possible home PT  OT evaluation   Patient lives with  who is high-risk. Unable to quarantine.     , Jatin Pang

## 2020-11-10 LAB
ANION GAP SERPL CALC-SCNC: 11 MMOL/L — SIGNIFICANT CHANGE UP (ref 5–17)
BUN SERPL-MCNC: 26 MG/DL — HIGH (ref 7–23)
CALCIUM SERPL-MCNC: 9.6 MG/DL — SIGNIFICANT CHANGE UP (ref 8.4–10.5)
CHLORIDE SERPL-SCNC: 104 MMOL/L — SIGNIFICANT CHANGE UP (ref 96–108)
CO2 SERPL-SCNC: 26 MMOL/L — SIGNIFICANT CHANGE UP (ref 22–31)
CREAT SERPL-MCNC: 0.9 MG/DL — SIGNIFICANT CHANGE UP (ref 0.5–1.3)
GLUCOSE BLDC GLUCOMTR-MCNC: 105 MG/DL — HIGH (ref 70–99)
GLUCOSE BLDC GLUCOMTR-MCNC: 107 MG/DL — HIGH (ref 70–99)
GLUCOSE BLDC GLUCOMTR-MCNC: 126 MG/DL — HIGH (ref 70–99)
GLUCOSE BLDC GLUCOMTR-MCNC: 150 MG/DL — HIGH (ref 70–99)
GLUCOSE SERPL-MCNC: 116 MG/DL — HIGH (ref 70–99)
HCT VFR BLD CALC: 38.9 % — SIGNIFICANT CHANGE UP (ref 34.5–45)
HGB BLD-MCNC: 12.4 G/DL — SIGNIFICANT CHANGE UP (ref 11.5–15.5)
MAGNESIUM SERPL-MCNC: 2 MG/DL — SIGNIFICANT CHANGE UP (ref 1.6–2.6)
MCHC RBC-ENTMCNC: 27.9 PG — SIGNIFICANT CHANGE UP (ref 27–34)
MCHC RBC-ENTMCNC: 31.9 GM/DL — LOW (ref 32–36)
MCV RBC AUTO: 87.4 FL — SIGNIFICANT CHANGE UP (ref 80–100)
NRBC # BLD: 0 /100 WBCS — SIGNIFICANT CHANGE UP (ref 0–0)
PHOSPHATE SERPL-MCNC: 4.5 MG/DL — SIGNIFICANT CHANGE UP (ref 2.5–4.5)
PLATELET # BLD AUTO: 468 K/UL — HIGH (ref 150–400)
POTASSIUM SERPL-MCNC: 4.3 MMOL/L — SIGNIFICANT CHANGE UP (ref 3.5–5.3)
POTASSIUM SERPL-SCNC: 4.3 MMOL/L — SIGNIFICANT CHANGE UP (ref 3.5–5.3)
RBC # BLD: 4.45 M/UL — SIGNIFICANT CHANGE UP (ref 3.8–5.2)
RBC # FLD: 13.5 % — SIGNIFICANT CHANGE UP (ref 10.3–14.5)
SODIUM SERPL-SCNC: 141 MMOL/L — SIGNIFICANT CHANGE UP (ref 135–145)
WBC # BLD: 8.78 K/UL — SIGNIFICANT CHANGE UP (ref 3.8–10.5)
WBC # FLD AUTO: 8.78 K/UL — SIGNIFICANT CHANGE UP (ref 3.8–10.5)

## 2020-11-10 PROCEDURE — 99232 SBSQ HOSP IP/OBS MODERATE 35: CPT

## 2020-11-10 RX ADMIN — Medication 650 MILLIGRAM(S): at 05:12

## 2020-11-10 RX ADMIN — Medication 25 MILLIGRAM(S): at 05:12

## 2020-11-10 RX ADMIN — Medication 6 MILLIGRAM(S): at 05:12

## 2020-11-10 RX ADMIN — Medication 25 MILLIGRAM(S): at 18:13

## 2020-11-10 RX ADMIN — OXYCODONE HYDROCHLORIDE 5 MILLIGRAM(S): 5 TABLET ORAL at 00:18

## 2020-11-10 RX ADMIN — Medication 1000 UNIT(S): at 12:38

## 2020-11-10 RX ADMIN — AMLODIPINE BESYLATE 5 MILLIGRAM(S): 2.5 TABLET ORAL at 05:12

## 2020-11-10 RX ADMIN — ENOXAPARIN SODIUM 40 MILLIGRAM(S): 100 INJECTION SUBCUTANEOUS at 12:36

## 2020-11-10 RX ADMIN — Medication 1 TABLET(S): at 12:37

## 2020-11-10 RX ADMIN — DULOXETINE HYDROCHLORIDE 120 MILLIGRAM(S): 30 CAPSULE, DELAYED RELEASE ORAL at 12:37

## 2020-11-10 RX ADMIN — PANTOPRAZOLE SODIUM 40 MILLIGRAM(S): 20 TABLET, DELAYED RELEASE ORAL at 05:12

## 2020-11-10 RX ADMIN — POLYETHYLENE GLYCOL 3350 17 GRAM(S): 17 POWDER, FOR SOLUTION ORAL at 12:37

## 2020-11-10 RX ADMIN — Medication 145 MILLIGRAM(S): at 12:37

## 2020-11-10 RX ADMIN — ATORVASTATIN CALCIUM 10 MILLIGRAM(S): 80 TABLET, FILM COATED ORAL at 21:49

## 2020-11-10 RX ADMIN — Medication 650 MILLIGRAM(S): at 06:12

## 2020-11-10 RX ADMIN — Medication 650 MILLIGRAM(S): at 21:49

## 2020-11-10 RX ADMIN — OXYCODONE HYDROCHLORIDE 5 MILLIGRAM(S): 5 TABLET ORAL at 18:49

## 2020-11-10 RX ADMIN — OXYCODONE HYDROCHLORIDE 5 MILLIGRAM(S): 5 TABLET ORAL at 19:49

## 2020-11-10 RX ADMIN — Medication 650 MILLIGRAM(S): at 22:47

## 2020-11-10 NOTE — PROGRESS NOTE ADULT - SUBJECTIVE AND OBJECTIVE BOX
Follow-up Pulmonary Progress Note  Chief Complaint : Infection due to severe acute respiratory syndrome coronavirus 2 (SARS-CoV-2)      patient feels well  no complaints      Allergies :penicillin (Other)      PAST MEDICAL & SURGICAL HISTORY:  Lumbar spinal stenosis    History of sciatica  mostly right side    H/O sinusitis    Eczema  extremities    Scoliosis    Meniscus tear  left knee    Spinal stenosis in cervical region    Anxiety and depression    Migraine    GERD (gastroesophageal reflux disease)    Seasonal allergies    Hyperlipidemia    HTN (hypertension)    H/O cervical spine surgery  C3-6   11/2015    History of tonsillectomy    History of appendectomy        Medications:  MEDICATIONS  (STANDING):  amLODIPine   Tablet 5 milliGRAM(s) Oral daily  atorvastatin 10 milliGRAM(s) Oral at bedtime  cholecalciferol 1000 Unit(s) Oral daily  dexAMETHasone  Injectable 6 milliGRAM(s) IV Push daily  dextrose 5%. 1000 milliLiter(s) (50 mL/Hr) IV Continuous <Continuous>  dextrose 50% Injectable 12.5 Gram(s) IV Push once  dextrose 50% Injectable 25 Gram(s) IV Push once  dextrose 50% Injectable 25 Gram(s) IV Push once  DULoxetine 120 milliGRAM(s) Oral daily  enoxaparin Injectable 40 milliGRAM(s) SubCutaneous daily  fenofibrate Tablet 145 milliGRAM(s) Oral daily  influenza   Vaccine 0.5 milliLiter(s) IntraMuscular once  insulin lispro (ADMELOG) corrective regimen sliding scale   SubCutaneous three times a day before meals  metoprolol tartrate 25 milliGRAM(s) Oral two times a day  multivitamin 1 Tablet(s) Oral daily  pantoprazole    Tablet 40 milliGRAM(s) Oral before breakfast  polyethylene glycol 3350 17 Gram(s) Oral daily    MEDICATIONS  (PRN):  acetaminophen   Tablet .. 650 milliGRAM(s) Oral every 6 hours PRN Moderate Pain (4 - 6)  ALBUTerol    90 MICROgram(s) HFA Inhaler 2 Puff(s) Inhalation every 6 hours PRN Shortness of Breath and/or Wheezing  dextrose 40% Gel 15 Gram(s) Oral once PRN Blood Glucose LESS THAN 70 milliGRAM(s)/deciliter  glucagon  Injectable 1 milliGRAM(s) IntraMuscular once PRN Glucose LESS THAN 70 milligrams/deciliter  oxyCODONE    IR 5 milliGRAM(s) Oral every 6 hours PRN Severe Pain (7 - 10)  senna 2 Tablet(s) Oral at bedtime PRN Constipation      LABS:                        12.4   8.78  )-----------( 468      ( 10 Nov 2020 05:30 )             38.9     11-10    141  |  104  |  26<H>  ----------------------------<  116<H>  4.3   |  26  |  0.90    Ca    9.6      10 Nov 2020 05:30  Phos  4.5     11-10  Mg     2.0     11-10      COVID  11-09-20 @ 18:32  COVID -   Detected<!!>  11-02-20 @ 00:45  COVID -   Detected<!!>  11-01-20 @ 09:04  COVID -   Detected<!!>  10-28-20 @ 15:30  COVID -   NotDetec  10-06-20 @ 12:45  COVID -   NotDetec  09-30-20 @ 00:20  COVID -   NotDetec      COVID Biomarkers    11-09-20 @ 05:30 ESR --  ---  CRP 0.80<H>  ---  DDimer  --   ---   <H>   ---   Ferritin 187<H>    11-03-20 @ 11:10 ESR --  ---  CRP --  ---  DDimer  --   ---      ---   Ferritin 146                  VITALS:  T(C): 36.4 (11-10-20 @ 04:58), Max: 37.1 (11-09-20 @ 16:32)  T(F): 97.5 (11-10-20 @ 04:58), Max: 98.7 (11-09-20 @ 16:32)  HR: 84 (11-10-20 @ 04:58) (75 - 84)  BP: 130/78 (11-10-20 @ 04:58) (122/76 - 130/78)  BP(mean): --  ABP: --  ABP(mean): --  RR: 15 (11-10-20 @ 04:58) (15 - 18)  SpO2: 96% (11-10-20 @ 04:58) (93% - 96%)  CVP(mm Hg): --  CVP(cm H2O): --    Ins and Outs     11-09-20 @ 07:01  -  11-10-20 @ 07:00  --------------------------------------------------------  IN: 370 mL / OUT: 0 mL / NET: 370 mL                I&O's Detail    09 Nov 2020 07:01  -  10 Nov 2020 07:00  --------------------------------------------------------  IN:    IV PiggyBack: 250 mL    Oral Fluid: 120 mL  Total IN: 370 mL    OUT:  Total OUT: 0 mL    Total NET: 370 mL

## 2020-11-10 NOTE — PROGRESS NOTE ADULT - SUBJECTIVE AND OBJECTIVE BOX
Patient is a 60y old Female who presents with a chief complaint of COVID-19 PNA (09 Nov 2020 15:14)      Patient seen and examined at bedside.  No overnight events  COVID was positive, still doing well off O2 with some sob  96 ra  Patient concerned for her  and being positive  For home with PT  Will re-swab 11/12  on IV decadron      ALLERGIES:  penicillin (Other)    MEDICATIONS  (STANDING):  amLODIPine   Tablet 5 milliGRAM(s) Oral daily  atorvastatin 10 milliGRAM(s) Oral at bedtime  cholecalciferol 1000 Unit(s) Oral daily  dexAMETHasone  Injectable 6 milliGRAM(s) IV Push daily  dextrose 5%. 1000 milliLiter(s) (50 mL/Hr) IV Continuous <Continuous>  dextrose 50% Injectable 12.5 Gram(s) IV Push once  dextrose 50% Injectable 25 Gram(s) IV Push once  dextrose 50% Injectable 25 Gram(s) IV Push once  DULoxetine 120 milliGRAM(s) Oral daily  enoxaparin Injectable 40 milliGRAM(s) SubCutaneous daily  fenofibrate Tablet 145 milliGRAM(s) Oral daily  influenza   Vaccine 0.5 milliLiter(s) IntraMuscular once  insulin lispro (ADMELOG) corrective regimen sliding scale   SubCutaneous three times a day before meals  metoprolol tartrate 25 milliGRAM(s) Oral two times a day  multivitamin 1 Tablet(s) Oral daily  pantoprazole    Tablet 40 milliGRAM(s) Oral before breakfast  polyethylene glycol 3350 17 Gram(s) Oral daily    MEDICATIONS  (PRN):  acetaminophen   Tablet .. 650 milliGRAM(s) Oral every 6 hours PRN Moderate Pain (4 - 6)  ALBUTerol    90 MICROgram(s) HFA Inhaler 2 Puff(s) Inhalation every 6 hours PRN Shortness of Breath and/or Wheezing  dextrose 40% Gel 15 Gram(s) Oral once PRN Blood Glucose LESS THAN 70 milliGRAM(s)/deciliter  glucagon  Injectable 1 milliGRAM(s) IntraMuscular once PRN Glucose LESS THAN 70 milligrams/deciliter  oxyCODONE    IR 5 milliGRAM(s) Oral every 6 hours PRN Severe Pain (7 - 10)  senna 2 Tablet(s) Oral at bedtime PRN Constipation    Vital Signs Last 24 Hrs  T(F): 97.5 (10 Nov 2020 04:58), Max: 98.7 (09 Nov 2020 16:32)  HR: 84 (10 Nov 2020 04:58) (75 - 90)  BP: 130/78 (10 Nov 2020 04:58) (110/60 - 130/78)  RR: 15 (10 Nov 2020 04:58) (15 - 18)  SpO2: 96% (10 Nov 2020 04:58) (93% - 96%)  I&O's Summary    09 Nov 2020 07:01  -  10 Nov 2020 07:00  --------------------------------------------------------  IN: 370 mL / OUT: 0 mL / NET: 370 mL        PHYSICAL EXAM:  General: NAD, A/O x 3, on ra  ENT: MMM, no scleral icterus  Neck: Supple, No JVD  Lungs: Clear to auscultation bilaterally, non labored breathing  Cardio: RRR, S1/S2, No murmurs  Abdomen: Soft, Nontender, Nondistended; Bowel sounds present  Extremities: No calf tenderness, No pitting edema    LABS:                        12.4   8.78  )-----------( 468      ( 10 Nov 2020 05:30 )             38.9       11-10    141  |  104  |  26  ----------------------------<  116  4.3   |  26  |  0.90    Ca    9.6      10 Nov 2020 05:30  Phos  4.5     11-10  Mg     2.0     11-10       eGFR if Non African American: 70 mL/min/1.73M2 (11-10-20 @ 05:30)  eGFR if African American: 81 mL/min/1.73M2 (11-10-20 @ 05:30)       POCT Blood Glucose.: 126 mg/dL (10 Nov 2020 08:03)  POCT Blood Glucose.: 107 mg/dL (09 Nov 2020 22:53)  POCT Blood Glucose.: 136 mg/dL (09 Nov 2020 16:41)  POCT Blood Glucose.: 152 mg/dL (09 Nov 2020 12:29)      Care Discussed with Consultants/Other Providers: pulm on board

## 2020-11-10 NOTE — PROGRESS NOTE ADULT - ASSESSMENT
60F with PMH HTN, HLD, osteoarthritis, anxiety/depression disorders, GERD, eczema, sinusitis, scoliosis/spinal stenosis- lumbar region, presented to [facility] 9/29/20 for scheduled stage 1, L1-5 lumbar lateral interbody fusion with Dr. Valdez. Stage 2 T9-pelvis arthrodesis, L5-S1 TLIF done on 9/30/20 with closure of muscle flap done by plastics, Dr. Alegre. Post-op course complicated by hemorrhagic shock requiring transfer to ICU, fluid resuscitation with IV hydration and pressors. Patient was transferred to [facility] on 10/10/20 for comprehensive rehab.     Viral PNA likely 2/2 to COVID-19   Acute hypoxic respiratory failure  -Monitor O2; monitor respiratory status closely  -Maintain strict isolation precautions, use proper PPE.  -Provide Acetaminophen 650 mg PO q6  - May use Albuterol inhaler PRN  - Continue decadron IV x 10 days (Day 6)  - Remdesivir completed  - Monitor glucose while in steroids   - Monitor inflammatory markers  - Pulm on board  - 96%on ra this AM  - will reswab COVID    Hypokalemia - resolved  - monitor electrolytes    HTN  -Chronic, controlled  -Continue home medication Norvasc, Metoprolol    HLD  -Chronic, controlled  -Continue statin, fenofibrate    Anxiety/Depression  -Chronic  -Continue home medication Cymbalta 120mg    Pre-diabetes  A1C 6.4  -Continue low dose insulin sliding scale  -Hypoglycemia protocol, accu-cheks      DVT prophylaxis: Lovenox  PT - possible home PT  OT evaluation   Patient lives with  who is high-risk. Unable to quarantine. Re-swabbed and still positive    , Jatin Pang updated

## 2020-11-10 NOTE — PROGRESS NOTE ADULT - ASSESSMENT
Physical Examination:  GENERAL:               Alert, Oriented, No acute distress.    HEENT:                   No JVD, Moist MM  PULM:                     Bilateral air entry, Clear to auscultation bilaterally, no significant sputum production, No Rales, No Rhonchi, No Wheezing  CVS:                         S1, S2,  No Murmur  ABD:                        Soft, nondistended, nontender, normoactive bowel sounds,   NEURO:                  Alert, oriented, interactive, nonfocal, follows commands  PSYC:                      Calm, + Insight.      Assessment  1. COVID PNA    2. Abnormal CT chest likely due ot COVID 19 doubt bacterial PNA  3. S/P Lumbar Surgery for spinal stenosis   4. HTN, High chol, Anxiety       Plan  Continue decadron 6mg x 10 days and Remdesivir x 5 days  monitor on RA  PT , OT as tolerated  DVT ppx  Tylenol for fever  Pain control   COVID 19 Isolation protocol   OOB as tolerated  Case d/w Dr. Martin  Consider dispo planning when able to if home status conducive for safe discharge

## 2020-11-11 LAB
ALBUMIN SERPL ELPH-MCNC: 3 G/DL — LOW (ref 3.3–5)
ALP SERPL-CCNC: 73 U/L — SIGNIFICANT CHANGE UP (ref 40–120)
ALT FLD-CCNC: 21 U/L — SIGNIFICANT CHANGE UP (ref 10–45)
ANION GAP SERPL CALC-SCNC: 11 MMOL/L — SIGNIFICANT CHANGE UP (ref 5–17)
AST SERPL-CCNC: 20 U/L — SIGNIFICANT CHANGE UP (ref 10–40)
BILIRUB SERPL-MCNC: 0.4 MG/DL — SIGNIFICANT CHANGE UP (ref 0.2–1.2)
BUN SERPL-MCNC: 26 MG/DL — HIGH (ref 7–23)
CALCIUM SERPL-MCNC: 9.4 MG/DL — SIGNIFICANT CHANGE UP (ref 8.4–10.5)
CHLORIDE SERPL-SCNC: 105 MMOL/L — SIGNIFICANT CHANGE UP (ref 96–108)
CO2 SERPL-SCNC: 25 MMOL/L — SIGNIFICANT CHANGE UP (ref 22–31)
CREAT SERPL-MCNC: 0.87 MG/DL — SIGNIFICANT CHANGE UP (ref 0.5–1.3)
GLUCOSE BLDC GLUCOMTR-MCNC: 126 MG/DL — HIGH (ref 70–99)
GLUCOSE BLDC GLUCOMTR-MCNC: 133 MG/DL — HIGH (ref 70–99)
GLUCOSE BLDC GLUCOMTR-MCNC: 149 MG/DL — HIGH (ref 70–99)
GLUCOSE SERPL-MCNC: 93 MG/DL — SIGNIFICANT CHANGE UP (ref 70–99)
HCT VFR BLD CALC: 39.6 % — SIGNIFICANT CHANGE UP (ref 34.5–45)
HGB BLD-MCNC: 12.7 G/DL — SIGNIFICANT CHANGE UP (ref 11.5–15.5)
MAGNESIUM SERPL-MCNC: 1.9 MG/DL — SIGNIFICANT CHANGE UP (ref 1.6–2.6)
MCHC RBC-ENTMCNC: 28.1 PG — SIGNIFICANT CHANGE UP (ref 27–34)
MCHC RBC-ENTMCNC: 32.1 GM/DL — SIGNIFICANT CHANGE UP (ref 32–36)
MCV RBC AUTO: 87.6 FL — SIGNIFICANT CHANGE UP (ref 80–100)
NRBC # BLD: 0 /100 WBCS — SIGNIFICANT CHANGE UP (ref 0–0)
PHOSPHATE SERPL-MCNC: 4.2 MG/DL — SIGNIFICANT CHANGE UP (ref 2.5–4.5)
PLATELET # BLD AUTO: 519 K/UL — HIGH (ref 150–400)
POTASSIUM SERPL-MCNC: 3.5 MMOL/L — SIGNIFICANT CHANGE UP (ref 3.5–5.3)
POTASSIUM SERPL-SCNC: 3.5 MMOL/L — SIGNIFICANT CHANGE UP (ref 3.5–5.3)
PROT SERPL-MCNC: 6.4 G/DL — SIGNIFICANT CHANGE UP (ref 6–8.3)
RBC # BLD: 4.52 M/UL — SIGNIFICANT CHANGE UP (ref 3.8–5.2)
RBC # FLD: 13.7 % — SIGNIFICANT CHANGE UP (ref 10.3–14.5)
SODIUM SERPL-SCNC: 141 MMOL/L — SIGNIFICANT CHANGE UP (ref 135–145)
WBC # BLD: 7.38 K/UL — SIGNIFICANT CHANGE UP (ref 3.8–10.5)
WBC # FLD AUTO: 7.38 K/UL — SIGNIFICANT CHANGE UP (ref 3.8–10.5)

## 2020-11-11 PROCEDURE — 99232 SBSQ HOSP IP/OBS MODERATE 35: CPT

## 2020-11-11 RX ORDER — OXYCODONE HYDROCHLORIDE 5 MG/1
5 TABLET ORAL EVERY 6 HOURS
Refills: 0 | Status: DISCONTINUED | OUTPATIENT
Start: 2020-11-12 | End: 2020-11-14

## 2020-11-11 RX ADMIN — PANTOPRAZOLE SODIUM 40 MILLIGRAM(S): 20 TABLET, DELAYED RELEASE ORAL at 05:41

## 2020-11-11 RX ADMIN — DULOXETINE HYDROCHLORIDE 120 MILLIGRAM(S): 30 CAPSULE, DELAYED RELEASE ORAL at 12:32

## 2020-11-11 RX ADMIN — OXYCODONE HYDROCHLORIDE 5 MILLIGRAM(S): 5 TABLET ORAL at 14:44

## 2020-11-11 RX ADMIN — OXYCODONE HYDROCHLORIDE 5 MILLIGRAM(S): 5 TABLET ORAL at 23:00

## 2020-11-11 RX ADMIN — Medication 145 MILLIGRAM(S): at 12:32

## 2020-11-11 RX ADMIN — Medication 6 MILLIGRAM(S): at 05:41

## 2020-11-11 RX ADMIN — OXYCODONE HYDROCHLORIDE 5 MILLIGRAM(S): 5 TABLET ORAL at 15:44

## 2020-11-11 RX ADMIN — ATORVASTATIN CALCIUM 10 MILLIGRAM(S): 80 TABLET, FILM COATED ORAL at 22:33

## 2020-11-11 RX ADMIN — OXYCODONE HYDROCHLORIDE 5 MILLIGRAM(S): 5 TABLET ORAL at 22:32

## 2020-11-11 RX ADMIN — Medication 25 MILLIGRAM(S): at 05:41

## 2020-11-11 RX ADMIN — Medication 25 MILLIGRAM(S): at 16:45

## 2020-11-11 RX ADMIN — Medication 1000 UNIT(S): at 12:32

## 2020-11-11 RX ADMIN — ENOXAPARIN SODIUM 40 MILLIGRAM(S): 100 INJECTION SUBCUTANEOUS at 12:31

## 2020-11-11 RX ADMIN — AMLODIPINE BESYLATE 5 MILLIGRAM(S): 2.5 TABLET ORAL at 05:40

## 2020-11-11 RX ADMIN — Medication 1 TABLET(S): at 12:31

## 2020-11-11 NOTE — PROGRESS NOTE ADULT - SUBJECTIVE AND OBJECTIVE BOX
Patient is a 60y old  Female who presents with a chief complaint of COVID-19 PNA (10 Nov 2020 14:01)      Patient seen and examined at bedside.  No overnight events  No complaints.   Continues to saturate > 94% on RA  Symptoms are stable    ALLERGIES:  penicillin (Other)    MEDICATIONS  (STANDING):  amLODIPine   Tablet 5 milliGRAM(s) Oral daily  atorvastatin 10 milliGRAM(s) Oral at bedtime  cholecalciferol 1000 Unit(s) Oral daily  dexAMETHasone  Injectable 6 milliGRAM(s) IV Push daily  dextrose 5%. 1000 milliLiter(s) (50 mL/Hr) IV Continuous <Continuous>  dextrose 50% Injectable 12.5 Gram(s) IV Push once  dextrose 50% Injectable 25 Gram(s) IV Push once  dextrose 50% Injectable 25 Gram(s) IV Push once  DULoxetine 120 milliGRAM(s) Oral daily  enoxaparin Injectable 40 milliGRAM(s) SubCutaneous daily  fenofibrate Tablet 145 milliGRAM(s) Oral daily  influenza   Vaccine 0.5 milliLiter(s) IntraMuscular once  insulin lispro (ADMELOG) corrective regimen sliding scale   SubCutaneous three times a day before meals  metoprolol tartrate 25 milliGRAM(s) Oral two times a day  multivitamin 1 Tablet(s) Oral daily  pantoprazole    Tablet 40 milliGRAM(s) Oral before breakfast  polyethylene glycol 3350 17 Gram(s) Oral daily    MEDICATIONS  (PRN):  acetaminophen   Tablet .. 650 milliGRAM(s) Oral every 6 hours PRN Moderate Pain (4 - 6)  ALBUTerol    90 MICROgram(s) HFA Inhaler 2 Puff(s) Inhalation every 6 hours PRN Shortness of Breath and/or Wheezing  dextrose 40% Gel 15 Gram(s) Oral once PRN Blood Glucose LESS THAN 70 milliGRAM(s)/deciliter  glucagon  Injectable 1 milliGRAM(s) IntraMuscular once PRN Glucose LESS THAN 70 milligrams/deciliter  oxyCODONE    IR 5 milliGRAM(s) Oral every 6 hours PRN Severe Pain (7 - 10)  senna 2 Tablet(s) Oral at bedtime PRN Constipation    Vital Signs Last 24 Hrs  T(F): 98.6 (11 Nov 2020 05:27), Max: 98.6 (11 Nov 2020 05:27)  HR: 82 (11 Nov 2020 05:27) (78 - 91)  BP: 130/79 (11 Nov 2020 05:27) (102/81 - 141/82)  RR: 14 (11 Nov 2020 05:27) (14 - 16)  SpO2: 96% (11 Nov 2020 05:27) (96% - 97%)  I&O's Summary    10 Nov 2020 07:01  -  11 Nov 2020 07:00  --------------------------------------------------------  IN: 480 mL / OUT: 350 mL / NET: 130 mL        PHYSICAL EXAM:  General: NAD, A/O x 3  ENT: MMM, no scleral icterus  Neck: Supple, No JVD  Lungs: Clear to auscultation bilaterally, non labored breathing  Cardio: RRR, S1/S2, No murmurs  Abdomen: Soft, Nontender, Nondistended; Bowel sounds present  Extremities: No calf tenderness, No pitting edema    LABS:                        12.4   8.78  )-----------( 468      ( 10 Nov 2020 05:30 )             38.9       11-11    141  |  105  |  26  ----------------------------<  93  3.5   |  25  |  0.87    Ca    9.4      11 Nov 2020 05:30  Phos  4.2     11-11  Mg     1.9     11-11    TPro  6.4  /  Alb  3.0  /  TBili  0.4  /  DBili  x   /  AST  20  /  ALT  21  /  AlkPhos  73  11-11     eGFR if Non African American: 73 mL/min/1.73M2 (11-11-20 @ 05:30)  eGFR if African American: 84 mL/min/1.73M2 (11-11-20 @ 05:30)       POCT Blood Glucose.: 126 mg/dL (11 Nov 2020 08:28)  POCT Blood Glucose.: 105 mg/dL (10 Nov 2020 21:44)  POCT Blood Glucose.: 107 mg/dL (10 Nov 2020 16:30)  POCT Blood Glucose.: 150 mg/dL (10 Nov 2020 12:34)            RADIOLOGY & ADDITIONAL TESTS:    Care Discussed with Consultants/Other Providers:

## 2020-11-11 NOTE — PROGRESS NOTE ADULT - SUBJECTIVE AND OBJECTIVE BOX
Follow-up Critical Care Progress Note  Chief Complaint : Infection due to severe acute respiratory syndrome coronavirus 2 (SARS-CoV-2)      patient seen and examined  comfortable  complain of back pain eduardo incision site.       Allergies :penicillin (Other)      PAST MEDICAL & SURGICAL HISTORY:  Lumbar spinal stenosis    History of sciatica  mostly right side    H/O sinusitis    Eczema  extremities    Scoliosis    Meniscus tear  left knee    Spinal stenosis in cervical region    Anxiety and depression    Migraine    GERD (gastroesophageal reflux disease)    Seasonal allergies    Hyperlipidemia    HTN (hypertension)    H/O cervical spine surgery  C3-6   11/2015    History of tonsillectomy    History of appendectomy        Medications:  MEDICATIONS  (STANDING):  amLODIPine   Tablet 5 milliGRAM(s) Oral daily  atorvastatin 10 milliGRAM(s) Oral at bedtime  cholecalciferol 1000 Unit(s) Oral daily  dexAMETHasone  Injectable 6 milliGRAM(s) IV Push daily  dextrose 5%. 1000 milliLiter(s) (50 mL/Hr) IV Continuous <Continuous>  dextrose 50% Injectable 12.5 Gram(s) IV Push once  dextrose 50% Injectable 25 Gram(s) IV Push once  dextrose 50% Injectable 25 Gram(s) IV Push once  DULoxetine 120 milliGRAM(s) Oral daily  enoxaparin Injectable 40 milliGRAM(s) SubCutaneous daily  fenofibrate Tablet 145 milliGRAM(s) Oral daily  influenza   Vaccine 0.5 milliLiter(s) IntraMuscular once  insulin lispro (ADMELOG) corrective regimen sliding scale   SubCutaneous three times a day before meals  metoprolol tartrate 25 milliGRAM(s) Oral two times a day  multivitamin 1 Tablet(s) Oral daily  pantoprazole    Tablet 40 milliGRAM(s) Oral before breakfast  polyethylene glycol 3350 17 Gram(s) Oral daily    MEDICATIONS  (PRN):  acetaminophen   Tablet .. 650 milliGRAM(s) Oral every 6 hours PRN Moderate Pain (4 - 6)  ALBUTerol    90 MICROgram(s) HFA Inhaler 2 Puff(s) Inhalation every 6 hours PRN Shortness of Breath and/or Wheezing  dextrose 40% Gel 15 Gram(s) Oral once PRN Blood Glucose LESS THAN 70 milliGRAM(s)/deciliter  glucagon  Injectable 1 milliGRAM(s) IntraMuscular once PRN Glucose LESS THAN 70 milligrams/deciliter  oxyCODONE    IR 5 milliGRAM(s) Oral every 6 hours PRN Severe Pain (7 - 10)  senna 2 Tablet(s) Oral at bedtime PRN Constipation    COVID  11-09-20 @ 18:32  COVID -   Detected<!!>  11-02-20 @ 00:45  COVID -   Detected<!!>  11-01-20 @ 09:04  COVID -   Detected<!!>  10-28-20 @ 15:30  COVID -   NotDetec  10-06-20 @ 12:45  COVID -   NotDetec  09-30-20 @ 00:20  COVID -   NotDetec      COVID Biomarkers    11-09-20 @ 05:30 ESR --  ---  CRP 0.80<H>  ---  DDimer  --   ---   <H>   ---   Ferritin 187<H>    11-03-20 @ 11:10 ESR --  ---  CRP --  ---  DDimer  --   ---      ---   Ferritin 146        Procalcitonin Trend  11-09-20 @ 05:30   -   0.08  11-03-20 @ 11:10   -   0.09<H>    WBC Trend  11-11-20 @ 05:30   -  7.38  11-10-20 @ 05:30   -  8.78  11-09-20 @ 05:30   -  3.56<L>    H/H Trend  11-11-20 @ 05:30   -  12.7 / 39.6  11-10-20 @ 05:30   -  12.4 / 38.9  11-09-20 @ 05:30   -  12.7 / 40.4    Platelet Trend  11-11-20 @ 05:30   -  519<H>  11-10-20 @ 05:30   -  468<H>  11-09-20 @ 05:30   -  438<H>    Trend Sodium  11-11-20 @ 05:30   -  141  11-10-20 @ 05:30   -  141  11-09-20 @ 05:30   -  138    Trend Potassium  11-11-20 @ 05:30   -  3.5  11-10-20 @ 05:30   -  4.3  11-09-20 @ 05:30   -  4.3    Trend Bun/Cr  11-11-20 @ 05:30  BUN/CR -  26<H> / 0.87  11-10-20 @ 05:30  BUN/CR -  26<H> / 0.90  11-09-20 @ 05:30  BUN/CR -  21 / 0.74    Lactic Acid Trend    ABG Trend  09-30-20 @ 20:43   - 7.23<L>/35/168<H>/98<H>  09-30-20 @ 17:27   - 7.39/35/189<H>/99<H>  09-30-20 @ 16:03   - 7.41/35/185<H>/99<H>  09-29-20 @ 10:14   - 7.43/39/290<H>/99<H>    Trend AST/ALT/ALK Phos/Bili  11-11-20 @ 05:30   20/21/73/0.4  11-07-20 @ 07:35   26/19/75/0.3  11-06-20 @ 05:30   29/21/80/0.4  11-05-20 @ 05:00   31/23/85/0.4  10-11-20 @ 10:23   29/46<H>/69/0.6        LABS:                        12.7   7.38  )-----------( 519      ( 11 Nov 2020 05:30 )             39.6     11-11    141  |  105  |  26<H>  ----------------------------<  93  3.5   |  25  |  0.87    Ca    9.4      11 Nov 2020 05:30  Phos  4.2     11-11  Mg     1.9     11-11    TPro  6.4  /  Alb  3.0<L>  /  TBili  0.4  /  DBili  x   /  AST  20  /  ALT  21  /  AlkPhos  73  11-11        CULTURES: (if applicable)    Culture - Blood (collected 11-03-20 @ 10:00)  Source: .Blood Blood  Final Report (11-08-20 @ 18:01):    No Growth Final    VITALS:  T(C): 37 (11-11-20 @ 05:27), Max: 37 (11-11-20 @ 05:27)  T(F): 98.6 (11-11-20 @ 05:27), Max: 98.6 (11-11-20 @ 05:27)  HR: 82 (11-11-20 @ 05:27) (78 - 91)  BP: 130/79 (11-11-20 @ 05:27) (130/79 - 141/82)  BP(mean): --  ABP: --  ABP(mean): --  RR: 14 (11-11-20 @ 05:27) (14 - 16)  SpO2: 96% (11-11-20 @ 05:27) (96% - 97%)  CVP(mm Hg): --  CVP(cm H2O): --    Ins and Outs     11-10-20 @ 07:01  -  11-11-20 @ 07:00  --------------------------------------------------------  IN: 480 mL / OUT: 350 mL / NET: 130 mL                I&O's Detail    10 Nov 2020 07:01  -  11 Nov 2020 07:00  --------------------------------------------------------  IN:    Oral Fluid: 480 mL  Total IN: 480 mL    OUT:    Voided (mL): 350 mL  Total OUT: 350 mL    Total NET: 130 mL

## 2020-11-11 NOTE — PROGRESS NOTE ADULT - ASSESSMENT
60F with PMH HTN, HLD, osteoarthritis, anxiety/depression disorders, GERD, eczema, sinusitis, scoliosis/spinal stenosis- lumbar region, presented to [facility] 9/29/20 for scheduled stage 1, L1-5 lumbar lateral interbody fusion with Dr. Valdez. Stage 2 T9-pelvis arthrodesis, L5-S1 TLIF done on 9/30/20 with closure of muscle flap done by plastics, Dr. Alegre. Post-op course complicated by hemorrhagic shock requiring transfer to ICU, fluid resuscitation with IV hydration and pressors. Patient was transferred to [facility] on 10/10/20 for comprehensive rehab.     Viral PNA likely 2/2 to COVID-19   Acute hypoxic respiratory failure  -Monitor O2; monitor respiratory status closely  -Maintain strict isolation precautions, use proper PPE.  -Provide Acetaminophen 650 mg PO q6  - May use Albuterol inhaler PRN  - Continue decadron IV x 10 days (Day 6)  - Remdesivir completed  - Monitor glucose while in steroids   - Monitor inflammatory markers  - Pulm on board  - 96%on ra this AM  - will reswab COVID on Thurs    Hypokalemia - resolved  - monitor electrolytes    HTN  -Chronic, controlled  -Continue home medication Norvasc, Metoprolol    HLD  -Chronic, controlled  -Continue statin, fenofibrate    Anxiety/Depression  -Chronic  -Continue home medication Cymbalta 120mg    Pre-diabetes  A1C 6.4  -Continue low dose insulin sliding scale  -Hypoglycemia protocol, accu-cheks      DVT prophylaxis: Lovenox  PT - possible home PT  OT evaluation   Patient lives with  who is high-risk. Unable to quarantine. Re-swabbed and still positive    , Jatin Pang informed of plan

## 2020-11-12 LAB
ALBUMIN SERPL ELPH-MCNC: 3.1 G/DL — LOW (ref 3.3–5)
ALP SERPL-CCNC: 78 U/L — SIGNIFICANT CHANGE UP (ref 40–120)
ALT FLD-CCNC: 18 U/L — SIGNIFICANT CHANGE UP (ref 10–45)
ANION GAP SERPL CALC-SCNC: 11 MMOL/L — SIGNIFICANT CHANGE UP (ref 5–17)
AST SERPL-CCNC: 18 U/L — SIGNIFICANT CHANGE UP (ref 10–40)
BILIRUB SERPL-MCNC: 0.5 MG/DL — SIGNIFICANT CHANGE UP (ref 0.2–1.2)
BUN SERPL-MCNC: 24 MG/DL — HIGH (ref 7–23)
CALCIUM SERPL-MCNC: 9.3 MG/DL — SIGNIFICANT CHANGE UP (ref 8.4–10.5)
CHLORIDE SERPL-SCNC: 103 MMOL/L — SIGNIFICANT CHANGE UP (ref 96–108)
CO2 SERPL-SCNC: 27 MMOL/L — SIGNIFICANT CHANGE UP (ref 22–31)
CREAT SERPL-MCNC: 0.96 MG/DL — SIGNIFICANT CHANGE UP (ref 0.5–1.3)
GLUCOSE BLDC GLUCOMTR-MCNC: 139 MG/DL — HIGH (ref 70–99)
GLUCOSE BLDC GLUCOMTR-MCNC: 162 MG/DL — HIGH (ref 70–99)
GLUCOSE BLDC GLUCOMTR-MCNC: 207 MG/DL — HIGH (ref 70–99)
GLUCOSE SERPL-MCNC: 106 MG/DL — HIGH (ref 70–99)
HCT VFR BLD CALC: 42 % — SIGNIFICANT CHANGE UP (ref 34.5–45)
HGB BLD-MCNC: 13.1 G/DL — SIGNIFICANT CHANGE UP (ref 11.5–15.5)
MAGNESIUM SERPL-MCNC: 1.9 MG/DL — SIGNIFICANT CHANGE UP (ref 1.6–2.6)
MCHC RBC-ENTMCNC: 27.5 PG — SIGNIFICANT CHANGE UP (ref 27–34)
MCHC RBC-ENTMCNC: 31.2 GM/DL — LOW (ref 32–36)
MCV RBC AUTO: 88.1 FL — SIGNIFICANT CHANGE UP (ref 80–100)
NRBC # BLD: 0 /100 WBCS — SIGNIFICANT CHANGE UP (ref 0–0)
PHOSPHATE SERPL-MCNC: 4.3 MG/DL — SIGNIFICANT CHANGE UP (ref 2.5–4.5)
PLATELET # BLD AUTO: 554 K/UL — HIGH (ref 150–400)
POTASSIUM SERPL-MCNC: 3.4 MMOL/L — LOW (ref 3.5–5.3)
POTASSIUM SERPL-SCNC: 3.4 MMOL/L — LOW (ref 3.5–5.3)
PROT SERPL-MCNC: 6.6 G/DL — SIGNIFICANT CHANGE UP (ref 6–8.3)
RBC # BLD: 4.77 M/UL — SIGNIFICANT CHANGE UP (ref 3.8–5.2)
RBC # FLD: 13.8 % — SIGNIFICANT CHANGE UP (ref 10.3–14.5)
SODIUM SERPL-SCNC: 141 MMOL/L — SIGNIFICANT CHANGE UP (ref 135–145)
WBC # BLD: 6.19 K/UL — SIGNIFICANT CHANGE UP (ref 3.8–10.5)
WBC # FLD AUTO: 6.19 K/UL — SIGNIFICANT CHANGE UP (ref 3.8–10.5)

## 2020-11-12 PROCEDURE — 99232 SBSQ HOSP IP/OBS MODERATE 35: CPT

## 2020-11-12 RX ORDER — POTASSIUM CHLORIDE 20 MEQ
40 PACKET (EA) ORAL ONCE
Refills: 0 | Status: COMPLETED | OUTPATIENT
Start: 2020-11-12 | End: 2020-11-12

## 2020-11-12 RX ADMIN — Medication 1000 UNIT(S): at 11:55

## 2020-11-12 RX ADMIN — OXYCODONE HYDROCHLORIDE 5 MILLIGRAM(S): 5 TABLET ORAL at 22:01

## 2020-11-12 RX ADMIN — ATORVASTATIN CALCIUM 10 MILLIGRAM(S): 80 TABLET, FILM COATED ORAL at 21:46

## 2020-11-12 RX ADMIN — Medication 25 MILLIGRAM(S): at 18:09

## 2020-11-12 RX ADMIN — Medication 145 MILLIGRAM(S): at 11:51

## 2020-11-12 RX ADMIN — OXYCODONE HYDROCHLORIDE 5 MILLIGRAM(S): 5 TABLET ORAL at 16:08

## 2020-11-12 RX ADMIN — Medication 1: at 11:50

## 2020-11-12 RX ADMIN — Medication 40 MILLIEQUIVALENT(S): at 11:52

## 2020-11-12 RX ADMIN — Medication 2: at 08:42

## 2020-11-12 RX ADMIN — Medication 25 MILLIGRAM(S): at 05:20

## 2020-11-12 RX ADMIN — OXYCODONE HYDROCHLORIDE 5 MILLIGRAM(S): 5 TABLET ORAL at 15:08

## 2020-11-12 RX ADMIN — ENOXAPARIN SODIUM 40 MILLIGRAM(S): 100 INJECTION SUBCUTANEOUS at 11:51

## 2020-11-12 RX ADMIN — PANTOPRAZOLE SODIUM 40 MILLIGRAM(S): 20 TABLET, DELAYED RELEASE ORAL at 06:30

## 2020-11-12 RX ADMIN — Medication 1 TABLET(S): at 11:51

## 2020-11-12 RX ADMIN — Medication 6 MILLIGRAM(S): at 05:19

## 2020-11-12 RX ADMIN — OXYCODONE HYDROCHLORIDE 5 MILLIGRAM(S): 5 TABLET ORAL at 23:10

## 2020-11-12 RX ADMIN — DULOXETINE HYDROCHLORIDE 120 MILLIGRAM(S): 30 CAPSULE, DELAYED RELEASE ORAL at 11:51

## 2020-11-12 RX ADMIN — AMLODIPINE BESYLATE 5 MILLIGRAM(S): 2.5 TABLET ORAL at 05:20

## 2020-11-12 NOTE — PROGRESS NOTE ADULT - ASSESSMENT
60F with PMH HTN, HLD, osteoarthritis, anxiety/depression disorders, GERD, eczema, sinusitis, scoliosis/spinal stenosis- lumbar region, presented to [facility] 9/29/20 for scheduled stage 1, L1-5 lumbar lateral interbody fusion with Dr. Valdez. Stage 2 T9-pelvis arthrodesis, L5-S1 TLIF done on 9/30/20 with closure of muscle flap done by plastics, Dr. Alegre. Post-op course complicated by hemorrhagic shock requiring transfer to ICU, fluid resuscitation with IV hydration and pressors. Patient was transferred to [facility] on 10/10/20 for comprehensive rehab.     #Viral PNA likely 2/2 to COVID-19   #Acute hypoxic respiratory failure - resolved   - Monitor O2; monitor respiratory status closely, currently tolerating room air  - Maintain strict isolation precautions, use proper PPE.  - Provide Acetaminophen 650 mg PO q6  - May use Albuterol inhaler PRN  - Continue decadron IV x 10 days (Day 7)  - Remdesivir completed  - Monitor glucose while in steroids   - Monitor inflammatory markers  - Pulm following  - will reswab COVID today    #Hypokalemia   - K = 3.4  - Will supplement and monitor    #HTN  - Chronic, controlled  - Continue home medication Norvasc, Metoprolol    #HLD  - Chronic, controlled  - Continue statin, fenofibrate    #Anxiety/Depression  - Chronic  - Continue home medication Cymbalta 120mg    #Pre-diabetes  - A1C 6.4  - Continue low dose insulin sliding scale  - Hypoglycemia protocol, accu-cheks    DVT prophylaxis: Lovenox  PT - possible home PT  OT - home with home OT  Patient lives with  who is high-risk. Unable to quarantine. Re-swabbed and still positive as of 11/9 - will reswab today      is Nawafalexis Bird 60F with PMH HTN, HLD, osteoarthritis, anxiety/depression disorders, GERD, eczema, sinusitis, scoliosis/spinal stenosis- lumbar region, presented to [facility] 9/29/20 for scheduled stage 1, L1-5 lumbar lateral interbody fusion with Dr. Valdez. Stage 2 T9-pelvis arthrodesis, L5-S1 TLIF done on 9/30/20 with closure of muscle flap done by plastics, Dr. Alegre. Post-op course complicated by hemorrhagic shock requiring transfer to ICU, fluid resuscitation with IV hydration and pressors. Patient was transferred to [facility] on 10/10/20 for comprehensive rehab.     #Viral PNA likely 2/2 to COVID-19   #Acute hypoxic respiratory failure - resolved   - Monitor O2; monitor respiratory status closely, currently tolerating room air  - Maintain strict isolation precautions, use proper PPE.  - Provide Acetaminophen 650 mg PO q6  - May use Albuterol inhaler PRN  - Continue decadron IV x 10 days (Day 7)  - Remdesivir completed  - Monitor glucose while in steroids   - Monitor inflammatory markers  - Pulm following  - will reswab COVID today    #Hypokalemia   - K = 3.4  - Will supplement and monitor    #HTN  - Chronic, controlled  - Continue home medication Norvasc, Metoprolol    #HLD  - Chronic, controlled  - Continue statin, fenofibrate    #Anxiety/Depression  - Chronic  - Continue home medication Cymbalta 120mg    #Pre-diabetes  - A1C 6.4  - Continue low dose insulin sliding scale  - Hypoglycemia protocol, accu-cheks    DVT prophylaxis: Lovenox  PT - possible home PT  OT - home with home OT    Dispo: Home with home PT and OT  As per [facility] guidelines for home discharge of symptomatic COVID-19 positive patients: If patient fever-free with no respiratory symptoms x24 hours, and there have been at least 10 days since initial reported positive specimen, patient can be discharged home with instructions to maintain self-isolation up to day 11 following positive test result. Patient initially tested positive on 11/1, has been symptom free for over 24 hrs- today is day 11 of isolation following initial test, i.e. last day of isolation, and so can be discharged home safely tomorrow.     Patient lives with  who is high-risk. Unable to quarantine.     is Edward - patient states that she will call her  to give update 60F with PMH HTN, HLD, osteoarthritis, anxiety/depression disorders, GERD, eczema, sinusitis, scoliosis/spinal stenosis- lumbar region, presented to [facility] 9/29/20 for scheduled stage 1, L1-5 lumbar lateral interbody fusion with Dr. Valdez. Stage 2 T9-pelvis arthrodesis, L5-S1 TLIF done on 9/30/20 with closure of muscle flap done by plastics, Dr. Alegre. Post-op course complicated by hemorrhagic shock requiring transfer to ICU, fluid resuscitation with IV hydration and pressors. Patient was transferred to [facility] on 10/10/20 for comprehensive rehab.     #Viral PNA likely 2/2 to COVID-19   #Acute hypoxic respiratory failure - resolved   - Monitor O2; monitor respiratory status closely, currently tolerating room air  - Maintain strict isolation precautions, use proper PPE.  - Provide Acetaminophen 650 mg PO q6  - May use Albuterol inhaler PRN  - Continue decadron IV x 10 days (Day 7)  - Remdesivir completed  - Monitor glucose while in steroids   - Monitor inflammatory markers  - Pulm following  - will reswab COVID today    #Hypokalemia   - K = 3.4  - Will supplement and monitor    #HTN  - Chronic, controlled  - Continue home medication Norvasc, Metoprolol    #HLD  - Chronic, controlled  - Continue statin, fenofibrate    #Anxiety/Depression  - Chronic  - Continue home medication Cymbalta 120mg    #Pre-diabetes  - A1C 6.4  - Continue low dose insulin sliding scale  - Hypoglycemia protocol, accu-cheks    DVT prophylaxis: Lovenox  PT - possible home PT  OT - home with home OT    Dispo: Home with home PT and OT  As per [facility] guidelines for home discharge of symptomatic COVID-19 positive patients: If patient fever-free with no respiratory symptoms x24 hours, and there have been at least 10 days since initial reported positive specimen, patient can be discharged home with instructions to maintain self-isolation up to day 11 following positive test result. Patient initially tested positive on 11/1, has been symptom free for over 24 hrs- today is day 11 of isolation following initial test, i.e. last day of isolation, and so as per guidelines should be able to be discharged home safely tomorrow.     Patient lives with  who is high-risk. Unable to quarantine.     is Edward - patient states that she will call her  to give update

## 2020-11-12 NOTE — PROGRESS NOTE ADULT - SUBJECTIVE AND OBJECTIVE BOX
Follow-up Pulmonary Progress Note  Chief Complaint : Infection due to severe acute respiratory syndrome coronavirus 2 (SARS-CoV-2)        pt seen and examined  comfortable  no cp, sob, palp, n/v        Allergies :penicillin (Other)      PAST MEDICAL & SURGICAL HISTORY:  Lumbar spinal stenosis    History of sciatica  mostly right side    H/O sinusitis    Eczema  extremities    Scoliosis    Meniscus tear  left knee    Spinal stenosis in cervical region    Anxiety and depression    Migraine    GERD (gastroesophageal reflux disease)    Seasonal allergies    Hyperlipidemia    HTN (hypertension)    H/O cervical spine surgery  C3-6   11/2015    History of tonsillectomy    History of appendectomy        Medications:  MEDICATIONS  (STANDING):  amLODIPine   Tablet 5 milliGRAM(s) Oral daily  atorvastatin 10 milliGRAM(s) Oral at bedtime  cholecalciferol 1000 Unit(s) Oral daily  dexAMETHasone  Injectable 6 milliGRAM(s) IV Push daily  dextrose 5%. 1000 milliLiter(s) (50 mL/Hr) IV Continuous <Continuous>  dextrose 50% Injectable 12.5 Gram(s) IV Push once  dextrose 50% Injectable 25 Gram(s) IV Push once  dextrose 50% Injectable 25 Gram(s) IV Push once  DULoxetine 120 milliGRAM(s) Oral daily  enoxaparin Injectable 40 milliGRAM(s) SubCutaneous daily  fenofibrate Tablet 145 milliGRAM(s) Oral daily  influenza   Vaccine 0.5 milliLiter(s) IntraMuscular once  insulin lispro (ADMELOG) corrective regimen sliding scale   SubCutaneous three times a day before meals  metoprolol tartrate 25 milliGRAM(s) Oral two times a day  multivitamin 1 Tablet(s) Oral daily  pantoprazole    Tablet 40 milliGRAM(s) Oral before breakfast  polyethylene glycol 3350 17 Gram(s) Oral daily    MEDICATIONS  (PRN):  acetaminophen   Tablet .. 650 milliGRAM(s) Oral every 6 hours PRN Moderate Pain (4 - 6)  ALBUTerol    90 MICROgram(s) HFA Inhaler 2 Puff(s) Inhalation every 6 hours PRN Shortness of Breath and/or Wheezing  dextrose 40% Gel 15 Gram(s) Oral once PRN Blood Glucose LESS THAN 70 milliGRAM(s)/deciliter  glucagon  Injectable 1 milliGRAM(s) IntraMuscular once PRN Glucose LESS THAN 70 milligrams/deciliter  oxyCODONE    IR 5 milliGRAM(s) Oral every 6 hours PRN Severe Pain (7 - 10)  senna 2 Tablet(s) Oral at bedtime PRN Constipation      LABS:                        13.1   6.19  )-----------( 554      ( 12 Nov 2020 05:20 )             42.0     11-12    141  |  103  |  24<H>  ----------------------------<  106<H>  3.4<L>   |  27  |  0.96    Ca    9.3      12 Nov 2020 05:20  Phos  4.3     11-12  Mg     1.9     11-12    TPro  6.6  /  Alb  3.1<L>  /  TBili  0.5  /  DBili  x   /  AST  18  /  ALT  18  /  AlkPhos  78  11-12        COVID  11-09-20 @ 18:32  COVID -   Detected<!!>  11-02-20 @ 00:45  COVID -   Detected<!!>  11-01-20 @ 09:04  COVID -   Detected<!!>  10-28-20 @ 15:30  COVID -   NotDetec  10-06-20 @ 12:45  COVID -   NotDetec  09-30-20 @ 00:20  COVID -   NotDete      COVID Biomarkers    11-09-20 @ 05:30 ESR --  ---  CRP 0.80<H>  ---  DDimer  --   ---   <H>   ---   Ferritin 187<H>    11-03-20 @ 11:10 ESR --  ---  CRP --  ---  DDimer  --   ---      ---   Ferritin 146              VITALS:  T(C): 37 (11-12-20 @ 05:24), Max: 37.3 (11-11-20 @ 14:15)  T(F): 98.6 (11-12-20 @ 05:24), Max: 99.1 (11-11-20 @ 14:15)  HR: 84 (11-12-20 @ 10:00) (83 - 97)  BP: 144/86 (11-12-20 @ 08:05) (132/72 - 144/86)  BP(mean): --  ABP: --  ABP(mean): --  RR: 16 (11-12-20 @ 05:24) (16 - 20)  SpO2: 96% (11-12-20 @ 10:00) (96% - 98%)  CVP(mm Hg): --  CVP(cm H2O): --    Ins and Outs     11-11-20 @ 07:01  -  11-12-20 @ 07:00  --------------------------------------------------------  IN: 770 mL / OUT: 0 mL / NET: 770 mL                I&O's Detail    11 Nov 2020 07:01  -  12 Nov 2020 07:00  --------------------------------------------------------  IN:    Oral Fluid: 770 mL  Total IN: 770 mL    OUT:  Total OUT: 0 mL    Total NET: 770 mL

## 2020-11-12 NOTE — CHART NOTE - NSCHARTNOTEFT_GEN_A_CORE
NUTRITION FOLLOW UP    SOURCE: Patient [X)   Family [ ]     other [ ]    DIET: Regular    PATIENT REPORT[ ] nausea  [ ] vomiting [ ] diarrhea [ ] constipation  [ ]chewing problems [ ] swallowing issues  [ ] other: no GI distress    PO INTAKE:  < 50% [ ]   50-75%  [ ]   %  [X]  other :    SOURCE: for PO intake [X] Patient [ ] family [ ] chart [ ] staff [ ] other    ENTERAL/PARENTERAL NUTRITION: n/a    CURRENT WEIGHT: 11/3 85.3 kg.    11/7 84.2 kg.     PERTINENT LABS:  Date: 12 Nov 2020 05:20  Hemoglobin 13.1   Hematocrit 42.0     Date: 11-12  Sodium 141  Potassium 3.4<L>  Glucose Serum 106<H>  BUN 24<H>      Creatinine    ACCUCHECK  POCT Blood Glucose.: 207 mg/dL (12 Nov 2020 08:40)  POCT Blood Glucose.: 133 mg/dL (11 Nov 2020 16:42)  POCT Blood Glucose.: 149 mg/dL (11 Nov 2020 12:24)  POCT Blood Glucose.: 126 mg/dL (11 Nov 2020 08:28)      SKIN: intact, no edema noted, last bm was 11/10    ESTIMATED NEEDS:   [X] no change since previous assessment  [ ] recalculated:     PREVIOUS NUTRITION DIAGNOSIS: addressed    NUTRITION DIAGNOSIS is   [X] resolved, RD will follow as per nutrition department protocol.    NEW NUTRITION DIAGNOSIS: [X] not applicable    MONITORING AND EVALUATION:   Current diet order is appropriate and is well tolerated, but will monitor for any changes that may be needed    [X] PO intake [X] Tolerance to diet prescription [X] weights [X] follow up per protocol    NUTRITION RECOMMENDATIONS: maintain adequate nutrition/ hydration    RD remains available JOHN Stroud RD CDE

## 2020-11-12 NOTE — PROGRESS NOTE ADULT - ASSESSMENT
Physical Examination:  GENERAL:               Alert, Oriented, No acute distress.    HEENT:                   No JVD, Moist MM  PULM:                     Bilateral air entry, Clear to auscultation bilaterally, no significant sputum production, No Rales, No Rhonchi, No Wheezing  CVS:                         S1, S2,  No Murmur  ABD:                        Soft, nondistended, nontender, normoactive bowel sounds,   NEURO:                  Alert, oriented, interactive, nonfocal, follows commands  PSYC:                      Calm, + Insight.      Assessment  1. COVID PNA    2. Abnormal CT chest likely due ot COVID 19 doubt bacterial PNA  3. S/P Lumbar Surgery for spinal stenosis   4. HTN, High chol, Anxiety       Plan  Continue decadron 6mg x 10 days   Finished Remdesivir x 5 days  monitor on RA  PT , OT as tolerated  DVT ppx  Tylenol for fever  Pain control   COVID 19 Isolation protocol   OOB as tolerated  Case d/w Dr. Hamilton  Consider dispo planning when able to if home status conducive for safe discharge

## 2020-11-12 NOTE — PROGRESS NOTE ADULT - SUBJECTIVE AND OBJECTIVE BOX
Patient is a 60y old  Female who presents with a chief complaint of COVID-19 PNA (11 Nov 2020 14:53)    Patient seen and examined at bedside. Patient currently feeling OK, c/o some back discomfort following surgery. Feeling hungry.     ALLERGIES:  penicillin (Other)    MEDICATIONS  (STANDING):  amLODIPine   Tablet 5 milliGRAM(s) Oral daily  atorvastatin 10 milliGRAM(s) Oral at bedtime  cholecalciferol 1000 Unit(s) Oral daily  dexAMETHasone  Injectable 6 milliGRAM(s) IV Push daily  dextrose 5%. 1000 milliLiter(s) (50 mL/Hr) IV Continuous <Continuous>  dextrose 50% Injectable 12.5 Gram(s) IV Push once  dextrose 50% Injectable 25 Gram(s) IV Push once  dextrose 50% Injectable 25 Gram(s) IV Push once  DULoxetine 120 milliGRAM(s) Oral daily  enoxaparin Injectable 40 milliGRAM(s) SubCutaneous daily  fenofibrate Tablet 145 milliGRAM(s) Oral daily  influenza   Vaccine 0.5 milliLiter(s) IntraMuscular once  insulin lispro (ADMELOG) corrective regimen sliding scale   SubCutaneous three times a day before meals  metoprolol tartrate 25 milliGRAM(s) Oral two times a day  multivitamin 1 Tablet(s) Oral daily  pantoprazole    Tablet 40 milliGRAM(s) Oral before breakfast  polyethylene glycol 3350 17 Gram(s) Oral daily  potassium chloride    Tablet ER 40 milliEquivalent(s) Oral once    MEDICATIONS  (PRN):  acetaminophen   Tablet .. 650 milliGRAM(s) Oral every 6 hours PRN Moderate Pain (4 - 6)  ALBUTerol    90 MICROgram(s) HFA Inhaler 2 Puff(s) Inhalation every 6 hours PRN Shortness of Breath and/or Wheezing  dextrose 40% Gel 15 Gram(s) Oral once PRN Blood Glucose LESS THAN 70 milliGRAM(s)/deciliter  glucagon  Injectable 1 milliGRAM(s) IntraMuscular once PRN Glucose LESS THAN 70 milligrams/deciliter  oxyCODONE    IR 5 milliGRAM(s) Oral every 6 hours PRN Severe Pain (7 - 10)  senna 2 Tablet(s) Oral at bedtime PRN Constipation    Vital Signs Last 24 Hrs  T(F): 98.6 (12 Nov 2020 05:24), Max: 99.1 (11 Nov 2020 14:15)  HR: 90 (12 Nov 2020 08:05) (83 - 97)  BP: 144/86 (12 Nov 2020 08:05) (132/72 - 144/86)  RR: 16 (12 Nov 2020 05:24) (16 - 20)  SpO2: 96% (12 Nov 2020 08:05) (96% - 98%)    I&O's Summary  11 Nov 2020 07:01  -  12 Nov 2020 07:00  --------------------------------------------------------  IN: 770 mL / OUT: 0 mL / NET: 770 mL    PHYSICAL EXAM:  General: NAD, A/O x 3  ENT: MMM  Neck: Supple, No JVD  Lungs: Clear to auscultation bilaterally  Cardio: RRR, S1/S2, No murmurs  Abdomen: Soft, Nontender, Nondistended; Bowel sounds present  Extremities: No calf tenderness, No pitting edema  Back: Surgical site intact, no redness or swelling    LABS:                        13.1   6.19  )-----------( 554      ( 12 Nov 2020 05:20 )             42.0     11-12    141  |  103  |  24  ----------------------------<  106  3.4   |  27  |  0.96    Ca    9.3      12 Nov 2020 05:20  Phos  4.3     11-12  Mg     1.9     11-12    TPro  6.6  /  Alb  3.1  /  TBili  0.5  /  DBili  x   /  AST  18  /  ALT  18  /  AlkPhos  78  11-12    eGFR if : 74 mL/min/1.73M2 (11-12-20 @ 05:20)  eGFR if Non African American: 64 mL/min/1.73M2 (11-12-20 @ 05:20)    POCT Blood Glucose.: 207 mg/dL (12 Nov 2020 08:40)  POCT Blood Glucose.: 133 mg/dL (11 Nov 2020 16:42)  POCT Blood Glucose.: 149 mg/dL (11 Nov 2020 12:24)    RADIOLOGY & ADDITIONAL TESTS:    Care Discussed with Consultants/Other Providers:

## 2020-11-13 LAB
GLUCOSE BLDC GLUCOMTR-MCNC: 116 MG/DL — HIGH (ref 70–99)
GLUCOSE BLDC GLUCOMTR-MCNC: 119 MG/DL — HIGH (ref 70–99)
GLUCOSE BLDC GLUCOMTR-MCNC: 140 MG/DL — HIGH (ref 70–99)
GLUCOSE BLDC GLUCOMTR-MCNC: 144 MG/DL — HIGH (ref 70–99)
SARS-COV-2 RNA SPEC QL NAA+PROBE: DETECTED

## 2020-11-13 PROCEDURE — 99232 SBSQ HOSP IP/OBS MODERATE 35: CPT

## 2020-11-13 RX ADMIN — ENOXAPARIN SODIUM 40 MILLIGRAM(S): 100 INJECTION SUBCUTANEOUS at 12:44

## 2020-11-13 RX ADMIN — ATORVASTATIN CALCIUM 10 MILLIGRAM(S): 80 TABLET, FILM COATED ORAL at 22:30

## 2020-11-13 RX ADMIN — AMLODIPINE BESYLATE 5 MILLIGRAM(S): 2.5 TABLET ORAL at 06:01

## 2020-11-13 RX ADMIN — Medication 1000 UNIT(S): at 12:45

## 2020-11-13 RX ADMIN — DULOXETINE HYDROCHLORIDE 120 MILLIGRAM(S): 30 CAPSULE, DELAYED RELEASE ORAL at 12:44

## 2020-11-13 RX ADMIN — Medication 25 MILLIGRAM(S): at 17:38

## 2020-11-13 RX ADMIN — Medication 25 MILLIGRAM(S): at 06:01

## 2020-11-13 RX ADMIN — Medication 6 MILLIGRAM(S): at 06:01

## 2020-11-13 RX ADMIN — OXYCODONE HYDROCHLORIDE 5 MILLIGRAM(S): 5 TABLET ORAL at 22:30

## 2020-11-13 RX ADMIN — Medication 145 MILLIGRAM(S): at 12:44

## 2020-11-13 RX ADMIN — PANTOPRAZOLE SODIUM 40 MILLIGRAM(S): 20 TABLET, DELAYED RELEASE ORAL at 06:01

## 2020-11-13 RX ADMIN — Medication 1 TABLET(S): at 12:44

## 2020-11-13 NOTE — PROGRESS NOTE ADULT - ASSESSMENT
Physical Examination:  GENERAL:               Alert, Oriented, No acute distress.    HEENT:                   No JVD, Moist MM  PULM:                     Bilateral air entry, Clear to auscultation bilaterally, no significant sputum production, No Rales, No Rhonchi, No Wheezing  CVS:                         S1, S2,  No Murmur  ABD:                        Soft, nondistended, nontender, normoactive bowel sounds,   NEURO:                  Alert, oriented, interactive, nonfocal, follows commands  PSYC:                      Calm, + Insight.      Assessment  1. COVID PNA    2. Abnormal CT chest likely due ot COVID 19 doubt bacterial PNA  3. S/P Lumbar Surgery for spinal stenosis   4. HTN, High chol, Anxiety       Plan  Finishing decadron 6mg x 10 days   Finished Remdesivir x 5 days  monitor on RA  PT , OT as tolerated  DVT ppx  Tylenol for fever  Pain control   COVID 19 Isolation protocol   OOB as tolerated    Consider dispo planning when able to if home status conducive for safe discharge

## 2020-11-13 NOTE — PROGRESS NOTE ADULT - SUBJECTIVE AND OBJECTIVE BOX
Follow-up Pulmonary Progress Note  Chief Complaint : Infection due to severe acute respiratory syndrome coronavirus 2 (SARS-CoV-2)        pt seen and examined  comfortable  no cp, sob, palp, n/v  wants to go home        Allergies :penicillin (Other)      PAST MEDICAL & SURGICAL HISTORY:  Lumbar spinal stenosis    History of sciatica  mostly right side    H/O sinusitis    Eczema  extremities    Scoliosis    Meniscus tear  left knee    Spinal stenosis in cervical region    Anxiety and depression    Migraine    GERD (gastroesophageal reflux disease)    Seasonal allergies    Hyperlipidemia    HTN (hypertension)    H/O cervical spine surgery  C3-6   11/2015    History of tonsillectomy    History of appendectomy        Medications:  MEDICATIONS  (STANDING):  amLODIPine   Tablet 5 milliGRAM(s) Oral daily  atorvastatin 10 milliGRAM(s) Oral at bedtime  cholecalciferol 1000 Unit(s) Oral daily  dextrose 5%. 1000 milliLiter(s) (50 mL/Hr) IV Continuous <Continuous>  dextrose 50% Injectable 12.5 Gram(s) IV Push once  dextrose 50% Injectable 25 Gram(s) IV Push once  dextrose 50% Injectable 25 Gram(s) IV Push once  DULoxetine 120 milliGRAM(s) Oral daily  enoxaparin Injectable 40 milliGRAM(s) SubCutaneous daily  fenofibrate Tablet 145 milliGRAM(s) Oral daily  influenza   Vaccine 0.5 milliLiter(s) IntraMuscular once  insulin lispro (ADMELOG) corrective regimen sliding scale   SubCutaneous three times a day before meals  metoprolol tartrate 25 milliGRAM(s) Oral two times a day  multivitamin 1 Tablet(s) Oral daily  pantoprazole    Tablet 40 milliGRAM(s) Oral before breakfast  polyethylene glycol 3350 17 Gram(s) Oral daily    MEDICATIONS  (PRN):  acetaminophen   Tablet .. 650 milliGRAM(s) Oral every 6 hours PRN Moderate Pain (4 - 6)  ALBUTerol    90 MICROgram(s) HFA Inhaler 2 Puff(s) Inhalation every 6 hours PRN Shortness of Breath and/or Wheezing  dextrose 40% Gel 15 Gram(s) Oral once PRN Blood Glucose LESS THAN 70 milliGRAM(s)/deciliter  glucagon  Injectable 1 milliGRAM(s) IntraMuscular once PRN Glucose LESS THAN 70 milligrams/deciliter  oxyCODONE    IR 5 milliGRAM(s) Oral every 6 hours PRN Severe Pain (7 - 10)  senna 2 Tablet(s) Oral at bedtime PRN Constipation      LABS:                        13.1   6.19  )-----------( 554      ( 12 Nov 2020 05:20 )             42.0     11-12    141  |  103  |  24<H>  ----------------------------<  106<H>  3.4<L>   |  27  |  0.96    Ca    9.3      12 Nov 2020 05:20  Phos  4.3     11-12  Mg     1.9     11-12    TPro  6.6  /  Alb  3.1<L>  /  TBili  0.5  /  DBili  x   /  AST  18  /  ALT  18  /  AlkPhos  78  11-12      VITALS:  T(C): 36.9 (11-12-20 @ 22:32), Max: 36.9 (11-12-20 @ 22:32)  T(F): 98.5 (11-12-20 @ 22:32), Max: 98.5 (11-12-20 @ 22:32)  HR: 99 (11-12-20 @ 22:32) (98 - 99)  BP: 132/80 (11-12-20 @ 22:32) (132/80 - 140/89)  BP(mean): --  ABP: --  ABP(mean): --  RR: 17 (11-12-20 @ 22:32) (16 - 17)  SpO2: 98% (11-12-20 @ 22:32) (96% - 98%)  CVP(mm Hg): --  CVP(cm H2O): --    Ins and Outs     11-12-20 @ 07:01  -  11-13-20 @ 07:00  --------------------------------------------------------  IN: 300 mL / OUT: 0 mL / NET: 300 mL                I&O's Detail    12 Nov 2020 07:01  -  13 Nov 2020 07:00  --------------------------------------------------------  IN:    Oral Fluid: 300 mL  Total IN: 300 mL    OUT:  Total OUT: 0 mL    Total NET: 300 mL

## 2020-11-13 NOTE — PROGRESS NOTE ADULT - SUBJECTIVE AND OBJECTIVE BOX
Patient is a 60y old  Female who presents with a chief complaint of COVID-19 PNA (12 Nov 2020 13:18)    Patient seen and examined at bedside.  S: Reports feeling well, minimal intermittent dry cough. Denies sob, no f/c.     ALLERGIES:  penicillin (Other)    MEDICATIONS:  acetaminophen   Tablet .. 650 milliGRAM(s) Oral every 6 hours PRN  ALBUTerol    90 MICROgram(s) HFA Inhaler 2 Puff(s) Inhalation every 6 hours PRN  DULoxetine 120 milliGRAM(s) Oral daily  influenza   Vaccine 0.5 milliLiter(s) IntraMuscular once  oxyCODONE    IR 5 milliGRAM(s) Oral every 6 hours PRN    Vital Signs Last 24 Hrs  T(F): 98.5 (12 Nov 2020 22:32), Max: 98.5 (12 Nov 2020 22:32)  HR: 99 (12 Nov 2020 22:32) (98 - 99)  BP: 132/80 (12 Nov 2020 22:32) (132/80 - 140/89)  RR: 17 (12 Nov 2020 22:32) (16 - 17)  SpO2: 98% (12 Nov 2020 22:32) (96% - 98%)  I&O's Summary    12 Nov 2020 07:01  -  13 Nov 2020 07:00  --------------------------------------------------------  IN: 300 mL / OUT: 0 mL / NET: 300 mL      PHYSICAL EXAM:  General: NAD, A/O x 3  ENT: MMM. +Intermittent cough  Lungs: Clear to auscultation bilaterally   Cardio: RR, S1/S2, No murmurs  Abdomen: Soft, NT/ND, Normal active Bowel Sounds   Extremities: No cyanosis, No edema    LABS:                        13.1   6.19  )-----------( 554      ( 12 Nov 2020 05:20 )             42.0     11-12    141  |  103  |  24  ----------------------------<  106  3.4   |  27  |  0.96    Ca    9.3      12 Nov 2020 05:20  Phos  4.3     11-12  Mg     1.9     11-12    TPro  6.6  /  Alb  3.1  /  TBili  0.5  /  DBili  x   /  AST  18  /  ALT  18  /  AlkPhos  78  11-12    eGFR if : 74 mL/min/1.73M2 (11-12-20 @ 05:20)  eGFR if Non African American: 64 mL/min/1.73M2 (11-12-20 @ 05:20)      POCT Blood Glucose.: 140 mg/dL (13 Nov 2020 08:31)  POCT Blood Glucose.: 139 mg/dL (12 Nov 2020 17:08)          RADIOLOGY & ADDITIONAL TESTS:  < from: CT Chest No Cont (11.03.20 @ 09:52) >  IMPRESSION:  Multifocal viral/atypical pneumonia. Appearance is very suggestive of infection with Covid 19      Care Discussed with Consultants/Other Providers:   ANNETTA Chamberlain discussed case & plan of care loren Hamilton.    Patient is a 60y old  Female who presents with a chief complaint of COVID-19 PNA (12 Nov 2020 13:18)    Patient seen and examined at bedside.  S: Reports feeling well, minimal intermittent dry cough. Denies sob, no f/c.     ALLERGIES:  penicillin (Other)    MEDICATIONS:  acetaminophen   Tablet .. 650 milliGRAM(s) Oral every 6 hours PRN  ALBUTerol    90 MICROgram(s) HFA Inhaler 2 Puff(s) Inhalation every 6 hours PRN  DULoxetine 120 milliGRAM(s) Oral daily  influenza   Vaccine 0.5 milliLiter(s) IntraMuscular once  oxyCODONE    IR 5 milliGRAM(s) Oral every 6 hours PRN    Vital Signs Last 24 Hrs  T(F): 98.5 (12 Nov 2020 22:32), Max: 98.5 (12 Nov 2020 22:32)  HR: 99 (12 Nov 2020 22:32) (98 - 99)  BP: 132/80 (12 Nov 2020 22:32) (132/80 - 140/89)  RR: 17 (12 Nov 2020 22:32) (16 - 17)  SpO2: 98% (12 Nov 2020 22:32) (96% - 98%)  I&O's Summary    12 Nov 2020 07:01  -  13 Nov 2020 07:00  --------------------------------------------------------  IN: 300 mL / OUT: 0 mL / NET: 300 mL    PHYSICAL EXAM:  General: NAD, A/O x 3  ENT: MMM. +Intermittent cough  Lungs: Clear to auscultation bilaterally   Cardio: RR, S1/S2, No murmurs  Abdomen: Soft, NT/ND, Normal active Bowel Sounds   Extremities: No cyanosis, No edema    LABS:                   13.1   6.19  )-----------( 554      ( 12 Nov 2020 05:20 )             42.0     11-12  141  |  103  |  24  ----------------------------<  106  3.4   |  27  |  0.96    Ca    9.3      12 Nov 2020 05:20  Phos  4.3     11-12  Mg     1.9     11-12    TPro  6.6  /  Alb  3.1  /  TBili  0.5  /  DBili  x   /  AST  18  /  ALT  18  /  AlkPhos  78  11-12    eGFR if : 74 mL/min/1.73M2 (11-12-20 @ 05:20)  eGFR if Non African American: 64 mL/min/1.73M2 (11-12-20 @ 05:20)    POCT Blood Glucose.: 140 mg/dL (13 Nov 2020 08:31)  POCT Blood Glucose.: 139 mg/dL (12 Nov 2020 17:08)    RADIOLOGY & ADDITIONAL TESTS:  < from: CT Chest No Cont (11.03.20 @ 09:52) >  IMPRESSION:  Multifocal viral/atypical pneumonia. Appearance is very suggestive of infection with Covid 19    Care Discussed with Consultants/Other Providers:   ANNETTA Chamberlain discussed case & plan of care loren Hamilton.

## 2020-11-13 NOTE — PROGRESS NOTE ADULT - ASSESSMENT
59yo female w. [facility] HTN, HLD, Osteoarthritis, anxiety/depression disorders, GERD, eczema, sinusitis, scoliosis/spinal stenosis- lumbar region, presented to [facility] 9/29/20 for scheduled stage 1, L1-5 lumbar lateral interbody fusion with Dr. Valdez. Stage 2 T9-pelvis arthrodesis, L5-S1 TLIF done 9/30/20 with closure of muscle flap done by plastics, Dr. Alegre. Post-op course complicated by hemorrhagic shock requiring transfer to ICU, fluid resuscitation with IV hydration and pressors. Patient was transferred to [facility] on 10/10/20 for comprehensive rehab. Now found to be Covid-19+.     #Viral PNA likely 2/2 to COVID-19   #Acute hypoxic respiratory failure - resolved   - Monitor O2; monitor respiratory status closely, currently tolerating room air  - Maintain strict isolation precautions, use proper PPE.  - Provide Acetaminophen 650 mg PO q6  - May use Albuterol inhaler PRN  - Continue decadron IV x 10 days (Day 7)  - Remdesivir completed  - Monitor glucose while in steroids   - Monitor inflammatory markers  - Pulm following  - will reswab COVID today    #Hypokalemia   - K = 3.4  - Will supplement and monitor    #HTN  - Chronic, controlled  - Continue home medication Norvasc, Metoprolol    #HLD  - Chronic, controlled  - Continue statin, fenofibrate    #Anxiety/Depression  - Chronic  - Continue home medication Cymbalta 120mg    #Pre-diabetes  - A1C 6.4  - Continue low dose insulin sliding scale  - Hypoglycemia protocol, accu-cheks    DVT prophylaxis: Lovenox  PT - possible home PT  OT - home with home OT    Dispo: Home with home PT and OT  As per [facility] guidelines for home discharge of symptomatic COVID-19 positive patients: If patient fever-free with no respiratory symptoms x24 hours, and there have been at least 10 days since initial reported positive specimen, patient can be discharged home with instructions to maintain self-isolation up to day 11 following positive test result. Patient initially tested positive on 11/1, has been symptom free for over 24 hrs- today is day 11 of isolation following initial test, i.e. last day of isolation, and so as per guidelines should be able to be discharged home safely tomorrow.     Patient lives with  who is high-risk. Unable to quarantine.     is Edward - patient states that she will call her  to give update 61yo female w. [facility] HTN, HLD, Osteoarthritis, anxiety/depression disorders, GERD, eczema, sinusitis, scoliosis/spinal stenosis- lumbar region, presented to [facility] 9/29/20 for scheduled stage 1, L1-5 lumbar lateral interbody fusion with Dr. Valdez. Stage 2 T9-pelvis arthrodesis, L5-S1 TLIF done 9/30/20 with closure of muscle flap done by plastics, Dr. Alegre. Post-op course complicated by hemorrhagic shock requiring transfer to ICU, fluid resuscitation with IV hydration and pressors. Patient was transferred to [facility] on 10/10/20 for comprehensive rehab. Now found to be Covid-19+.     *Viral PNA likely 2/2 to COVID-19   *cute hypoxic respiratory failure - resolved   Monitor O2; monitor respiratory status closely, currently tolerating room air  Maintain strict isolation precautions  Cont Acetaminophen prn  May use Albuterol inhaler PRN  Continue Decadron IV x 10 days (Day 8)  Remdesivir completed  Monitor glucose while in steroids   Monitor inflammatory markers  Appreciate Pulm recs  - will reswab COVID today    *Hypokalemia   s/p repletion    *HTN  Cont home Norvasc, Metoprolol    *HLD  Continue statin, fenofibrate    *Anxiety/Depression  Stable, Continue home medication Cymbalta 120mg    *Pre-diabetes  A1C 6.4  Cont FS/ISS    *DVT prophylaxis  Lovenox      Dispo:   PT/OT Recs for Home w. Home PT/OT.  As per [facility] guidelines for home discharge of symptomatic COVID-19 positive patients: If patient fever-free with no respiratory symptoms x24 hours, and there have been at least 10 days since initial reported positive specimen, patient can be discharged home with instructions to maintain self-isolation up to day 11 following positive test result. Patient initially tested positive on 11/1, has been symptom free for over 24 hrs- today is day 11 of isolation following initial test, i.e. last day of isolation, and so as per guidelines should be able to be discharged home safely tomorrow.   -Patient lives with  who is high-risk. Unable to quarantine. Pt. sts she will discharge tomorrow to [place].   , Jatin - patient states that she will call her  to provide updates.

## 2020-11-14 ENCOUNTER — TRANSCRIPTION ENCOUNTER (OUTPATIENT)
Age: 60
End: 2020-11-14

## 2020-11-14 VITALS
SYSTOLIC BLOOD PRESSURE: 131 MMHG | TEMPERATURE: 98 F | OXYGEN SATURATION: 97 % | RESPIRATION RATE: 16 BRPM | HEART RATE: 96 BPM | DIASTOLIC BLOOD PRESSURE: 81 MMHG

## 2020-11-14 LAB
ANION GAP SERPL CALC-SCNC: 11 MMOL/L — SIGNIFICANT CHANGE UP (ref 5–17)
BUN SERPL-MCNC: 28 MG/DL — HIGH (ref 7–23)
CALCIUM SERPL-MCNC: 9.8 MG/DL — SIGNIFICANT CHANGE UP (ref 8.4–10.5)
CHLORIDE SERPL-SCNC: 103 MMOL/L — SIGNIFICANT CHANGE UP (ref 96–108)
CO2 SERPL-SCNC: 25 MMOL/L — SIGNIFICANT CHANGE UP (ref 22–31)
CREAT SERPL-MCNC: 0.92 MG/DL — SIGNIFICANT CHANGE UP (ref 0.5–1.3)
GLUCOSE SERPL-MCNC: 106 MG/DL — HIGH (ref 70–99)
POTASSIUM SERPL-MCNC: 3.6 MMOL/L — SIGNIFICANT CHANGE UP (ref 3.5–5.3)
POTASSIUM SERPL-SCNC: 3.6 MMOL/L — SIGNIFICANT CHANGE UP (ref 3.5–5.3)
SODIUM SERPL-SCNC: 139 MMOL/L — SIGNIFICANT CHANGE UP (ref 135–145)

## 2020-11-14 PROCEDURE — 84100 ASSAY OF PHOSPHORUS: CPT

## 2020-11-14 PROCEDURE — 84145 PROCALCITONIN (PCT): CPT

## 2020-11-14 PROCEDURE — 83735 ASSAY OF MAGNESIUM: CPT

## 2020-11-14 PROCEDURE — 87635 SARS-COV-2 COVID-19 AMP PRB: CPT

## 2020-11-14 PROCEDURE — 85025 COMPLETE CBC W/AUTO DIFF WBC: CPT

## 2020-11-14 PROCEDURE — U0003: CPT

## 2020-11-14 PROCEDURE — 36415 COLL VENOUS BLD VENIPUNCTURE: CPT

## 2020-11-14 PROCEDURE — 86140 C-REACTIVE PROTEIN: CPT

## 2020-11-14 PROCEDURE — 97116 GAIT TRAINING THERAPY: CPT

## 2020-11-14 PROCEDURE — 97162 PT EVAL MOD COMPLEX 30 MIN: CPT

## 2020-11-14 PROCEDURE — 82728 ASSAY OF FERRITIN: CPT

## 2020-11-14 PROCEDURE — 97110 THERAPEUTIC EXERCISES: CPT

## 2020-11-14 PROCEDURE — 97166 OT EVAL MOD COMPLEX 45 MIN: CPT

## 2020-11-14 PROCEDURE — 80048 BASIC METABOLIC PNL TOTAL CA: CPT

## 2020-11-14 PROCEDURE — 85027 COMPLETE CBC AUTOMATED: CPT

## 2020-11-14 PROCEDURE — 82962 GLUCOSE BLOOD TEST: CPT

## 2020-11-14 PROCEDURE — 83615 LACTATE (LD) (LDH) ENZYME: CPT

## 2020-11-14 PROCEDURE — 80053 COMPREHEN METABOLIC PANEL: CPT

## 2020-11-14 PROCEDURE — 99239 HOSP IP/OBS DSCHRG MGMT >30: CPT

## 2020-11-14 PROCEDURE — 97535 SELF CARE MNGMENT TRAINING: CPT

## 2020-11-14 PROCEDURE — 97530 THERAPEUTIC ACTIVITIES: CPT

## 2020-11-14 RX ORDER — ACETAMINOPHEN 500 MG
2 TABLET ORAL
Qty: 0 | Refills: 0 | DISCHARGE
Start: 2020-11-14

## 2020-11-14 RX ORDER — OXYCODONE HYDROCHLORIDE 5 MG/1
1 TABLET ORAL
Qty: 20 | Refills: 0
Start: 2020-11-14 | End: 2020-11-18

## 2020-11-14 RX ADMIN — Medication 25 MILLIGRAM(S): at 06:55

## 2020-11-14 RX ADMIN — Medication 1000 UNIT(S): at 12:02

## 2020-11-14 RX ADMIN — AMLODIPINE BESYLATE 5 MILLIGRAM(S): 2.5 TABLET ORAL at 06:57

## 2020-11-14 RX ADMIN — OXYCODONE HYDROCHLORIDE 5 MILLIGRAM(S): 5 TABLET ORAL at 00:42

## 2020-11-14 RX ADMIN — DULOXETINE HYDROCHLORIDE 120 MILLIGRAM(S): 30 CAPSULE, DELAYED RELEASE ORAL at 11:59

## 2020-11-14 RX ADMIN — Medication 145 MILLIGRAM(S): at 11:59

## 2020-11-14 RX ADMIN — OXYCODONE HYDROCHLORIDE 5 MILLIGRAM(S): 5 TABLET ORAL at 07:41

## 2020-11-14 RX ADMIN — OXYCODONE HYDROCHLORIDE 5 MILLIGRAM(S): 5 TABLET ORAL at 06:41

## 2020-11-14 RX ADMIN — Medication 1 TABLET(S): at 11:59

## 2020-11-14 RX ADMIN — PANTOPRAZOLE SODIUM 40 MILLIGRAM(S): 20 TABLET, DELAYED RELEASE ORAL at 06:41

## 2020-11-14 RX ADMIN — OXYCODONE HYDROCHLORIDE 5 MILLIGRAM(S): 5 TABLET ORAL at 12:02

## 2020-11-14 NOTE — PROGRESS NOTE ADULT - ASSESSMENT
61yo female w. [facility] HTN, HLD, Osteoarthritis, anxiety/depression disorders, GERD, eczema, sinusitis, scoliosis/spinal stenosis- lumbar region, presented to [facility] 9/29/20 for scheduled stage 1, L1-5 lumbar lateral interbody fusion with Dr. Valdez. Stage 2 T9-pelvis arthrodesis, L5-S1 TLIF done 9/30/20 with closure of muscle flap done by plastics, Dr. Alegre. Post-op course complicated by hemorrhagic shock requiring transfer to ICU, fluid resuscitation with IV hydration and pressors. Patient was transferred to [facility] on 10/10/20 for comprehensive rehab. Now found to be Covid-19+. Now clinically improving.     *Viral PNA likely 2/2 to COVID-19   *Acute hypoxic respiratory failure - resolved   Monitor O2; monitor respiratory status closely, currently tolerating room air  Maintain strict isolation precautions  Cont Acetaminophen prn  May use Albuterol inhaler PRN  Continue Decadron IV x 10 days (Day 8)  Remdesivir completed  Appreciate Pulm recs    *Hypokalemia   s/p repletion    *HTN  Cont home Norvasc, Metoprolol    *HLD  Continue statin, fenofibrate    *Anxiety/Depression  Stable, Continue home medication Cymbalta 120mg    *Pre-diabetes  A1C 6.4  Cont FS/ISS while in house     *DVT prophylaxis  Lovenox    Dispo: Medically cleared for discharge today. p/s she has a safe discharge plan with ride to [place] today.

## 2020-11-14 NOTE — DISCHARGE NOTE PROVIDER - NSDCCPCAREPLAN_GEN_ALL_CORE_FT
PRINCIPAL DISCHARGE DIAGNOSIS  Diagnosis: COVID-19  Assessment and Plan of Treatment:       SECONDARY DISCHARGE DIAGNOSES  Diagnosis: Pre-diabetes  Assessment and Plan of Treatment:     Diagnosis: HTN (hypertension)  Assessment and Plan of Treatment:     Diagnosis: Viral pneumonia  Assessment and Plan of Treatment:     Diagnosis: Hypokalemia  Assessment and Plan of Treatment:     Diagnosis: Hypoxia  Assessment and Plan of Treatment:

## 2020-11-14 NOTE — PROGRESS NOTE ADULT - SUBJECTIVE AND OBJECTIVE BOX
Follow-up Pulmonary Progress Note  Chief Complaint : Infection due to severe acute respiratory syndrome coronavirus 2 (SARS-CoV-2)    No new events overnight.  Denies SOB/CP.       Allergies :penicillin (Other)      PAST MEDICAL & SURGICAL HISTORY:  Lumbar spinal stenosis    History of sciatica  mostly right side    H/O sinusitis    Eczema  extremities    Scoliosis    Meniscus tear  left knee    Spinal stenosis in cervical region    Anxiety and depression    Migraine    GERD (gastroesophageal reflux disease)    Seasonal allergies    Hyperlipidemia    HTN (hypertension)    H/O cervical spine surgery  C3-6   11/2015    History of tonsillectomy    History of appendectomy        Medications:  MEDICATIONS  (STANDING):  amLODIPine   Tablet 5 milliGRAM(s) Oral daily  atorvastatin 10 milliGRAM(s) Oral at bedtime  cholecalciferol 1000 Unit(s) Oral daily  dextrose 5%. 1000 milliLiter(s) (50 mL/Hr) IV Continuous <Continuous>  dextrose 50% Injectable 12.5 Gram(s) IV Push once  dextrose 50% Injectable 25 Gram(s) IV Push once  dextrose 50% Injectable 25 Gram(s) IV Push once  DULoxetine 120 milliGRAM(s) Oral daily  enoxaparin Injectable 40 milliGRAM(s) SubCutaneous daily  fenofibrate Tablet 145 milliGRAM(s) Oral daily  influenza   Vaccine 0.5 milliLiter(s) IntraMuscular once  insulin lispro (ADMELOG) corrective regimen sliding scale   SubCutaneous three times a day before meals  metoprolol tartrate 25 milliGRAM(s) Oral two times a day  multivitamin 1 Tablet(s) Oral daily  pantoprazole    Tablet 40 milliGRAM(s) Oral before breakfast  polyethylene glycol 3350 17 Gram(s) Oral daily    MEDICATIONS  (PRN):  acetaminophen   Tablet .. 650 milliGRAM(s) Oral every 6 hours PRN Moderate Pain (4 - 6)  ALBUTerol    90 MICROgram(s) HFA Inhaler 2 Puff(s) Inhalation every 6 hours PRN Shortness of Breath and/or Wheezing  dextrose 40% Gel 15 Gram(s) Oral once PRN Blood Glucose LESS THAN 70 milliGRAM(s)/deciliter  glucagon  Injectable 1 milliGRAM(s) IntraMuscular once PRN Glucose LESS THAN 70 milligrams/deciliter  oxyCODONE    IR 5 milliGRAM(s) Oral every 6 hours PRN Severe Pain (7 - 10)  senna 2 Tablet(s) Oral at bedtime PRN Constipation      LABS:    11-14    139  |  103  |  28<H>  ----------------------------<  106<H>  3.6   |  25  |  0.92    Ca    9.8      14 Nov 2020 07:00                          CULTURES: (if applicable)    Culture - Blood (collected 11-03-20 @ 10:00)  Source: .Blood Blood  Final Report (11-08-20 @ 18:01):    No Growth Final            CAPILLARY BLOOD GLUCOSE      POCT Blood Glucose.: 116 mg/dL (13 Nov 2020 22:25)      RADIOLOGY  CXR:      CT:    ECHO:      VITALS:  T(C): 36.8 (11-14-20 @ 06:36), Max: 36.8 (11-14-20 @ 06:36)  T(F): 98.2 (11-14-20 @ 06:36), Max: 98.2 (11-14-20 @ 06:36)  HR: 96 (11-14-20 @ 06:36) (91 - 96)  BP: 131/81 (11-14-20 @ 06:36) (124/72 - 131/81)  BP(mean): --  ABP: --  ABP(mean): --  RR: 16 (11-14-20 @ 06:36) (16 - 16)  SpO2: 97% (11-14-20 @ 06:36) (97% - 97%)  CVP(mm Hg): --  CVP(cm H2O): --    Ins and Outs   I&O's Detail

## 2020-11-14 NOTE — PROGRESS NOTE ADULT - SUBJECTIVE AND OBJECTIVE BOX
Patient is a 60y old  Female who presents with a chief complaint of COVID-19 PNA (13 Nov 2020 13:52)    Patient seen and examined at bedside.  S: Reports feeling well. Denies sob/cp/palpitations.    ALLERGIES:  penicillin (Other)    MEDICATIONS:  acetaminophen   Tablet .. 650 milliGRAM(s) Oral every 6 hours PRN  ALBUTerol    90 MICROgram(s) HFA Inhaler 2 Puff(s) Inhalation every 6 hours PRN  DULoxetine 120 milliGRAM(s) Oral daily  influenza   Vaccine 0.5 milliLiter(s) IntraMuscular once  oxyCODONE    IR 5 milliGRAM(s) Oral every 6 hours PRN    Vital Signs Last 24 Hrs  T(F): 98.2 (14 Nov 2020 06:36), Max: 98.2 (14 Nov 2020 06:36)  HR: 96 (14 Nov 2020 06:36) (91 - 96)  BP: 131/81 (14 Nov 2020 06:36) (124/72 - 131/81)  RR: 16 (14 Nov 2020 06:36) (16 - 16)  SpO2: 97% (14 Nov 2020 06:36) (97% - 97%)  I&O's Summary      PHYSICAL EXAM:  General: NAD, A/O x 3  ENT: MMM. +Intermittent cough  Lungs: Clear to auscultation bilaterally   Cardio: RR, S1/S2, No murmurs  Abdomen: Soft, NT/ND, Normal active Bowel Sounds   Extremities: No cyanosis, No edema    LABS:                        13.1   6.19  )-----------( 554      ( 12 Nov 2020 05:20 )             42.0     11-14    139  |  103  |  28  ----------------------------<  106  3.6   |  25  |  0.92    Ca    9.8      14 Nov 2020 07:00  Phos  4.3     11-12  Mg     1.9     11-12    TPro  6.6  /  Alb  3.1  /  TBili  0.5  /  DBili  x   /  AST  18  /  ALT  18  /  AlkPhos  78  11-12    eGFR if Non African American: 68 mL/min/1.73M2 (11-14-20 @ 07:00)  eGFR if : 78 mL/min/1.73M2 (11-14-20 @ 07:00)      POCT Blood Glucose.: 116 mg/dL (13 Nov 2020 22:25)  POCT Blood Glucose.: 144 mg/dL (13 Nov 2020 17:37)  POCT Blood Glucose.: 119 mg/dL (13 Nov 2020 12:41)        Care Discussed with Consultants/Other Providers:   ANNETTA Chamberlain discussed case & plan loren Hamilton.

## 2020-11-14 NOTE — DISCHARGE NOTE NURSING/CASE MANAGEMENT/SOCIAL WORK - PATIENT PORTAL LINK FT
You can access the FollowMyHealth Patient Portal offered by [facility] by registering at the following website: [email or web address]. By joining [employer]’s FollowMyHealth portal, you will also be able to view your health information using other applications (apps) compatible with our system.

## 2020-11-14 NOTE — DISCHARGE NOTE PROVIDER - NSDCMRMEDTOKEN_GEN_ALL_CORE_FT
acetaminophen 325 mg oral tablet: 2 tab(s) orally every 6 hours, As needed, Moderate Pain (4 - 6)  amLODIPine 5 mg oral tablet: 1 tab(s) orally once a day  atorvastatin 10 mg oral tablet: 1 tab(s) orally once a day, evening   cholecalciferol 1000 intl units (25 mcg) oral tablet: 1  orally once a day   Cymbalta 60 mg oral delayed release capsule: 2 cap(s) orally once a day x anxiety and body pain  fenofibrate 145 mg oral tablet: 1 tab(s) orally once a day  metoprolol tartrate 25 mg oral tablet: 1 tab(s) orally 2 times a day  Multiple Vitamins oral tablet: 1 tab(s) orally once a day  oxyCODONE 10 mg oral tablet: 1 tab(s) orally every 6 hours, As needed, Severe Pain (7 - 10) MDD:4 tablets  pantoprazole 40 mg oral delayed release tablet: 1 tab(s) orally once a day (before a meal)  polyethylene glycol 3350 oral powder for reconstitution: 17 gram(s) orally once a day  SUMAtriptan 25 mg oral tablet: 1 tab(s) orally 4 times a day, As needed, Migraine

## 2020-11-14 NOTE — DISCHARGE NOTE PROVIDER - PROVIDER TOKENS
FREE:[LAST:[Primary Care Physician],PHONE:[(   )    -],FAX:[(   )    -],ADDRESS:[Please follow up with you Primary Care Physician in [place].]]

## 2020-11-14 NOTE — PROGRESS NOTE ADULT - ATTENDING COMMENTS
I have personally seen and examined patient on the above date.  I discussed the case with Fannie Chamberlain NP and I agree with findings and plan as detailed per note above, which I have amended where appropriate.      Covid 19 infection  - mostly asymptomatic  - not requiring O2  - for discharge today  - follow up with PCP as outpatient  - DME setup by case management
I have personally seen and examined patient on the above date.  I discussed the case with Brandy Manuel NP and I agree with findings and plan as detailed per note above, which I have amended where appropriate.    COVID 19 Pneumonia  acute hypoxic respiratory failure (resolved)  s/p completion of remdesivir  currently on day 7/10 decadron    dispo: suspect discharge to home tomorrow.  Patient will no longer require quarantine.
I have personally seen and examined patient on the above date.  I discussed the case with Fannie Chamberlain NP and I agree with findings and plan as detailed per note above, which I have amended where appropriate.      COVID 19 infection  - asymptomatic now  - off O2    Dispo: awaiting discharge tomorrow to home with home services

## 2020-11-14 NOTE — PROGRESS NOTE ADULT - REASON FOR ADMISSION
COVID-19 PNA

## 2020-11-14 NOTE — PROGRESS NOTE ADULT - ASSESSMENT
Physical Examination:  GENERAL:               Alert, Oriented, No acute distress.    HEENT:                   No JVD, Moist MM  PULM:                     Bilateral air entry, No Rales, No Rhonchi, No Wheezing  CVS:                         S1, S2,  No Murmur  ABD:                        Soft, nondistended, nontender, normoactive bowel sounds,   NEURO:                  Alert, oriented, interactive, nonfocal, follows commands  PSYC:                      Calm, + Insight.      Assessment  1. COVID PNA    2. Abnormal CT chest likely due ot COVID 19 doubt bacterial PNA  3. S/P Lumbar Surgery for spinal stenosis   4. HTN, High chol, Anxiety     Plan  Finished decadron and Remdesivir   monitor on Room air   PT , OT as tolerated  DVT ppx  Tylenol for fever  Pain control   COVID 19 Isolation protocol   OOB as tolerated    Consider dispo planning when able to if home status conducive for safe discharge per primary team

## 2020-11-14 NOTE — DISCHARGE NOTE PROVIDER - HOSPITAL COURSE
Hospital Course  60y Female with PMH HTN, HLD, Osteoarthritis, anxiety/depression disorders, GERD, eczema, sinusitis, scoliosis/spinal stenosis- lumbar region, presented to [facility] 9/29/20 for scheduled stage 1, L1-5 lumbar lateral interbody fusion with Dr. Valdez. Stage 2 T9-pelvis arthrodesis, L5-S1 TLIF done 9/30/20 with closure of muscle flap done by plastics, Dr. Alegre. Post-op course complicated by hemorrhagic shock requiring transfer to ICU, fluid resuscitation with IV hydration and pressors. Patient was transferred to [facility] on 10/10/20 for comprehensive rehab. At Rehab found to be Covid-19+, transferred to acute medical floor for further management. Now clinically improving.     Antibiotic: None  Infection: COVID-19+  Last day of antibiotic: n/a    Discharging Provider:  Fannie Chamberlain NP  Contact Info: [phone]. Please call with any questions or concerns.    Outpatient Provider: Primary Care Provider in [place]     Palliative Care/Advance Care Planning/Code Status: Full Code.

## 2020-11-14 NOTE — DISCHARGE NOTE PROVIDER - CARE PROVIDER_API CALL
Primary Care Physician,   Please follow up with you Primary Care Physician in [place].  Phone: (   )    -  Fax: (   )    -  Follow Up Time:

## 2020-11-18 DIAGNOSIS — E87.6 HYPOKALEMIA: ICD-10-CM

## 2020-11-18 DIAGNOSIS — Z79.899 OTHER LONG TERM (CURRENT) DRUG THERAPY: ICD-10-CM

## 2020-11-18 DIAGNOSIS — J12.89 OTHER VIRAL PNEUMONIA: ICD-10-CM

## 2020-11-18 DIAGNOSIS — M48.061 SPINAL STENOSIS, LUMBAR REGION WITHOUT NEUROGENIC CLAUDICATION: ICD-10-CM

## 2020-11-18 DIAGNOSIS — Z82.49 FAMILY HISTORY OF ISCHEMIC HEART DISEASE AND OTHER DISEASES OF THE CIRCULATORY SYSTEM: ICD-10-CM

## 2020-11-18 DIAGNOSIS — K21.9 GASTRO-ESOPHAGEAL REFLUX DISEASE WITHOUT ESOPHAGITIS: ICD-10-CM

## 2020-11-18 DIAGNOSIS — R73.03 PREDIABETES: ICD-10-CM

## 2020-11-18 DIAGNOSIS — Z88.0 ALLERGY STATUS TO PENICILLIN: ICD-10-CM

## 2020-11-18 DIAGNOSIS — I10 ESSENTIAL (PRIMARY) HYPERTENSION: ICD-10-CM

## 2020-11-18 DIAGNOSIS — F32.9 MAJOR DEPRESSIVE DISORDER, SINGLE EPISODE, UNSPECIFIED: ICD-10-CM

## 2020-11-18 DIAGNOSIS — M41.9 SCOLIOSIS, UNSPECIFIED: ICD-10-CM

## 2020-11-18 DIAGNOSIS — M19.90 UNSPECIFIED OSTEOARTHRITIS, UNSPECIFIED SITE: ICD-10-CM

## 2020-11-18 DIAGNOSIS — J96.01 ACUTE RESPIRATORY FAILURE WITH HYPOXIA: ICD-10-CM

## 2020-11-18 DIAGNOSIS — E78.5 HYPERLIPIDEMIA, UNSPECIFIED: ICD-10-CM

## 2020-11-18 DIAGNOSIS — F41.9 ANXIETY DISORDER, UNSPECIFIED: ICD-10-CM

## 2020-11-18 DIAGNOSIS — U07.1 COVID-19: ICD-10-CM

## 2020-11-19 PROCEDURE — 84484 ASSAY OF TROPONIN QUANT: CPT

## 2020-11-19 PROCEDURE — 87635 SARS-COV-2 COVID-19 AMP PRB: CPT

## 2020-11-19 PROCEDURE — 97530 THERAPEUTIC ACTIVITIES: CPT

## 2020-11-19 PROCEDURE — 80053 COMPREHEN METABOLIC PANEL: CPT

## 2020-11-19 PROCEDURE — 97110 THERAPEUTIC EXERCISES: CPT

## 2020-11-19 PROCEDURE — 97535 SELF CARE MNGMENT TRAINING: CPT

## 2020-11-19 PROCEDURE — 85027 COMPLETE CBC AUTOMATED: CPT

## 2020-11-19 PROCEDURE — 73562 X-RAY EXAM OF KNEE 3: CPT

## 2020-11-19 PROCEDURE — 80048 BASIC METABOLIC PNL TOTAL CA: CPT

## 2020-11-19 PROCEDURE — 83615 LACTATE (LD) (LDH) ENZYME: CPT

## 2020-11-19 PROCEDURE — 83735 ASSAY OF MAGNESIUM: CPT

## 2020-11-19 PROCEDURE — 97167 OT EVAL HIGH COMPLEX 60 MIN: CPT

## 2020-11-19 PROCEDURE — 93005 ELECTROCARDIOGRAM TRACING: CPT

## 2020-11-19 PROCEDURE — 36415 COLL VENOUS BLD VENIPUNCTURE: CPT

## 2020-11-19 PROCEDURE — 87086 URINE CULTURE/COLONY COUNT: CPT

## 2020-11-19 PROCEDURE — 82550 ASSAY OF CK (CPK): CPT

## 2020-11-19 PROCEDURE — 97112 NEUROMUSCULAR REEDUCATION: CPT

## 2020-11-19 PROCEDURE — 84145 PROCALCITONIN (PCT): CPT

## 2020-11-19 PROCEDURE — 82962 GLUCOSE BLOOD TEST: CPT

## 2020-11-19 PROCEDURE — 97163 PT EVAL HIGH COMPLEX 45 MIN: CPT

## 2020-11-19 PROCEDURE — 72100 X-RAY EXAM L-S SPINE 2/3 VWS: CPT

## 2020-11-19 PROCEDURE — 82728 ASSAY OF FERRITIN: CPT

## 2020-11-19 PROCEDURE — 72072 X-RAY EXAM THORAC SPINE 3VWS: CPT

## 2020-11-19 PROCEDURE — 81001 URINALYSIS AUTO W/SCOPE: CPT

## 2020-11-19 PROCEDURE — 85025 COMPLETE CBC W/AUTO DIFF WBC: CPT

## 2020-11-19 PROCEDURE — 87040 BLOOD CULTURE FOR BACTERIA: CPT

## 2020-11-19 PROCEDURE — 71250 CT THORAX DX C-: CPT

## 2020-11-19 PROCEDURE — 97116 GAIT TRAINING THERAPY: CPT

## 2020-11-19 PROCEDURE — 87186 SC STD MICRODIL/AGAR DIL: CPT

## 2020-11-19 PROCEDURE — 86803 HEPATITIS C AB TEST: CPT

## 2020-11-19 PROCEDURE — U0003: CPT

## 2020-11-24 DIAGNOSIS — Z00.00 ENCOUNTER FOR GENERAL ADULT MEDICAL EXAMINATION W/OUT ABNORMAL FINDINGS: ICD-10-CM

## 2020-12-03 ENCOUNTER — TRANSCRIPTION ENCOUNTER (OUTPATIENT)
Age: 60
End: 2020-12-03

## 2020-12-03 NOTE — PROVIDER CONTACT NOTE (OTHER) - NAME OF MD/NP/PA/DO NOTIFIED:
History and Physical      Aimee Angele Aschoff  YOB: 1990    Date of Service:  12/3/2020    CC:   Ck Power is a 27 y.o. female who presents for complete physical examination. HPI: No new complaints. 8 weeks post-partum- doing well both physically and mentally now, but pregnancy complicated by HELLP syndrome and post  infection. Patient Active Problem List   Diagnosis    GERD (gastroesophageal reflux disease)    Arnold-Chiari deformity (HCC)    Allergic rhinitis    Eustachian tube disorder    Obesity, Class I, BMI 30-34.9    Dysmenorrhea    Stiffness of joints of both hands    Major depressive disorder with single episode, in full remission ([address]    Anxiety       No Known Allergies  Prior to Visit Medications    Medication Sig Taking? Authorizing Provider   citalopram (CELEXA) 20 MG tablet TAKE 1 TABLET DAILY Yes Chepe Simon MD   Multiple Vitamins-Minerals (MULTIVITAMIN ADULT PO) Take by mouth daily Yes Historical Provider, MD        Past Medical History:   Diagnosis Date    Allergic rhinitis     Arnold-Chiari deformity ([facility] [address]     Dysmenorrhea     Eustachian tube disorder     Gallstones     GERD (gastroesophageal reflux disease)      Past Surgical History:   Procedure Laterality Date    ADENOIDECTOMY      BRAIN SURGERY      Cyst removal related to Chiari malformation    BRAIN SURGERY      Chiari decompression    CHOLECYSTECTOMY  3/24/16    TYMPANOSTOMY TUBE PLACEMENT Bilateral     x11- last      Family History   Problem Relation Age of Onset    Anxiety Disorder Sister     Diabetes Maternal Uncle     Colon Cancer Paternal Grandmother 68    Esophageal Cancer Maternal Grandfather 76    Hypertension Mother     Rheum Arthritis Mother     Rheum Arthritis Maternal Grandmother        Review of Systems:  A comprehensive review of systems was negative.       Vitals:    20 1129   BP: 102/60   Site: Right Upper Arm   Position: Sitting   Cuff Size: Large Adult   Pulse: 76   Temp: 96.4 °F (35.8 °C)   Weight: 205 lb (93 kg)   Height: 5' 4.5\" (1.638 m)      Body mass index is 34.64 kg/m². Wt Readings from Last 3 Encounters:   12/03/20 205 lb (93 kg)   01/31/20 194 lb (88 kg)   09/03/19 193 lb (87.5 kg)     BP Readings from Last 3 Encounters:   12/03/20 102/60   01/31/20 104/60   09/03/19 116/68     Physical Exam  Constitutional:       General: She is not in acute distress. Appearance: She is well-developed. HENT:      Head: Normocephalic and atraumatic. Right Ear: Tympanic membrane, ear canal and external ear normal.      Left Ear: Tympanic membrane, ear canal and external ear normal.   Eyes:      General: Lids are normal.      Conjunctiva/sclera: Conjunctivae normal.      Pupils: Pupils are equal, round, and reactive to light. Neck:      Musculoskeletal: Neck supple. Thyroid: No thyroid mass or thyromegaly. Vascular: No carotid bruit. Cardiovascular:      Rate and Rhythm: Normal rate and regular rhythm. Heart sounds: Normal heart sounds. No murmur. No friction rub. No gallop. Pulmonary:      Effort: Pulmonary effort is normal. No respiratory distress. Breath sounds: Normal breath sounds. No wheezing, rhonchi or rales. Chest:      Comments: Breast exam deferred to GYN  Abdominal:      General: Bowel sounds are normal. There is no distension. Palpations: Abdomen is soft. There is no mass. Tenderness: There is no abdominal tenderness. Musculoskeletal: Normal range of motion. General: No tenderness. Lymphadenopathy:      Cervical: No cervical adenopathy. Skin:     General: Skin is warm and dry. Findings: No erythema or rash. Comments: No suspicious lesions. Neurological:      Mental Status: She is alert and oriented to person, place, and time. Coordination: Coordination normal.      Deep Tendon Reflexes: Reflexes are normal and symmetric.    Psychiatric:         Speech: Speech Rajesh MATUTE normal.         Behavior: Behavior normal.         Thought Content: Thought content normal.         Judgment: Judgment normal.         No results found for: CHOLFAST, TRIGLYCFAST, HDL, LDLCALC, LDLDIRECT  No results found for: GLUF, LABA1C  Lab Results   Component Value Date     04/02/2019    K 4.0 04/02/2019    BUN 15 04/02/2019    CREATININE 1.0 04/02/2019    LABGLOM >60 04/02/2019    GFRAA >60 04/02/2019    CALCIUM 8.8 04/02/2019     Lab Results   Component Value Date    ALT 10 04/02/2019    AST 12 (L) 04/02/2019     Lab Results   Component Value Date    HGB 13.1 04/02/2019     Lab Results   Component Value Date    TSH 1.32 04/02/2019         Preventive Care:  Last eye exam: Has appt next week  Last dental exam: 11/12/20  Seatbelt used consistently: yes  Working smoke and carbon monoxide detectors in home: yes   Diet:  1500 calories/day, lower carb  Exercise: Walking 30-60 minutes 5x/week  Advance Directive: N    Social History     Tobacco Use    Smoking status: Never Smoker    Smokeless tobacco: Never Used   Substance Use Topics    Alcohol use: Yes     Comment: Social     Social History     Substance and Sexual Activity   Sexual Activity Never       Immunization History   Administered Date(s) Administered    DTaP 02/04/1991, 04/15/1991, 05/31/1991, 06/06/1992, 03/29/1996    HPV Quadrivalent (Gardasil) 07/01/2008    Hepatitis B 03/31/2000, 05/11/2000, 10/11/2000    Hib, unspecified 02/04/1991, 04/15/1991, 05/31/1991, 06/06/1992    MMR 06/06/1992, 03/29/2002    Meningococcal MCV4P (Menactra) 07/01/2008    Polio OPV 02/04/1991, 04/15/1991, 06/06/1992, 03/29/1996    Td, unspecified formulation 10/04/2002    Tdap (Boostrix, Adacel) 05/27/2008, 05/04/2019       There are no preventive care reminders to display for this patient. Assessment/Plan:  1.  Annual physical exam  Personalized Preventive Plan is provided to patient in written form- see Patient Instructions   - Patient has no Advanced Directive, so was encouraged to complete this document and forward a copy to be scanned into the electronic medical record. - Lipid, Fasting; Future    2. Obesity, Class I, BMI 30-34.9  Making progress post-partum. Patient counseled on diet and exercise. - Comprehensive Metabolic Panel, Fasting; Future  - TSH with Reflex; Future       Return in about 1 year (around 12/3/2021) for CPE.

## 2020-12-05 ENCOUNTER — OUTPATIENT (OUTPATIENT)
Dept: OUTPATIENT SERVICES | Facility: HOSPITAL | Age: 60
LOS: 1 days | End: 2020-12-05
Payer: COMMERCIAL

## 2020-12-05 ENCOUNTER — RESULT REVIEW (OUTPATIENT)
Age: 60
End: 2020-12-05

## 2020-12-05 ENCOUNTER — APPOINTMENT (OUTPATIENT)
Dept: RADIOLOGY | Facility: CLINIC | Age: 60
End: 2020-12-05
Payer: COMMERCIAL

## 2020-12-05 DIAGNOSIS — Z98.89 OTHER SPECIFIED POSTPROCEDURAL STATES: Chronic | ICD-10-CM

## 2020-12-05 DIAGNOSIS — Z00.8 ENCOUNTER FOR OTHER GENERAL EXAMINATION: ICD-10-CM

## 2020-12-05 DIAGNOSIS — Z98.890 OTHER SPECIFIED POSTPROCEDURAL STATES: Chronic | ICD-10-CM

## 2020-12-05 PROCEDURE — 72084 X-RAY EXAM ENTIRE SPI 6/> VW: CPT

## 2020-12-05 PROCEDURE — 72084 X-RAY EXAM ENTIRE SPI 6/> VW: CPT | Mod: 26

## 2020-12-07 ENCOUNTER — APPOINTMENT (OUTPATIENT)
Dept: SPINE | Facility: CLINIC | Age: 60
End: 2020-12-07
Payer: COMMERCIAL

## 2020-12-07 VITALS
BODY MASS INDEX: 28.12 KG/M2 | HEART RATE: 80 BPM | TEMPERATURE: 98 F | SYSTOLIC BLOOD PRESSURE: 118 MMHG | DIASTOLIC BLOOD PRESSURE: 60 MMHG | WEIGHT: 175 LBS | HEIGHT: 66 IN | RESPIRATION RATE: 18 BRPM | OXYGEN SATURATION: 95 %

## 2020-12-07 DIAGNOSIS — Z86.79 PERSONAL HISTORY OF OTHER DISEASES OF THE CIRCULATORY SYSTEM: ICD-10-CM

## 2020-12-07 DIAGNOSIS — Z86.69 PERSONAL HISTORY OF OTHER DISEASES OF THE NERVOUS SYSTEM AND SENSE ORGANS: ICD-10-CM

## 2020-12-07 DIAGNOSIS — M54.5 LOW BACK PAIN: ICD-10-CM

## 2020-12-07 PROCEDURE — 99024 POSTOP FOLLOW-UP VISIT: CPT

## 2020-12-07 NOTE — REVIEW OF SYSTEMS
[Leg Weakness] : leg weakness [Numbness] : numbness [Tingling] : tingling [Difficulty Walking] : difficulty walking [Negative] : Heme/Lymph

## 2020-12-07 NOTE — HISTORY OF PRESENT ILLNESS
[FreeTextEntry1] : 6 week post op following a T 10 to pelvic fusion.   She had been placed in rehab and had COVID postop.  She is now recovering  She has no back pain but does admit to fatigue in her legs.  SHe is walking with a walker,  and can walk about 100 feet. She is falling since she has been home, on her right knee. She feels slipping of her legs and knee.  In addition she reports vertigo which is improved with Bonine.    Scoliosis xrays show significant sagital imbalance improvement.  Hardware intact and good alignment. The lumbar incision is clean and dry, intact with no drainage and no redness.  She says she is essentially pain free.

## 2020-12-07 NOTE — REASON FOR VISIT
[Other: _____] : [unfilled] [de-identified] : 9/30/2020 [de-identified] : 6 [de-identified] : T 11 T 12 L 1 L 2 L3 L4 and L5 decompressive laminectomy and medial facetectomy with T 9 to  S 1 pelvic fusion

## 2021-01-05 ENCOUNTER — NON-APPOINTMENT (OUTPATIENT)
Age: 61
End: 2021-01-05

## 2021-01-21 ENCOUNTER — TRANSCRIPTION ENCOUNTER (OUTPATIENT)
Age: 61
End: 2021-01-21

## 2021-01-25 ENCOUNTER — OUTPATIENT (OUTPATIENT)
Dept: OUTPATIENT SERVICES | Facility: HOSPITAL | Age: 61
LOS: 1 days | End: 2021-01-25
Payer: COMMERCIAL

## 2021-01-25 ENCOUNTER — APPOINTMENT (OUTPATIENT)
Dept: RADIOLOGY | Facility: CLINIC | Age: 61
End: 2021-01-25
Payer: COMMERCIAL

## 2021-01-25 ENCOUNTER — APPOINTMENT (OUTPATIENT)
Dept: SPINE | Facility: CLINIC | Age: 61
End: 2021-01-25
Payer: COMMERCIAL

## 2021-01-25 VITALS
SYSTOLIC BLOOD PRESSURE: 120 MMHG | DIASTOLIC BLOOD PRESSURE: 76 MMHG | OXYGEN SATURATION: 94 % | HEIGHT: 66 IN | TEMPERATURE: 95.7 F | WEIGHT: 175 LBS | BODY MASS INDEX: 28.12 KG/M2 | HEART RATE: 87 BPM

## 2021-01-25 DIAGNOSIS — M41.9 SCOLIOSIS, UNSPECIFIED: ICD-10-CM

## 2021-01-25 DIAGNOSIS — Z98.89 OTHER SPECIFIED POSTPROCEDURAL STATES: Chronic | ICD-10-CM

## 2021-01-25 DIAGNOSIS — Z98.890 OTHER SPECIFIED POSTPROCEDURAL STATES: Chronic | ICD-10-CM

## 2021-01-25 PROCEDURE — 72082 X-RAY EXAM ENTIRE SPI 2/3 VW: CPT

## 2021-01-25 PROCEDURE — 99212 OFFICE O/P EST SF 10 MIN: CPT

## 2021-01-25 PROCEDURE — 72082 X-RAY EXAM ENTIRE SPI 2/3 VW: CPT | Mod: 26

## 2021-01-25 NOTE — ASSESSMENT
[FreeTextEntry1] : 4 month follow up  thoracic - lumbar fusion T 9 to S 1.  Pain free with residual numbness of her legs.  Scoliosis xrays show good alignment and improvement of sagital imbalance with hardware in place.   Continue with pain management and  follow up in three months with a scoliosis image.  She may drive and have a soft tissue massage. No B L T.

## 2021-01-25 NOTE — REASON FOR VISIT
[Follow-Up: _____] : a [unfilled] follow-up visit [Other: _____] : [unfilled] [FreeTextEntry1] : Ms Pang presents with lower back pain and now 4 month postop a thoracic - lumbar fusion.  She has  no back pain and c/o numbness of her left knee and right ankle.   She does have instability of her gait at times from the numbness. Postoperatively she has resolution of her prior pain.  Scoliosis images show stable hardware and significant repair of sagital imbalance.

## 2021-01-25 NOTE — PHYSICAL EXAM
[Motor Strength] : muscle strength was normal in all four extremities [FreeTextEntry8] : using a quad cane

## 2021-03-09 RX ORDER — GABAPENTIN 100 MG/1
100 CAPSULE ORAL
Qty: 30 | Refills: 3 | Status: DISCONTINUED | COMMUNITY
Start: 2021-02-04 | End: 2021-03-09

## 2021-04-07 NOTE — PROGRESS NOTE BEHAVIORAL HEALTH - NSBHFUPINTERVALHXFT_PSY_A_CORE
Changed to tablet, covered by insurance   60 year old female with PMHx HTN, hyperlipidemia, osteoarthritis, anxiety/depression disorders, GERD, eczema, sinusitis, scoliosis/spinal stenosis- lumbar region, presented to [facility] 9/29/20 for scheduled stage 1, L1-5 lumbar lateral interbody fusion with Dr. Valdez. Stage 2 T9-pelvis arthrodesis, L5-S1 TLIF done on 9/30/20 with closure of muscle flap done by plastics, Dr. Alegre. Admitted to NSICU post operatively due to hemorrhagic shock requiring fluid resuscitation with IV hydration and pressors. EBL 1500 cc, s/p 2 u prbc. Patient was weaned off IV pressors 10/1/20 and started on midodrine. Transferred to the floor when stable. Surgical drains removed on 10/7/20. Pt was on antihypertensives, now on midodrine. Pt then stable for DC to [facility] for multidisciplinary acute rehab 10/10/20.     Psychiatry: Psych C/L service was asked to see pt for evaluation of medication management. Pt currently on Cymbalta 120mg PO Q daily prescribed by Asaf Galarza DO (neuro specialist). Pt has been on Cymbalta 120mg Q daily for 8 years and states her mood is stable on it.   Pt seen and evaluated by writer today. She is A&Ox4, pleasant and cooperative. She states she is making progress in therapy and is appreciative of all the services she is receiving in [facility]. She states her sleep is improved with Vistaril 50mg PO QHS started last night. Today she denies feeling hopeless. She was often smiling with writer. Denies issues related to sleep/appetite/concentration/energy. Rest of psychiatric ROS are negative. Suggest continuing Cymbalta 120mg PO Q daily and Vistaril 50mg QHS for sleep.   Psychiatry will continue to follow as needed for emotional support.

## 2021-04-09 NOTE — PATIENT PROFILE ADULT - HARM RISK FACTORS
Component      Latest Ref Rng & Units 3/11/2021 4/3/2021 4/8/2021 4/8/2021             4:45 AM  5:01 PM   Ammonia      11 - 35 umol/L 81 (H) 72 (H) 22 79 (H)     Component      Latest Ref Rng & Units 4/9/2021 4/9/2021          12:59 AM  5:21 AM   Ammonia      11 - 35 umol/L 48 (H) 51 (H) no

## 2021-04-13 NOTE — CHART NOTE - NSCHARTNOTEFT_GEN_A_CORE
Nutrition Initial Assessment    Nutrition Consult Received: Yes [ X  ]  No [   ]    Reason for Initial Nutrition Assessment: Assessment    Source of Information: Unable to conduct a fact to face interview due to limited contact restrictions related to pt's medical condition and isolation precautions. Information obtained from a phone call with the pt and from the EMR.    Admitting Diagnosis: 60y Female admitted for   PAST MEDICAL & SURGICAL HISTORY:  Lumbar spinal stenosis    History of sciatica  mostly right side    H/O sinusitis    Eczema  extremities    Scoliosis    Meniscus tear  left knee    Spinal stenosis in cervical region    Anxiety and depression    Migraine    GERD (gastroesophageal reflux disease)    Seasonal allergies    Hyperlipidemia    HTN (hypertension)    H/O cervical spine surgery  C3-6   11/2015    History of tonsillectomy    History of appendectomy        Subjective Information: pt. reports good intake. tolerates regular diet (diet changed this am). happier about no restrictions. skin intact. s/p lumbar surgery-admitted from rehab w/ COVID 19.  No edema noted. denies any n/v/ diarrhea. last bm this am.      GI Issues: none    Current Nutrition Order: REGULAR  PO Intake:   Good (%) [X   ]    Fair (50-75%) [   ]    Poor (<50%) [   ]    Skin Integrity: intact    Labs:   11-05 Na134 mmol/L<L> Glu 123 mg/dL<H> K+ 3.4 mmol/L<L> Cr  0.77 mg/dL BUN 17 mg/dL Phos n/a   Alb 2.8 g/dL<L> PAB n/a           POCT Blood Glucose.: 129 mg/dL (11-05-20 @ 08:00)  POCT Blood Glucose.: 113 mg/dL (11-04-20 @ 21:30)  POCT Blood Glucose.: 112 mg/dL (11-04-20 @ 17:11)  POCT Blood Glucose.: 143 mg/dL (11-04-20 @ 12:05)    Medications:  MEDICATIONS  (STANDING):  amLODIPine   Tablet 5 milliGRAM(s) Oral daily  atorvastatin 10 milliGRAM(s) Oral at bedtime  cholecalciferol 1000 Unit(s) Oral daily  dexAMETHasone  Injectable 6 milliGRAM(s) IV Push daily  dextrose 5%. 1000 milliLiter(s) (50 mL/Hr) IV Continuous <Continuous>  dextrose 50% Injectable 12.5 Gram(s) IV Push once  dextrose 50% Injectable 25 Gram(s) IV Push once  dextrose 50% Injectable 25 Gram(s) IV Push once  DULoxetine 60 milliGRAM(s) Oral daily  enoxaparin Injectable 40 milliGRAM(s) SubCutaneous daily  fenofibrate Tablet 145 milliGRAM(s) Oral daily  insulin lispro (ADMELOG) corrective regimen sliding scale   SubCutaneous three times a day before meals  metoprolol tartrate 25 milliGRAM(s) Oral two times a day  multivitamin 1 Tablet(s) Oral daily  pantoprazole    Tablet 40 milliGRAM(s) Oral before breakfast  polyethylene glycol 3350 17 Gram(s) Oral daily    MEDICATIONS  (PRN):  acetaminophen   Tablet .. 650 milliGRAM(s) Oral every 6 hours PRN Moderate Pain (4 - 6)  dextrose 40% Gel 15 Gram(s) Oral once PRN Blood Glucose LESS THAN 70 milliGRAM(s)/deciliter  glucagon  Injectable 1 milliGRAM(s) IntraMuscular once PRN Glucose LESS THAN 70 milligrams/deciliter  oxyCODONE    IR 5 milliGRAM(s) Oral every 6 hours PRN Severe Pain (7 - 10)  senna 2 Tablet(s) Oral at bedtime PRN Constipation    Admitted Anthropometrics:    Height (cm): 167.6 (11-03-20 @ 17:45)  Weight (kg): 85.3 (11-03-20 @ 17:45)  BMI (kg/m2): 30.4 (11-03-20 @ 17:45)    Nutrition Focused Physical Exam: Unable to complete due to limited isolation contact precautions at this time.     Estimated Energy Needs (_24_ kcal/kg- __2040_ kcal/kg):   Estimated Protein Needs (.9__ g/kg- ___77 g/kg):   Based on weight of:    [  ] Nutrition Diagnosis:  [ X ] No active nutrition diagnosis at this time  [  ] Current medical condition precludes nutrition intervention    Goal: maintain adequate nutrition/hydration.    Nutrition Interventions:     Recommendations:      RD to follow-up per protocol. Respiratory

## 2021-05-24 ENCOUNTER — APPOINTMENT (OUTPATIENT)
Dept: SPINE | Facility: CLINIC | Age: 61
End: 2021-05-24
Payer: COMMERCIAL

## 2021-05-24 ENCOUNTER — OUTPATIENT (OUTPATIENT)
Dept: OUTPATIENT SERVICES | Facility: HOSPITAL | Age: 61
LOS: 1 days | End: 2021-05-24
Payer: COMMERCIAL

## 2021-05-24 ENCOUNTER — APPOINTMENT (OUTPATIENT)
Dept: RADIOLOGY | Facility: CLINIC | Age: 61
End: 2021-05-24
Payer: COMMERCIAL

## 2021-05-24 VITALS
BODY MASS INDEX: 30.22 KG/M2 | SYSTOLIC BLOOD PRESSURE: 142 MMHG | HEIGHT: 66 IN | HEART RATE: 71 BPM | WEIGHT: 188 LBS | OXYGEN SATURATION: 97 % | TEMPERATURE: 96.7 F | DIASTOLIC BLOOD PRESSURE: 90 MMHG

## 2021-05-24 DIAGNOSIS — Z98.890 OTHER SPECIFIED POSTPROCEDURAL STATES: Chronic | ICD-10-CM

## 2021-05-24 DIAGNOSIS — M41.9 SCOLIOSIS, UNSPECIFIED: ICD-10-CM

## 2021-05-24 DIAGNOSIS — Z98.89 OTHER SPECIFIED POSTPROCEDURAL STATES: Chronic | ICD-10-CM

## 2021-05-24 PROCEDURE — 99212 OFFICE O/P EST SF 10 MIN: CPT

## 2021-05-24 PROCEDURE — 72082 X-RAY EXAM ENTIRE SPI 2/3 VW: CPT | Mod: 26

## 2021-05-24 PROCEDURE — 72082 X-RAY EXAM ENTIRE SPI 2/3 VW: CPT

## 2021-05-24 NOTE — ASSESSMENT
[FreeTextEntry1] : \par \par Follow up in three months with scoliosis xray.  She can decrease Gabapentin and discontinue.  Return in three months with a scoliosis xray.  Shirlene CONTI.

## 2021-05-24 NOTE — REASON FOR VISIT
[Follow-Up: _____] : a [unfilled] follow-up visit [Other: _____] : [unfilled] [FreeTextEntry1] : \par \par 8 months postop following a T 9 to S 1 spinal fusion.   She has a significant amount of diminished pain in her back.  Sitting and  turning or prolonged car rides provoke back pain on the right side.   However overall  she has  improved.  Scoliosis xray show good alignment and hardware in place.

## 2021-06-21 ENCOUNTER — NON-APPOINTMENT (OUTPATIENT)
Age: 61
End: 2021-06-21

## 2021-09-09 ENCOUNTER — TRANSCRIPTION ENCOUNTER (OUTPATIENT)
Age: 61
End: 2021-09-09

## 2021-09-13 ENCOUNTER — APPOINTMENT (OUTPATIENT)
Dept: SPINE | Facility: CLINIC | Age: 61
End: 2021-09-13
Payer: COMMERCIAL

## 2021-09-13 ENCOUNTER — OUTPATIENT (OUTPATIENT)
Dept: OUTPATIENT SERVICES | Facility: HOSPITAL | Age: 61
LOS: 1 days | End: 2021-09-13
Payer: MEDICARE

## 2021-09-13 ENCOUNTER — APPOINTMENT (OUTPATIENT)
Dept: RADIOLOGY | Facility: CLINIC | Age: 61
End: 2021-09-13
Payer: COMMERCIAL

## 2021-09-13 VITALS
DIASTOLIC BLOOD PRESSURE: 72 MMHG | WEIGHT: 200 LBS | SYSTOLIC BLOOD PRESSURE: 174 MMHG | BODY MASS INDEX: 32.14 KG/M2 | HEIGHT: 66 IN | HEART RATE: 97 BPM

## 2021-09-13 DIAGNOSIS — Z98.89 OTHER SPECIFIED POSTPROCEDURAL STATES: Chronic | ICD-10-CM

## 2021-09-13 DIAGNOSIS — Z80.6 FAMILY HISTORY OF LEUKEMIA: ICD-10-CM

## 2021-09-13 DIAGNOSIS — M41.9 SCOLIOSIS, UNSPECIFIED: ICD-10-CM

## 2021-09-13 DIAGNOSIS — Z82.3 FAMILY HISTORY OF STROKE: ICD-10-CM

## 2021-09-13 DIAGNOSIS — Z98.890 OTHER SPECIFIED POSTPROCEDURAL STATES: Chronic | ICD-10-CM

## 2021-09-13 PROCEDURE — 72082 X-RAY EXAM ENTIRE SPI 2/3 VW: CPT | Mod: 26

## 2021-09-13 PROCEDURE — 72082 X-RAY EXAM ENTIRE SPI 2/3 VW: CPT

## 2021-09-13 PROCEDURE — 99212 OFFICE O/P EST SF 10 MIN: CPT

## 2021-09-13 RX ORDER — GABAPENTIN 300 MG/1
300 CAPSULE ORAL AT BEDTIME
Qty: 90 | Refills: 0 | Status: DISCONTINUED | COMMUNITY
Start: 2021-03-09 | End: 2021-09-13

## 2021-09-13 RX ORDER — LORATADINE 5 MG
TABLET,CHEWABLE ORAL
Refills: 0 | Status: DISCONTINUED | COMMUNITY
End: 2021-09-13

## 2021-09-13 NOTE — REASON FOR VISIT
[Follow-Up: _____] : a [unfilled] follow-up visit [Other: _____] : [unfilled] [FreeTextEntry1] : Ms Pang is here at her one year follow up after a T 9 to pelvic fusion.  She has no back pain and no leg pain.  On  9/1/2021 she had a fall due to her left foot drag and after a few days she had ongoign pain and an xrays hsowed intact hardware.  She did take pain  meds and Motrin .  Her pain is under her right breast and there was a suspicion for a rib fracture which was ruled out by a CT scan be her PCP.  The CT scan is not available for review.  Shoulder spasms are present.   Overall she is doing very well and she has recovered fully with no sequela .  Scoliosis xray shows no change in hardware and sagital imbalance  improved.

## 2021-09-13 NOTE — ASSESSMENT
[FreeTextEntry1] : \par 61 year old female one year postop T 9 to pelvic fusion.  Doing well and reporting no pain.  Chronic left  foot drop and routine core strengthening was recommended.  She will report in one year with updated scoliosis xrays.    Avoid B L T.

## 2022-02-08 ENCOUNTER — NON-APPOINTMENT (OUTPATIENT)
Age: 62
End: 2022-02-08

## 2022-02-14 ENCOUNTER — APPOINTMENT (OUTPATIENT)
Dept: SPINE | Facility: CLINIC | Age: 62
End: 2022-02-14
Payer: COMMERCIAL

## 2022-02-14 ENCOUNTER — APPOINTMENT (OUTPATIENT)
Dept: RADIOLOGY | Facility: CLINIC | Age: 62
End: 2022-02-14
Payer: COMMERCIAL

## 2022-02-14 ENCOUNTER — OUTPATIENT (OUTPATIENT)
Dept: OUTPATIENT SERVICES | Facility: HOSPITAL | Age: 62
LOS: 1 days | End: 2022-02-14
Payer: COMMERCIAL

## 2022-02-14 VITALS
HEART RATE: 63 BPM | OXYGEN SATURATION: 95 % | SYSTOLIC BLOOD PRESSURE: 151 MMHG | HEIGHT: 66 IN | DIASTOLIC BLOOD PRESSURE: 93 MMHG

## 2022-02-14 DIAGNOSIS — Z98.890 OTHER SPECIFIED POSTPROCEDURAL STATES: ICD-10-CM

## 2022-02-14 DIAGNOSIS — Z78.9 OTHER SPECIFIED HEALTH STATUS: ICD-10-CM

## 2022-02-14 DIAGNOSIS — Z98.890 OTHER SPECIFIED POSTPROCEDURAL STATES: Chronic | ICD-10-CM

## 2022-02-14 DIAGNOSIS — Z98.89 OTHER SPECIFIED POSTPROCEDURAL STATES: Chronic | ICD-10-CM

## 2022-02-14 DIAGNOSIS — M41.9 SCOLIOSIS, UNSPECIFIED: ICD-10-CM

## 2022-02-14 DIAGNOSIS — F17.200 NICOTINE DEPENDENCE, UNSPECIFIED, UNCOMPLICATED: ICD-10-CM

## 2022-02-14 DIAGNOSIS — Z98.1 ARTHRODESIS STATUS: ICD-10-CM

## 2022-02-14 DIAGNOSIS — F19.90 OTHER PSYCHOACTIVE SUBSTANCE USE, UNSPECIFIED, UNCOMPLICATED: ICD-10-CM

## 2022-02-14 PROCEDURE — 72082 X-RAY EXAM ENTIRE SPI 2/3 VW: CPT

## 2022-02-14 PROCEDURE — 99212 OFFICE O/P EST SF 10 MIN: CPT

## 2022-02-14 PROCEDURE — 72082 X-RAY EXAM ENTIRE SPI 2/3 VW: CPT | Mod: 26

## 2022-02-14 RX ORDER — METHYLPREDNISOLONE 4 MG/1
4 TABLET ORAL
Qty: 1 | Refills: 0 | Status: ACTIVE | COMMUNITY
Start: 2022-02-14 | End: 1900-01-01

## 2022-02-14 NOTE — END OF VISIT
[FreeTextEntry3] : I, Dr. Shine Valdez, evaluated this patient with the Nurse Practitioner, Kim Lombardo, and established the plan of care.  I personally discussed this patient  during the key portions of the history and exam with the Nurse Practitioner at the time of the visit.  I agree with the assessment and plan as written, unless noted below.\par

## 2022-02-14 NOTE — REASON FOR VISIT
[Follow-Up: _____] : a [unfilled] follow-up visit [Other: _____] : [unfilled] [FreeTextEntry1] : Ms Pang is now one and half years post op  T 10 to pelvic fusion.   She was doing well and recently  over the last month began to have left buttock pain that radiates into her leg and to the knee.  Her left ankle numbness is increasing and now affects her foot.  She  has worsening gait balance and does use a cane intermittently.   She denies any falls .   Her pain is a reported at a 5/10.  Recent shoulder surgery  was performed in Dec 2021.   recent x-rays show no change in hardware or alignment.

## 2022-02-14 NOTE — ASSESSMENT
[FreeTextEntry1] : \par \par 1.5 years postop T 10 to pelvic fusion.  Onset of left buttock pain and leg pain with ankle/foot numbness.  Scoliosis xrays are stable in appearance and shows no signs of hardware failure.   If her pain increases in severity she will return with a thoracic lumbar MRI.    She will use Tylenol for pain.   Otherwise, she will return in September as planned with updated xrays.  A medrol dose josefa was ordered with Omeprazole.

## 2022-03-10 NOTE — DIETITIAN INITIAL EVALUATION ADULT. - RD TO REMAIN AVAILABLE
Dapsone Pregnancy And Lactation Text: This medication is Pregnancy Category C and is not considered safe during pregnancy or breast feeding. Use Enhanced Medication Counseling?: No Topical Sulfur Applications Pregnancy And Lactation Text: This medication is Pregnancy Category C and has an unknown safety profile during pregnancy. It is unknown if this topical medication is excreted in breast milk. Birth Control Pills Pregnancy And Lactation Text: This medication should be avoided if pregnant and for the first 30 days post-partum. Topical Sulfur Applications Counseling: Topical Sulfur Counseling: Patient counseled that this medication may cause skin irritation or allergic reactions.  In the event of skin irritation, the patient was advised to reduce the amount of the drug applied or use it less frequently.   The patient verbalized understanding of the proper use and possible adverse effects of topical sulfur application.  All of the patient's questions and concerns were addressed. Tetracycline Counseling: Patient counseled regarding possible photosensitivity and increased risk for sunburn.  Patient instructed to avoid sunlight, if possible.  When exposed to sunlight, patients should wear protective clothing, sunglasses, and sunscreen.  The patient was instructed to call the office immediately if the following severe adverse effects occur:  hearing changes, easy bruising/bleeding, severe headache, or vision changes.  The patient verbalized understanding of the proper use and possible adverse effects of tetracycline.  All of the patient's questions and concerns were addressed. Patient understands to avoid pregnancy while on therapy due to potential birth defects. Tetracycline Pregnancy And Lactation Text: This medication is Pregnancy Category D and not consider safe during pregnancy. It is also excreted in breast milk. Spironolactone Pregnancy And Lactation Text: This medication can cause feminization of the male fetus and should be avoided during pregnancy. The active metabolite is also found in breast milk. Erythromycin Counseling:  I discussed with the patient the risks of erythromycin including but not limited to GI upset, allergic reaction, drug rash, diarrhea, increase in liver enzymes, and yeast infections. Tazorac Pregnancy And Lactation Text: This medication is not safe during pregnancy. It is unknown if this medication is excreted in breast milk. Topical Retinoid Pregnancy And Lactation Text: This medication is Pregnancy Category C. It is unknown if this medication is excreted in breast milk. Topical Clindamycin Counseling: Patient counseled that this medication may cause skin irritation or allergic reactions.  In the event of skin irritation, the patient was advised to reduce the amount of the drug applied or use it less frequently.   The patient verbalized understanding of the proper use and possible adverse effects of clindamycin.  All of the patient's questions and concerns were addressed. Isotretinoin Pregnancy And Lactation Text: This medication is Pregnancy Category X and is considered extremely dangerous during pregnancy. It is unknown if it is excreted in breast milk. Minocycline Counseling: Patient advised regarding possible photosensitivity and discoloration of the teeth, skin, lips, tongue and gums.  Patient instructed to avoid sunlight, if possible.  When exposed to sunlight, patients should wear protective clothing, sunglasses, and sunscreen.  The patient was instructed to call the office immediately if the following severe adverse effects occur:  hearing changes, easy bruising/bleeding, severe headache, or vision changes.  The patient verbalized understanding of the proper use and possible adverse effects of minocycline.  All of the patient's questions and concerns were addressed. Birth Control Pills Counseling: Birth Control Pill Counseling: I discussed with the patient the potential side effects of OCPs including but not limited to increased risk of stroke, heart attack, thrombophlebitis, deep venous thrombosis, hepatic adenomas, breast changes, GI upset, headaches, and depression.  The patient verbalized understanding of the proper use and possible adverse effects of OCPs. All of the patient's questions and concerns were addressed. Doxycycline Pregnancy And Lactation Text: This medication is Pregnancy Category D and not consider safe during pregnancy. It is also excreted in breast milk but is considered safe for shorter treatment courses. Spironolactone Counseling: Patient advised regarding risks of diarrhea, abdominal pain, hyperkalemia, birth defects (for female patients), liver toxicity and renal toxicity. The patient may need blood work to monitor liver and kidney function and potassium levels while on therapy. The patient verbalized understanding of the proper use and possible adverse effects of spironolactone.  All of the patient's questions and concerns were addressed. Dapsone Counseling: I discussed with the patient the risks of dapsone including but not limited to hemolytic anemia, agranulocytosis, rashes, methemoglobinemia, kidney failure, peripheral neuropathy, headaches, GI upset, and liver toxicity.  Patients who start dapsone require monitoring including baseline LFTs and weekly CBCs for the first month, then every month thereafter.  The patient verbalized understanding of the proper use and possible adverse effects of dapsone.  All of the patient's questions and concerns were addressed. Erythromycin Pregnancy And Lactation Text: This medication is Pregnancy Category B and is considered safe during pregnancy. It is also excreted in breast milk. Topical Clindamycin Pregnancy And Lactation Text: This medication is Pregnancy Category B and is considered safe during pregnancy. It is unknown if it is excreted in breast milk. Doxycycline Counseling:  Patient counseled regarding possible photosensitivity and increased risk for sunburn.  Patient instructed to avoid sunlight, if possible.  When exposed to sunlight, patients should wear protective clothing, sunglasses, and sunscreen.  The patient was instructed to call the office immediately if the following severe adverse effects occur:  hearing changes, easy bruising/bleeding, severe headache, or vision changes.  The patient verbalized understanding of the proper use and possible adverse effects of doxycycline.  All of the patient's questions and concerns were addressed. High Dose Vitamin A Pregnancy And Lactation Text: High dose vitamin A therapy is contraindicated during pregnancy and breast feeding. Isotretinoin Counseling: Patient should get monthly blood tests, not donate blood, not drive at night if vision affected, not share medication, and not undergo elective surgery for 6 months after tx completed. Side effects reviewed, pt to contact office should one occur. Tazorac Counseling:  Patient advised that medication is irritating and drying.  Patient may need to apply sparingly and wash off after an hour before eventually leaving it on overnight.  The patient verbalized understanding of the proper use and possible adverse effects of tazorac.  All of the patient's questions and concerns were addressed. Azithromycin Counseling:  I discussed with the patient the risks of azithromycin including but not limited to GI upset, allergic reaction, drug rash, diarrhea, and yeast infections. Benzoyl Peroxide Pregnancy And Lactation Text: This medication is Pregnancy Category C. It is unknown if benzoyl peroxide is excreted in breast milk. Benzoyl Peroxide Counseling: Patient counseled that medicine may cause skin irritation and bleach clothing.  In the event of skin irritation, the patient was advised to reduce the amount of the drug applied or use it less frequently.   The patient verbalized understanding of the proper use and possible adverse effects of benzoyl peroxide.  All of the patient's questions and concerns were addressed. Detail Level: Zone High Dose Vitamin A Counseling: Side effects reviewed, pt to contact office should one occur. Bactrim Pregnancy And Lactation Text: This medication is Pregnancy Category D and is known to cause fetal risk.  It is also excreted in breast milk. yes/Terrence Gamez RDN Bactrim Counseling:  I discussed with the patient the risks of sulfa antibiotics including but not limited to GI upset, allergic reaction, drug rash, diarrhea, dizziness, photosensitivity, and yeast infections.  Rarely, more serious reactions can occur including but not limited to aplastic anemia, agranulocytosis, methemoglobinemia, blood dyscrasias, liver or kidney failure, lung infiltrates or desquamative/blistering drug rashes. Azithromycin Pregnancy And Lactation Text: This medication is considered safe during pregnancy and is also secreted in breast milk. Topical Retinoid counseling:  Patient advised to apply a pea-sized amount only at bedtime and wait 30 minutes after washing their face before applying.  If too drying, patient may add a non-comedogenic moisturizer. The patient verbalized understanding of the proper use and possible adverse effects of retinoids.  All of the patient's questions and concerns were addressed. Winlevi Counseling:  I discussed with the patient the risks of topical clascoterone including but not limited to erythema, scaling, itching, and stinging. Patient voiced their understanding. Winlevi Pregnancy And Lactation Text: This medication is considered safe during pregnancy and breastfeeding. Sarecycline Counseling: Patient advised regarding possible photosensitivity and discoloration of the teeth, skin, lips, tongue and gums.  Patient instructed to avoid sunlight, if possible.  When exposed to sunlight, patients should wear protective clothing, sunglasses, and sunscreen.  The patient was instructed to call the office immediately if the following severe adverse effects occur:  hearing changes, easy bruising/bleeding, severe headache, or vision changes.  The patient verbalized understanding of the proper use and possible adverse effects of sarecycline.  All of the patient's questions and concerns were addressed. Azelaic Acid Counseling: Patient counseled that medicine may cause skin irritation and to avoid applying near the eyes.  In the event of skin irritation, the patient was advised to reduce the amount of the drug applied or use it less frequently.   The patient verbalized understanding of the proper use and possible adverse effects of azelaic acid.  All of the patient's questions and concerns were addressed. Azelaic Acid Pregnancy And Lactation Text: This medication is considered safe during pregnancy and breast feeding.

## 2022-04-29 NOTE — CONSULT NOTE ADULT - CONSULT REQUESTED BY NAME
Dr. Martin Subjective:       Patient ID: Elizabeth Quan is a 87 y.o. female      Chief Complaint   Patient presents with    Blurred Vision     Patient Elizabeth Quan is an 87 year old female.     History of Present Illness  HPI     Blurred Vision      Additional comments: Patient Elizabeth Quan is an 87 year old female.              Comments     Pt referred by Dr. Aly lynch DFE/mOCT OU. Pt's daughter, Bozena, and   son-in-law, Erasmo, are present for visit.     Pt states that VA OU is constantly blurry at near.  Having a hard time   reading or writing    Pt's daughter states that pt complains of not being being able to see TV.   Pt's daughter states that pt complains of not being able to see her if she   is standing in front of the door in the kitchen. Pt's daughter states that   pt cannot see at distance in other parts of the house as well. Pt's   daughter and son-in-law state that pt cannot see to do activities that she   enjoys, such as reading or writing, due to blurry VA. Pt 's daughter   states that pt has to stand close to plants outdoors in order to see the   outline of them. Pt denies any eye pain, flashes, or floaters.    DLS: 04/16/2018 with Dr. Olsen  HALLIE: 12/08/2021 with Dr. Lu    Meds: (+)AT's PRN OU (+)AREDS po once daily, restarted use 2 months ago   (previously discontinued use due to concerns about interactions with other   medications)     POHx:   1. Nonexudative age-related macular degeneration, bilateral, intermediate   dry stage  2. Posterior vitreous detachment of both eyes  3. Retinal macroaneurysm, left eye  4. PCIOL OU  5. Dry eye syndrome OU          Last edited by Hakan Olsen MD on 4/29/2022  6:22 PM. (History)        Imaging:    See report    Assessment/Plan:     1. Nonexudative age-related macular degeneration, bilateral, advanced atrophic with subfoveal involvement  Discussed vision limitation and advancing dry AMD  No signs of wet conversion  Bright lights  Recommend Low Vision  evaluation--scheduled    Discussed Dry and Wet AMD in detail  Recommend AREDS 2 Vitamins  RTC immediately PRN any changes in vision    - Posterior Segment OCT Retina-Both eyes    Follow up in about 6 months (around 10/29/2022), or if symptoms worsen or fail to improve, for Comprehensive Examination, OCT Mac.

## 2022-09-12 ENCOUNTER — APPOINTMENT (OUTPATIENT)
Dept: SPINE | Facility: CLINIC | Age: 62
End: 2022-09-12

## 2022-09-12 ENCOUNTER — APPOINTMENT (OUTPATIENT)
Dept: RADIOLOGY | Facility: CLINIC | Age: 62
End: 2022-09-12

## 2022-09-12 ENCOUNTER — OUTPATIENT (OUTPATIENT)
Dept: OUTPATIENT SERVICES | Facility: HOSPITAL | Age: 62
LOS: 1 days | End: 2022-09-12
Payer: COMMERCIAL

## 2022-09-12 ENCOUNTER — RESULT REVIEW (OUTPATIENT)
Age: 62
End: 2022-09-12

## 2022-09-12 VITALS
HEART RATE: 77 BPM | BODY MASS INDEX: 32.14 KG/M2 | HEIGHT: 66 IN | OXYGEN SATURATION: 95 % | SYSTOLIC BLOOD PRESSURE: 139 MMHG | DIASTOLIC BLOOD PRESSURE: 83 MMHG | WEIGHT: 200 LBS

## 2022-09-12 DIAGNOSIS — Z98.89 OTHER SPECIFIED POSTPROCEDURAL STATES: Chronic | ICD-10-CM

## 2022-09-12 DIAGNOSIS — M48.00 SPINAL STENOSIS, SITE UNSPECIFIED: ICD-10-CM

## 2022-09-12 DIAGNOSIS — Z98.890 OTHER SPECIFIED POSTPROCEDURAL STATES: Chronic | ICD-10-CM

## 2022-09-12 DIAGNOSIS — Z00.8 ENCOUNTER FOR OTHER GENERAL EXAMINATION: ICD-10-CM

## 2022-09-12 PROCEDURE — 72082 X-RAY EXAM ENTIRE SPI 2/3 VW: CPT

## 2022-09-12 PROCEDURE — 72082 X-RAY EXAM ENTIRE SPI 2/3 VW: CPT | Mod: 26

## 2022-09-12 PROCEDURE — 99213 OFFICE O/P EST LOW 20 MIN: CPT

## 2022-09-13 NOTE — ASSESSMENT
[FreeTextEntry1] : 62 year old female approximately 2 years post op T 10 to S1 fusion for thoracolumbar stenosis. Doing well. Pain free. RTO PRN.

## 2022-09-13 NOTE — REASON FOR VISIT
[Other: _____] : [unfilled] [FreeTextEntry1] : Ms Pang is here for a follow up. Approximately 2 years post op T 10 to S1 for thoracolumbar stenosis. Today she reports she is doing well. Denies any pain. Ambulating unassisted. She underwent right knee replacement in June 2022. She has upcoming left shoulder surgery. Recent x-rays show no change in hardware or alignment. \par  \par

## 2022-10-19 NOTE — PROGRESS NOTE BEHAVIORAL HEALTH - SUMMARY
For information on Fall & Injury Prevention, visit: [email or web address] OR  [email or web address] OR  https://www.cdc.gov/steadi/patient.html
60 year old female with PMHx HTN, hyperlipidemia, osteoarthritis, anxiety/depression disorders, GERD, eczema, sinusitis, scoliosis/spinal stenosis- lumbar region. Pt currently on Cymbalta 120mg PO Q daily and states her mood is stable on it. Recommend to continue Cymbalta at current dosage. Today she offers no new complaints. States her sleep has improved with Vistaril 50mg PO QHS.   Psychiatry will continue to follow to provide emotional support.

## 2023-02-13 NOTE — PROGRESS NOTE ADULT - PROVIDER SPECIALTY LIST ADULT
Hospitalist Griseofulvin Counseling:  I discussed with the patient the risks of griseofulvin including but not limited to photosensitivity, cytopenia, liver damage, nausea/vomiting and severe allergy.  The patient understands that this medication is best absorbed when taken with a fatty meal (e.g., ice cream or french fries).

## 2023-02-17 NOTE — PATIENT PROFILE ADULT - .
Stop the amoxicillin and take the new antibiotic clindamycin 4 times a day starting tomorrow. Prednisone daily for the next 3 days to help reduce swelling/inflammation. Tylenol or ibuprofen for pain.
05-Nov-2020 13:03:03

## 2024-12-03 ENCOUNTER — RESULT REVIEW (OUTPATIENT)
Age: 64
End: 2024-12-03
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
## 2024-12-07 ENCOUNTER — OUTPATIENT (OUTPATIENT)
Dept: OUTPATIENT SERVICES | Facility: HOSPITAL | Age: 64
LOS: 1 days | End: 2024-12-07
Payer: MEDICARE

## 2024-12-07 ENCOUNTER — APPOINTMENT (OUTPATIENT)
Dept: RADIOLOGY | Facility: IMAGING CENTER | Age: 64
End: 2024-12-07
Payer: MEDICARE

## 2024-12-07 DIAGNOSIS — Z98.890 OTHER SPECIFIED POSTPROCEDURAL STATES: Chronic | ICD-10-CM

## 2024-12-07 DIAGNOSIS — Z00.8 ENCOUNTER FOR OTHER GENERAL EXAMINATION: ICD-10-CM

## 2024-12-07 DIAGNOSIS — Z98.1 ARTHRODESIS STATUS: ICD-10-CM

## 2024-12-07 DIAGNOSIS — Z98.89 OTHER SPECIFIED POSTPROCEDURAL STATES: Chronic | ICD-10-CM

## 2024-12-07 PROCEDURE — 72082 X-RAY EXAM ENTIRE SPI 2/3 VW: CPT | Mod: 26

## 2024-12-07 PROCEDURE — 72082 X-RAY EXAM ENTIRE SPI 2/3 VW: CPT

## 2024-12-09 ENCOUNTER — APPOINTMENT (OUTPATIENT)
Dept: SPINE | Facility: CLINIC | Age: 64
End: 2024-12-09
Payer: MEDICARE

## 2024-12-09 DIAGNOSIS — M54.30 SCIATICA, UNSPECIFIED SIDE: ICD-10-CM

## 2024-12-09 DIAGNOSIS — M41.9 SCOLIOSIS, UNSPECIFIED: ICD-10-CM

## 2024-12-09 DIAGNOSIS — M48.00 SPINAL STENOSIS, SITE UNSPECIFIED: ICD-10-CM

## 2024-12-09 DIAGNOSIS — M48.02 SPINAL STENOSIS, CERVICAL REGION: ICD-10-CM

## 2024-12-09 DIAGNOSIS — Z98.1 ARTHRODESIS STATUS: ICD-10-CM

## 2024-12-09 PROCEDURE — 99203 OFFICE O/P NEW LOW 30 MIN: CPT

## 2025-04-06 NOTE — REASON FOR VISIT
[Patient Optimized for Surgery] : Patient optimized for surgery [FreeTextEntry4] : Patient without signs or symptoms of active infection decompensated cardiopulmonary disease currently by history and exam; has excellent functional capacity.  Defer EKG to cardiology, CBC CMP BNP coags urinalysis with micro and culture identified no concerns. Low BNP indicates no strong indication for post op trops, EKGs.  Primary question is whether urology can do biopsy with aspirin on board, or whether he needs to be off both Plavix and aspirin for at least 5 to 10 days preprocedure.  Patient sees cardiology tomorrow. [FreeTextEntry6] : Confirm w cardiology pt ok to hold plavix 5 days before surgery.(baby ECASA OK, just hold AM of surg) [New Patient Visit] : a new patient visit [Other: _____] : [unfilled] [FreeTextEntry1] : Lumbar Stenosis

## 2025-04-09 ENCOUNTER — APPOINTMENT (OUTPATIENT)
Age: 65
End: 2025-04-09

## 2025-05-06 NOTE — PATIENT PROFILE ADULT - NSPROPASSIVESMOKEEXPOSURE_GEN_A_NUR
[Yes] : Yes [No falls in past year] : Patient reported no falls in the past year [0] : 2) Feeling down, depressed, or hopeless: Not at all (0) [PHQ-2 Negative - No further assessment needed] : PHQ-2 Negative - No further assessment needed [HIV test declined] : HIV test declined [With Family] : lives with family [Employed] : employed [Never] : Never [de-identified] : occasional [[id number]] : 0 [High Risk Behavior] : no high risk behavior [Reports changes in hearing] : Reports no changes in hearing [Reports changes in vision] : Reports no changes in vision [Reports changes in dental health] : Reports no changes in dental health [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

## 2025-05-25 NOTE — OCCUPATIONAL THERAPY INITIAL EVALUATION ADULT - BED MOBILITY LIMITATIONS, REHAB EVAL
Yes
decreased ability to use legs for bridging/pushing/impaired ability to control trunk for mobility/decreased ability to use arms for pushing/pulling